# Patient Record
Sex: MALE | Race: ASIAN | NOT HISPANIC OR LATINO | URBAN - METROPOLITAN AREA
[De-identification: names, ages, dates, MRNs, and addresses within clinical notes are randomized per-mention and may not be internally consistent; named-entity substitution may affect disease eponyms.]

---

## 2018-02-20 ENCOUNTER — INPATIENT (INPATIENT)
Facility: HOSPITAL | Age: 48
LOS: 0 days | Discharge: HOME | DRG: 251 | End: 2018-02-21
Attending: INTERNAL MEDICINE | Admitting: INTERNAL MEDICINE
Payer: MEDICAID

## 2018-02-20 VITALS
TEMPERATURE: 98 F | HEART RATE: 70 BPM | DIASTOLIC BLOOD PRESSURE: 94 MMHG | SYSTOLIC BLOOD PRESSURE: 161 MMHG | WEIGHT: 179.9 LBS | OXYGEN SATURATION: 99 % | RESPIRATION RATE: 18 BRPM

## 2018-02-20 DIAGNOSIS — Z98.61 CORONARY ANGIOPLASTY STATUS: Chronic | ICD-10-CM

## 2018-02-20 PROBLEM — E78.5 HYPERLIPIDEMIA, UNSPECIFIED: Chronic | Status: ACTIVE | Noted: 2018-02-16

## 2018-02-20 LAB
ALBUMIN SERPL ELPH-MCNC: 4.8 G/DL — SIGNIFICANT CHANGE UP (ref 3.3–5)
ALP SERPL-CCNC: 99 U/L — SIGNIFICANT CHANGE UP (ref 40–120)
ALT FLD-CCNC: 15 U/L — SIGNIFICANT CHANGE UP (ref 10–45)
ANION GAP SERPL CALC-SCNC: 14 MMOL/L — SIGNIFICANT CHANGE UP (ref 5–17)
APTT BLD: 37.2 SEC — SIGNIFICANT CHANGE UP (ref 27.5–37.4)
AST SERPL-CCNC: 19 U/L — SIGNIFICANT CHANGE UP (ref 10–40)
BASOPHILS NFR BLD AUTO: 0.8 % — SIGNIFICANT CHANGE UP (ref 0–2)
BILIRUB SERPL-MCNC: 0.7 MG/DL — SIGNIFICANT CHANGE UP (ref 0.2–1.2)
BUN SERPL-MCNC: 11 MG/DL — SIGNIFICANT CHANGE UP (ref 7–23)
CALCIUM SERPL-MCNC: 9.8 MG/DL — SIGNIFICANT CHANGE UP (ref 8.4–10.5)
CHLORIDE SERPL-SCNC: 97 MMOL/L — SIGNIFICANT CHANGE UP (ref 96–108)
CK MB CFR SERPL CALC: 1.7 NG/ML — SIGNIFICANT CHANGE UP (ref 0–6.7)
CK SERPL-CCNC: 302 U/L — HIGH (ref 30–200)
CO2 SERPL-SCNC: 26 MMOL/L — SIGNIFICANT CHANGE UP (ref 22–31)
CREAT SERPL-MCNC: 0.89 MG/DL — SIGNIFICANT CHANGE UP (ref 0.5–1.3)
EOSINOPHIL NFR BLD AUTO: 10 % — HIGH (ref 0–6)
GLUCOSE SERPL-MCNC: 154 MG/DL — HIGH (ref 70–99)
HCT VFR BLD CALC: 35.1 % — LOW (ref 39–50)
HGB BLD-MCNC: 11.1 G/DL — LOW (ref 13–17)
INR BLD: 1.05 — SIGNIFICANT CHANGE UP (ref 0.88–1.16)
LYMPHOCYTES # BLD AUTO: 41.8 % — SIGNIFICANT CHANGE UP (ref 13–44)
MCHC RBC-ENTMCNC: 22.7 PG — LOW (ref 27–34)
MCHC RBC-ENTMCNC: 31.6 G/DL — LOW (ref 32–36)
MCV RBC AUTO: 71.8 FL — LOW (ref 80–100)
MONOCYTES NFR BLD AUTO: 7.3 % — SIGNIFICANT CHANGE UP (ref 2–14)
NEUTROPHILS NFR BLD AUTO: 40.1 % — LOW (ref 43–77)
PLATELET # BLD AUTO: 184 K/UL — SIGNIFICANT CHANGE UP (ref 150–400)
POTASSIUM SERPL-MCNC: 3.9 MMOL/L — SIGNIFICANT CHANGE UP (ref 3.5–5.3)
POTASSIUM SERPL-SCNC: 3.9 MMOL/L — SIGNIFICANT CHANGE UP (ref 3.5–5.3)
PROT SERPL-MCNC: 8.4 G/DL — HIGH (ref 6–8.3)
PROTHROM AB SERPL-ACNC: 11.7 SEC — SIGNIFICANT CHANGE UP (ref 9.8–12.7)
RBC # BLD: 4.89 M/UL — SIGNIFICANT CHANGE UP (ref 4.2–5.8)
RBC # FLD: 15.7 % — SIGNIFICANT CHANGE UP (ref 10.3–16.9)
SODIUM SERPL-SCNC: 137 MMOL/L — SIGNIFICANT CHANGE UP (ref 135–145)
TROPONIN T SERPL-MCNC: <0.01 NG/ML — SIGNIFICANT CHANGE UP (ref 0–0.01)
WBC # BLD: 6.4 K/UL — SIGNIFICANT CHANGE UP (ref 3.8–10.5)
WBC # FLD AUTO: 6.4 K/UL — SIGNIFICANT CHANGE UP (ref 3.8–10.5)

## 2018-02-20 PROCEDURE — 93571 IV DOP VEL&/PRESS C FLO 1ST: CPT | Mod: 26,LC

## 2018-02-20 PROCEDURE — 93458 L HRT ARTERY/VENTRICLE ANGIO: CPT | Mod: 26,XU

## 2018-02-20 PROCEDURE — 71045 X-RAY EXAM CHEST 1 VIEW: CPT | Mod: 26

## 2018-02-20 PROCEDURE — 99285 EMERGENCY DEPT VISIT HI MDM: CPT | Mod: 25

## 2018-02-20 PROCEDURE — 93010 ELECTROCARDIOGRAM REPORT: CPT

## 2018-02-20 PROCEDURE — 92920 PRQ TRLUML C ANGIOP 1ART&/BR: CPT | Mod: LC

## 2018-02-20 RX ORDER — ATORVASTATIN CALCIUM 80 MG/1
40 TABLET, FILM COATED ORAL AT BEDTIME
Qty: 0 | Refills: 0 | Status: DISCONTINUED | OUTPATIENT
Start: 2018-02-20 | End: 2018-02-21

## 2018-02-20 RX ORDER — CLOPIDOGREL BISULFATE 75 MG/1
75 TABLET, FILM COATED ORAL ONCE
Qty: 0 | Refills: 0 | Status: COMPLETED | OUTPATIENT
Start: 2018-02-20 | End: 2018-02-20

## 2018-02-20 RX ORDER — CLOPIDOGREL BISULFATE 75 MG/1
0 TABLET, FILM COATED ORAL
Qty: 0 | Refills: 0 | COMMUNITY

## 2018-02-20 RX ORDER — ISOSORBIDE MONONITRATE 60 MG/1
30 TABLET, EXTENDED RELEASE ORAL ONCE
Qty: 0 | Refills: 0 | Status: COMPLETED | OUTPATIENT
Start: 2018-02-20 | End: 2018-02-20

## 2018-02-20 RX ORDER — TICAGRELOR 90 MG/1
180 TABLET ORAL ONCE
Qty: 0 | Refills: 0 | Status: COMPLETED | OUTPATIENT
Start: 2018-02-20 | End: 2018-02-20

## 2018-02-20 RX ORDER — ATORVASTATIN CALCIUM 80 MG/1
0 TABLET, FILM COATED ORAL
Qty: 0 | Refills: 0 | COMMUNITY

## 2018-02-20 RX ORDER — HEPARIN SODIUM 5000 [USP'U]/ML
5000 INJECTION INTRAVENOUS; SUBCUTANEOUS EVERY 6 HOURS
Qty: 0 | Refills: 0 | Status: DISCONTINUED | OUTPATIENT
Start: 2018-02-20 | End: 2018-02-20

## 2018-02-20 RX ORDER — SODIUM CHLORIDE 9 MG/ML
500 INJECTION INTRAMUSCULAR; INTRAVENOUS; SUBCUTANEOUS
Qty: 0 | Refills: 0 | Status: DISCONTINUED | OUTPATIENT
Start: 2018-02-20 | End: 2018-02-21

## 2018-02-20 RX ORDER — RANOLAZINE 500 MG/1
0 TABLET, FILM COATED, EXTENDED RELEASE ORAL
Qty: 0 | Refills: 0 | COMMUNITY

## 2018-02-20 RX ORDER — RANOLAZINE 500 MG/1
500 TABLET, FILM COATED, EXTENDED RELEASE ORAL
Qty: 0 | Refills: 0 | Status: DISCONTINUED | OUTPATIENT
Start: 2018-02-20 | End: 2018-02-21

## 2018-02-20 RX ORDER — ASPIRIN/CALCIUM CARB/MAGNESIUM 324 MG
0 TABLET ORAL
Qty: 0 | Refills: 0 | COMMUNITY

## 2018-02-20 RX ORDER — ISOSORBIDE MONONITRATE 60 MG/1
30 TABLET, EXTENDED RELEASE ORAL DAILY
Qty: 0 | Refills: 0 | Status: DISCONTINUED | OUTPATIENT
Start: 2018-02-20 | End: 2018-02-21

## 2018-02-20 RX ORDER — HEPARIN SODIUM 5000 [USP'U]/ML
INJECTION INTRAVENOUS; SUBCUTANEOUS
Qty: 25000 | Refills: 0 | Status: DISCONTINUED | OUTPATIENT
Start: 2018-02-20 | End: 2018-02-20

## 2018-02-20 RX ORDER — NITROGLYCERIN 6.5 MG
0 CAPSULE, EXTENDED RELEASE ORAL
Qty: 0 | Refills: 0 | COMMUNITY

## 2018-02-20 RX ORDER — INFLUENZA VIRUS VACCINE 15; 15; 15; 15 UG/.5ML; UG/.5ML; UG/.5ML; UG/.5ML
0.5 SUSPENSION INTRAMUSCULAR ONCE
Qty: 0 | Refills: 0 | Status: COMPLETED | OUTPATIENT
Start: 2018-02-20 | End: 2018-02-20

## 2018-02-20 RX ORDER — METOPROLOL TARTRATE 50 MG
25 TABLET ORAL
Qty: 0 | Refills: 0 | Status: DISCONTINUED | OUTPATIENT
Start: 2018-02-20 | End: 2018-02-21

## 2018-02-20 RX ORDER — METOPROLOL TARTRATE 50 MG
25 TABLET ORAL ONCE
Qty: 0 | Refills: 0 | Status: COMPLETED | OUTPATIENT
Start: 2018-02-20 | End: 2018-02-20

## 2018-02-20 RX ORDER — ISOSORBIDE MONONITRATE 60 MG/1
0 TABLET, EXTENDED RELEASE ORAL
Qty: 0 | Refills: 0 | COMMUNITY

## 2018-02-20 RX ORDER — HEPARIN SODIUM 5000 [USP'U]/ML
5000 INJECTION INTRAVENOUS; SUBCUTANEOUS ONCE
Qty: 0 | Refills: 0 | Status: COMPLETED | OUTPATIENT
Start: 2018-02-20 | End: 2018-02-20

## 2018-02-20 RX ORDER — CLOPIDOGREL BISULFATE 75 MG/1
75 TABLET, FILM COATED ORAL DAILY
Qty: 0 | Refills: 0 | Status: DISCONTINUED | OUTPATIENT
Start: 2018-02-21 | End: 2018-02-21

## 2018-02-20 RX ORDER — NITROGLYCERIN 6.5 MG
0.4 CAPSULE, EXTENDED RELEASE ORAL
Qty: 0 | Refills: 0 | Status: DISCONTINUED | OUTPATIENT
Start: 2018-02-20 | End: 2018-02-21

## 2018-02-20 RX ORDER — ASPIRIN/CALCIUM CARB/MAGNESIUM 324 MG
325 TABLET ORAL DAILY
Qty: 0 | Refills: 0 | Status: DISCONTINUED | OUTPATIENT
Start: 2018-02-20 | End: 2018-02-20

## 2018-02-20 RX ORDER — METOPROLOL TARTRATE 50 MG
0 TABLET ORAL
Qty: 0 | Refills: 0 | COMMUNITY

## 2018-02-20 RX ORDER — ASPIRIN/CALCIUM CARB/MAGNESIUM 324 MG
81 TABLET ORAL DAILY
Qty: 0 | Refills: 0 | Status: DISCONTINUED | OUTPATIENT
Start: 2018-02-20 | End: 2018-02-21

## 2018-02-20 RX ADMIN — HEPARIN SODIUM 1000 UNIT(S)/HR: 5000 INJECTION INTRAVENOUS; SUBCUTANEOUS at 10:53

## 2018-02-20 RX ADMIN — CLOPIDOGREL BISULFATE 75 MILLIGRAM(S): 75 TABLET, FILM COATED ORAL at 17:36

## 2018-02-20 RX ADMIN — RANOLAZINE 500 MILLIGRAM(S): 500 TABLET, FILM COATED, EXTENDED RELEASE ORAL at 17:35

## 2018-02-20 RX ADMIN — TICAGRELOR 180 MILLIGRAM(S): 90 TABLET ORAL at 10:45

## 2018-02-20 RX ADMIN — Medication 5 MILLIGRAM(S): at 10:46

## 2018-02-20 RX ADMIN — Medication 325 MILLIGRAM(S): at 10:45

## 2018-02-20 RX ADMIN — Medication 0.4 MILLIGRAM(S): at 10:46

## 2018-02-20 RX ADMIN — ISOSORBIDE MONONITRATE 30 MILLIGRAM(S): 60 TABLET, EXTENDED RELEASE ORAL at 10:45

## 2018-02-20 RX ADMIN — HEPARIN SODIUM 5000 UNIT(S): 5000 INJECTION INTRAVENOUS; SUBCUTANEOUS at 10:53

## 2018-02-20 RX ADMIN — SODIUM CHLORIDE 100 MILLILITER(S): 9 INJECTION INTRAMUSCULAR; INTRAVENOUS; SUBCUTANEOUS at 14:37

## 2018-02-20 RX ADMIN — Medication 25 MILLIGRAM(S): at 17:36

## 2018-02-20 RX ADMIN — Medication 25 MILLIGRAM(S): at 10:46

## 2018-02-20 NOTE — H&P ADULT - NSHPLABSRESULTS_GEN_ALL_CORE
11.1   6.4   )-----------( 184      ( 20 Feb 2018 10:37 )             35.1       02-20    137  |  97  |  11  ----------------------------<  154<H>  3.9   |  26  |  0.89    Ca    9.8      20 Feb 2018 10:37    TPro  8.4<H>  /  Alb  4.8  /  TBili  0.7  /  DBili  x   /  AST  19  /  ALT  15  /  AlkPhos  99  02-20      PT/INR - ( 20 Feb 2018 10:37 )   PT: 11.7 sec;   INR: 1.05          PTT - ( 20 Feb 2018 10:37 )  PTT:37.2 sec    CARDIAC MARKERS ( 20 Feb 2018 10:37 )  x     / <0.01 ng/mL / 302 U/L / x     / 1.7 ng/mL            EKG: NSR @ 72 with no ischemic changes

## 2018-02-20 NOTE — ED PROVIDER NOTE - PROGRESS NOTE DETAILS
Pt discussed w Dr Rivera - he is going to do cath today and pt tba to Dr Yu.  Pt discussed w Dr Yu who agrees w plan.

## 2018-02-20 NOTE — H&P ADULT - HISTORY OF PRESENT ILLNESS
47 year old male with PMH CAD with MI (2010) and multiple PCIs (most recently JUAN midLAD and OM1 9/2016), HTN, HLD, GERD and hemorrhoids   presented to [facility] ED on 2/20 complaining of intermittent, SSCP radiating to the left chest/shoulder with associated SOB, nausea, diaphoresis x 2 weeks. Patient reports pain comes both on exertion and at rest and is only relieved with NTG SL. Additionally, patient reports orthopnea and has been waking up at night with SOB and CP. Of note, patient reports has not taken any of his meds including ASA and plavix for the past 2 days. Denies palpitations, dizziness, syncope, LE edema, and melena. VS on arrival T 97.6  /94 HR 70 RR 18 SpO2 99% RA. Labs significant for H/H 11.1/35.1, , CKMB 1.7 and trop (-). In ED patient given  mg, Brilinta 180 mg, Enalapril 5 mg, Imdur 30mg, Metoprolol tartrate 25mg and started on IV Heparin gtt. Patient not admitted for recommended cardiac catheterization in the setting of unstable angina. 47 year old male with PMH CAD with MI (2010) and multiple PCIs (most recently JUAN midLAD and OM1 9/2016), HTN, HLD, GERD and hemorrhoids presented to [facility] ED on 2/20 complaining of intermittent, SSCP radiating to the left chest/shoulder with associated SOB, nausea, diaphoresis x 2 weeks. Patient reports pain comes both on exertion and at rest and is only relieved with NTG SL. Additionally, patient reports orthopnea and has been waking up at night with SOB and CP. Of note, patient reports has not taken any of his meds including ASA and plavix for the past 2 days. Denies palpitations, dizziness, syncope, LE edema, and melena. VS on arrival T 97.6  /94 HR 70 RR 18 SpO2 99% RA. Labs significant for H/H 11.1/35.1, , CKMB 1.7 and trop (-). EKG revealed NSR @ 72bpm without ischemic changes. In ED patient given  mg, Brilinta 180 mg, Enalapril 5 mg, Imdur 30mg, Metoprolol tartrate 25mg and started on IV Heparin gtt. Patient not admitted for recommended cardiac catheterization in the setting of unstable angina. 47 year old male with PMH CAD with MI (2010) and multiple PCIs (most recently JUAN midLAD and OM1 9/2016), HTN, HLD, GERD and hemorrhoids presented to [facility] ED on 2/20 complaining of intermittent, SSCP radiating to the left chest/shoulder with associated SOB, nausea, diaphoresis x 2 weeks. Patient reports pain comes both on exertion and at rest and is only relieved with NTG SL. Additionally, patient reports orthopnea and has been waking up at night with SOB and CP. Of note, patient reports has not taken any of his meds including ASA and plavix for the past 2 days. Denies palpitations, dizziness, syncope, LE edema, and melena. VS on arrival T 97.6  /94 HR 70 RR 18 SpO2 99% RA. Labs significant for H/H 11.1/35.1, , CKMB 1.7 and trop (-). EKG revealed NSR @ 72bpm without ischemic changes. In ED patient given  mg, Brilinta 180 mg, Enalapril 5 mg, Imdur 30mg, Metoprolol tartrate 25mg and started on IV Heparin gtt. Patient now admitted for recommended cardiac catheterization with possible intervention in the setting of unstable angina.

## 2018-02-20 NOTE — ED PROVIDER NOTE - PMH
Atherosclerosis of coronary artery  CAD (coronary artery disease)  Essential hypertension  HTN (hypertension)  Gastroesophageal reflux disease  GERD (gastroesophageal reflux disease)  Hyperlipidemia

## 2018-02-20 NOTE — ED PROVIDER NOTE - MEDICAL DECISION MAKING DETAILS
Pt c/o progressive cp worse w exertion concerning for acs.  EKG w/o stemi.  Pt reports he's supposed to have cath today.  Pt given ntg, bp meds (did not take his meds x 2 d), heparin, brilinta.  Dr Rivera paged to discuss.  Plan for admit for cath.

## 2018-02-20 NOTE — H&P ADULT - ASSESSMENT
47 year old male with PMH CAD with MI (2010) and multiple PCIs (most recently JUAN midLAD and OM1 9/2016), HTN, HLD, GERD and hemorrhoids presented to [facility] ED on 2/20 complaining of unstable anginal symptoms and is now admitted for cardiac catheterization with possible intervention.    ASA 3, Mallampati 2    OF NOTE: Patient was loaded with ASA 325mg and Brilinta 180mg in the ED, denies taking his ASA/plavix last two days. Difficulty lying flat. Case d/w Dr. Rivera.    Risks & benefits of procedure and alternative therapy have been explained to the patient including but not limited to: allergic reaction, bleeding w/possible need for blood transfusion, infection, renal and vascular compromise, limb damage, arrhythmia, stroke, vessel dissection/perforation, Myocardial infarction, emergent CABG. Informed consent obtained and in chart.

## 2018-02-20 NOTE — ED PROVIDER NOTE - DIAGNOSTIC INTERPRETATION
ER Physician:  Vivian Director  CHEST XRAY INTERPRETATION: lungs clear, heart shadow normal, bony structures intact

## 2018-02-20 NOTE — ED ADULT NURSE NOTE - OBJECTIVE STATEMENT
pt is a 47 year old male who was sent in by his cardiologist for 2 weeks of chest pain which has been worsening. pt states "I have a history of 8 stents, I have been having pain for 2 weeks which is getting worse and my doctor told me to come in so they could look at my heart again, nitroglycerin has not been working and I have not been able to take my medicatiosn for the past two days because my pharmacy didn't have the refills". pt reports intermittent chest pain associated with nausea, dizziness and shortness of breath. pt denies any cough, fevers, chills, vomiting or falls. pt is speaking in full sentences, in NAD, EKG was done, iv placed and labs sent. pt is a 47 year old male who was sent in by his cardiologist for 2 weeks of chest pain which has been worsening. pt states "I have a history of 8 stents, I have been having pain for 2 weeks which is getting worse and my doctor told me to come in so they could look at my heart again, nitroglycerin has not been working and I have not been able to take my medications for the past two days because my pharmacy didn't have the refills". pt reports intermittent chest pain associated with nausea, dizziness and shortness of breath. pt denies any cough, fevers, chills, vomiting or falls. pt is speaking in full sentences, in NAD, EKG was done, iv placed and labs sent.

## 2018-02-20 NOTE — ED PROVIDER NOTE - OBJECTIVE STATEMENT
48 yo male h/o HTN, hyperlipidemia, CAD s/p MI JUAN LAD/OM 2015 c/o progressively worsening midsternal chest pain that is described as --------that radiates to left shoulder and occurs with --------, relieved with SL NTG (stating he need to take more NTG) over the past 6 weeks.  Associated symptoms includes shortness of breath, 46 yo male h/o tob abuse, HTN, hyperlipidemia, CAD s/p MI JUAN LAD/OM 2015 c/o progressively worsening midsternal chest pain sharp w radiation to LUE similar to pain w prev cad related issues, worse w minimal exertion but also present at rest.  Some relief w ntg - last use this am, 7-->3/10 after ntg but returned to a 7.  + sob, no palpitations.  Pt saw his md, Dr Rivera, on Fri.  No meds x 2 d except ntg 2/2 prescription problem.  No fever, cough, uri sx, abd pain.  Pt states his md is planning a cath today.

## 2018-02-20 NOTE — ED ADULT NURSE REASSESSMENT NOTE - NS ED NURSE REASSESS COMMENT FT1
heparin infusion started at 10cc/hr. nsr on cm. reports given to cath lab rnyahir. awaiting transport.

## 2018-02-21 ENCOUNTER — TRANSCRIPTION ENCOUNTER (OUTPATIENT)
Age: 48
End: 2018-02-21

## 2018-02-21 VITALS — TEMPERATURE: 96 F

## 2018-02-21 LAB
ANION GAP SERPL CALC-SCNC: 13 MMOL/L — SIGNIFICANT CHANGE UP (ref 5–17)
BASOPHILS NFR BLD AUTO: 0.3 % — SIGNIFICANT CHANGE UP (ref 0–2)
BUN SERPL-MCNC: 14 MG/DL — SIGNIFICANT CHANGE UP (ref 7–23)
CALCIUM SERPL-MCNC: 9.2 MG/DL — SIGNIFICANT CHANGE UP (ref 8.4–10.5)
CHLORIDE SERPL-SCNC: 102 MMOL/L — SIGNIFICANT CHANGE UP (ref 96–108)
CHOLEST SERPL-MCNC: 151 MG/DL — SIGNIFICANT CHANGE UP (ref 10–199)
CO2 SERPL-SCNC: 22 MMOL/L — SIGNIFICANT CHANGE UP (ref 22–31)
CREAT SERPL-MCNC: 0.94 MG/DL — SIGNIFICANT CHANGE UP (ref 0.5–1.3)
EOSINOPHIL NFR BLD AUTO: 10.9 % — HIGH (ref 0–6)
GLUCOSE SERPL-MCNC: 125 MG/DL — HIGH (ref 70–99)
HCT VFR BLD CALC: 33.1 % — LOW (ref 39–50)
HDLC SERPL-MCNC: 32 MG/DL — LOW (ref 40–125)
HGB BLD-MCNC: 10.1 G/DL — LOW (ref 13–17)
LIPID PNL WITH DIRECT LDL SERPL: 90 MG/DL — SIGNIFICANT CHANGE UP
LYMPHOCYTES # BLD AUTO: 35 % — SIGNIFICANT CHANGE UP (ref 13–44)
MAGNESIUM SERPL-MCNC: 1.9 MG/DL — SIGNIFICANT CHANGE UP (ref 1.6–2.6)
MCHC RBC-ENTMCNC: 22 PG — LOW (ref 27–34)
MCHC RBC-ENTMCNC: 30.5 G/DL — LOW (ref 32–36)
MCV RBC AUTO: 72 FL — LOW (ref 80–100)
MONOCYTES NFR BLD AUTO: 7.6 % — SIGNIFICANT CHANGE UP (ref 2–14)
NEUTROPHILS NFR BLD AUTO: 46.2 % — SIGNIFICANT CHANGE UP (ref 43–77)
PLATELET # BLD AUTO: 173 K/UL — SIGNIFICANT CHANGE UP (ref 150–400)
POTASSIUM SERPL-MCNC: 4 MMOL/L — SIGNIFICANT CHANGE UP (ref 3.5–5.3)
POTASSIUM SERPL-SCNC: 4 MMOL/L — SIGNIFICANT CHANGE UP (ref 3.5–5.3)
RBC # BLD: 4.6 M/UL — SIGNIFICANT CHANGE UP (ref 4.2–5.8)
RBC # FLD: 16 % — SIGNIFICANT CHANGE UP (ref 10.3–16.9)
SODIUM SERPL-SCNC: 137 MMOL/L — SIGNIFICANT CHANGE UP (ref 135–145)
TOTAL CHOLESTEROL/HDL RATIO MEASUREMENT: 4.7 RATIO — SIGNIFICANT CHANGE UP (ref 3.4–9.6)
TRIGL SERPL-MCNC: 147 MG/DL — SIGNIFICANT CHANGE UP (ref 10–149)
WBC # BLD: 7 K/UL — SIGNIFICANT CHANGE UP (ref 3.8–10.5)
WBC # FLD AUTO: 7 K/UL — SIGNIFICANT CHANGE UP (ref 3.8–10.5)

## 2018-02-21 PROCEDURE — 99238 HOSP IP/OBS DSCHRG MGMT 30/<: CPT

## 2018-02-21 RX ORDER — RANOLAZINE 500 MG/1
1 TABLET, FILM COATED, EXTENDED RELEASE ORAL
Qty: 60 | Refills: 2
Start: 2018-02-21 | End: 2018-05-21

## 2018-02-21 RX ORDER — METOPROLOL TARTRATE 50 MG
1 TABLET ORAL
Qty: 60 | Refills: 2 | OUTPATIENT
Start: 2018-02-21 | End: 2018-05-21

## 2018-02-21 RX ORDER — CLOPIDOGREL BISULFATE 75 MG/1
1 TABLET, FILM COATED ORAL
Qty: 30 | Refills: 11
Start: 2018-02-21 | End: 2019-02-15

## 2018-02-21 RX ORDER — ASPIRIN/CALCIUM CARB/MAGNESIUM 324 MG
1 TABLET ORAL
Qty: 0 | Refills: 0 | COMMUNITY

## 2018-02-21 RX ORDER — ISOSORBIDE MONONITRATE 60 MG/1
1 TABLET, EXTENDED RELEASE ORAL
Qty: 0 | Refills: 0 | COMMUNITY

## 2018-02-21 RX ORDER — ASPIRIN/CALCIUM CARB/MAGNESIUM 324 MG
1 TABLET ORAL
Qty: 30 | Refills: 11
Start: 2018-02-21 | End: 2019-02-15

## 2018-02-21 RX ORDER — ATORVASTATIN CALCIUM 80 MG/1
1 TABLET, FILM COATED ORAL
Qty: 0 | Refills: 0 | COMMUNITY

## 2018-02-21 RX ORDER — CLOPIDOGREL BISULFATE 75 MG/1
1 TABLET, FILM COATED ORAL
Qty: 0 | Refills: 0 | COMMUNITY

## 2018-02-21 RX ORDER — RANOLAZINE 500 MG/1
1 TABLET, FILM COATED, EXTENDED RELEASE ORAL
Qty: 0 | Refills: 0 | COMMUNITY

## 2018-02-21 RX ORDER — ISOSORBIDE MONONITRATE 60 MG/1
1 TABLET, EXTENDED RELEASE ORAL
Qty: 30 | Refills: 2
Start: 2018-02-21 | End: 2018-05-21

## 2018-02-21 RX ORDER — ATORVASTATIN CALCIUM 80 MG/1
1 TABLET, FILM COATED ORAL
Qty: 30 | Refills: 2
Start: 2018-02-21 | End: 2018-05-21

## 2018-02-21 RX ORDER — METOPROLOL TARTRATE 50 MG
1 TABLET ORAL
Qty: 0 | Refills: 0 | COMMUNITY

## 2018-02-21 RX ORDER — MAGNESIUM OXIDE 400 MG ORAL TABLET 241.3 MG
400 TABLET ORAL ONCE
Qty: 0 | Refills: 0 | Status: COMPLETED | OUTPATIENT
Start: 2018-02-21 | End: 2018-02-21

## 2018-02-21 RX ADMIN — MAGNESIUM OXIDE 400 MG ORAL TABLET 400 MILLIGRAM(S): 241.3 TABLET ORAL at 10:00

## 2018-02-21 RX ADMIN — Medication 5 MILLIGRAM(S): at 06:40

## 2018-02-21 RX ADMIN — CLOPIDOGREL BISULFATE 75 MILLIGRAM(S): 75 TABLET, FILM COATED ORAL at 10:00

## 2018-02-21 RX ADMIN — Medication 81 MILLIGRAM(S): at 10:00

## 2018-02-21 RX ADMIN — RANOLAZINE 500 MILLIGRAM(S): 500 TABLET, FILM COATED, EXTENDED RELEASE ORAL at 06:40

## 2018-02-21 RX ADMIN — ISOSORBIDE MONONITRATE 30 MILLIGRAM(S): 60 TABLET, EXTENDED RELEASE ORAL at 10:00

## 2018-02-21 RX ADMIN — ATORVASTATIN CALCIUM 40 MILLIGRAM(S): 80 TABLET, FILM COATED ORAL at 00:09

## 2018-02-21 NOTE — DISCHARGE NOTE ADULT - HOSPITAL COURSE
47 year old male with PMH CAD with MI (2010) and multiple PCIs (most recently JUAN midLAD and OM1 9/2016), HTN, HLD, GERD and hemorrhoids presented to [facility] ED on 2/20 with c/o unstable angina.  Patient reports he has not taken any of his meds including ASA and plavix for the past 2 days due to running out of the prescriptions.  On arrival VSS.  Cardiac enzymes negative.  EKG revealed NSR @ 72bpm without ischemic changes. In ED patient given  mg, Brilinta 180 mg, Enalapril 5 mg, Imdur 30mg, Metoprolol tartrate 25mg and started on IV Heparin gtt.  Patient admitted for cardiac catheterization.  He now s/p cardiac catheterization 2/20/18 revealing 2VCAD, LM: normal, LAD: luminal irregularities, D1: patent stent, LCx: luminal irregularities, large dominant, OM1: medium vessel, 70% ISR, RCA: 70% mid vessel stenosis, non-dominant, small vessel, LVEF: 50%, LVEDP 4, No AS, Inferior hypokinesis. Successful PTCA of the mid OM1 70% ISR with < 5% residual stenosis.  Right Radial D-Stat Band 3:30pm.  Continue ASA & Plavix per Dr Rivera.  (received Brilinta load precath) 47 year old male with PMH CAD with MI (2010) and multiple PCIs (most recently JUAN midLAD and OM1 9/2016), HTN, HLD, GERD and hemorrhoids presented to [facility] ED on 2/20 with c/o unstable angina.  Patient reports he has not taken any of his meds including ASA and plavix for the past 2 days due to running out of the prescriptions.  On arrival VSS.  Cardiac enzymes negative.  EKG revealed NSR @ 72bpm without ischemic changes. In ED patient given  mg, Brilinta 180 mg, Enalapril 5 mg, Imdur 30mg, Metoprolol tartrate 25mg and started on IV Heparin gtt.  Patient admitted for cardiac catheterization.  He now s/p cardiac catheterization 2/20/18 revealing 2VCAD, LM: normal, LAD: luminal irregularities, D1: patent stent, LCx: luminal irregularities, large dominant, OM1: medium vessel, 70% ISR, RCA: 70% mid vessel stenosis, non-dominant, small vessel, LVEF: 50%, LVEDP 4, No AS, Inferior hypokinesis. s/p Successful PTCA of the mid OM1 70% ISR with < 5% residual stenosis.  Right Radial access site stable with no hematoma, bleeding, and pulses intact.  Patient recommended to continue home ASA/Plavix/Statin/BB and blood pressure medications as usual.  He is feeling well post procedure.  Labs, Vitals, Meds reviewed with dr. Pacheco and patient deemed stable for discharge home.  He will follow up with Dr. Rivera.  All medications E prescribed to pharmacy of choice as patient was without any new refills at home.

## 2018-02-21 NOTE — DISCHARGE NOTE ADULT - CARE PROVIDER_API CALL
Mathew Rivera), Cardiovascular Disease; Internal Medicine; Interventional Cardiology  [address] [place] [address]  Phone: [phone]  Fax: [phone]

## 2018-02-21 NOTE — DISCHARGE NOTE ADULT - CARE PLAN
Principal Discharge DX:	Coronary artery disease  Goal:	Please follow up with Dr. Rivera in 1-2 weeks.  Assessment and plan of treatment:	You underwent a cardiac catheterization receiving balooning and opening of a prior stent that was blocked (obtuse marginal stent)  - Please continue taking aspirin 81mg daily and Plavix 75mg daily.  DO NOT STOP THESE MEDICATIONS AS THEY PREVENT YOUR STENT FROM CLOSING.  A new prescritpion was sent to your pharmacy    You underwent a coronary angiogram and should wait 3 days before returning to ordinary activities. Do not drive for 2 days. Consult your doctor before returning to vigorous activity. You may return to work in 3-5 days. The catheter from your wrist was removed and you should remove the dressing in 24 hours. You may shower once the dressing is removed, but avoid baths, hot tubs, or swimming for 5 days to prevent infection. If you notice bleeding from the site, hardening and pain at the site, drainage or redness from the site, coolness/paleness of the extremity, swelling, or fever, please call [phone].  Secondary Diagnosis:	Essential hypertension  Goal:	Your blood pressure is stable.  Please continue taking Enalapril, Metoprolol, and Imdur as usual.  new prescriptions were sent for all your medications.  Secondary Diagnosis:	Hyperlipidemia  Goal:	Please continue taking Lipitor 40mg daily at bedtime. A new prescription was sent to your pharmacy. Principal Discharge DX:	Coronary artery disease  Goal:	Please follow up with Dr. Rivera in 1-2 weeks.  Assessment and plan of treatment:	You underwent a cardiac catheterization receiving ballooning and opening of a prior stent that was blocked (obtuse marginal stent)  - Please continue taking aspirin 81mg daily and Plavix 75mg daily.  DO NOT STOP THESE MEDICATIONS AS THEY PREVENT YOUR STENT FROM CLOSING.  A new prescritpion was sent to your pharmacy    You underwent a coronary angiogram and should wait 3 days before returning to ordinary activities. Do not drive for 2 days. Consult your doctor before returning to vigorous activity. You may return to work in 3-5 days. The catheter from your wrist was removed and you should remove the dressing in 24 hours. You may shower once the dressing is removed, but avoid baths, hot tubs, or swimming for 5 days to prevent infection. If you notice bleeding from the site, hardening and pain at the site, drainage or redness from the site, coolness/paleness of the extremity, swelling, or fever, please call [phone].  Secondary Diagnosis:	Essential hypertension  Goal:	Your blood pressure is stable.  Please continue taking Enalapril, Metoprolol, and Imdur as usual.  new prescriptions were sent for all your medications.  Secondary Diagnosis:	Hyperlipidemia  Goal:	Your cholesterol is stable.  Please continue taking Lipitor 40mg daily at bedtime. A new prescription was sent to your pharmacy.

## 2018-02-21 NOTE — DISCHARGE NOTE ADULT - PLAN OF CARE
Please follow up with Dr. Rivera in 1-2 weeks. You underwent a cardiac catheterization receiving balooning and opening of a prior stent that was blocked (obtuse marginal stent)  - Please continue taking aspirin 81mg daily and Plavix 75mg daily.  DO NOT STOP THESE MEDICATIONS AS THEY PREVENT YOUR STENT FROM CLOSING.  A new prescritpion was sent to your pharmacy    You underwent a coronary angiogram and should wait 3 days before returning to ordinary activities. Do not drive for 2 days. Consult your doctor before returning to vigorous activity. You may return to work in 3-5 days. The catheter from your wrist was removed and you should remove the dressing in 24 hours. You may shower once the dressing is removed, but avoid baths, hot tubs, or swimming for 5 days to prevent infection. If you notice bleeding from the site, hardening and pain at the site, drainage or redness from the site, coolness/paleness of the extremity, swelling, or fever, please call [phone]. Your blood pressure is stable.  Please continue taking Enalapril, Metoprolol, and Imdur as usual.  new prescriptions were sent for all your medications. Please continue taking Lipitor 40mg daily at bedtime. A new prescription was sent to your pharmacy. You underwent a cardiac catheterization receiving ballooning and opening of a prior stent that was blocked (obtuse marginal stent)  - Please continue taking aspirin 81mg daily and Plavix 75mg daily.  DO NOT STOP THESE MEDICATIONS AS THEY PREVENT YOUR STENT FROM CLOSING.  A new prescritpion was sent to your pharmacy    You underwent a coronary angiogram and should wait 3 days before returning to ordinary activities. Do not drive for 2 days. Consult your doctor before returning to vigorous activity. You may return to work in 3-5 days. The catheter from your wrist was removed and you should remove the dressing in 24 hours. You may shower once the dressing is removed, but avoid baths, hot tubs, or swimming for 5 days to prevent infection. If you notice bleeding from the site, hardening and pain at the site, drainage or redness from the site, coolness/paleness of the extremity, swelling, or fever, please call [phone]. Your cholesterol is stable.  Please continue taking Lipitor 40mg daily at bedtime. A new prescription was sent to your pharmacy.

## 2018-02-21 NOTE — DISCHARGE NOTE ADULT - MEDICATION SUMMARY - MEDICATIONS TO TAKE
I will START or STAY ON the medications listed below when I get home from the hospital:    aspirin 81 mg oral tablet  -- 1 tab(s) by mouth once a day  -- Indication: For Coronary artery disease    enalapril 5 mg oral tablet  -- 1 tab(s) by mouth once a day  -- Indication: For High blood pressure    isosorbide mononitrate 30 mg oral tablet, extended release  -- 1 tab(s) by mouth once a day (in the morning)  -- Indication: For High blood pressure    ranolazine 500 mg oral tablet, extended release  -- 1 tab(s) by mouth 2 times a day  -- Indication: For as needed for chest pain    nitroglycerin 0.4 mg sublingual tablet  -- 1 tab(s) by mouth every 5 minutes, As Needed CP  -- Indication: For as needed for chest pain    atorvastatin 40 mg oral tablet  -- 1 tab(s) by mouth once a day  -- Indication: For High cholesterol    clopidogrel 75 mg oral tablet  -- 1 tab(s) by mouth once a day  -- Indication: For Coronary artery disease    Metoprolol Tartrate 25 mg oral tablet  -- 1 tab(s) by mouth 2 times a day  -- Indication: For High blood pressure and heart rate

## 2018-02-21 NOTE — CONSULT NOTE ADULT - SUBJECTIVE AND OBJECTIVE BOX
CHIEF COMPLAINT: currently denies     HISTORY OF PRESENT ILLNESS:  47 year old male with PMH CAD with MI (2010) and multiple PCIs (most recently JUAN midLAD and OM1 9/2016), HTN, HLD, GERD and hemorrhoids presented to [facility] ED on 2/20 with c/o unstable angina. Now s/p cardiac catheterization 2/20/18 revealing 2VCAD, LM: normal, LAD: luminal irregularities, D1: patent stent, LCx: luminal irregularities, large dominant, OM1: medium vessel, 70% ISR, RCA: 70% mid vessel stenosis, non-dominant, small vessel, LVEF: 50%, LVEDP 4, No AS, Inferior hypokinesis. s/p successful PTCA of the mid OM1 70% ISR with < 5% residual stenosis.  Consulted for prevention.     PAST MEDICAL & SURGICAL HISTORY:  Hyperlipidemia  Essential hypertension: HTN (hypertension)  Gastroesophageal reflux disease: GERD (gastroesophageal reflux disease)  Atherosclerosis of coronary artery: CAD (coronary artery disease)  Coronary angioplasty status    FAMILY HISTORY:   No pertinent family history in first degree relatives    ALLERGIES: NKDA    HOME MEDICATIONS:  aspirin  chewable 81 milliGRAM(s) Oral daily  atorvastatin 40 milliGRAM(s) Oral at bedtime  clopidogrel Tablet 75 milliGRAM(s) Oral daily  enalapril 5 milliGRAM(s) Oral daily  isosorbide   mononitrate ER Tablet (IMDUR) 30 milliGRAM(s) Oral daily  metoprolol     tartrate 25 milliGRAM(s) Oral two times a day  nitroglycerin     SubLingual 0.4 milliGRAM(s) SubLingual every 5 minutes PRN Chest Pain  ranolazine 500 milliGRAM(s) Oral two times a day  sodium chloride 0.9%. 500 milliLiter(s) IV Continuous <Continuous>  	    PHYSICAL EXAM:  T(C): 35.8 (02-21-18 @ 09:51), Max: 36.6 (02-20-18 @ 22:05)  T(F): 96.5 (02-21-18 @ 09:51), Max: 97.9 (02-20-18 @ 22:05)  HR: 70 (02-21-18 @ 08:30) (55 - 78)  BP: 108/65 (02-21-18 @ 08:30) (101/57 - 139/77)  RR: 16 (02-21-18 @ 08:30) (16 - 20)  SpO2: 98% (02-21-18 @ 08:30) (97% - 100%)  Wt(kg): --  Height (cm): 167.6 (02-20 @ 15:23)  Weight (kg): 81.6 (02-20 @ 10:12)  BMI (kg/m2): 29 (02-20 @ 15:23)  	  LABS:	                        10.1   7.0   )-----------( 173      ( 21 Feb 2018 07:36 )             33.1     02-21    137  |  102  |  14  ----------------------------<  125<H>  4.0   |  22  |  0.94    Ca    9.2      21 Feb 2018 07:36  Mg     1.9     02-21    TPro  8.4<H>  /  Alb  4.8  /  TBili  0.7  /  DBili  x   /  AST  19  /  ALT  15  /  AlkPhos  99  02-20    Cholesterol, Serum: 151 mg/dL (02-21 @ 07:36)  HDL Cholesterol, Serum: 32 mg/dL (02-21 @ 07:36)  Triglycerides, Serum: 147 mg/dL (02-21 @ 07:36)  Direct LDL: 90 mg/dL (02-21 @ 07:36)      10 "S" ASSESSMENT PLAN: SMOKING, SITTING, SUGAR, SALT, SOME FATS, SOCIAL, SLEEP, SIGNS, AND MEDS:  Tobacco usage: non smoker  Stress: mild to moderate  ETOH: non dirnker  Caffeine: no coffee, tea, soft drinks  Sleep Disorder: 6-8 hours, trouble falling to sleep at times  Inflammatory Condition: denies cancer/autoimmune/connective tissue   Activity Level: very active during day, but does not designate 30 minutes to moderate exercise daily  Heart Failure: denies sx's  Myopathy with Statins: denies  GI/ Issues: denies n/v/c/d  Current Diet:  Br: porridge with sugar, oatmeal with sugar, fruit  Lunch: fruit, potatoes, white toast with butter  Dinner: seafood (fish, shrimp), avocado, vegetable, fried foods occasionally -- uses butter, corn oil  S/D: fruit    ASSESSMENT/RECOMMENDATIONS: 	  Summary:   47 year old male with PMH CAD with MI (2010) and multiple PCIs (most recently JUAN midLAD and OM1 9/2016), HTN, HLD, GERD and hemorrhoids presented to [facility] ED on 2/20 with c/o unstable angina. Now s/p cardiac catheterization 2/20/18 revealing 2VCAD, LM: normal, LAD: luminal irregularities, D1: patent stent, LCx: luminal irregularities, large dominant, OM1: medium vessel, 70% ISR, RCA: 70% mid vessel stenosis, non-dominant, small vessel, LVEF: 50%, LVEDP 4, No AS, Inferior hypokinesis. s/p successful PTCA of the mid OM1 70% ISR with < 5% residual stenosis.  Consulted for prevention.     RECOMMENDATIONS:   Anti-platelet Therapy: DAPT per interventionalist recommendation.   Lipid Therapy: High dose statin, LDL not at goal for CAD, ?DM hx.   Beta Blocker Therapy: Continue current therapy per your discretion.   ACE/ARB Therapy: Continue current therapy per your discretion.   Diet: Low-sodium, low-carbohydrate, Mediterranean diet. Written instruction on the Mediterranean diet was provided. Discussed hidden sugars in his current diet and ways to modify, also discussed olive or canola over corn oil, and to use butter sparingly.   Exercise: 30-45 minutes most days of the week once cleared to do so by their referring cardiologist.  Recommend cardiac rehab -- set up through Dr. Rivera  Smoking: This patient is a non-smoker.   Stress Management: Instruction on mindfulness meditation was provided.   Sleep: Continue goal of 7-8 hours, may benefit from a sleep study.   Other: FSBS have been high and pt consumes moderate refined sugars -- concerned for diabetes risk. A1c pending --- will f/u with hid cardiologist/pmd when lab results. We discussed the benefits of diet modification and exercise and weight loss to help lower DM risk.     Thank you for the opportunity to see this patient. Please feel free to contact [facility] if there are any questions, or if you feel that your patient would benefit from continued follow-up visits with the Program.

## 2018-02-21 NOTE — DISCHARGE NOTE ADULT - PATIENT PORTAL LINK FT
You can access the [facility], offered by [facility], by registering with the following website: [email or web address]

## 2018-03-08 DIAGNOSIS — I25.10 ATHEROSCLEROTIC HEART DISEASE OF NATIVE CORONARY ARTERY WITHOUT ANGINA PECTORIS: ICD-10-CM

## 2018-03-08 DIAGNOSIS — I10 ESSENTIAL (PRIMARY) HYPERTENSION: ICD-10-CM

## 2018-03-08 DIAGNOSIS — Z79.82 LONG TERM (CURRENT) USE OF ASPIRIN: ICD-10-CM

## 2018-03-08 DIAGNOSIS — Z95.5 PRESENCE OF CORONARY ANGIOPLASTY IMPLANT AND GRAFT: ICD-10-CM

## 2018-03-08 DIAGNOSIS — Z79.02 LONG TERM (CURRENT) USE OF ANTITHROMBOTICS/ANTIPLATELETS: ICD-10-CM

## 2018-03-08 DIAGNOSIS — K21.9 GASTRO-ESOPHAGEAL REFLUX DISEASE WITHOUT ESOPHAGITIS: ICD-10-CM

## 2018-03-08 DIAGNOSIS — F17.200 NICOTINE DEPENDENCE, UNSPECIFIED, UNCOMPLICATED: ICD-10-CM

## 2018-03-08 DIAGNOSIS — E78.5 HYPERLIPIDEMIA, UNSPECIFIED: ICD-10-CM

## 2018-05-23 PROCEDURE — 96374 THER/PROPH/DIAG INJ IV PUSH: CPT

## 2018-05-23 PROCEDURE — 85025 COMPLETE CBC W/AUTO DIFF WBC: CPT

## 2018-05-23 PROCEDURE — 80061 LIPID PANEL: CPT

## 2018-05-23 PROCEDURE — 80053 COMPREHEN METABOLIC PANEL: CPT

## 2018-05-23 PROCEDURE — C1769: CPT

## 2018-05-23 PROCEDURE — 85610 PROTHROMBIN TIME: CPT

## 2018-05-23 PROCEDURE — C1725: CPT

## 2018-05-23 PROCEDURE — 93005 ELECTROCARDIOGRAM TRACING: CPT

## 2018-05-23 PROCEDURE — C1887: CPT

## 2018-05-23 PROCEDURE — 85730 THROMBOPLASTIN TIME PARTIAL: CPT

## 2018-05-23 PROCEDURE — 71045 X-RAY EXAM CHEST 1 VIEW: CPT

## 2018-05-23 PROCEDURE — 36415 COLL VENOUS BLD VENIPUNCTURE: CPT

## 2018-05-23 PROCEDURE — 82550 ASSAY OF CK (CPK): CPT

## 2018-05-23 PROCEDURE — 99285 EMERGENCY DEPT VISIT HI MDM: CPT | Mod: 25

## 2018-05-23 PROCEDURE — 84484 ASSAY OF TROPONIN QUANT: CPT

## 2018-05-23 PROCEDURE — 83735 ASSAY OF MAGNESIUM: CPT

## 2018-05-23 PROCEDURE — 80048 BASIC METABOLIC PNL TOTAL CA: CPT

## 2018-05-23 PROCEDURE — 82553 CREATINE MB FRACTION: CPT

## 2019-01-14 ENCOUNTER — INPATIENT (INPATIENT)
Facility: HOSPITAL | Age: 49
LOS: 0 days | Discharge: HOME | DRG: 251 | End: 2019-01-15
Attending: INTERNAL MEDICINE | Admitting: INTERNAL MEDICINE
Payer: MEDICAID

## 2019-01-14 VITALS
TEMPERATURE: 97 F | DIASTOLIC BLOOD PRESSURE: 82 MMHG | WEIGHT: 164.91 LBS | RESPIRATION RATE: 18 BRPM | OXYGEN SATURATION: 98 % | HEART RATE: 60 BPM | SYSTOLIC BLOOD PRESSURE: 120 MMHG | HEIGHT: 63 IN

## 2019-01-14 DIAGNOSIS — Z98.61 CORONARY ANGIOPLASTY STATUS: Chronic | ICD-10-CM

## 2019-01-14 DIAGNOSIS — K64.9 UNSPECIFIED HEMORRHOIDS: ICD-10-CM

## 2019-01-14 DIAGNOSIS — I10 ESSENTIAL (PRIMARY) HYPERTENSION: ICD-10-CM

## 2019-01-14 DIAGNOSIS — R07.9 CHEST PAIN, UNSPECIFIED: ICD-10-CM

## 2019-01-14 DIAGNOSIS — E78.5 HYPERLIPIDEMIA, UNSPECIFIED: ICD-10-CM

## 2019-01-14 DIAGNOSIS — K21.9 GASTRO-ESOPHAGEAL REFLUX DISEASE WITHOUT ESOPHAGITIS: ICD-10-CM

## 2019-01-14 LAB
ALBUMIN SERPL ELPH-MCNC: 4.6 G/DL — SIGNIFICANT CHANGE UP (ref 3.3–5)
ALP SERPL-CCNC: 100 U/L — SIGNIFICANT CHANGE UP (ref 40–120)
ALT FLD-CCNC: 12 U/L — SIGNIFICANT CHANGE UP (ref 10–45)
ANION GAP SERPL CALC-SCNC: 14 MMOL/L — SIGNIFICANT CHANGE UP (ref 5–17)
APTT BLD: 32.9 SEC — SIGNIFICANT CHANGE UP (ref 27.5–36.3)
AST SERPL-CCNC: 14 U/L — SIGNIFICANT CHANGE UP (ref 10–40)
BASOPHILS NFR BLD AUTO: 0.4 % — SIGNIFICANT CHANGE UP (ref 0–2)
BILIRUB SERPL-MCNC: 0.6 MG/DL — SIGNIFICANT CHANGE UP (ref 0.2–1.2)
BUN SERPL-MCNC: 15 MG/DL — SIGNIFICANT CHANGE UP (ref 7–23)
CALCIUM SERPL-MCNC: 9.3 MG/DL — SIGNIFICANT CHANGE UP (ref 8.4–10.5)
CHLORIDE SERPL-SCNC: 103 MMOL/L — SIGNIFICANT CHANGE UP (ref 96–108)
CK MB CFR SERPL CALC: 1.2 NG/ML — SIGNIFICANT CHANGE UP (ref 0–6.7)
CK SERPL-CCNC: 155 U/L — SIGNIFICANT CHANGE UP (ref 30–200)
CO2 SERPL-SCNC: 24 MMOL/L — SIGNIFICANT CHANGE UP (ref 22–31)
CREAT SERPL-MCNC: 0.89 MG/DL — SIGNIFICANT CHANGE UP (ref 0.5–1.3)
EOSINOPHIL NFR BLD AUTO: 11.2 % — HIGH (ref 0–6)
GLUCOSE SERPL-MCNC: 147 MG/DL — HIGH (ref 70–99)
HCT VFR BLD CALC: 34 % — LOW (ref 39–50)
HGB BLD-MCNC: 10.3 G/DL — LOW (ref 13–17)
INR BLD: 1.01 — SIGNIFICANT CHANGE UP (ref 0.88–1.16)
LYMPHOCYTES # BLD AUTO: 25.6 % — SIGNIFICANT CHANGE UP (ref 13–44)
MCHC RBC-ENTMCNC: 22.3 PG — LOW (ref 27–34)
MCHC RBC-ENTMCNC: 30.3 G/DL — LOW (ref 32–36)
MCV RBC AUTO: 73.6 FL — LOW (ref 80–100)
MONOCYTES NFR BLD AUTO: 7.7 % — SIGNIFICANT CHANGE UP (ref 2–14)
NEUTROPHILS NFR BLD AUTO: 55.1 % — SIGNIFICANT CHANGE UP (ref 43–77)
PLATELET # BLD AUTO: 228 K/UL — SIGNIFICANT CHANGE UP (ref 150–400)
POTASSIUM SERPL-MCNC: 3.9 MMOL/L — SIGNIFICANT CHANGE UP (ref 3.5–5.3)
POTASSIUM SERPL-SCNC: 3.9 MMOL/L — SIGNIFICANT CHANGE UP (ref 3.5–5.3)
PROT SERPL-MCNC: 7.9 G/DL — SIGNIFICANT CHANGE UP (ref 6–8.3)
PROTHROM AB SERPL-ACNC: 11.4 SEC — SIGNIFICANT CHANGE UP (ref 10–12.9)
RBC # BLD: 4.62 M/UL — SIGNIFICANT CHANGE UP (ref 4.2–5.8)
RBC # FLD: 15.1 % — SIGNIFICANT CHANGE UP (ref 10.3–16.9)
SODIUM SERPL-SCNC: 141 MMOL/L — SIGNIFICANT CHANGE UP (ref 135–145)
TROPONIN T SERPL-MCNC: <0.01 NG/ML — SIGNIFICANT CHANGE UP (ref 0–0.01)
WBC # BLD: 9.3 K/UL — SIGNIFICANT CHANGE UP (ref 3.8–10.5)
WBC # FLD AUTO: 9.3 K/UL — SIGNIFICANT CHANGE UP (ref 3.8–10.5)

## 2019-01-14 PROCEDURE — 99223 1ST HOSP IP/OBS HIGH 75: CPT | Mod: AI

## 2019-01-14 PROCEDURE — 71045 X-RAY EXAM CHEST 1 VIEW: CPT | Mod: 26

## 2019-01-14 PROCEDURE — 99285 EMERGENCY DEPT VISIT HI MDM: CPT | Mod: 25

## 2019-01-14 PROCEDURE — 93010 ELECTROCARDIOGRAM REPORT: CPT

## 2019-01-14 PROCEDURE — 93458 L HRT ARTERY/VENTRICLE ANGIO: CPT | Mod: 26,XU

## 2019-01-14 PROCEDURE — 92920 PRQ TRLUML C ANGIOP 1ART&/BR: CPT | Mod: LC

## 2019-01-14 RX ORDER — ASPIRIN/CALCIUM CARB/MAGNESIUM 324 MG
81 TABLET ORAL DAILY
Qty: 0 | Refills: 0 | Status: DISCONTINUED | OUTPATIENT
Start: 2019-01-15 | End: 2019-01-15

## 2019-01-14 RX ORDER — POTASSIUM CHLORIDE 20 MEQ
20 PACKET (EA) ORAL ONCE
Qty: 0 | Refills: 0 | Status: COMPLETED | OUTPATIENT
Start: 2019-01-14 | End: 2019-01-14

## 2019-01-14 RX ORDER — CARVEDILOL PHOSPHATE 80 MG/1
6.25 CAPSULE, EXTENDED RELEASE ORAL EVERY 12 HOURS
Qty: 0 | Refills: 0 | Status: DISCONTINUED | OUTPATIENT
Start: 2019-01-14 | End: 2019-01-15

## 2019-01-14 RX ORDER — RANOLAZINE 500 MG/1
500 TABLET, FILM COATED, EXTENDED RELEASE ORAL
Qty: 0 | Refills: 0 | Status: DISCONTINUED | OUTPATIENT
Start: 2019-01-14 | End: 2019-01-15

## 2019-01-14 RX ORDER — SODIUM CHLORIDE 9 MG/ML
500 INJECTION INTRAMUSCULAR; INTRAVENOUS; SUBCUTANEOUS
Qty: 0 | Refills: 0 | Status: DISCONTINUED | OUTPATIENT
Start: 2019-01-14 | End: 2019-01-15

## 2019-01-14 RX ORDER — ASPIRIN/CALCIUM CARB/MAGNESIUM 324 MG
325 TABLET ORAL ONCE
Qty: 0 | Refills: 0 | Status: COMPLETED | OUTPATIENT
Start: 2019-01-14 | End: 2019-01-14

## 2019-01-14 RX ORDER — CHLORHEXIDINE GLUCONATE 213 G/1000ML
1 SOLUTION TOPICAL ONCE
Qty: 0 | Refills: 0 | Status: DISCONTINUED | OUTPATIENT
Start: 2019-01-14 | End: 2019-01-14

## 2019-01-14 RX ORDER — ISOSORBIDE MONONITRATE 60 MG/1
30 TABLET, EXTENDED RELEASE ORAL DAILY
Qty: 0 | Refills: 0 | Status: DISCONTINUED | OUTPATIENT
Start: 2019-01-14 | End: 2019-01-15

## 2019-01-14 RX ORDER — INFLUENZA VIRUS VACCINE 15; 15; 15; 15 UG/.5ML; UG/.5ML; UG/.5ML; UG/.5ML
0.5 SUSPENSION INTRAMUSCULAR ONCE
Qty: 0 | Refills: 0 | Status: COMPLETED | OUTPATIENT
Start: 2019-01-14 | End: 2019-01-14

## 2019-01-14 RX ORDER — PANTOPRAZOLE SODIUM 20 MG/1
40 TABLET, DELAYED RELEASE ORAL
Qty: 0 | Refills: 0 | Status: DISCONTINUED | OUTPATIENT
Start: 2019-01-14 | End: 2019-01-15

## 2019-01-14 RX ORDER — CLOPIDOGREL BISULFATE 75 MG/1
75 TABLET, FILM COATED ORAL ONCE
Qty: 0 | Refills: 0 | Status: COMPLETED | OUTPATIENT
Start: 2019-01-14 | End: 2019-01-14

## 2019-01-14 RX ORDER — SODIUM CHLORIDE 9 MG/ML
500 INJECTION INTRAMUSCULAR; INTRAVENOUS; SUBCUTANEOUS
Qty: 0 | Refills: 0 | Status: DISCONTINUED | OUTPATIENT
Start: 2019-01-14 | End: 2019-01-14

## 2019-01-14 RX ORDER — HYDROCORTISONE 1 %
1 OINTMENT (GRAM) TOPICAL
Qty: 0 | Refills: 0 | Status: DISCONTINUED | OUTPATIENT
Start: 2019-01-14 | End: 2019-01-15

## 2019-01-14 RX ORDER — DOCUSATE SODIUM 100 MG
100 CAPSULE ORAL
Qty: 0 | Refills: 0 | Status: DISCONTINUED | OUTPATIENT
Start: 2019-01-14 | End: 2019-01-15

## 2019-01-14 RX ORDER — ATORVASTATIN CALCIUM 80 MG/1
80 TABLET, FILM COATED ORAL AT BEDTIME
Qty: 0 | Refills: 0 | Status: DISCONTINUED | OUTPATIENT
Start: 2019-01-14 | End: 2019-01-15

## 2019-01-14 RX ORDER — CLOPIDOGREL BISULFATE 75 MG/1
75 TABLET, FILM COATED ORAL DAILY
Qty: 0 | Refills: 0 | Status: DISCONTINUED | OUTPATIENT
Start: 2019-01-15 | End: 2019-01-15

## 2019-01-14 RX ORDER — ATORVASTATIN CALCIUM 80 MG/1
40 TABLET, FILM COATED ORAL AT BEDTIME
Qty: 0 | Refills: 0 | Status: DISCONTINUED | OUTPATIENT
Start: 2019-01-14 | End: 2019-01-14

## 2019-01-14 RX ADMIN — CLOPIDOGREL BISULFATE 75 MILLIGRAM(S): 75 TABLET, FILM COATED ORAL at 07:51

## 2019-01-14 RX ADMIN — CARVEDILOL PHOSPHATE 6.25 MILLIGRAM(S): 80 CAPSULE, EXTENDED RELEASE ORAL at 18:08

## 2019-01-14 RX ADMIN — RANOLAZINE 500 MILLIGRAM(S): 500 TABLET, FILM COATED, EXTENDED RELEASE ORAL at 18:08

## 2019-01-14 RX ADMIN — Medication 100 MILLIGRAM(S): at 18:08

## 2019-01-14 RX ADMIN — PANTOPRAZOLE SODIUM 40 MILLIGRAM(S): 20 TABLET, DELAYED RELEASE ORAL at 18:08

## 2019-01-14 RX ADMIN — Medication 325 MILLIGRAM(S): at 07:51

## 2019-01-14 RX ADMIN — ATORVASTATIN CALCIUM 80 MILLIGRAM(S): 80 TABLET, FILM COATED ORAL at 22:13

## 2019-01-14 RX ADMIN — Medication 5 MILLIGRAM(S): at 19:27

## 2019-01-14 RX ADMIN — Medication 20 MILLIEQUIVALENT(S): at 14:20

## 2019-01-14 RX ADMIN — SODIUM CHLORIDE 75 MILLILITER(S): 9 INJECTION INTRAMUSCULAR; INTRAVENOUS; SUBCUTANEOUS at 14:10

## 2019-01-14 RX ADMIN — ISOSORBIDE MONONITRATE 30 MILLIGRAM(S): 60 TABLET, EXTENDED RELEASE ORAL at 14:20

## 2019-01-14 NOTE — H&P ADULT - PROBLEM SELECTOR PLAN 1
Currently CP free and hemodynamically stable  - h/o CAD with MI in 2010 and multiple PCIs  - Last PCI @ [facility] on 8/13/18: mLAD stent widely patent, D1 stent widely patent, OM1 99% ISR s/p BRENNEN --> 20%, mRCA stent widely patent but with discrete 30% stenosis abd with second lesion with 65% stenosis. , EF 45%  - ECHO (8/14/18) revealing EF 55%, mild concentric hypertrophy, trace MR.  - Plan for cardiac cath on 1/14/19 with Dr. Rivera. Loaded with  mg x1 in ED and given home dose Plavix 75 mg PO x1. Pt endorses daily compliance with ASA/Plavix. Pt consented (in 11U PA room)  - c/w coreg 5.25 mg BID, enalapril 5 mg PO QD, ASA 81 mg PO QD, Plavix 75 mg PO QD Currently CP free and hemodynamically stable  - h/o CAD with MI in 2010 and multiple PCIs  - Last PCI @ [facility] on 8/13/18: mLAD stent widely patent, D1 stent widely patent, OM1 99% ISR s/p BRENNEN --> 20%, mRCA stent widely patent but with discrete 30% stenosis abd with second lesion with 65% stenosis. , EF 45%  - ECHO (8/14/18) revealing EF 55%, mild concentric hypertrophy, trace MR.  - Plan for cardiac cath on 1/14/19 with Dr. Rivera. Loaded with  mg x1 in ED and given home dose Plavix 75 mg PO x1. Pt endorses daily compliance with ASA/Plavix. Pt consented (in 11U PA room)  - c/w coreg 6.25 mg BID, enalapril 5 mg PO QD, ASA 81 mg PO QD, Plavix 75 mg PO QD

## 2019-01-14 NOTE — H&P ADULT - GASTROINTESTINAL DETAILS
normal/soft/no distention/no masses palpable/nontender normal/no distention/no masses palpable/bowel sounds normal/nontender/soft

## 2019-01-14 NOTE — H&P ADULT - NSHPLABSRESULTS_GEN_ALL_CORE
10.3   9.3   )-----------( 228      ( 14 Jan 2019 06:32 )             34.0       01-14    141  |  103  |  15  ----------------------------<  147<H>  3.9   |  24  |  0.89    Ca    9.3      14 Jan 2019 06:32    TPro  7.9  /  Alb  4.6  /  TBili  0.6  /  DBili  x   /  AST  14  /  ALT  12  /  AlkPhos  100  01-14      PT/INR - ( 14 Jan 2019 06:32 )   PT: 11.4 sec;   INR: 1.01          PTT - ( 14 Jan 2019 06:32 )  PTT:32.9 sec    CARDIAC MARKERS ( 14 Jan 2019 06:32 )  x     / <0.01 ng/mL / 155 U/L / x     / 1.2 ng/mL      EKG: NSR @ 68 BPM with flattened T waves in III, V5-V6.

## 2019-01-14 NOTE — ED PROVIDER NOTE - ATTENDING CONTRIBUTION TO CARE
47 y/o male with PMH of HTN, HLD, CAD with muktiple cardiac stents, GERD c/o intermittent substernal CP x 3 days which worsens with exertion. Pt also states that he gets out of breath after walking half a block. Last stent placed about one year ago. Currently pain-free. Sent to Ed by his cardiologist. Pt AAO, NAD, RRR, CTA b/l, abd: soft/NT. Labs/ studies noted. Case d/w pt Dr Rivera and will take to cath lab today recommends admission to Dr Yu. case d/w Dr Yu and will given aspirin, plavix and plan for admission.

## 2019-01-14 NOTE — H&P ADULT - NSHPSOCIALHISTORY_GEN_ALL_CORE
Former smoker (quit in 2011, smoked 1-1.5 PPD since he was 15)  Quit drinking in 2002  Denies elicit drug use

## 2019-01-14 NOTE — H&P ADULT - ATTENDING COMMENTS
Assessment: Patient personally seen and examined myself during rounds with the Physician Assistant/House Staff/Nurse Practitioner   ON DATE 1/14/19  Physician Assistant/House Staff/Nurse Practitioner note read, including vitals, physical findings, laboratory data, and radiological reports.   Revisions included below.   Direct personal management at bed side and extensive interpretation of the data.    Plan was outlined and discussed in details with the Physician Assistant/House Staff/Nurse Practitioner.    Decision making of high complexity   Risk high of complications, morbidity, and/or mortality  Assessment and Action taken for acute disease activity to reflect the level of care provided:  -Hemodynamic evaluation and support  -ACS assessment and treatment as applicable  -Heart failure assessment and treatment as applicable  -Cardiac Telemetry reviewed  -Medication reconciliation  -Review laboratory data  -EKG reviewed - no stemi  -Echo ordered  -Interdisciplinary discussion with IC / EP / HF / CTS teams as needed  TIME SPENT in evaluation and management, reassessments, review and interpretation of labs and x-rays, and hemodynamic management, formulating a plan and coordinating care: ___70____ MIN.  Time does not include procedural time.    Mi Yu MD  Cardiology    Mobile: [phone]  Office: [phone]  [address] [place], [place] [address]

## 2019-01-14 NOTE — H&P ADULT - PROBLEM SELECTOR PLAN 5
History of intermittent BRBPR since 2010 which has progressively worsened over past 3 years since he was started on AC. Pt reports blood not mixed in with stool but drips from the rectum and blood noted when he wipes on toilet paper.  - H/H stable at 10.3/34 today (10.1/33.1 2/2018)  - Dr. Rivera notified and OK to proceed  - c/w hydrocortisone 2.5% BID, colace 100 mg BID    Dispo:  - Pending cardiac cath    Case d/w with Miguel

## 2019-01-14 NOTE — H&P ADULT - HISTORY OF PRESENT ILLNESS
47 yo male with PMHx of HTN, HLD, CAD (s/p PCI 1 year ago @ ), GERD c/o presented to [facility] on 1/14/19 with c/o intermittent, non-radiating SSCP described as ___ and of _/10 intensity with associated SOB and mild nausea over past 3 days upon ambulation of <1/2 city blocks. Denies dizziness, diaphoresis, palpitations, fatigue, LE edema, orthopnea, PND, N/V, abdominal pain.   Upon admit to [facility]: Temp 97.4F, HR 60 BPM, /82, RR 18, SpO2 98% on RA. Labs significant for H/H 10.3/34 (was 10.1/33.1 on 2/2018), K+ 3.9, trop neg x1.  ECG revealed NSR @ 68 BPM with flattened T waves in III, V5-V6. While in ED, pt loaded with  mg x1 and given Plavix 75 mg x1.  Pt admitted to 5U for left heart cath with possible intervention with Dr. Rivera. 47 yo male with PMHx of HTN, HLD, CAD with MI in 2010 and multiple PCIs (most recently PCTA to OM1 with residual mRCA 70% edge stent restenosis 2/2018)), GERD who presented to [facility] ED on 1/14/19 with c/o intermittent,  SSCP radiating to left shoulder described as ___ and of _/10 intensity with associated SOB and mild nausea, diaphoresis over past 3 days upon ambulation of <1/2 city blocks. Denies dizziness, diaphoresis, palpitations, fatigue, LE edema, orthopnea, PND, N/V, abdominal pain.   Upon admit to [facility]: Temp 97.4F, HR 60 BPM, /82, RR 18, SpO2 98% on RA. Labs significant for H/H 10.3/34 (was 10.1/33.1 on 2/2018), K+ 3.9, trop neg x1.  ECG revealed NSR @ 68 BPM with flattened T waves in III, V5-V6. While in ED, pt loaded with  mg x1 and given Plavix 75 mg x1.  ECHO (8/14/18) revealing EF 55%, mild concentric hypertrophy, trace MR. In light of pt's risk factors, CCS class III anginal Sx, pt admitted to 5U for left heart cath with possible intervention with Dr. Rivera.    CATH @[facility] (2/20/18): 2VCAD, LAD luminal irregularities, D1 patent stent, LCx luminal irregularities large dominant, midOM1 49 yo male, FORMER heavy smoker with PMHx of HTN, HLD, CAD with MI in 2010 and multiple PCIs (most recently PCTA to OM1 with residual mRCA 70% edge stent restenosis 2/2018), GERD, hemorrhoids (s/p colonoscopy 3 years ago revealing internal hemorrhoid) who presented to [facility] ED on 1/14/19 with c/o intermittent,  SSCP radiating to left shoulder described as sharp/stabbing and of 8/10 intensity with associated mild nausea, decreased appetite, diaphoresis, dizziness  occurring independent of eexertion over past 5 months but progressively worsening over past 1 week relieved with SL NTG (last episode CP last night). Pt also endorses MANZO/fatigue/palpitations upon ambulation of <1/2 city blocks over past 5 months. Denies  orthopnea, PND, abdominal pain.   Upon admit to [facility]: Temp 97.4F, HR 60 BPM, /82, RR 18, SpO2 98% on RA. Labs significant for H/H 10.3/34 (was 10.1/33.1 on 2/2018), K+ 3.9, trop neg x1.  ECG revealed NSR @ 68 BPM with flattened T waves in III, V5-V6. While in ED, pt loaded with  mg x1 and given Plavix 75 mg x1.  In light of pt's risk factors, CCS class III anginal Sx, pt admitted to 5U for left heart cath with possible intervention with Dr. Rivera.  Of note: Pt also with c/o intermittent BRBPR since 2010 which has progressively worsened over past 3 years since he was started on AC. Pt reports blood not mixed in with stool but drips from the rectum and blood noted when he wipes on toilet paper.     ECHO (8/14/18) revealing EF 55%, mild concentric hypertrophy, trace MR.     CATH @[facility] (2/20/18): 2VCAD, LAD luminal irregularities, D1 patent stent, LCx luminal irregularities large dominant, midOM1

## 2019-01-14 NOTE — ED ADULT NURSE NOTE - OBJECTIVE STATEMENT
pt received into spot 3 A&Ox3 ambulatory appears comfortable, arrives via walk in triage with complaints of intermittent chest pain SOB on ambulation and for about a week now worse over 3 days states he called his cardiologist this morning who sent him to Ed and pt took 1 SL nitro 1hr PTA with relief of CP. 12 lead EKG done NSR noted to continuous cardiac monitor respirations appear even and unlabored sating at 100% on room air. 18G PIV placed to LAC labs drawn and sent pt in NAD informed and agreeable to plan will montior and reassess pt awaiting md leo

## 2019-01-14 NOTE — H&P ADULT - ASSESSMENT
49 yo male FORMER heavy smoker, with PMHx of HTN, HLD, CAD with MI in 2010 and multiple PCIs (most recently PCTA to OM1 with residual mRCA 70% edge stent restenosis 2/2018), GERD, hemorrhoids (s/p colonoscopy 3 years ago revealing internal hemorrhoid) who presented to [facility] ED on 1/14/19 with c/o intermittent,  SSCP radiating to left shoulder described as sharp/stabbing and of 8/10 intensity with associated mild nausea, decreased appetite, diaphoresis, dizziness  occurring independent of eexertion over past 5 months but progressively worsening over past 1 week relieved with SL NTG (last episode CP last night).   In light of pt's risk factors, CCS class III anginal Sx, pt admitted to 5U for left heart cath with possible intervention with Dr. Rivera.

## 2019-01-14 NOTE — ED PROVIDER NOTE - OBJECTIVE STATEMENT
49 y/o male with hx of HTN, HLD, CAD, GERD c/o cp x 3 days. Pt states intermittent pain to center of chest for past 3 days. Pt reports pain worse with exertion and gets out of breath after half a block. Last stent placed about one year ago. no leg pain or swelling. no ha or dizziness. + mild nausea. Pt currently without pain. Pt states spoke with his cardiologist and told to come to ED. no further complaints.

## 2019-01-14 NOTE — ED ADULT NURSE REASSESSMENT NOTE - NS ED NURSE REASSESS COMMENT FT1
Pt received from night PALLAVI Batres. Pt resting comfortably in NAD. VSS. Will continue to monitor.

## 2019-01-14 NOTE — ED ADULT NURSE NOTE - NSIMPLEMENTINTERV_GEN_ALL_ED
Implemented All Universal Safety Interventions:  [place] to call system. Call bell, personal items and telephone within reach. Instruct patient to call for assistance. Room bathroom lighting operational. Non-slip footwear when patient is off stretcher. Physically safe environment: no spills, clutter or unnecessary equipment. Stretcher in lowest position, wheels locked, appropriate side rails in place.

## 2019-01-14 NOTE — ED PROVIDER NOTE - MEDICAL DECISION MAKING DETAILS
chest pain. pt well appearing. no pain currently. long hx of CAD with multiple stents. d/w pt Dr Rivera and will take to cath lab today recommends admission to Dr Yu. case d/w Dr Yu and will given aspirin, plavix and plan for admission.

## 2019-01-14 NOTE — ED ADULT TRIAGE NOTE - CHIEF COMPLAINT QUOTE
chest pain for 3 days .pt is schedule for Cat today . h/o 6 Stents ,last done 6/18. chest pain for 3 days .pt is schedule for Cath today . h/o 6 Stents ,last done 6/18.

## 2019-01-14 NOTE — ED ADULT NURSE NOTE - PMH
Atherosclerosis of coronary artery  CAD (coronary artery disease)  Essential hypertension  HTN (hypertension)  Gastroesophageal reflux disease  GERD (gastroesophageal reflux disease)  Hemorrhoid    Hyperlipidemia    Myocardial ischemia

## 2019-01-15 ENCOUNTER — TRANSCRIPTION ENCOUNTER (OUTPATIENT)
Age: 49
End: 2019-01-15

## 2019-01-15 VITALS
DIASTOLIC BLOOD PRESSURE: 76 MMHG | HEART RATE: 77 BPM | OXYGEN SATURATION: 98 % | RESPIRATION RATE: 16 BRPM | SYSTOLIC BLOOD PRESSURE: 135 MMHG

## 2019-01-15 LAB
ANION GAP SERPL CALC-SCNC: 13 MMOL/L — SIGNIFICANT CHANGE UP (ref 5–17)
BUN SERPL-MCNC: 16 MG/DL — SIGNIFICANT CHANGE UP (ref 7–23)
CALCIUM SERPL-MCNC: 9 MG/DL — SIGNIFICANT CHANGE UP (ref 8.4–10.5)
CHLORIDE SERPL-SCNC: 104 MMOL/L — SIGNIFICANT CHANGE UP (ref 96–108)
CHOLEST SERPL-MCNC: 115 MG/DL — SIGNIFICANT CHANGE UP (ref 10–199)
CO2 SERPL-SCNC: 22 MMOL/L — SIGNIFICANT CHANGE UP (ref 22–31)
CREAT SERPL-MCNC: 0.84 MG/DL — SIGNIFICANT CHANGE UP (ref 0.5–1.3)
GLUCOSE SERPL-MCNC: 128 MG/DL — HIGH (ref 70–99)
HBA1C BLD-MCNC: 7.3 % — HIGH (ref 4–5.6)
HCT VFR BLD CALC: 33.9 % — LOW (ref 39–50)
HDLC SERPL-MCNC: 34 MG/DL — LOW
HGB BLD-MCNC: 10.1 G/DL — LOW (ref 13–17)
LIPID PNL WITH DIRECT LDL SERPL: 49 MG/DL — SIGNIFICANT CHANGE UP
MAGNESIUM SERPL-MCNC: 1.9 MG/DL — SIGNIFICANT CHANGE UP (ref 1.6–2.6)
MCHC RBC-ENTMCNC: 21.7 PG — LOW (ref 27–34)
MCHC RBC-ENTMCNC: 29.8 G/DL — LOW (ref 32–36)
MCV RBC AUTO: 72.9 FL — LOW (ref 80–100)
PLATELET # BLD AUTO: 173 K/UL — SIGNIFICANT CHANGE UP (ref 150–400)
POTASSIUM SERPL-MCNC: 4 MMOL/L — SIGNIFICANT CHANGE UP (ref 3.5–5.3)
POTASSIUM SERPL-SCNC: 4 MMOL/L — SIGNIFICANT CHANGE UP (ref 3.5–5.3)
RBC # BLD: 4.65 M/UL — SIGNIFICANT CHANGE UP (ref 4.2–5.8)
RBC # FLD: 15.3 % — SIGNIFICANT CHANGE UP (ref 10.3–16.9)
SODIUM SERPL-SCNC: 139 MMOL/L — SIGNIFICANT CHANGE UP (ref 135–145)
TOTAL CHOLESTEROL/HDL RATIO MEASUREMENT: 3.4 RATIO — SIGNIFICANT CHANGE UP (ref 3.4–9.6)
TRIGL SERPL-MCNC: 158 MG/DL — HIGH (ref 10–149)
WBC # BLD: 5.6 K/UL — SIGNIFICANT CHANGE UP (ref 3.8–10.5)
WBC # FLD AUTO: 5.6 K/UL — SIGNIFICANT CHANGE UP (ref 3.8–10.5)

## 2019-01-15 PROCEDURE — 82553 CREATINE MB FRACTION: CPT

## 2019-01-15 PROCEDURE — C1894: CPT

## 2019-01-15 PROCEDURE — 83735 ASSAY OF MAGNESIUM: CPT

## 2019-01-15 PROCEDURE — 82550 ASSAY OF CK (CPK): CPT

## 2019-01-15 PROCEDURE — 85610 PROTHROMBIN TIME: CPT

## 2019-01-15 PROCEDURE — 80061 LIPID PANEL: CPT

## 2019-01-15 PROCEDURE — 84484 ASSAY OF TROPONIN QUANT: CPT

## 2019-01-15 PROCEDURE — 80048 BASIC METABOLIC PNL TOTAL CA: CPT

## 2019-01-15 PROCEDURE — 85027 COMPLETE CBC AUTOMATED: CPT

## 2019-01-15 PROCEDURE — 71045 X-RAY EXAM CHEST 1 VIEW: CPT

## 2019-01-15 PROCEDURE — 83036 HEMOGLOBIN GLYCOSYLATED A1C: CPT

## 2019-01-15 PROCEDURE — C1887: CPT

## 2019-01-15 PROCEDURE — 85025 COMPLETE CBC W/AUTO DIFF WBC: CPT

## 2019-01-15 PROCEDURE — 99238 HOSP IP/OBS DSCHRG MGMT 30/<: CPT

## 2019-01-15 PROCEDURE — 99285 EMERGENCY DEPT VISIT HI MDM: CPT | Mod: 25

## 2019-01-15 PROCEDURE — 36415 COLL VENOUS BLD VENIPUNCTURE: CPT

## 2019-01-15 PROCEDURE — 80053 COMPREHEN METABOLIC PANEL: CPT

## 2019-01-15 PROCEDURE — 99231 SBSQ HOSP IP/OBS SF/LOW 25: CPT | Mod: 25

## 2019-01-15 PROCEDURE — 85730 THROMBOPLASTIN TIME PARTIAL: CPT

## 2019-01-15 PROCEDURE — C1769: CPT

## 2019-01-15 PROCEDURE — 93005 ELECTROCARDIOGRAM TRACING: CPT

## 2019-01-15 PROCEDURE — C1725: CPT

## 2019-01-15 RX ORDER — MAGNESIUM OXIDE 400 MG ORAL TABLET 241.3 MG
400 TABLET ORAL ONCE
Qty: 0 | Refills: 0 | Status: COMPLETED | OUTPATIENT
Start: 2019-01-15 | End: 2019-01-15

## 2019-01-15 RX ADMIN — MAGNESIUM OXIDE 400 MG ORAL TABLET 400 MILLIGRAM(S): 241.3 TABLET ORAL at 07:27

## 2019-01-15 RX ADMIN — CARVEDILOL PHOSPHATE 6.25 MILLIGRAM(S): 80 CAPSULE, EXTENDED RELEASE ORAL at 05:32

## 2019-01-15 RX ADMIN — Medication 81 MILLIGRAM(S): at 12:25

## 2019-01-15 RX ADMIN — RANOLAZINE 500 MILLIGRAM(S): 500 TABLET, FILM COATED, EXTENDED RELEASE ORAL at 05:32

## 2019-01-15 RX ADMIN — CLOPIDOGREL BISULFATE 75 MILLIGRAM(S): 75 TABLET, FILM COATED ORAL at 12:25

## 2019-01-15 RX ADMIN — PANTOPRAZOLE SODIUM 40 MILLIGRAM(S): 20 TABLET, DELAYED RELEASE ORAL at 07:27

## 2019-01-15 RX ADMIN — Medication 100 MILLIGRAM(S): at 05:32

## 2019-01-15 RX ADMIN — Medication 5 MILLIGRAM(S): at 05:32

## 2019-01-15 RX ADMIN — ISOSORBIDE MONONITRATE 30 MILLIGRAM(S): 60 TABLET, EXTENDED RELEASE ORAL at 12:25

## 2019-01-15 NOTE — DISCHARGE NOTE ADULT - MEDICATION SUMMARY - MEDICATIONS TO TAKE
I will START or STAY ON the medications listed below when I get home from the hospital:    hydrocortisone  -- 1 application rectally 2 times a day (2.5% 2-4x/d)  -- Indication: For as prescribed    aspirin 81 mg oral tablet  -- 1 tab(s) by mouth once a day  -- Indication: For Coronary artery disease    enalapril 5 mg oral tablet  -- 1 tab(s) by mouth once a day  -- Indication: For blood pressure    isosorbide mononitrate 30 mg oral tablet, extended release  -- 1 tab(s) by mouth once a day (in the morning)  -- Indication: For blood pressure    ranolazine 500 mg oral tablet, extended release  -- 1 tab(s) by mouth 2 times a day  -- Indication: For Chest pain    nitroglycerin 0.4 mg sublingual tablet  -- 1 tab(s) by mouth every 5 minutes, As Needed CP  -- Indication: For Chest pain as needed    atorvastatin 40 mg oral tablet  -- 1 tab(s) by mouth once a day  -- Indication: For Cholesterol    clopidogrel 75 mg oral tablet  -- 1 tab(s) by mouth once a day  -- Indication: For Coronary artery disease    carvedilol 6.25 mg oral tablet  -- 1 tab(s) by mouth 2 times a day  -- Indication: For blood pressure    raNITIdine 300 mg oral capsule  -- 1 cap(s) by mouth once a day (at bedtime)  -- Indication: For GERD    Colace 100 mg oral capsule  -- 1 cap(s) by mouth 2 times a day  -- Indication: For Constipation    omeprazole 40 mg oral delayed release capsule  -- 1 cap(s) by mouth once a day  -- Indication: For acid reflux

## 2019-01-15 NOTE — DISCHARGE NOTE ADULT - CARE PLAN
Principal Discharge DX:	Coronary artery disease  Goal:	Please follow up with Dr. Rivera in 1-2 weeks.  Assessment and plan of treatment:	You underwent a cardiac catheterization receiving a baloon and opening of the obtuse marginal artery in stent restenosis.    - Please continue taking aspirin 81mg daily and Plavix 75mg daily.  DO NOT STOP THESE MEDICATIONS AS THEY PREVENT YOUR STENT FROM CLOSING.   - Please follow up with Dr. Rivera to discuss further brachytherapy for continued restenosis of the obtuse marginal artery.     You underwent a coronary angiogram and should wait 3 days before returning to ordinary activities. Do not drive for 2 days. Consult your doctor before returning to vigorous activity. You may return to work in 3-5 days. The catheter from your wrist was removed and you should remove the dressing in 24 hours. You may shower once the dressing is removed, but avoid baths, hot tubs, or swimming for 5 days to prevent infection. If you notice bleeding from the site, hardening and pain at the site, drainage or redness from the site, coolness/paleness of the extremity, swelling, or fever, please call [phone].  Secondary Diagnosis:	Essential hypertension  Goal:	Your blood pressure is stable.  Continue home medications.  maintain a low salt diet.  Secondary Diagnosis:	Hyperlipidemia  Goal:	Your cholesterol is controlled.  Continue your home medications.

## 2019-01-15 NOTE — DISCHARGE NOTE ADULT - PRINCIPAL DIAGNOSIS
Coronary artery disease

## 2019-01-15 NOTE — DISCHARGE NOTE ADULT - PATIENT PORTAL LINK FT
You can access the [facility], offered by [facility], by registering with the following website: [email or web address]

## 2019-01-15 NOTE — DISCHARGE NOTE ADULT - CARE PROVIDER_API CALL
Mathew Rivera), Cardiovascular Disease; Internal Medicine; Interventional Cardiology  [address] [place] [address]  Phone: [phone]  Fax: [phone]

## 2019-01-15 NOTE — DISCHARGE NOTE ADULT - PLAN OF CARE
Your cholesterol is controlled.  Continue your home medications. Please follow up with Dr. Rivera in 1-2 weeks. You underwent a cardiac catheterization receiving a baloon and opening of the obtuse marginal artery in stent restenosis.    - Please continue taking aspirin 81mg daily and Plavix 75mg daily.  DO NOT STOP THESE MEDICATIONS AS THEY PREVENT YOUR STENT FROM CLOSING.   - Please follow up with Dr. Rivera to discuss further brachytherapy for continued restenosis of the obtuse marginal artery.     You underwent a coronary angiogram and should wait 3 days before returning to ordinary activities. Do not drive for 2 days. Consult your doctor before returning to vigorous activity. You may return to work in 3-5 days. The catheter from your wrist was removed and you should remove the dressing in 24 hours. You may shower once the dressing is removed, but avoid baths, hot tubs, or swimming for 5 days to prevent infection. If you notice bleeding from the site, hardening and pain at the site, drainage or redness from the site, coolness/paleness of the extremity, swelling, or fever, please call [phone]. Your blood pressure is stable.  Continue home medications.  maintain a low salt diet.

## 2019-01-15 NOTE — PROGRESS NOTE ADULT - SUBJECTIVE AND OBJECTIVE BOX
INTERVENTIONAL CARDIOLOGY FOLLOW UP NOTE    -Patient seen and examined this am    -No events overnight    -No complaints this am    VASCULAR ACCESS EXAM:    RADIAL:    2+ right/left radial pulse    Normal capillary refill. No evidence of motor or sensory deficits of digits, hand, wrist or forearm.    -Right radial Access site clean, non-tender, without ecchymosis or hematoma.    A/P: 49 yo male, FORMER heavy smoker with PMHx of HTN, HLD, CAD with MI in 2010 and multiple PCIs (most recently PCTA to OM1 with residual mRCA 70% edge stent restenosis 2/2018), GERD, hemorrhoids (s/p colonoscopy 3 years ago revealing internal hemorrhoid) now s/p PTCA of OM1 via right radial approach with no evidence of vascular complications post procedure.    -continue with current medications including dual antiplatelet therapy    -discharge instructions as per protocol    -outpatient follow up with primary cardiologist    - will need to consider brachytherapy of OM1 as outpatient

## 2019-01-18 DIAGNOSIS — R07.9 CHEST PAIN, UNSPECIFIED: ICD-10-CM

## 2019-01-18 DIAGNOSIS — E78.5 HYPERLIPIDEMIA, UNSPECIFIED: ICD-10-CM

## 2019-01-18 DIAGNOSIS — I10 ESSENTIAL (PRIMARY) HYPERTENSION: ICD-10-CM

## 2019-01-18 DIAGNOSIS — I25.110 ATHEROSCLEROTIC HEART DISEASE OF NATIVE CORONARY ARTERY WITH UNSTABLE ANGINA PECTORIS: ICD-10-CM

## 2019-01-18 DIAGNOSIS — K21.9 GASTRO-ESOPHAGEAL REFLUX DISEASE WITHOUT ESOPHAGITIS: ICD-10-CM

## 2019-01-18 DIAGNOSIS — K64.9 UNSPECIFIED HEMORRHOIDS: ICD-10-CM

## 2019-09-24 ENCOUNTER — INPATIENT (INPATIENT)
Facility: HOSPITAL | Age: 49
LOS: 0 days | Discharge: HOME | DRG: 247 | End: 2019-09-25
Attending: INTERNAL MEDICINE | Admitting: INTERNAL MEDICINE
Payer: MEDICAID

## 2019-09-24 VITALS
OXYGEN SATURATION: 98 % | TEMPERATURE: 98 F | RESPIRATION RATE: 17 BRPM | HEART RATE: 56 BPM | SYSTOLIC BLOOD PRESSURE: 176 MMHG | DIASTOLIC BLOOD PRESSURE: 111 MMHG

## 2019-09-24 DIAGNOSIS — I20.0 UNSTABLE ANGINA: ICD-10-CM

## 2019-09-24 DIAGNOSIS — I10 ESSENTIAL (PRIMARY) HYPERTENSION: ICD-10-CM

## 2019-09-24 DIAGNOSIS — E78.5 HYPERLIPIDEMIA, UNSPECIFIED: ICD-10-CM

## 2019-09-24 DIAGNOSIS — Z98.61 CORONARY ANGIOPLASTY STATUS: Chronic | ICD-10-CM

## 2019-09-24 LAB
ALBUMIN SERPL ELPH-MCNC: 4.6 G/DL — SIGNIFICANT CHANGE UP (ref 3.3–5)
ALP SERPL-CCNC: 78 U/L — SIGNIFICANT CHANGE UP (ref 40–120)
ALT FLD-CCNC: 16 U/L — SIGNIFICANT CHANGE UP (ref 10–45)
ANION GAP SERPL CALC-SCNC: 11 MMOL/L — SIGNIFICANT CHANGE UP (ref 5–17)
APTT BLD: 36 SEC — SIGNIFICANT CHANGE UP (ref 27.5–36.3)
AST SERPL-CCNC: 28 U/L — SIGNIFICANT CHANGE UP (ref 10–40)
BASOPHILS # BLD AUTO: 0.03 K/UL — SIGNIFICANT CHANGE UP (ref 0–0.2)
BASOPHILS NFR BLD AUTO: 0.4 % — SIGNIFICANT CHANGE UP (ref 0–2)
BILIRUB SERPL-MCNC: 0.8 MG/DL — SIGNIFICANT CHANGE UP (ref 0.2–1.2)
BUN SERPL-MCNC: 12 MG/DL — SIGNIFICANT CHANGE UP (ref 7–23)
CALCIUM SERPL-MCNC: 9.4 MG/DL — SIGNIFICANT CHANGE UP (ref 8.4–10.5)
CHLORIDE SERPL-SCNC: 106 MMOL/L — SIGNIFICANT CHANGE UP (ref 96–108)
CHOLEST SERPL-MCNC: 198 MG/DL — SIGNIFICANT CHANGE UP (ref 10–199)
CK MB CFR SERPL CALC: 1 NG/ML — SIGNIFICANT CHANGE UP (ref 0–6.7)
CK MB CFR SERPL CALC: 1.3 NG/ML — SIGNIFICANT CHANGE UP (ref 0–6.7)
CK SERPL-CCNC: 100 U/L — SIGNIFICANT CHANGE UP (ref 30–200)
CK SERPL-CCNC: 121 U/L — SIGNIFICANT CHANGE UP (ref 30–200)
CO2 SERPL-SCNC: 24 MMOL/L — SIGNIFICANT CHANGE UP (ref 22–31)
CREAT SERPL-MCNC: 0.76 MG/DL — SIGNIFICANT CHANGE UP (ref 0.5–1.3)
EOSINOPHIL # BLD AUTO: 0.62 K/UL — HIGH (ref 0–0.5)
EOSINOPHIL NFR BLD AUTO: 9.1 % — HIGH (ref 0–6)
GLUCOSE SERPL-MCNC: 124 MG/DL — HIGH (ref 70–99)
HBA1C BLD-MCNC: 6.2 % — HIGH (ref 4–5.6)
HCT VFR BLD CALC: 40.2 % — SIGNIFICANT CHANGE UP (ref 39–50)
HDLC SERPL-MCNC: 34 MG/DL — LOW
HGB BLD-MCNC: 12.6 G/DL — LOW (ref 13–17)
IMM GRANULOCYTES NFR BLD AUTO: 0.3 % — SIGNIFICANT CHANGE UP (ref 0–1.5)
INR BLD: 1.04 — SIGNIFICANT CHANGE UP (ref 0.88–1.16)
LIPID PNL WITH DIRECT LDL SERPL: 126 MG/DL — HIGH
LYMPHOCYTES # BLD AUTO: 2.22 K/UL — SIGNIFICANT CHANGE UP (ref 1–3.3)
LYMPHOCYTES # BLD AUTO: 32.6 % — SIGNIFICANT CHANGE UP (ref 13–44)
MCHC RBC-ENTMCNC: 27 PG — SIGNIFICANT CHANGE UP (ref 27–34)
MCHC RBC-ENTMCNC: 31.3 GM/DL — LOW (ref 32–36)
MCV RBC AUTO: 86.3 FL — SIGNIFICANT CHANGE UP (ref 80–100)
MONOCYTES # BLD AUTO: 0.77 K/UL — SIGNIFICANT CHANGE UP (ref 0–0.9)
MONOCYTES NFR BLD AUTO: 11.3 % — SIGNIFICANT CHANGE UP (ref 2–14)
NEUTROPHILS # BLD AUTO: 3.16 K/UL — SIGNIFICANT CHANGE UP (ref 1.8–7.4)
NEUTROPHILS NFR BLD AUTO: 46.3 % — SIGNIFICANT CHANGE UP (ref 43–77)
NRBC # BLD: 0 /100 WBCS — SIGNIFICANT CHANGE UP (ref 0–0)
PLATELET # BLD AUTO: 182 K/UL — SIGNIFICANT CHANGE UP (ref 150–400)
POTASSIUM SERPL-MCNC: 4.5 MMOL/L — SIGNIFICANT CHANGE UP (ref 3.5–5.3)
POTASSIUM SERPL-SCNC: 4.5 MMOL/L — SIGNIFICANT CHANGE UP (ref 3.5–5.3)
PROT SERPL-MCNC: 7.9 G/DL — SIGNIFICANT CHANGE UP (ref 6–8.3)
PROTHROM AB SERPL-ACNC: 11.8 SEC — SIGNIFICANT CHANGE UP (ref 10–12.9)
RBC # BLD: 4.66 M/UL — SIGNIFICANT CHANGE UP (ref 4.2–5.8)
RBC # FLD: 14 % — SIGNIFICANT CHANGE UP (ref 10.3–14.5)
SODIUM SERPL-SCNC: 141 MMOL/L — SIGNIFICANT CHANGE UP (ref 135–145)
TOTAL CHOLESTEROL/HDL RATIO MEASUREMENT: 5.8 RATIO — SIGNIFICANT CHANGE UP (ref 3.4–9.6)
TRIGL SERPL-MCNC: 189 MG/DL — HIGH (ref 10–149)
TROPONIN T SERPL-MCNC: <0.01 NG/ML — SIGNIFICANT CHANGE UP (ref 0–0.01)
TROPONIN T SERPL-MCNC: <0.01 NG/ML — SIGNIFICANT CHANGE UP (ref 0–0.01)
TSH SERPL-MCNC: 1.72 UIU/ML — SIGNIFICANT CHANGE UP (ref 0.35–4.94)
WBC # BLD: 6.82 K/UL — SIGNIFICANT CHANGE UP (ref 3.8–10.5)
WBC # FLD AUTO: 6.82 K/UL — SIGNIFICANT CHANGE UP (ref 3.8–10.5)

## 2019-09-24 PROCEDURE — 71045 X-RAY EXAM CHEST 1 VIEW: CPT | Mod: 26

## 2019-09-24 PROCEDURE — 99285 EMERGENCY DEPT VISIT HI MDM: CPT | Mod: 25

## 2019-09-24 PROCEDURE — 93306 TTE W/DOPPLER COMPLETE: CPT | Mod: 26

## 2019-09-24 PROCEDURE — 99223 1ST HOSP IP/OBS HIGH 75: CPT

## 2019-09-24 PROCEDURE — 92928 PRQ TCAT PLMT NTRAC ST 1 LES: CPT | Mod: LC

## 2019-09-24 PROCEDURE — 93010 ELECTROCARDIOGRAM REPORT: CPT

## 2019-09-24 PROCEDURE — 93458 L HRT ARTERY/VENTRICLE ANGIO: CPT | Mod: 26,59

## 2019-09-24 PROCEDURE — 92978 ENDOLUMINL IVUS OCT C 1ST: CPT | Mod: 26,LC

## 2019-09-24 RX ORDER — ASPIRIN/CALCIUM CARB/MAGNESIUM 324 MG
81 TABLET ORAL DAILY
Refills: 0 | Status: DISCONTINUED | OUTPATIENT
Start: 2019-09-25 | End: 2019-09-25

## 2019-09-24 RX ORDER — CARVEDILOL PHOSPHATE 80 MG/1
6.25 CAPSULE, EXTENDED RELEASE ORAL EVERY 12 HOURS
Refills: 0 | Status: DISCONTINUED | OUTPATIENT
Start: 2019-09-24 | End: 2019-09-25

## 2019-09-24 RX ORDER — DOCUSATE SODIUM 100 MG
100 CAPSULE ORAL
Refills: 0 | Status: DISCONTINUED | OUTPATIENT
Start: 2019-09-24 | End: 2019-09-25

## 2019-09-24 RX ORDER — CLOPIDOGREL BISULFATE 75 MG/1
75 TABLET, FILM COATED ORAL DAILY
Refills: 0 | Status: DISCONTINUED | OUTPATIENT
Start: 2019-09-24 | End: 2019-09-25

## 2019-09-24 RX ORDER — ATORVASTATIN CALCIUM 80 MG/1
80 TABLET, FILM COATED ORAL AT BEDTIME
Refills: 0 | Status: DISCONTINUED | OUTPATIENT
Start: 2019-09-24 | End: 2019-09-25

## 2019-09-24 RX ORDER — ASPIRIN/CALCIUM CARB/MAGNESIUM 324 MG
325 TABLET ORAL ONCE
Refills: 0 | Status: DISCONTINUED | OUTPATIENT
Start: 2019-09-24 | End: 2019-09-24

## 2019-09-24 RX ORDER — RANOLAZINE 500 MG/1
500 TABLET, FILM COATED, EXTENDED RELEASE ORAL
Refills: 0 | Status: DISCONTINUED | OUTPATIENT
Start: 2019-09-24 | End: 2019-09-25

## 2019-09-24 RX ORDER — INFLUENZA VIRUS VACCINE 15; 15; 15; 15 UG/.5ML; UG/.5ML; UG/.5ML; UG/.5ML
0.5 SUSPENSION INTRAMUSCULAR ONCE
Refills: 0 | Status: COMPLETED | OUTPATIENT
Start: 2019-09-24 | End: 2019-09-24

## 2019-09-24 RX ORDER — ASPIRIN/CALCIUM CARB/MAGNESIUM 324 MG
325 TABLET ORAL ONCE
Refills: 0 | Status: COMPLETED | OUTPATIENT
Start: 2019-09-24 | End: 2019-09-24

## 2019-09-24 RX ORDER — ISOSORBIDE MONONITRATE 60 MG/1
30 TABLET, EXTENDED RELEASE ORAL DAILY
Refills: 0 | Status: DISCONTINUED | OUTPATIENT
Start: 2019-09-24 | End: 2019-09-25

## 2019-09-24 RX ORDER — ATORVASTATIN CALCIUM 80 MG/1
40 TABLET, FILM COATED ORAL AT BEDTIME
Refills: 0 | Status: DISCONTINUED | OUTPATIENT
Start: 2019-09-24 | End: 2019-09-24

## 2019-09-24 RX ORDER — PANTOPRAZOLE SODIUM 20 MG/1
40 TABLET, DELAYED RELEASE ORAL
Refills: 0 | Status: DISCONTINUED | OUTPATIENT
Start: 2019-09-24 | End: 2019-09-25

## 2019-09-24 RX ADMIN — CLOPIDOGREL BISULFATE 75 MILLIGRAM(S): 75 TABLET, FILM COATED ORAL at 08:18

## 2019-09-24 RX ADMIN — RANOLAZINE 500 MILLIGRAM(S): 500 TABLET, FILM COATED, EXTENDED RELEASE ORAL at 08:18

## 2019-09-24 RX ADMIN — CARVEDILOL PHOSPHATE 6.25 MILLIGRAM(S): 80 CAPSULE, EXTENDED RELEASE ORAL at 21:28

## 2019-09-24 RX ADMIN — ATORVASTATIN CALCIUM 80 MILLIGRAM(S): 80 TABLET, FILM COATED ORAL at 21:29

## 2019-09-24 RX ADMIN — CARVEDILOL PHOSPHATE 6.25 MILLIGRAM(S): 80 CAPSULE, EXTENDED RELEASE ORAL at 08:19

## 2019-09-24 RX ADMIN — PANTOPRAZOLE SODIUM 40 MILLIGRAM(S): 20 TABLET, DELAYED RELEASE ORAL at 08:18

## 2019-09-24 RX ADMIN — Medication 325 MILLIGRAM(S): at 06:24

## 2019-09-24 RX ADMIN — RANOLAZINE 500 MILLIGRAM(S): 500 TABLET, FILM COATED, EXTENDED RELEASE ORAL at 21:28

## 2019-09-24 RX ADMIN — ISOSORBIDE MONONITRATE 30 MILLIGRAM(S): 60 TABLET, EXTENDED RELEASE ORAL at 06:25

## 2019-09-24 NOTE — H&P ADULT - PROBLEM SELECTOR PLAN 3
Total Cholesterol 198. Triglycerides 199. HDL 38. .   - Continue Atorvastatin 40mg daily, consider increasing in light of LDL elevation and anginal symptoms    DVT PPX: pt ambulatory  Dispo: pending cardiac catheterization

## 2019-09-24 NOTE — H&P ADULT - HISTORY OF PRESENT ILLNESS
48 yo Male former heavy smoker, PMHx of HTN, Hyperlipidemia, CAD with MI in 2010 and multiple PCIs (most recently PTCA OM1 in 1/2019 at [facility]), GERD, internal hemorrhoids who was sent in by his cardiologist Dr. Rivera for intermittent sharp SSCP radiating to left shoulder of and on x 2 weeks, assoc with mild SOB, pain comes at rest and with exertion, relived by nitro, pt last took 9/23/19 PM and now he is CP free. No dizziness or syncope. No f/c, no cough. He states he has been compliant with his meds. No recent NST or Echo. In [facility] ED /111. HR RR O2 sat Temp 48 yo Male former heavy smoker, PMHx of HTN, Hyperlipidemia, CAD with MI in 2010 and multiple PCIs (most recently PTCA OM1 in 1/2019 at [facility]), GERD, internal hemorrhoids who was sent in by his cardiologist Dr. Rivera for intermittent sharp SSCP radiating to left shoulder of and on x 2 weeks, assoc with mild SOB, pain comes at rest and with exertion, relived by nitro, pt last took 9/23/19 PM and now he is CP free. No dizziness or syncope. No f/c, no cough. He states he has been compliant with his meds. No recent NST or Echo. In [facility] ED /111. HR 56 RR 17 O2 sat 99% room air. Temp 97.8F. EKG SB 53bpm with TWI III, AVL, V1, V5, V6. Trop neg x 1. In ED pt received Aspirin 325mg x 1 and Imdur 30mg x 1. Pt is being admitted to [facility] for management of unstable angina.

## 2019-09-24 NOTE — H&P ADULT - NSICDXPASTMEDICALHX_GEN_ALL_CORE_FT
PAST MEDICAL HISTORY:  Atherosclerosis of coronary artery CAD (coronary artery disease)    Essential hypertension HTN (hypertension)    Gastroesophageal reflux disease GERD (gastroesophageal reflux disease)    Hemorrhoid     Hyperlipidemia     Myocardial ischemia

## 2019-09-24 NOTE — ED PROVIDER NOTE - OBJECTIVE STATEMENT
48M former heavy smoker, PMHx of HTN, HLD, CAD with MI in 2010 and multiple PCIs, GERD, internal hemorrhoids who was sent in by his cardiologist Dr. Galaviz for intermittent sharp SSCP radiating to left shoulder of and on x 2 weeks, assoc with mild SOB, pain comes at rest and with exertion, relived by nitro, pt last took 5 hours ago and now he is CP free. No dizziness or syncope. No f/c, no cough. He states he has been compliant with his meds.

## 2019-09-24 NOTE — ED PROVIDER NOTE - PHYSICAL EXAMINATION
GEN: Well appearing, well nourished, awake, alert, oriented to person, place, time/situation and in no apparent distress.  ENT: Airway patent, Nasal mucosa clear. Mouth with normal mucosa.  EYES: Clear bilaterally.  RESPIRATORY: Breathing comfortably with normal RR. no w/c/r.  CARDIAC: Regular rate and rhythm  ABDOMEN: Soft, nontender, +bowel sounds, no rebound, rigidity, or guarding.  MSK: Range of motion is not limited, no deformities noted.  NEURO: Alert and oriented, no focal deficits.  SKIN: Skin normal color for race, warm, dry and intact. No evidence of rash.  PSYCH: Alert and oriented to person, place, time/situation. normal mood and affect. no apparent risk to self or others.

## 2019-09-24 NOTE — H&P ADULT - PROBLEM SELECTOR PLAN 2
- Continue Carvedilol 6.25mg BID. Hold Enalapril 5mg daily until 9/25/19 at 12pm pending AM Cr if pt stays overnight post cath

## 2019-09-24 NOTE — PATIENT PROFILE ADULT - HEALTH LITERACY
CHIEF COMPLAINT:   Chief Complaint   Patient presents with   • Weakness   • Fever (75 years or more)       HISTORY OF PRESENT ILLNESS: Elba Salas is a 89 year old female presenting with UTI and sepsis. CT scan reveals B hydroureteronephrosis without ureteral stone. The bladder wall was found to be abnormal, most likely being cystitis. The pt notes a long h/o urinary incontinence. She denies GH. She denies previous kidney stones.     REVIEW OF SYSTEM:  I have personally reviewed the ROS as entered by the medical assistant, and have addressed pertinent issues.    HISTORY:   PAST MEDICAL HX:    Diabetes mellitus (CMS/[facility])                                   Pseudomonas infection                           2011          Candidiasis of skin and nails                   2010            Comment: resolved    Nutritional deficiency disorder                 2010            Comment: resolved    Hypercholesterolemia                            2010          Hyperlipemia                                    2011          Hyperpotassemia                                 2010            Comment: resolved    Anemia                                          2011          Anxiety                                                       Chronic depression                                            Chronic pain                                                  Esophageal reflux disease                                     Constipation                                                  Kidney failure, acute (CMS/[facility])                 2010            Comment: resolved    Chronic kidney disease (CKD), stage III (moder*               UTI (urinary tract infection)                                 Pressure ulcer of lower back                    2012            Comment: active condition    Rheumatoid arthritis (CMS/[facility])                  2010          Osteoarthrosis                                  2011          Osteomyelitis (CMS/[facility])                                        Insomnia                                                      Edema                                                         Symbolic dysfunction                                          Shortness of breath                                           Sepsis (CMS/HCC)                                            Comment: active as of     Circulatory disease                                           Wheelchair confinement                                          Comment: able to pivot with 1 assit.    HTN (hypertension)                                            Osteoporosis                                                  Herpes zoster                                                 PAST SURGICAL HX:    COLOSTOMY                                                     TOTAL KNEE REPLACEMENT                                          Comment: bilateral    HYSTERECTOMY                                                  FEMUR SURGERY                                                   Comment: right    DEXA BONE DENSITY AXIAL SKELETON                10/01/2008    CENTRAL LINE INSERTION                          10/14/2010    LAPAROSCOPIC TAKEDOWN STOMA                     2013        Family History   Problem Relation Age of Onset   • Cancer Father      lung     Review of patient's family status indicates:    Father                                           Mother                                           Sister                         Alive                     Brother                                          Daughter                       Alive                     Son                            Alive                     Brother                                          Brother                        Alive                     Brother                        Alive                     Son                            Alive                       Social History     Social History   •  Marital status:      Spouse name: N/A   • Number of children: N/A   • Years of education: N/A     Occupational History   • Not on file.     Social History Main Topics   • Smoking status: Former Smoker     Types: Cigarettes     Quit date: 12/17/1957   • Smokeless tobacco: Never Used   • Alcohol use No   • Drug use: No   • Sexual activity: Not on file     Other Topics Concern   • Not on file     Social History Narrative   • No narrative on file       PHYSICAL EXAM:     Visit Vitals  /76 (BP Location: [facility], Patient Position: Supine)   Pulse 102   Temp 98.9 °F (37.2 °C) (Oral)   Resp 17   Ht 5' (1.524 m)   Wt 79.7 kg   SpO2 95%   BMI 34.32 kg/m²     General: Well developed, well nourished, well groomed  Psych: Normal mood and affect. Alert and orientated x3.  HEENT: Neck supple, normal thyroid  Lungs: CTA B (clear to auscultation bilaterally), normal respiratory effort  Heart: RRR (regular rate and rhythm), no murmurs  Abdominal: Soft, NT (nontender), normal liver and spleen, no palpable hernias  Skin: warm and dry without lesions or rashes  Lymphatic: No lymphadenopathy in neck or groin  Extremities: No peripheral edema    Labs: WBC 6, Hgb 11, Plt 206, Cr 1    Assessment: UTI, B hydroureteronephrosis, cystitis    Plan: Initial recommendation is for a azul catheter to decompress the bladder and allow for renal drainage. After azul placement, U/S should be done about 48 hours later to confirm resolution of upper tract dilation. The pt will also require cystoscopy in a few weeks to ensure that the finding on CT was transient.             Thank you very kindly for allowing me to participate in the care of your patient.                   CC: Nhan Aparicio MD     no

## 2019-09-24 NOTE — ED PROVIDER NOTE - CLINICAL SUMMARY MEDICAL DECISION MAKING FREE TEXT BOX
49M with PMHx including significant CAD with multiple stents who p/w worsening CP intermittent x 2 weeks, pt took nitro PTA and CP free currently. VS remarkable for HTN, EKG no STEMI, +TWI. Monitor, labs, CXR, admit for r/o acs

## 2019-09-24 NOTE — H&P ADULT - ASSESSMENT
48 yo Male former heavy smoker, PMHx of HTN, Hyperlipidemia, CAD with MI in 2010 and multiple PCIs (most recently PTCA OM1 in 1/2019 at [facility]), GERD, internal hemorrhoids who was sent in by his cardiologist Dr. Rivera for intermittent sharp SSCP radiating to left shoulder of and on x 2 weeks, assoc with mild SOB, pain comes at rest and with exertion, relived by nitro, pt last took 9/23/19 PM and now he is CP free. No dizziness or syncope. No f/c, no cough. He states he has been compliant with his meds. No recent NST or Echo. In [facility] ED /111. HR 56 RR 17 O2 sat 99% room air. Temp 97.8F. EKG SB 53bpm with TWI III, AVL, V1, V5, V6. Trop neg x 1. In ED pt received Aspirin 325mg x 1 and Imdur 30mg x 1. Pt is being admitted to [facility] for management of unstable angina.

## 2019-09-24 NOTE — H&P ADULT - PROBLEM SELECTOR PLAN 1
- Currently chest pain free  - Trop neg x 1, f/u 10AM and 2PM trop  - EKG SB at 53bpm with TWI, III, AVL, V1, V5, V6  - Echo performed, f/u results  - NPO after 1pm for cardiac catheterization with Dr Rivera. Pt signed consent  - Aspirin 325mg x 1 given in ED, pt given Plavix 75mg (pt states he's compliant with medications)  - Consider increasing Atorvastatin 40mg daily at bedtime to 80mg daily at bedtime in light of .  - f/u hemoglobin A1C and TSH

## 2019-09-24 NOTE — ED ADULT NURSE NOTE - OBJECTIVE STATEMENT
Patient to the ED with complaint of anterior chest wall pain on and off for the past 2 weeks, associated with SOB, has hx of 6 stents ad 2 angioplasty, went to cardiologist was told to come to ED on Monday patient was unable to come to ED on Monday came this morning for procedure.  Patient denies pain at the moment, reported that he took nitro SL.

## 2019-09-24 NOTE — ED ADULT NURSE NOTE - NSIMPLEMENTINTERV_GEN_ALL_ED
Implemented All Universal Safety Interventions:  [place] to call system. Call bell, personal items and telephone within reach. Instruct patient to call for assistance. Room bathroom lighting operational. Non-slip footwear when patient is off stretcher. Physically safe environment: no spills, clutter or unnecessary equipment. Stretcher in lowest position, wheels locked, appropriate side rails in place.

## 2019-09-25 ENCOUNTER — TRANSCRIPTION ENCOUNTER (OUTPATIENT)
Age: 49
End: 2019-09-25

## 2019-09-25 VITALS — TEMPERATURE: 97 F

## 2019-09-25 LAB
ALBUMIN SERPL ELPH-MCNC: 4.9 G/DL — SIGNIFICANT CHANGE UP (ref 3.3–5)
ALP SERPL-CCNC: 83 U/L — SIGNIFICANT CHANGE UP (ref 40–120)
ALT FLD-CCNC: 15 U/L — SIGNIFICANT CHANGE UP (ref 10–45)
ANION GAP SERPL CALC-SCNC: 14 MMOL/L — SIGNIFICANT CHANGE UP (ref 5–17)
AST SERPL-CCNC: 20 U/L — SIGNIFICANT CHANGE UP (ref 10–40)
BILIRUB SERPL-MCNC: 0.8 MG/DL — SIGNIFICANT CHANGE UP (ref 0.2–1.2)
BUN SERPL-MCNC: 17 MG/DL — SIGNIFICANT CHANGE UP (ref 7–23)
CALCIUM SERPL-MCNC: 9.8 MG/DL — SIGNIFICANT CHANGE UP (ref 8.4–10.5)
CHLORIDE SERPL-SCNC: 100 MMOL/L — SIGNIFICANT CHANGE UP (ref 96–108)
CO2 SERPL-SCNC: 27 MMOL/L — SIGNIFICANT CHANGE UP (ref 22–31)
CREAT SERPL-MCNC: 0.9 MG/DL — SIGNIFICANT CHANGE UP (ref 0.5–1.3)
GLUCOSE SERPL-MCNC: 122 MG/DL — HIGH (ref 70–99)
HCT VFR BLD CALC: 42.7 % — SIGNIFICANT CHANGE UP (ref 39–50)
HGB BLD-MCNC: 13.1 G/DL — SIGNIFICANT CHANGE UP (ref 13–17)
MAGNESIUM SERPL-MCNC: 2.1 MG/DL — SIGNIFICANT CHANGE UP (ref 1.6–2.6)
MCHC RBC-ENTMCNC: 27.2 PG — SIGNIFICANT CHANGE UP (ref 27–34)
MCHC RBC-ENTMCNC: 30.7 GM/DL — LOW (ref 32–36)
MCV RBC AUTO: 88.8 FL — SIGNIFICANT CHANGE UP (ref 80–100)
NRBC # BLD: 0 /100 WBCS — SIGNIFICANT CHANGE UP (ref 0–0)
PLATELET # BLD AUTO: 191 K/UL — SIGNIFICANT CHANGE UP (ref 150–400)
POTASSIUM SERPL-MCNC: 4.1 MMOL/L — SIGNIFICANT CHANGE UP (ref 3.5–5.3)
POTASSIUM SERPL-SCNC: 4.1 MMOL/L — SIGNIFICANT CHANGE UP (ref 3.5–5.3)
PROT SERPL-MCNC: 8.4 G/DL — HIGH (ref 6–8.3)
RBC # BLD: 4.81 M/UL — SIGNIFICANT CHANGE UP (ref 4.2–5.8)
RBC # FLD: 13.8 % — SIGNIFICANT CHANGE UP (ref 10.3–14.5)
SODIUM SERPL-SCNC: 141 MMOL/L — SIGNIFICANT CHANGE UP (ref 135–145)
WBC # BLD: 8.04 K/UL — SIGNIFICANT CHANGE UP (ref 3.8–10.5)
WBC # FLD AUTO: 8.04 K/UL — SIGNIFICANT CHANGE UP (ref 3.8–10.5)

## 2019-09-25 PROCEDURE — 99238 HOSP IP/OBS DSCHRG MGMT 30/<: CPT

## 2019-09-25 RX ORDER — NITROGLYCERIN 6.5 MG
1 CAPSULE, EXTENDED RELEASE ORAL
Qty: 0 | Refills: 0 | DISCHARGE

## 2019-09-25 RX ORDER — ASPIRIN/CALCIUM CARB/MAGNESIUM 324 MG
1 TABLET ORAL
Qty: 30 | Refills: 11
Start: 2019-09-25 | End: 2020-09-18

## 2019-09-25 RX ORDER — NITROGLYCERIN 6.5 MG
1 CAPSULE, EXTENDED RELEASE ORAL
Qty: 21 | Refills: 0
Start: 2019-09-25 | End: 2019-10-01

## 2019-09-25 RX ORDER — CLOPIDOGREL BISULFATE 75 MG/1
1 TABLET, FILM COATED ORAL
Qty: 30 | Refills: 11
Start: 2019-09-25 | End: 2020-09-18

## 2019-09-25 RX ORDER — ATORVASTATIN CALCIUM 80 MG/1
1 TABLET, FILM COATED ORAL
Qty: 30 | Refills: 2
Start: 2019-09-25 | End: 2019-12-23

## 2019-09-25 RX ADMIN — CARVEDILOL PHOSPHATE 6.25 MILLIGRAM(S): 80 CAPSULE, EXTENDED RELEASE ORAL at 06:23

## 2019-09-25 RX ADMIN — Medication 81 MILLIGRAM(S): at 11:18

## 2019-09-25 RX ADMIN — CLOPIDOGREL BISULFATE 75 MILLIGRAM(S): 75 TABLET, FILM COATED ORAL at 11:18

## 2019-09-25 RX ADMIN — PANTOPRAZOLE SODIUM 40 MILLIGRAM(S): 20 TABLET, DELAYED RELEASE ORAL at 06:24

## 2019-09-25 RX ADMIN — ISOSORBIDE MONONITRATE 30 MILLIGRAM(S): 60 TABLET, EXTENDED RELEASE ORAL at 11:18

## 2019-09-25 RX ADMIN — Medication 100 MILLIGRAM(S): at 06:23

## 2019-09-25 RX ADMIN — RANOLAZINE 500 MILLIGRAM(S): 500 TABLET, FILM COATED, EXTENDED RELEASE ORAL at 06:23

## 2019-09-25 NOTE — DISCHARGE NOTE PROVIDER - NSDCCPCAREPLAN_GEN_ALL_CORE_FT
PRINCIPAL DISCHARGE DIAGNOSIS  Diagnosis: CAD (coronary artery disease)  Assessment and Plan of Treatment: You presented to the hospital with concern for unstable angina and underwent a cardiac catheterization on 9/24. The plaque build-up causing a blockage in your Left Circumflex Artery was opened with stent placement. You should continue taking aspirin and plavix (clopidogrel) daily. NEVER MISS A DOSE OF ASPIRIN OR PLAVIX; IF YOU DO, YOU ARE AT RISK OF YOUR STENT CLOSING AND HAVING A HEART ATTACK. DO NOT STOP THESE TWO MEDICATIONS UNLESS INSTRUCTED TO DO SO BY YOUR CARDIOLOGIST. You should also take atorvastatin 80mg (increased from 40mg) every night in order to prevent further plaque build-up. For chest pain/angina, you take ranolazine daily, and you may take nitroglycerin as prescribed (one tab every 5 min, maximum 3 doses). If your chest pain persists, you should call 911. Your procedure was done through your right groin. You do not need to keep this area covered and you may shower. Please avoid any heavy lifting  (no more than 3 to 5 lbs) or strenuous activity for five days. If you develop any swelling, bleeding, hardening of the skin (hematoma formation), acute pain, numbness/tingling  in your leg please contact your doctor immediately or call our 24/7 line: [phone]. Please follow up with Dr. Rivera as previously scheduled. Please adhere to a heart healthy diet, which includes continue incorporating 2 or more servings per day of vegetables, fruits, and whole grains. Increase intake of fish and legumes/beans to 2 or more servings per week. Aim to increase intake of healthy fats, such as olive oil and avocados, and have a handful of nuts/seeds most days. Reduce red/processed meat consumption to 2 or fewer times per week.      SECONDARY DISCHARGE DIAGNOSES  Diagnosis: Essential hypertension  Assessment and Plan of Treatment: Your blood pressure was well-controlled during this hospitalization. Please continue taking carvedilol 6.25mg twice a day and enalapril 5mg daily    Diagnosis: Hyperlipidemia  Assessment and Plan of Treatment: Your LDL (bad cholesterol) level is 126. Your LDL goal is less than 70. We increased your atorvastatin to 80mg once a day.

## 2019-09-25 NOTE — PROGRESS NOTE ADULT - SUBJECTIVE AND OBJECTIVE BOX
INTERVENTIONAL CARDIOLOGY FOLLOW UP NOTE    -Patient seen and examined this am  -No events overnight  -No complaints this am    T(C): 36.3 (09-25-19 @ 09:25), Max: 36.9 (09-24-19 @ 22:13)  HR: 76 (09-25-19 @ 08:44) (53 - 76)  BP: 134/79 (09-25-19 @ 08:44) (110/65 - 138/82)  RR: 14 (09-25-19 @ 08:44) (14 - 18)  SpO2: 97% (09-25-19 @ 08:44) (96% - 100%)    VASCULAR ACCESS EXAM:  FEMORAL:  2+ right/left femoral pulse  2+ DP/PT pulses.  -Access site clean, non-tender, without ecchymosis or hematoma.      A/P  S/p PCI via femoral approach with no evidence of vascular complications post procedure.  -continue with current medications including dual antiplatelet therapy  -discharge instructions as per protocol  -outpatient follow up with primary cardiologist

## 2019-09-25 NOTE — DISCHARGE NOTE PROVIDER - CARE PROVIDER_API CALL
Mathew Rivera)  Cardiovascular Disease; Internal Medicine; Interventional Cardiology  [address], [facility] [address]  [place], [place] [address]  Phone: [phone]  Fax: [phone]  Follow Up Time:

## 2019-09-25 NOTE — DISCHARGE NOTE PROVIDER - HOSPITAL COURSE
48 yo Male former heavy smoker, PMHx of HTN, Hyperlipidemia, CAD with MI in 2010 and multiple PCIs (most recently PTCA OM1 in 1/2019 at [facility]), GERD, internal hemorrhoids who was sent in by his cardiologist Dr. Rivera for intermittent sharp SSCP radiating to left shoulder of and on x 2 weeks, assoc with mild SOB, pain comes at rest and with exertion, relieved by nitro, pt last took nitro 9/23/19 PM with resolution of chest pain. He states he has been compliant with his meds. In [facility] ED /111. HR 56 RR 17 O2 sat 99% room air. Temp 97.8F. EKG SB 53bpm with TWI III, AVL, V1, V5, V6. Trop neg x 1. In ED pt received Aspirin 325mg x 1 and Imdur 30mg x 1. Admitted to [facility] for further management of unstable angina and underwent echo with normal EF, no WMA. Cardiac cath on 9/24/19 s/p JUAN pLCX. Pt seen and examined at bedside this morning. Pt comfortable, denies any CP, SOB, dizziness, palpitations. VSS. R groin access site stable, no bleeding or hematoma noted, DP/PT pulse at baseline. AM labs stable. Home meds reviewed with Dr. Encarnacion and pt. to be d/c on ASA/Plavix, atorvastatin 80mg, enalapril 5mg daily, coreg 6.25mg twice daily. Pt. stable to be d/c as per Dr. Encarnacion and to f/u with Dr Rivera in 2 weeks as previously scheduled. Patient has been given appropriate discharge instructions including medication regimen, access site management and follow up. Prescriptions have been e-prescribed to patient's preferred pharmacy.

## 2019-09-25 NOTE — DISCHARGE NOTE PROVIDER - INSTRUCTIONS
We recommend a Mediterranean diet: Some suggestions include continue incorporating 2 or more servings per day of vegetables, fruits, and whole grains. Increase intake of fish and legumes/beans to 2 or more servings per week. Aim to increase intake of healthy fats, such as olive oil and avocados, and have a handful of nuts/seeds most days. Reduce red/processed meat consumption to 2 or fewer times per week.

## 2019-09-25 NOTE — DISCHARGE NOTE NURSING/CASE MANAGEMENT/SOCIAL WORK - PATIENT PORTAL LINK FT
You can access the FollowMyHealth Patient Portal offered by [facility] by registering at the following website: [email or web address]. By joining [employer]’s FollowMyHealth portal, you will also be able to view your health information using other applications (apps) compatible with our system.

## 2019-10-01 ENCOUNTER — OUTPATIENT (OUTPATIENT)
Dept: OUTPATIENT SERVICES | Facility: HOSPITAL | Age: 49
LOS: 1 days | End: 2019-10-01
Payer: MEDICAID

## 2019-10-01 DIAGNOSIS — Z98.61 CORONARY ANGIOPLASTY STATUS: Chronic | ICD-10-CM

## 2019-10-01 PROCEDURE — G9001: CPT

## 2019-10-04 DIAGNOSIS — K64.8 OTHER HEMORRHOIDS: ICD-10-CM

## 2019-10-04 DIAGNOSIS — Z79.82 LONG TERM (CURRENT) USE OF ASPIRIN: ICD-10-CM

## 2019-10-04 DIAGNOSIS — Z87.891 PERSONAL HISTORY OF NICOTINE DEPENDENCE: ICD-10-CM

## 2019-10-04 DIAGNOSIS — I10 ESSENTIAL (PRIMARY) HYPERTENSION: ICD-10-CM

## 2019-10-04 DIAGNOSIS — K21.9 GASTRO-ESOPHAGEAL REFLUX DISEASE WITHOUT ESOPHAGITIS: ICD-10-CM

## 2019-10-04 DIAGNOSIS — I25.2 OLD MYOCARDIAL INFARCTION: ICD-10-CM

## 2019-10-04 DIAGNOSIS — T82.855A STENOSIS OF CORONARY ARTERY STENT, INITIAL ENCOUNTER: ICD-10-CM

## 2019-10-04 DIAGNOSIS — E78.5 HYPERLIPIDEMIA, UNSPECIFIED: ICD-10-CM

## 2019-10-04 DIAGNOSIS — I25.110 ATHEROSCLEROTIC HEART DISEASE OF NATIVE CORONARY ARTERY WITH UNSTABLE ANGINA PECTORIS: ICD-10-CM

## 2019-10-04 DIAGNOSIS — I25.82 CHRONIC TOTAL OCCLUSION OF CORONARY ARTERY: ICD-10-CM
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
## 2019-10-04 DIAGNOSIS — Z79.02 LONG TERM (CURRENT) USE OF ANTITHROMBOTICS/ANTIPLATELETS: ICD-10-CM
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
## 2019-10-08 DIAGNOSIS — Z71.89 OTHER SPECIFIED COUNSELING: ICD-10-CM

## 2019-10-31 PROCEDURE — 93005 ELECTROCARDIOGRAM TRACING: CPT

## 2019-10-31 PROCEDURE — 85025 COMPLETE CBC W/AUTO DIFF WBC: CPT

## 2019-10-31 PROCEDURE — 84484 ASSAY OF TROPONIN QUANT: CPT

## 2019-10-31 PROCEDURE — 82553 CREATINE MB FRACTION: CPT

## 2019-10-31 PROCEDURE — 36415 COLL VENOUS BLD VENIPUNCTURE: CPT

## 2019-10-31 PROCEDURE — C1769: CPT

## 2019-10-31 PROCEDURE — C1753: CPT

## 2019-10-31 PROCEDURE — C1725: CPT

## 2019-10-31 PROCEDURE — 85610 PROTHROMBIN TIME: CPT

## 2019-10-31 PROCEDURE — C1887: CPT

## 2019-10-31 PROCEDURE — 71045 X-RAY EXAM CHEST 1 VIEW: CPT

## 2019-10-31 PROCEDURE — 85730 THROMBOPLASTIN TIME PARTIAL: CPT

## 2019-10-31 PROCEDURE — C1894: CPT

## 2019-10-31 PROCEDURE — 83036 HEMOGLOBIN GLYCOSYLATED A1C: CPT

## 2019-10-31 PROCEDURE — 85027 COMPLETE CBC AUTOMATED: CPT

## 2019-10-31 PROCEDURE — 83735 ASSAY OF MAGNESIUM: CPT

## 2019-10-31 PROCEDURE — 82550 ASSAY OF CK (CPK): CPT

## 2019-10-31 PROCEDURE — C1874: CPT

## 2019-10-31 PROCEDURE — C1760: CPT

## 2019-10-31 PROCEDURE — 99285 EMERGENCY DEPT VISIT HI MDM: CPT | Mod: 25

## 2019-10-31 PROCEDURE — 80061 LIPID PANEL: CPT

## 2019-10-31 PROCEDURE — 93306 TTE W/DOPPLER COMPLETE: CPT

## 2019-10-31 PROCEDURE — 84443 ASSAY THYROID STIM HORMONE: CPT

## 2019-10-31 PROCEDURE — 80053 COMPREHEN METABOLIC PANEL: CPT

## 2020-05-04 ENCOUNTER — INPATIENT (INPATIENT)
Facility: HOSPITAL | Age: 50
LOS: 0 days | Discharge: HOME | DRG: 247 | End: 2020-05-05
Attending: INTERNAL MEDICINE | Admitting: INTERNAL MEDICINE
Payer: MEDICAID

## 2020-05-04 VITALS
OXYGEN SATURATION: 100 % | TEMPERATURE: 99 F | DIASTOLIC BLOOD PRESSURE: 100 MMHG | WEIGHT: 176.37 LBS | SYSTOLIC BLOOD PRESSURE: 162 MMHG | RESPIRATION RATE: 20 BRPM | HEART RATE: 84 BPM | HEIGHT: 68 IN

## 2020-05-04 DIAGNOSIS — Z98.61 CORONARY ANGIOPLASTY STATUS: Chronic | ICD-10-CM

## 2020-05-04 LAB
ALBUMIN SERPL ELPH-MCNC: 4.7 G/DL — SIGNIFICANT CHANGE UP (ref 3.3–5)
ALP SERPL-CCNC: 79 U/L — SIGNIFICANT CHANGE UP (ref 40–120)
ALT FLD-CCNC: 16 U/L — SIGNIFICANT CHANGE UP (ref 10–45)
ANION GAP SERPL CALC-SCNC: 13 MMOL/L — SIGNIFICANT CHANGE UP (ref 5–17)
APPEARANCE UR: CLEAR — SIGNIFICANT CHANGE UP
APTT BLD: 38.8 SEC — HIGH (ref 27.5–36.3)
AST SERPL-CCNC: 22 U/L — SIGNIFICANT CHANGE UP (ref 10–40)
BASOPHILS # BLD AUTO: 0.05 K/UL — SIGNIFICANT CHANGE UP (ref 0–0.2)
BASOPHILS NFR BLD AUTO: 0.7 % — SIGNIFICANT CHANGE UP (ref 0–2)
BILIRUB SERPL-MCNC: 0.6 MG/DL — SIGNIFICANT CHANGE UP (ref 0.2–1.2)
BILIRUB UR-MCNC: NEGATIVE — SIGNIFICANT CHANGE UP
BLD GP AB SCN SERPL QL: NEGATIVE — SIGNIFICANT CHANGE UP
BUN SERPL-MCNC: 13 MG/DL — SIGNIFICANT CHANGE UP (ref 7–23)
CALCIUM SERPL-MCNC: 9.4 MG/DL — SIGNIFICANT CHANGE UP (ref 8.4–10.5)
CHLORIDE SERPL-SCNC: 101 MMOL/L — SIGNIFICANT CHANGE UP (ref 96–108)
CK MB CFR SERPL CALC: 1.1 NG/ML — SIGNIFICANT CHANGE UP (ref 0–6.7)
CO2 SERPL-SCNC: 27 MMOL/L — SIGNIFICANT CHANGE UP (ref 22–31)
COLOR SPEC: YELLOW — SIGNIFICANT CHANGE UP
CREAT SERPL-MCNC: 1.02 MG/DL — SIGNIFICANT CHANGE UP (ref 0.5–1.3)
DIFF PNL FLD: NEGATIVE — SIGNIFICANT CHANGE UP
EOSINOPHIL # BLD AUTO: 0.77 K/UL — HIGH (ref 0–0.5)
EOSINOPHIL NFR BLD AUTO: 10.9 % — HIGH (ref 0–6)
GLUCOSE SERPL-MCNC: 99 MG/DL — SIGNIFICANT CHANGE UP (ref 70–99)
GLUCOSE UR QL: NEGATIVE — SIGNIFICANT CHANGE UP
HCT VFR BLD CALC: 38.2 % — LOW (ref 39–50)
HGB BLD-MCNC: 11.2 G/DL — LOW (ref 13–17)
IMM GRANULOCYTES NFR BLD AUTO: 0.4 % — SIGNIFICANT CHANGE UP (ref 0–1.5)
INR BLD: 1.05 — SIGNIFICANT CHANGE UP (ref 0.88–1.16)
KETONES UR-MCNC: NEGATIVE — SIGNIFICANT CHANGE UP
LEUKOCYTE ESTERASE UR-ACNC: NEGATIVE — SIGNIFICANT CHANGE UP
LYMPHOCYTES # BLD AUTO: 2.59 K/UL — SIGNIFICANT CHANGE UP (ref 1–3.3)
LYMPHOCYTES # BLD AUTO: 36.6 % — SIGNIFICANT CHANGE UP (ref 13–44)
MAGNESIUM SERPL-MCNC: 1.7 MG/DL — SIGNIFICANT CHANGE UP (ref 1.6–2.6)
MCHC RBC-ENTMCNC: 23.3 PG — LOW (ref 27–34)
MCHC RBC-ENTMCNC: 29.3 GM/DL — LOW (ref 32–36)
MCV RBC AUTO: 79.6 FL — LOW (ref 80–100)
MONOCYTES # BLD AUTO: 0.66 K/UL — SIGNIFICANT CHANGE UP (ref 0–0.9)
MONOCYTES NFR BLD AUTO: 9.3 % — SIGNIFICANT CHANGE UP (ref 2–14)
NEUTROPHILS # BLD AUTO: 2.98 K/UL — SIGNIFICANT CHANGE UP (ref 1.8–7.4)
NEUTROPHILS NFR BLD AUTO: 42.1 % — LOW (ref 43–77)
NITRITE UR-MCNC: NEGATIVE — SIGNIFICANT CHANGE UP
NRBC # BLD: 0 /100 WBCS — SIGNIFICANT CHANGE UP (ref 0–0)
NT-PROBNP SERPL-SCNC: 28 PG/ML — SIGNIFICANT CHANGE UP (ref 0–300)
PH UR: 7 — SIGNIFICANT CHANGE UP (ref 5–8)
PLATELET # BLD AUTO: 206 K/UL — SIGNIFICANT CHANGE UP (ref 150–400)
POTASSIUM SERPL-MCNC: 4.3 MMOL/L — SIGNIFICANT CHANGE UP (ref 3.5–5.3)
POTASSIUM SERPL-SCNC: 4.3 MMOL/L — SIGNIFICANT CHANGE UP (ref 3.5–5.3)
PROT SERPL-MCNC: 7.5 G/DL — SIGNIFICANT CHANGE UP (ref 6–8.3)
PROT UR-MCNC: NEGATIVE MG/DL — SIGNIFICANT CHANGE UP
PROTHROM AB SERPL-ACNC: 12 SEC — SIGNIFICANT CHANGE UP (ref 10–12.9)
RBC # BLD: 4.8 M/UL — SIGNIFICANT CHANGE UP (ref 4.2–5.8)
RBC # FLD: 15.9 % — HIGH (ref 10.3–14.5)
RH IG SCN BLD-IMP: POSITIVE — SIGNIFICANT CHANGE UP
SARS-COV-2 RNA SPEC QL NAA+PROBE: SIGNIFICANT CHANGE UP
SODIUM SERPL-SCNC: 141 MMOL/L — SIGNIFICANT CHANGE UP (ref 135–145)
SP GR SPEC: 1.01 — SIGNIFICANT CHANGE UP (ref 1–1.03)
TROPONIN T SERPL-MCNC: <0.01 NG/ML — SIGNIFICANT CHANGE UP (ref 0–0.01)
UROBILINOGEN FLD QL: 0.2 E.U./DL — SIGNIFICANT CHANGE UP
WBC # BLD: 7.08 K/UL — SIGNIFICANT CHANGE UP (ref 3.8–10.5)
WBC # FLD AUTO: 7.08 K/UL — SIGNIFICANT CHANGE UP (ref 3.8–10.5)

## 2020-05-04 PROCEDURE — 93010 ELECTROCARDIOGRAM REPORT: CPT

## 2020-05-04 PROCEDURE — 71045 X-RAY EXAM CHEST 1 VIEW: CPT | Mod: 26

## 2020-05-04 PROCEDURE — 99285 EMERGENCY DEPT VISIT HI MDM: CPT | Mod: 25

## 2020-05-04 NOTE — ED ADULT NURSE REASSESSMENT NOTE - NS ED NURSE REASSESS COMMENT FT1
interventions as noted, elizabeth. well, PCXR was obtained, assessment on-going, utd on current poc, with understanding verbalized
report was attempted x2, without success, per Manager-Kong, will call ED back for report
pt. ambulated to restroom and back to bed with steady gait noted, CM continued, denies complaint at present, awaiting Covid Result, utd on poc, with understanding verbalized

## 2020-05-04 NOTE — ED PROVIDER NOTE - CLINICAL SUMMARY MEDICAL DECISION MAKING FREE TEXT BOX
avss. nontoxic. NAD. no systemic sx. no active cp. no acute resp distress. no leukocytosis vs significant anemia vs electrolyte abnl. no coagulopathy. trop neg x1. ekg w/o acute abnl. cxr w/o acute focal consol vs ptx vs pulm edema. low suspicion, covid neg. ua neg. cards consulted. reccs for *** avss. nontoxic. NAD. no systemic sx. no active cp. no acute resp distress. no leukocytosis vs significant anemia vs electrolyte abnl. no coagulopathy. trop neg x1. ekg w/o acute abnl. cxr w/o acute focal consol vs ptx vs pulm edema. low suspicion, covid neg. ua neg. cards consulted. will admit per reccs.

## 2020-05-04 NOTE — H&P ADULT - ATTENDING COMMENTS
See PA note written above, for details. I reviewed the PA documentation.  I have personally seen and examined this patient. I reviewed vitals, labs, medications, cardiac studies and additional imaging.  I agree with the PA's findings and plans as written above with the following additions/amendments:  Plan for:  DAPT with ASA/ Plavix   High intensity statin Atorva 80mg for LDL goal <70  Carvedilol 6.25 BID  Ranexa 500 po daily for antianginal  Reviewed HbA1c, TFTs, and FLP  Patient to be referred to cardiac rehabilitation upon discharge for cardiac conditioning  patient to follow up with cardiologist Dr. schroeder within 1-2 weeks of discharge  RENAN Keene.  Cardiology Attending

## 2020-05-04 NOTE — H&P ADULT - NSHPLABSRESULTS_GEN_ALL_CORE
11.2   7.08  )-----------( 206      ( 04 May 2020 21:50 )             38.2   05-04    141  |  101  |  13  ----------------------------<  99  4.3   |  27  |  1.02    Ca    9.4      04 May 2020 21:50  Mg     1.7     05-04    TPro  7.5  /  Alb  4.7  /  TBili  0.6  /  DBili  x   /  AST  22  /  ALT  16  /  AlkPhos  79  05-04  PT/INR - ( 04 May 2020 21:50 )   PT: 12.0 sec;   INR: 1.05          PTT - ( 04 May 2020 21:50 )  PTT:38.8 sec

## 2020-05-04 NOTE — H&P ADULT - NSICDXPASTMEDICALHX_GEN_ALL_CORE_FT
PAST MEDICAL HISTORY:  Atherosclerosis of coronary artery CAD (coronary artery disease)    Essential hypertension HTN (hypertension)    Gastroesophageal reflux disease GERD (gastroesophageal reflux disease)    Hemorrhoid Internal    Hyperlipidemia     MI (myocardial infarction)

## 2020-05-04 NOTE — ED ADULT NURSE NOTE - PMH
Atherosclerosis of coronary artery  CAD (coronary artery disease)  Essential hypertension  HTN (hypertension)  Gastroesophageal reflux disease  GERD (gastroesophageal reflux disease)  Hemorrhoid  Internal  Hyperlipidemia    MI (myocardial infarction)

## 2020-05-04 NOTE — H&P ADULT - HISTORY OF PRESENT ILLNESS
INCOMPLETE    51 yo M, former heavy smoker, PMHx of HTN, HLD, CAD with MI in 2010 and multiple PCIs (most recent cardiac cath at [facility] on 9/25/19 with LAD widely patent stent, oLCx 99% s/p JUAN, OM1 100% ISR , pRCA patent stent, mRCA 50%), GERD, depression, internal hemorrhoids (s/p colonoscopy 4 years ago revealing internal hemorrhoid) who presented to Cardiologist Dr. Rivera with c/o intermittent LSCP --> RSCP radiating to L shoulder described as sharp/stabbing and of 6/10 intensity with associated diaphoresis occurring independent of exertion, relieved with NTG, over past 1.5 months. Pt also with MANZO upon ambulation of 2 city blocks, resolving with rest, over past 1.5 months. Furthermore, pt with generalized feeling of fatigue over past 1.5 months.  Denies dizziness, palpitations, LE edema, orthopnea, PND, syncope, N/V, abdominal pain.     ECHO 9/24/19: normal LV and RV size and systolic function, LVEF 55%, no regional WMA, no significant valvular disease, no pericardial effusion.    In setting of pt's risk factors, significant h/o CAD, CCS Anginal Class IV Sx which are similar to symptoms he developed before last stent, pt referred for cardiac cath with possible intervention to r/o progressive CAD.     Cath hx:  Cardiac cath at [facility] on 9/25/19: LMCA normal, LAD widely patent stent, D1 LI, oLCx 99% s/p JUAN, OM1 severe 100% ISR , pRCA patent stent, mRCA 50%, EF 55-60% via LV gram, LVEDP 10 mmHg, R groin. 49 yo M, former heavy smoker, PMHx of HTN, HLD, CAD with MI in 2010 and multiple PCIs (most recent cardiac cath at [facility] on 9/25/19 with LAD widely patent stent, oLCx 99% s/p JUAN, OM1 100% ISR , pRCA patent stent, mRCA 50%), GERD, depression, internal hemorrhoids (s/p colonoscopy 4 years ago revealing internal hemorrhoid) who presented to Cardiologist Dr. Rivera with c/o intermittent LSCP --> RSCP radiating to L shoulder described as sharp/stabbing and of 6/10 intensity with associated diaphoresis occurring independent of exertion, relieved with NTG, over past 1.5 months. Pt. reports that he usually was taking one SL NTG a day; however the episodes of chest pain is increasing requiring him to take 2-3 SL NTG for the last month. Pt also with MANZO upon ambulation of 2 city blocks, resolving with rest, lasting for 1-2 min over past 1.5 months. Furthermore, pt with generalized feeling of fatigue over past 1.5 months.  Denies dizziness, palpitations, LE edema, orthopnea, PND, syncope, N/V/D, abdominal pain, sick contacts; recent travel and illness. ECHO 9/24/19: normal LV and RV size and systolic function, LVEF 55%, no regional WMA, no significant valvular disease, no pericardial effusion. In ER /100, HR 84, RR 20, T 98.9, O2 100 RA; Troponin negative X 1; EKG: NSR @ 75 bpm with TWI lead 3, AVF and V6; CXR: Hazy retrocardiac opacity which could represent atelectasis, infiltrate, summation of shadows. COVID negative. In setting of pt's risk factors, significant h/o CAD, CCS Anginal Class IV Sx which are similar to symptoms he developed before last stent, pt referred for cardiac cath with possible intervention to r/o progressive CAD.     Cath hx:  Cardiac cath at [facility] on 9/25/19: LMCA normal, LAD widely patent stent, D1 LI, oLCx 99% s/p JUAN, OM1 severe 100% ISR , pRCA patent stent, mRCA 50%, EF 55-60% via LV gram, LVEDP 10 mmHg, R groin.

## 2020-05-04 NOTE — H&P ADULT - RS GEN PE MLT RESP DETAILS PC
no subcutaneous emphysema/no chest wall tenderness/no rhonchi/no wheezes/no intercostal retractions/no rales/clear to auscultation bilaterally/good air movement/normal/airway patent/breath sounds equal/respirations non-labored

## 2020-05-04 NOTE — ED ADULT NURSE NOTE - CHPI ED NUR SYMPTOMS NEG
no fever/no dizziness/no syncope/no vomiting/no back pain/no chills/no nausea/no diaphoresis/no shortness of breath/no congestion

## 2020-05-04 NOTE — H&P ADULT - NSHPSOCIALHISTORY_GEN_ALL_CORE
Former smoker (quit in 2011, smoked 1-1.5 PPD since he was 15); Quit drinking in 2002; Denies elicit drug use

## 2020-05-04 NOTE — ED PROVIDER NOTE - OBJECTIVE STATEMENT
50M former heavy smoker, cad/mi (2010) s/p pci x8 (most recently 9/2019, [facility]), htn, hld, gerd, internal hemorrhoids, referred in by dr schroeder for cath tmw AM. pt c/o 8w daily sharp L upper chest pain radiating to R upper chest w/ assoc sob/lightheadedness relieved s/p ntg. given the pandemic, cath was deferred however finally referred in for urgent cath after cp became worse in intensity earlier this AM, none currently. no fever/chills, no ha, no neck pain, no uri/cough, no abd pain/n/v, no diarrhea, no hematochezia/melena, no dysuria, no rash, no pedal edema/pain/rash, no trauma.    cards: abida  cards IR: elda

## 2020-05-04 NOTE — H&P ADULT - ASSESSMENT
51 yo M, former heavy smoker, PMHx of HTN, HLD, CAD with MI in 2010 and multiple PCIs (most recent cardiac cath at [facility] on 9/25/19 with LAD widely patent stent, oLCx 99% s/p JUAN, OM1 100% ISR , pRCA patent stent, mRCA 50%), GERD, depression, internal hemorrhoids (s/p colonoscopy 4 years ago revealing internal hemorrhoid) who is referred for cardiac cath with possible intervention to r/o progressive CAD 2/2 pt's risk factors, significant h/o CAD, CCS Anginal Class IV Sx which are similar to symptoms he developed before last stent.    H/H 11.2/38.2. Pt denies bleeding, GI bleeding, hematemesis, hematuria, BRBPR or melena . pt. reports being complaint with home DAPT. Pt. ordered for ASA 81 mg daily and Plavix 75 mg daily pre-cath.  Cr. 1.02; IV NS@ 75 cc X 4 hr ordered pre-cath.  Precath-consented. consent in [facility] PA office.   ALL home meds continued except for enalapril 5 mg daily in anticipation for cath in am.   trop neg X 1; f/u am trop and EKG.     Risks & benefits of procedure and alternative therapy have been explained to the patient including but not limited to: allergic reaction, bleeding w/possible need for blood transfusion, infection, renal and vascular compromise, limb damage, arrhythmia, stroke, vessel dissection/perforation, Myocardial infarction, emergent CABG. Informed consent obtained and in chart

## 2020-05-04 NOTE — ED PROVIDER NOTE - DIAGNOSTIC INTERPRETATION
ED Physician: Gretchen Corona MD  CHEST XRAY INTERPRETATION: trachea midline, no widened mediastinum, unchanged enlarged cardiac silhouette, lungs clear bilaterally, no acute osseous abnormalities

## 2020-05-04 NOTE — ED PROVIDER NOTE - PHYSICAL EXAMINATION
CONST: overweight nontoxic NAD speaking in full sentences  HEAD: atraumatic  EYES: conjunctivae clear, PERRL, EOMI  ENT: mmm  NECK: supple/FROM, no jvd  CARD: rrr no murmurs  CHEST: ctab no r/r/w, no stridor/retractions/tripoding  ABD: soft, nd, nttp, no rebound/guarding  EXT: FROM, symmetric distal pulses intact  SKIN: warm, dry, no rash, no pedal edema/pain/rash, cap refill <2sec  NEURO: a+ox3, 5/5 strength x4, gross sensation intact x4, normal gait

## 2020-05-04 NOTE — ED ADULT NURSE NOTE - OBJECTIVE STATEMENT
50M former heavy smoker, cad/mi (2010) s/p pci x8 (most recently 9/2019, [facility]), htn, hld, gerd, internal hemorrhoids, referred in by dr schroeder for cath tmw AM. pt c/o 8w daily sharp L upper chest pain radiating to R upper chest w/ assoc sob/lightheadedness relieved s/p ntg. given the pandemic, cath was deferred however finally referred in for urgent cath after cp became worse in intensity earlier this AM, none currently. no fever/chills, no ha, no neck pain, no uri/cough, no abd pain/n/v, no diarrhea, no hematochezia/melena, no dysuria, no rash, no pedal edema/pain/rash, no trauma.  nad at present, denies complaint

## 2020-05-04 NOTE — ED PROVIDER NOTE - PROGRESS NOTE DETAILS
cards consulted. admission for cath tmw AM pending covid. cards reccs for admission to cards/tele under dr hamptno

## 2020-05-05 ENCOUNTER — TRANSCRIPTION ENCOUNTER (OUTPATIENT)
Age: 50
End: 2020-05-05

## 2020-05-05 VITALS
DIASTOLIC BLOOD PRESSURE: 77 MMHG | OXYGEN SATURATION: 96 % | RESPIRATION RATE: 20 BRPM | SYSTOLIC BLOOD PRESSURE: 127 MMHG | HEART RATE: 83 BPM

## 2020-05-05 PROBLEM — I21.9 ACUTE MYOCARDIAL INFARCTION, UNSPECIFIED: Chronic | Status: ACTIVE | Noted: 2020-04-29

## 2020-05-05 PROBLEM — K64.9 UNSPECIFIED HEMORRHOIDS: Chronic | Status: ACTIVE | Noted: 2019-01-11

## 2020-05-05 LAB
A1C WITH ESTIMATED AVERAGE GLUCOSE RESULT: 7.1 % — HIGH (ref 4–5.6)
ANION GAP SERPL CALC-SCNC: 12 MMOL/L — SIGNIFICANT CHANGE UP (ref 5–17)
ANION GAP SERPL CALC-SCNC: 14 MMOL/L — SIGNIFICANT CHANGE UP (ref 5–17)
BUN SERPL-MCNC: 13 MG/DL — SIGNIFICANT CHANGE UP (ref 7–23)
BUN SERPL-MCNC: 16 MG/DL — SIGNIFICANT CHANGE UP (ref 7–23)
CALCIUM SERPL-MCNC: 9 MG/DL — SIGNIFICANT CHANGE UP (ref 8.4–10.5)
CALCIUM SERPL-MCNC: 9.3 MG/DL — SIGNIFICANT CHANGE UP (ref 8.4–10.5)
CHLORIDE SERPL-SCNC: 102 MMOL/L — SIGNIFICANT CHANGE UP (ref 96–108)
CHLORIDE SERPL-SCNC: 99 MMOL/L — SIGNIFICANT CHANGE UP (ref 96–108)
CHOLEST SERPL-MCNC: 134 MG/DL — SIGNIFICANT CHANGE UP (ref 10–199)
CO2 SERPL-SCNC: 25 MMOL/L — SIGNIFICANT CHANGE UP (ref 22–31)
CO2 SERPL-SCNC: 26 MMOL/L — SIGNIFICANT CHANGE UP (ref 22–31)
CREAT SERPL-MCNC: 0.89 MG/DL — SIGNIFICANT CHANGE UP (ref 0.5–1.3)
CREAT SERPL-MCNC: 1.09 MG/DL — SIGNIFICANT CHANGE UP (ref 0.5–1.3)
ESTIMATED AVERAGE GLUCOSE: 157 MG/DL — HIGH (ref 68–114)
GLUCOSE SERPL-MCNC: 127 MG/DL — HIGH (ref 70–99)
GLUCOSE SERPL-MCNC: 176 MG/DL — HIGH (ref 70–99)
HCT VFR BLD CALC: 34.7 % — LOW (ref 39–50)
HCT VFR BLD CALC: 35.4 % — LOW (ref 39–50)
HDLC SERPL-MCNC: 36 MG/DL — LOW
HGB BLD-MCNC: 10.5 G/DL — LOW (ref 13–17)
HGB BLD-MCNC: 10.8 G/DL — LOW (ref 13–17)
INR BLD: 1.04 — SIGNIFICANT CHANGE UP (ref 0.88–1.16)
LIPID PNL WITH DIRECT LDL SERPL: 68 MG/DL — SIGNIFICANT CHANGE UP
MAGNESIUM SERPL-MCNC: 1.9 MG/DL — SIGNIFICANT CHANGE UP (ref 1.6–2.6)
MCHC RBC-ENTMCNC: 23.9 PG — LOW (ref 27–34)
MCHC RBC-ENTMCNC: 23.9 PG — LOW (ref 27–34)
MCHC RBC-ENTMCNC: 30.3 GM/DL — LOW (ref 32–36)
MCHC RBC-ENTMCNC: 30.5 GM/DL — LOW (ref 32–36)
MCV RBC AUTO: 78.3 FL — LOW (ref 80–100)
MCV RBC AUTO: 78.9 FL — LOW (ref 80–100)
NRBC # BLD: 0 /100 WBCS — SIGNIFICANT CHANGE UP (ref 0–0)
NRBC # BLD: 0 /100 WBCS — SIGNIFICANT CHANGE UP (ref 0–0)
PLATELET # BLD AUTO: 191 K/UL — SIGNIFICANT CHANGE UP (ref 150–400)
PLATELET # BLD AUTO: 197 K/UL — SIGNIFICANT CHANGE UP (ref 150–400)
POTASSIUM SERPL-MCNC: 3.6 MMOL/L — SIGNIFICANT CHANGE UP (ref 3.5–5.3)
POTASSIUM SERPL-MCNC: 3.9 MMOL/L — SIGNIFICANT CHANGE UP (ref 3.5–5.3)
POTASSIUM SERPL-SCNC: 3.6 MMOL/L — SIGNIFICANT CHANGE UP (ref 3.5–5.3)
POTASSIUM SERPL-SCNC: 3.9 MMOL/L — SIGNIFICANT CHANGE UP (ref 3.5–5.3)
PROTHROM AB SERPL-ACNC: 11.9 SEC — SIGNIFICANT CHANGE UP (ref 10–12.9)
RBC # BLD: 4.4 M/UL — SIGNIFICANT CHANGE UP (ref 4.2–5.8)
RBC # BLD: 4.52 M/UL — SIGNIFICANT CHANGE UP (ref 4.2–5.8)
RBC # FLD: 16 % — HIGH (ref 10.3–14.5)
RBC # FLD: 16.2 % — HIGH (ref 10.3–14.5)
SODIUM SERPL-SCNC: 137 MMOL/L — SIGNIFICANT CHANGE UP (ref 135–145)
SODIUM SERPL-SCNC: 141 MMOL/L — SIGNIFICANT CHANGE UP (ref 135–145)
TOTAL CHOLESTEROL/HDL RATIO MEASUREMENT: 3.7 RATIO — SIGNIFICANT CHANGE UP (ref 3.4–9.6)
TRIGL SERPL-MCNC: 150 MG/DL — HIGH (ref 10–149)
TROPONIN T SERPL-MCNC: <0.01 NG/ML — SIGNIFICANT CHANGE UP (ref 0–0.01)
TSH SERPL-MCNC: 0.96 UIU/ML — SIGNIFICANT CHANGE UP (ref 0.35–4.94)
WBC # BLD: 7.06 K/UL — SIGNIFICANT CHANGE UP (ref 3.8–10.5)
WBC # BLD: 7.22 K/UL — SIGNIFICANT CHANGE UP (ref 3.8–10.5)
WBC # FLD AUTO: 7.06 K/UL — SIGNIFICANT CHANGE UP (ref 3.8–10.5)
WBC # FLD AUTO: 7.22 K/UL — SIGNIFICANT CHANGE UP (ref 3.8–10.5)

## 2020-05-05 PROCEDURE — 93458 L HRT ARTERY/VENTRICLE ANGIO: CPT | Mod: 26,59

## 2020-05-05 PROCEDURE — 92928 PRQ TCAT PLMT NTRAC ST 1 LES: CPT | Mod: RC

## 2020-05-05 PROCEDURE — 92978 ENDOLUMINL IVUS OCT C 1ST: CPT | Mod: 26,RC

## 2020-05-05 PROCEDURE — 99239 HOSP IP/OBS DSCHRG MGMT >30: CPT

## 2020-05-05 RX ORDER — SODIUM CHLORIDE 9 MG/ML
1000 INJECTION INTRAMUSCULAR; INTRAVENOUS; SUBCUTANEOUS
Refills: 0 | Status: DISCONTINUED | OUTPATIENT
Start: 2020-05-05 | End: 2020-05-05

## 2020-05-05 RX ORDER — CARVEDILOL PHOSPHATE 80 MG/1
6.25 CAPSULE, EXTENDED RELEASE ORAL EVERY 12 HOURS
Refills: 0 | Status: DISCONTINUED | OUTPATIENT
Start: 2020-05-05 | End: 2020-05-05

## 2020-05-05 RX ORDER — ASPIRIN/CALCIUM CARB/MAGNESIUM 324 MG
1 TABLET ORAL
Qty: 30 | Refills: 11
Start: 2020-05-05 | End: 2021-04-29

## 2020-05-05 RX ORDER — CLOPIDOGREL BISULFATE 75 MG/1
1 TABLET, FILM COATED ORAL
Qty: 30 | Refills: 11
Start: 2020-05-05 | End: 2021-04-29

## 2020-05-05 RX ORDER — RANOLAZINE 500 MG/1
500 TABLET, FILM COATED, EXTENDED RELEASE ORAL DAILY
Refills: 0 | Status: DISCONTINUED | OUTPATIENT
Start: 2020-05-05 | End: 2020-05-05

## 2020-05-05 RX ORDER — POTASSIUM CHLORIDE 20 MEQ
40 PACKET (EA) ORAL ONCE
Refills: 0 | Status: COMPLETED | OUTPATIENT
Start: 2020-05-05 | End: 2020-05-05

## 2020-05-05 RX ORDER — ISOSORBIDE MONONITRATE 60 MG/1
30 TABLET, EXTENDED RELEASE ORAL DAILY
Refills: 0 | Status: DISCONTINUED | OUTPATIENT
Start: 2020-05-05 | End: 2020-05-05

## 2020-05-05 RX ORDER — PANTOPRAZOLE SODIUM 20 MG/1
40 TABLET, DELAYED RELEASE ORAL DAILY
Refills: 0 | Status: DISCONTINUED | OUTPATIENT
Start: 2020-05-05 | End: 2020-05-05

## 2020-05-05 RX ORDER — ASPIRIN/CALCIUM CARB/MAGNESIUM 324 MG
81 TABLET ORAL DAILY
Refills: 0 | Status: DISCONTINUED | OUTPATIENT
Start: 2020-05-05 | End: 2020-05-05

## 2020-05-05 RX ORDER — CLOPIDOGREL BISULFATE 75 MG/1
75 TABLET, FILM COATED ORAL DAILY
Refills: 0 | Status: DISCONTINUED | OUTPATIENT
Start: 2020-05-05 | End: 2020-05-05

## 2020-05-05 RX ORDER — ASPIRIN/CALCIUM CARB/MAGNESIUM 324 MG
81 TABLET ORAL ONCE
Refills: 0 | Status: COMPLETED | OUTPATIENT
Start: 2020-05-05 | End: 2020-05-05

## 2020-05-05 RX ORDER — RANITIDINE HYDROCHLORIDE 150 MG/1
1 TABLET, FILM COATED ORAL
Qty: 0 | Refills: 0 | DISCHARGE

## 2020-05-05 RX ORDER — DOCUSATE SODIUM 100 MG
1 CAPSULE ORAL
Qty: 0 | Refills: 0 | DISCHARGE

## 2020-05-05 RX ORDER — ATORVASTATIN CALCIUM 80 MG/1
80 TABLET, FILM COATED ORAL AT BEDTIME
Refills: 0 | Status: DISCONTINUED | OUTPATIENT
Start: 2020-05-05 | End: 2020-05-05

## 2020-05-05 RX ORDER — CLOPIDOGREL BISULFATE 75 MG/1
75 TABLET, FILM COATED ORAL ONCE
Refills: 0 | Status: COMPLETED | OUTPATIENT
Start: 2020-05-05 | End: 2020-05-05

## 2020-05-05 RX ORDER — MAGNESIUM SULFATE 500 MG/ML
2 VIAL (ML) INJECTION ONCE
Refills: 0 | Status: COMPLETED | OUTPATIENT
Start: 2020-05-05 | End: 2020-05-05

## 2020-05-05 RX ORDER — HEPARIN SODIUM 5000 [USP'U]/ML
5000 INJECTION INTRAVENOUS; SUBCUTANEOUS EVERY 8 HOURS
Refills: 0 | Status: DISCONTINUED | OUTPATIENT
Start: 2020-05-05 | End: 2020-05-05

## 2020-05-05 RX ADMIN — RANOLAZINE 500 MILLIGRAM(S): 500 TABLET, FILM COATED, EXTENDED RELEASE ORAL at 13:51

## 2020-05-05 RX ADMIN — HEPARIN SODIUM 5000 UNIT(S): 5000 INJECTION INTRAVENOUS; SUBCUTANEOUS at 06:22

## 2020-05-05 RX ADMIN — CARVEDILOL PHOSPHATE 6.25 MILLIGRAM(S): 80 CAPSULE, EXTENDED RELEASE ORAL at 17:24

## 2020-05-05 RX ADMIN — CARVEDILOL PHOSPHATE 6.25 MILLIGRAM(S): 80 CAPSULE, EXTENDED RELEASE ORAL at 06:22

## 2020-05-05 RX ADMIN — ATORVASTATIN CALCIUM 80 MILLIGRAM(S): 80 TABLET, FILM COATED ORAL at 17:24

## 2020-05-05 RX ADMIN — Medication 81 MILLIGRAM(S): at 02:59

## 2020-05-05 RX ADMIN — Medication 40 MILLIEQUIVALENT(S): at 13:51

## 2020-05-05 RX ADMIN — Medication 50 GRAM(S): at 11:13

## 2020-05-05 RX ADMIN — SODIUM CHLORIDE 75 MILLILITER(S): 9 INJECTION INTRAMUSCULAR; INTRAVENOUS; SUBCUTANEOUS at 11:14

## 2020-05-05 RX ADMIN — CLOPIDOGREL BISULFATE 75 MILLIGRAM(S): 75 TABLET, FILM COATED ORAL at 02:59

## 2020-05-05 RX ADMIN — PANTOPRAZOLE SODIUM 40 MILLIGRAM(S): 20 TABLET, DELAYED RELEASE ORAL at 03:00

## 2020-05-05 RX ADMIN — ISOSORBIDE MONONITRATE 30 MILLIGRAM(S): 60 TABLET, EXTENDED RELEASE ORAL at 03:00

## 2020-05-05 NOTE — DISCHARGE NOTE PROVIDER - NSDCMRMEDTOKEN_GEN_ALL_CORE_FT
aspirin 81 mg oral tablet: 1 tab(s) orally once a day  atorvastatin 80 mg oral tablet: 1 tab(s) orally once a day (at bedtime)  carvedilol 6.25 mg oral tablet: 1 tab(s) orally 2 times a day  clopidogrel 75 mg oral tablet: 1 tab(s) orally once a day  enalapril 5 mg oral tablet: 1 tab(s) orally once a day  hydrocortisone: 1 application rectal 2 times a day (2.5% 2-4x/d)  isosorbide mononitrate 30 mg oral tablet, extended release: 1 tab(s) orally once a day (in the morning)  nitroglycerin 0.4 mg sublingual tablet: 1 tab(s) orally every 5 minutes, As Needed  for chest pain, maximum 3 doses MDD:3 tablets  omeprazole 40 mg oral delayed release capsule: 1 cap(s) orally once a day  ranolazine 500 mg oral tablet, extended release: 1 tab(s) orally once a day

## 2020-05-05 NOTE — DISCHARGE NOTE PROVIDER - HOSPITAL COURSE
49 yo M, former heavy smoker, PMHx of HTN, HLD, CAD with MI in 2010 and multiple PCIs (most recent cardiac cath at [facility] on 9/25/19 with LAD widely patent stent, oLCx 99% s/p JUAN, OM1 100% ISR , pRCA patent stent, mRCA 50%), GERD, depression, internal hemorrhoids (s/p colonoscopy 4 years ago revealing internal hemorrhoid) who presented to Cardiologist Dr. Rivera with c/o intermittent LSCP --> RSCP radiating to L shoulder described as sharp/stabbing and of 6/10 intensity with associated diaphoresis occurring independent of exertion, relieved with NTG, over past 1.5 months. Pt. reports that he usually was taking one SL NTG a day; however the episodes of chest pain is increasing requiring him to take 2-3 SL NTG for the last month. Pt also with MANZO upon ambulation of 2 city blocks, resolving with rest, lasting for 1-2 min over past 1.5 months. Furthermore, pt with generalized feeling of fatigue over past 1.5 months.  Denies dizziness, palpitations, LE edema, orthopnea, PND, syncope, N/V/D, abdominal pain, sick contacts; recent travel and illness. ECHO 9/24/19: normal LV and RV size and systolic function, LVEF 55%, no regional WMA, no significant valvular disease, no pericardial effusion. In ER /100, HR 84, RR 20, T 98.9, O2 100 RA; Troponin negative X 1; EKG: NSR @ 75 bpm with TWI lead 3, AVF and V6; CXR: Hazy retrocardiac opacity which could represent atelectasis, infiltrate, summation of shadows. COVID negative. In setting of pt's risk factors, significant h/o CAD, CCS Anginal Class IV Sx which are similar to symptoms he developed before last stent, pt referred for cardiac cath with possible intervention to r/o progressive CAD. Pt now s/p cardiac catheterization on 5/5/2020 with JUAN mRCA. IVUS of oLAD nonobstuctive. OM1 100% ISR. EF normal. EDP 15mmHg. Right radial access. If pt has chest pain in the future he should return for staged PCI of OM1 lesion. 51 yo M, former heavy smoker, PMHx of HTN, HLD, CAD with MI in 2010 and multiple PCIs (most recent cardiac cath at [facility] on 9/25/19 with LAD widely patent stent, oLCx 99% s/p JUAN, OM1 100% ISR , pRCA patent stent, mRCA 50%), GERD, depression, internal hemorrhoids (s/p colonoscopy 4 years ago revealing internal hemorrhoid) who presented to Cardiologist Dr. Rivera with c/o intermittent LSCP --> RSCP radiating to L shoulder described as sharp/stabbing and of 6/10 intensity with associated diaphoresis occurring independent of exertion, relieved with NTG, over past 1.5 months. Pt. reports that he usually was taking one SL NTG a day; however the episodes of chest pain is increasing requiring him to take 2-3 SL NTG for the last month. Pt also with MANZO upon ambulation of 2 city blocks, resolving with rest, lasting for 1-2 min over past 1.5 months. Furthermore, pt with generalized feeling of fatigue over past 1.5 months.  Denies dizziness, palpitations, LE edema, orthopnea, PND, syncope, N/V/D, abdominal pain, sick contacts; recent travel and illness. ECHO 9/24/19: normal LV and RV size and systolic function, LVEF 55%, no regional WMA, no significant valvular disease, no pericardial effusion. In ER /100, HR 84, RR 20, T 98.9, O2 100 RA; Troponin negative X 1; EKG: NSR @ 75 bpm with TWI lead 3, AVF and V6; CXR: Hazy retrocardiac opacity which could represent atelectasis, infiltrate, summation of shadows. COVID negative. In setting of pt's risk factors, significant h/o CAD, CCS Anginal Class IV Sx which are similar to symptoms he developed before last stent, pt referred for cardiac cath with possible intervention to r/o progressive CAD. Pt now s/p cardiac catheterization on 5/5/2020 with JUAN mRCA. IVUS of oLAD nonobstuctive. OM1 100% ISR. EF normal. EDP 15mmHg. Right radial access. If pt has chest pain in the future he should return for staged PCI of OM1 lesion. Labs and EKG 4 hours post cath stable. Right radial band removed without complications. Pt stable for discharge as per Dr Encarnacion. Pt will follow up with Dr Rivera in 1-2 weeks.

## 2020-05-05 NOTE — DISCHARGE NOTE PROVIDER - NSDCCPCAREPLAN_GEN_ALL_CORE_FT
PRINCIPAL DISCHARGE DIAGNOSIS  Diagnosis: Unstable angina  Assessment and Plan of Treatment: You came to the hospital endorsing chest pain. You had a procedure called a CARDIAC CATHETERIZATION and received a drug eluting STENT to your mid RIGHT CORONARY ARTERY. You have another stent from the past that is 100% blocked (OM1 artery) and if you have chest pain in the future this artery should be fixed. CONTINUE taking ASPIRIN 81mg daily and PLAVIX (CLOPIDOGREL) 75mg daily EVERY SINGLE DAY to avoid blood clots forming in your stent that could give you a heart attack. RIGHT WRIST WOUND CARE: do not lift objects heavier than 5 pounds for 5 days. Observe for signs of bleeding including bleeding/sudden swelling to your right wrist site or numbness/tingling/pain/coolness to your right wrist/hand/arm. If you experience these symptoms call your doctor and go to the nearest ED immediately. Follow up with Dr Rivera in 1-2 weeks.      SECONDARY DISCHARGE DIAGNOSES  Diagnosis: Hyperlipidemia  Assessment and Plan of Treatment: Continue taking ATORVASTATIN 80mg daily at bedtime for cholesterol control.    Diagnosis: Hypertension  Assessment and Plan of Treatment: Continue taking medications for blood pressure at home doses.

## 2020-05-05 NOTE — DISCHARGE NOTE NURSING/CASE MANAGEMENT/SOCIAL WORK - PATIENT PORTAL LINK FT
You can access the FollowMyHealth Patient Portal offered by [facility] by registering at the following website: [email or web address]. By joining [employer]’s FollowMyHealth portal, you will also be able to view your health information using other applications (apps) compatible with our system.

## 2020-05-05 NOTE — DISCHARGE NOTE PROVIDER - CARE PROVIDER_API CALL
Mathew Rivera)  Cardiovascular Disease; Internal Medicine; Interventional Cardiology  [address], [facility] [address]  [place], [place] [address]  Phone: [phone]  Fax: [phone]  Follow Up Time:

## 2020-05-07 DIAGNOSIS — K21.9 GASTRO-ESOPHAGEAL REFLUX DISEASE WITHOUT ESOPHAGITIS: ICD-10-CM

## 2020-05-07 DIAGNOSIS — I25.110 ATHEROSCLEROTIC HEART DISEASE OF NATIVE CORONARY ARTERY WITH UNSTABLE ANGINA PECTORIS: ICD-10-CM
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
## 2020-05-07 DIAGNOSIS — R07.9 CHEST PAIN, UNSPECIFIED: ICD-10-CM

## 2020-05-07 DIAGNOSIS — E78.5 HYPERLIPIDEMIA, UNSPECIFIED: ICD-10-CM

## 2020-05-07 DIAGNOSIS — I25.2 OLD MYOCARDIAL INFARCTION: ICD-10-CM

## 2020-05-07 DIAGNOSIS — T82.855A STENOSIS OF CORONARY ARTERY STENT, INITIAL ENCOUNTER: ICD-10-CM
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
## 2020-05-07 DIAGNOSIS — I10 ESSENTIAL (PRIMARY) HYPERTENSION: ICD-10-CM

## 2020-05-07 DIAGNOSIS — F32.9 MAJOR DEPRESSIVE DISORDER, SINGLE EPISODE, UNSPECIFIED: ICD-10-CM

## 2020-05-07 DIAGNOSIS — Z95.5 PRESENCE OF CORONARY ANGIOPLASTY IMPLANT AND GRAFT: ICD-10-CM

## 2020-05-08 PROCEDURE — 86901 BLOOD TYPING SEROLOGIC RH(D): CPT

## 2020-05-08 PROCEDURE — 87635 SARS-COV-2 COVID-19 AMP PRB: CPT

## 2020-05-08 PROCEDURE — 86850 RBC ANTIBODY SCREEN: CPT

## 2020-05-08 PROCEDURE — 83735 ASSAY OF MAGNESIUM: CPT

## 2020-05-08 PROCEDURE — 80053 COMPREHEN METABOLIC PANEL: CPT

## 2020-05-08 PROCEDURE — 83880 ASSAY OF NATRIURETIC PEPTIDE: CPT

## 2020-05-08 PROCEDURE — 80061 LIPID PANEL: CPT

## 2020-05-08 PROCEDURE — 36415 COLL VENOUS BLD VENIPUNCTURE: CPT

## 2020-05-08 PROCEDURE — 85025 COMPLETE CBC W/AUTO DIFF WBC: CPT

## 2020-05-08 PROCEDURE — 82550 ASSAY OF CK (CPK): CPT

## 2020-05-08 PROCEDURE — C1769: CPT

## 2020-05-08 PROCEDURE — C1887: CPT

## 2020-05-08 PROCEDURE — 81003 URINALYSIS AUTO W/O SCOPE: CPT

## 2020-05-08 PROCEDURE — 85027 COMPLETE CBC AUTOMATED: CPT

## 2020-05-08 PROCEDURE — C1874: CPT

## 2020-05-08 PROCEDURE — 83036 HEMOGLOBIN GLYCOSYLATED A1C: CPT

## 2020-05-08 PROCEDURE — C1753: CPT

## 2020-05-08 PROCEDURE — 99285 EMERGENCY DEPT VISIT HI MDM: CPT | Mod: 25

## 2020-05-08 PROCEDURE — 84443 ASSAY THYROID STIM HORMONE: CPT

## 2020-05-08 PROCEDURE — C1894: CPT

## 2020-05-08 PROCEDURE — 84484 ASSAY OF TROPONIN QUANT: CPT

## 2020-05-08 PROCEDURE — 82553 CREATINE MB FRACTION: CPT

## 2020-05-08 PROCEDURE — 71045 X-RAY EXAM CHEST 1 VIEW: CPT

## 2020-05-08 PROCEDURE — 85730 THROMBOPLASTIN TIME PARTIAL: CPT

## 2020-05-08 PROCEDURE — 85610 PROTHROMBIN TIME: CPT

## 2020-05-08 PROCEDURE — 80048 BASIC METABOLIC PNL TOTAL CA: CPT

## 2020-05-08 PROCEDURE — C1725: CPT

## 2020-07-22 ENCOUNTER — INPATIENT (INPATIENT)
Facility: HOSPITAL | Age: 50
LOS: 1 days | Discharge: HOME | DRG: 247 | End: 2020-07-24
Attending: INTERNAL MEDICINE | Admitting: INTERNAL MEDICINE
Payer: MEDICAID

## 2020-07-22 VITALS
HEART RATE: 108 BPM | SYSTOLIC BLOOD PRESSURE: 134 MMHG | TEMPERATURE: 98 F | RESPIRATION RATE: 22 BRPM | OXYGEN SATURATION: 98 % | DIASTOLIC BLOOD PRESSURE: 79 MMHG | WEIGHT: 179.9 LBS

## 2020-07-22 DIAGNOSIS — I21.4 NON-ST ELEVATION (NSTEMI) MYOCARDIAL INFARCTION: ICD-10-CM

## 2020-07-22 DIAGNOSIS — D64.9 ANEMIA, UNSPECIFIED: ICD-10-CM

## 2020-07-22 DIAGNOSIS — Z98.61 CORONARY ANGIOPLASTY STATUS: Chronic | ICD-10-CM

## 2020-07-22 DIAGNOSIS — K21.9 GASTRO-ESOPHAGEAL REFLUX DISEASE WITHOUT ESOPHAGITIS: ICD-10-CM

## 2020-07-22 DIAGNOSIS — I10 ESSENTIAL (PRIMARY) HYPERTENSION: ICD-10-CM

## 2020-07-22 LAB
ALBUMIN SERPL ELPH-MCNC: 4.4 G/DL — SIGNIFICANT CHANGE UP (ref 3.3–5)
ALP SERPL-CCNC: 75 U/L — SIGNIFICANT CHANGE UP (ref 40–120)
ALT FLD-CCNC: 12 U/L — SIGNIFICANT CHANGE UP (ref 10–45)
ANION GAP SERPL CALC-SCNC: 12 MMOL/L — SIGNIFICANT CHANGE UP (ref 5–17)
ANISOCYTOSIS BLD QL: SLIGHT — SIGNIFICANT CHANGE UP
APTT BLD: 33.4 SEC — SIGNIFICANT CHANGE UP (ref 27.5–35.5)
AST SERPL-CCNC: 20 U/L — SIGNIFICANT CHANGE UP (ref 10–40)
BASOPHILS # BLD AUTO: 0.4 K/UL — HIGH (ref 0–0.2)
BASOPHILS NFR BLD AUTO: 6.1 % — HIGH (ref 0–2)
BILIRUB SERPL-MCNC: 0.5 MG/DL — SIGNIFICANT CHANGE UP (ref 0.2–1.2)
BUN SERPL-MCNC: 11 MG/DL — SIGNIFICANT CHANGE UP (ref 7–23)
BURR CELLS BLD QL SMEAR: PRESENT — SIGNIFICANT CHANGE UP
CALCIUM SERPL-MCNC: 8.7 MG/DL — SIGNIFICANT CHANGE UP (ref 8.4–10.5)
CHLORIDE SERPL-SCNC: 101 MMOL/L — SIGNIFICANT CHANGE UP (ref 96–108)
CO2 SERPL-SCNC: 24 MMOL/L — SIGNIFICANT CHANGE UP (ref 22–31)
CREAT SERPL-MCNC: 0.98 MG/DL — SIGNIFICANT CHANGE UP (ref 0.5–1.3)
EOSINOPHIL # BLD AUTO: 0.87 K/UL — HIGH (ref 0–0.5)
EOSINOPHIL NFR BLD AUTO: 13.3 % — HIGH (ref 0–6)
GIANT PLATELETS BLD QL SMEAR: PRESENT — SIGNIFICANT CHANGE UP
GLUCOSE SERPL-MCNC: 186 MG/DL — HIGH (ref 70–99)
HCT VFR BLD CALC: 29.9 % — LOW (ref 39–50)
HGB BLD-MCNC: 9.2 G/DL — LOW (ref 13–17)
INR BLD: 0.99 — SIGNIFICANT CHANGE UP (ref 0.88–1.16)
LIDOCAIN IGE QN: 47 U/L — SIGNIFICANT CHANGE UP (ref 7–60)
LYMPHOCYTES # BLD AUTO: 1.27 K/UL — SIGNIFICANT CHANGE UP (ref 1–3.3)
LYMPHOCYTES # BLD AUTO: 19.4 % — SIGNIFICANT CHANGE UP (ref 13–44)
MAGNESIUM SERPL-MCNC: 1.6 MG/DL — SIGNIFICANT CHANGE UP (ref 1.6–2.6)
MANUAL SMEAR VERIFICATION: SIGNIFICANT CHANGE UP
MCHC RBC-ENTMCNC: 22.8 PG — LOW (ref 27–34)
MCHC RBC-ENTMCNC: 30.8 GM/DL — LOW (ref 32–36)
MCV RBC AUTO: 74.2 FL — LOW (ref 80–100)
MICROCYTES BLD QL: SLIGHT — SIGNIFICANT CHANGE UP
MONOCYTES # BLD AUTO: 0.33 K/UL — SIGNIFICANT CHANGE UP (ref 0–0.9)
MONOCYTES NFR BLD AUTO: 5.1 % — SIGNIFICANT CHANGE UP (ref 2–14)
NEUTROPHILS # BLD AUTO: 3.67 K/UL — SIGNIFICANT CHANGE UP (ref 1.8–7.4)
NEUTROPHILS NFR BLD AUTO: 55.1 % — SIGNIFICANT CHANGE UP (ref 43–77)
NEUTS BAND # BLD: 1 % — SIGNIFICANT CHANGE UP (ref 0–8)
NT-PROBNP SERPL-SCNC: 258 PG/ML — SIGNIFICANT CHANGE UP (ref 0–300)
PLAT MORPH BLD: ABNORMAL
PLATELET # BLD AUTO: 216 K/UL — SIGNIFICANT CHANGE UP (ref 150–400)
POIKILOCYTOSIS BLD QL AUTO: SLIGHT — SIGNIFICANT CHANGE UP
POLYCHROMASIA BLD QL SMEAR: SLIGHT — SIGNIFICANT CHANGE UP
POTASSIUM SERPL-MCNC: 3.8 MMOL/L — SIGNIFICANT CHANGE UP (ref 3.5–5.3)
POTASSIUM SERPL-SCNC: 3.8 MMOL/L — SIGNIFICANT CHANGE UP (ref 3.5–5.3)
PROT SERPL-MCNC: 6.8 G/DL — SIGNIFICANT CHANGE UP (ref 6–8.3)
PROTHROM AB SERPL-ACNC: 11.9 SEC — SIGNIFICANT CHANGE UP (ref 10.6–13.6)
RBC # BLD: 4.03 M/UL — LOW (ref 4.2–5.8)
RBC # FLD: 15.9 % — HIGH (ref 10.3–14.5)
RBC BLD AUTO: ABNORMAL
SMUDGE CELLS # BLD: PRESENT — SIGNIFICANT CHANGE UP
SODIUM SERPL-SCNC: 137 MMOL/L — SIGNIFICANT CHANGE UP (ref 135–145)
TROPONIN T SERPL-MCNC: 0.12 NG/ML — CRITICAL HIGH (ref 0–0.01)
WBC # BLD: 6.54 K/UL — SIGNIFICANT CHANGE UP (ref 3.8–10.5)
WBC # FLD AUTO: 6.54 K/UL — SIGNIFICANT CHANGE UP (ref 3.8–10.5)

## 2020-07-22 PROCEDURE — 99291 CRITICAL CARE FIRST HOUR: CPT

## 2020-07-22 PROCEDURE — 71045 X-RAY EXAM CHEST 1 VIEW: CPT | Mod: 26

## 2020-07-22 PROCEDURE — 93010 ELECTROCARDIOGRAM REPORT: CPT

## 2020-07-22 PROCEDURE — 99222 1ST HOSP IP/OBS MODERATE 55: CPT | Mod: AI

## 2020-07-22 RX ORDER — HEPARIN SODIUM 5000 [USP'U]/ML
4000 INJECTION INTRAVENOUS; SUBCUTANEOUS EVERY 6 HOURS
Refills: 0 | Status: DISCONTINUED | OUTPATIENT
Start: 2020-07-22 | End: 2020-07-23

## 2020-07-22 RX ORDER — RANOLAZINE 500 MG/1
500 TABLET, FILM COATED, EXTENDED RELEASE ORAL
Refills: 0 | Status: DISCONTINUED | OUTPATIENT
Start: 2020-07-22 | End: 2020-07-24

## 2020-07-22 RX ORDER — PANTOPRAZOLE SODIUM 20 MG/1
40 TABLET, DELAYED RELEASE ORAL
Refills: 0 | Status: DISCONTINUED | OUTPATIENT
Start: 2020-07-22 | End: 2020-07-24

## 2020-07-22 RX ORDER — CLOPIDOGREL BISULFATE 75 MG/1
75 TABLET, FILM COATED ORAL DAILY
Refills: 0 | Status: DISCONTINUED | OUTPATIENT
Start: 2020-07-23 | End: 2020-07-24

## 2020-07-22 RX ORDER — ISOSORBIDE MONONITRATE 60 MG/1
30 TABLET, EXTENDED RELEASE ORAL DAILY
Refills: 0 | Status: DISCONTINUED | OUTPATIENT
Start: 2020-07-22 | End: 2020-07-24

## 2020-07-22 RX ORDER — ASPIRIN/CALCIUM CARB/MAGNESIUM 324 MG
325 TABLET ORAL ONCE
Refills: 0 | Status: COMPLETED | OUTPATIENT
Start: 2020-07-22 | End: 2020-07-22

## 2020-07-22 RX ORDER — HYDROCORTISONE 20 MG
1 TABLET ORAL
Qty: 0 | Refills: 0 | DISCHARGE

## 2020-07-22 RX ORDER — CARVEDILOL PHOSPHATE 80 MG/1
6.25 CAPSULE, EXTENDED RELEASE ORAL EVERY 12 HOURS
Refills: 0 | Status: DISCONTINUED | OUTPATIENT
Start: 2020-07-22 | End: 2020-07-24

## 2020-07-22 RX ORDER — HEPARIN SODIUM 5000 [USP'U]/ML
INJECTION INTRAVENOUS; SUBCUTANEOUS
Qty: 25000 | Refills: 0 | Status: DISCONTINUED | OUTPATIENT
Start: 2020-07-22 | End: 2020-07-23

## 2020-07-22 RX ORDER — ASPIRIN/CALCIUM CARB/MAGNESIUM 324 MG
81 TABLET ORAL DAILY
Refills: 0 | Status: DISCONTINUED | OUTPATIENT
Start: 2020-07-23 | End: 2020-07-24

## 2020-07-22 RX ORDER — HEPARIN SODIUM 5000 [USP'U]/ML
4000 INJECTION INTRAVENOUS; SUBCUTANEOUS ONCE
Refills: 0 | Status: COMPLETED | OUTPATIENT
Start: 2020-07-22 | End: 2020-07-22

## 2020-07-22 RX ORDER — OMEPRAZOLE 10 MG/1
1 CAPSULE, DELAYED RELEASE ORAL
Qty: 0 | Refills: 0 | DISCHARGE

## 2020-07-22 RX ORDER — ATORVASTATIN CALCIUM 80 MG/1
80 TABLET, FILM COATED ORAL AT BEDTIME
Refills: 0 | Status: DISCONTINUED | OUTPATIENT
Start: 2020-07-22 | End: 2020-07-24

## 2020-07-22 RX ADMIN — Medication 325 MILLIGRAM(S): at 22:06

## 2020-07-22 RX ADMIN — HEPARIN SODIUM 4000 UNIT(S): 5000 INJECTION INTRAVENOUS; SUBCUTANEOUS at 22:31

## 2020-07-22 RX ADMIN — HEPARIN SODIUM 1000 UNIT(S)/HR: 5000 INJECTION INTRAVENOUS; SUBCUTANEOUS at 22:31

## 2020-07-22 NOTE — H&P ADULT - HISTORY OF PRESENT ILLNESS
50 yr old M, former heavy smoker, PMHx of HTN, hyperlipidemia, diet controlled DM, GERD,  CAD with MI in 2010 and multiple PCIs, most recent cardiac catheterization @[facility] on 5/5/2020 with JUAN mRCA, IVUS of oLAD nonobstuctive, OM1 100% ISR. EF normal. Patient at which time was recommended to return for staged PCI of OM1 if clinically indicated.      Patient now returns to [facility] ED 7/22/20 c/o intermittent chest pain x 4-5 days.    Patient describes it as being exertional left sided sharp pain 7/10 in severity, with radiation to LUE. Patient reports symptoms worse with ambulating >20 feet, associated with SOB and lightheadedness.     Patient reports he took 3 SL NTG tabs with no relief, states symptoms similar to prior MI. Patient denies any N/V, diaphoresis, fever/chills, palpitations, dizziness, recent PND, orthopnea, recent travel or sick contacts.    In ED, BP: 134/79, , RR:22, Temp: 98.4F, O2 sat: 98% RA. EKG revealed NSR@87BPM with no acute ST/T wave changes. CXR unremarkable.   Labs notable for: H/H 9.2/29.9, Troponin T 0.12, , COVID PCR pending.   Patient R/I NSTEMI, given ASA 325mg PO x 1 dose and started on Heparin gtt as per ACS protocol.    Patient currently stable, admitted to [facility] for cardiac telemetry and plan for cardiac catheterization with possible intervention.                  CATH Hx:  @[facility] on 5/5/20: as above  @ [facility] on 9/25/19: LMCA normal, LAD widely patent stent, D1 LI, oLCx 99% s/p JUAN, OM1 severe 100% ISR , pRCA patent stent, mRCA 50%, EF 55-60% via LV gram, LVEDP 10 mmHg, R groin.       Noninvasive imaging:  ECHO 9/24/19: normal LV and RV size and systolic function, LVEF 55%, no regional WMA, no significant valvular disease, no pericardial effusion. 50 yr old M, former heavy smoker, PMHx of HTN, hyperlipidemia, diet controlled DM, GERD,  CAD with MI in 2010 and multiple PCIs, most recent cardiac catheterization @[facility] on 5/5/2020 with JUAN mRCA, IVUS of oLAD nonobstuctive, OM1 100% ISR. EF normal. Patient at which time was recommended to return for staged PCI of OM1 if clinically indicated.      Patient now returns to [facility] ED 7/22/20 c/o intermittent chest pain x 4-5 days. Patient describes it as being exertional left sided sharp pain 7/10 in severity, with radiation to LUE. Patient reports symptoms worse with ambulating >20 feet, associated with SOB and lightheadedness. Patient reports he took 3 SL NTG tabs with no relief, states symptoms similar to prior MI. Patient denies any N/V, diaphoresis, fever/chills, palpitations, dizziness, recent PND, orthopnea, recent travel or sick contacts.    In ED, BP: 134/79, , RR:22, Temp: 98.4F, O2 sat: 98% RA. EKG revealed NSR@87BPM with no acute ST/T wave changes. CXR unremarkable. Labs notable for: H/H 9.2/29.9, Troponin T 0.12, , COVID PCR pending. Patient R/I NSTEMI, given ASA 325mg PO x 1 dose and started on Heparin gtt as per ACS protocol.    Patient currently stable, admitted to [facility] for cardiac telemetry and plan for cardiac catheterization with possible intervention.                  CATH Hx:  @[facility] on 5/5/20: as above  @ [facility] on 9/25/19: LMCA normal, LAD widely patent stent, D1 LI, oLCx 99% s/p JUAN, OM1 severe 100% ISR , pRCA patent stent, mRCA 50%, EF 55-60% via LV gram, LVEDP 10 mmHg, R groin.       Noninvasive imaging:  ECHO 9/24/19: normal LV and RV size and systolic function, LVEF 55%, no regional WMA, no significant valvular disease, no pericardial effusion. 50 yr old M, former heavy smoker, PMHx of HTN, hyperlipidemia, diet controlled DM, GERD,  CAD with MI in 2010 and multiple PCIs, most recent cardiac catheterization @[facility] on 5/5/2020 with JUAN mRCA, IVUS of oLAD nonobstuctive, OM1 100% ISR. EF normal. Patient at which time was recommended to return for staged PCI of OM1 if clinically indicated.      Patient now returns to [facility] ED 7/22/20 c/o intermittent chest pain x 4-5 days. Patient describes it as being exertional left sided sharp pain 7/10 in severity, with radiation to left of neck/LUE. Patient reports symptoms worse with ambulating >20 feet, associated with SOB and lightheadedness. Patient reports he took 2 SL NTG tabs with minimal relief, states symptoms similar to prior MI. Patient denies any N/V, diaphoresis, fever/chills, palpitations, dizziness, recent PND, orthopnea, recent travel or sick contacts.    In ED, BP: 134/79, , RR:22, Temp: 98.4F, O2 sat: 98% RA. EKG revealed NSR@87BPM with no acute ST/T wave changes. CXR unremarkable. Labs notable for: H/H 9.2/29.9, Troponin T 0.12, , COVID PCR pending. Patient R/I NSTEMI, given ASA 325mg PO x 1 dose and started on Heparin gtt as per ACS protocol.    Patient currently stable, admitted to [facility] for cardiac telemetry and plan for cardiac catheterization with possible intervention.                  CATH Hx:  @[facility] on 5/5/20: as above  @ [facility] on 9/25/19: LMCA normal, LAD widely patent stent, D1 LI, oLCx 99% s/p JUAN, OM1 severe 100% ISR , pRCA patent stent, mRCA 50%, EF 55-60% via LV gram, LVEDP 10 mmHg, R groin.       Noninvasive imaging:  ECHO 9/24/19: normal LV and RV size and systolic function, LVEF 55%, no regional WMA, no significant valvular disease, no pericardial effusion. 50 yr old M, former heavy smoker, PMHx of HTN, hyperlipidemia, diet controlled DM, GERD,  CAD with MI in 2010 and multiple PCIs, most recent cardiac catheterization @[facility] on 5/5/2020 with JUAN mRCA, IVUS of oLAD nonobstuctive, OM1 100% ISR. EF normal. Patient at which time was recommended to return for staged PCI of OM1 if clinically indicated.      Patient now returns to [facility] ED 7/22/20 c/o intermittent chest pain x 4-5 days. Patient describes it as being exertional left sided sharp pain 7/10 in severity, with radiation to left of neck/LUE. Patient reports symptoms worse with ambulating >20 feet, associated with SOB and lightheadedness. Patient reports he took 2 SL NTG tabs with minimal relief, states symptoms similar to prior MI. Patient denies any N/V, diaphoresis, fever/chills, palpitations, dizziness, recent PND, orthopnea, recent travel or sick contacts.    In ED, BP: 134/79, , RR:22, Temp: 98.4F, O2 sat: 98% RA. EKG revealed NSR@87BPM with TWI/T wave flattening lateral leads. CXR unremarkable. Labs notable for: H/H 9.2/29.9, Troponin T 0.12, , COVID PCR pending. Patient R/I NSTEMI, given ASA 325mg PO x 1 dose and started on Heparin gtt as per ACS protocol.    Patient currently stable, admitted to [facility] for cardiac telemetry and plan for cardiac catheterization with possible intervention.                  CATH Hx:  @[facility] on 5/5/20: as above  @ [facility] on 9/25/19: LMCA normal, LAD widely patent stent, D1 LI, oLCx 99% s/p JUAN, OM1 severe 100% ISR , pRCA patent stent, mRCA 50%, EF 55-60% via LV gram, LVEDP 10 mmHg, R groin.       Noninvasive imaging:  ECHO 9/24/19: normal LV and RV size and systolic function, LVEF 55%, no regional WMA, no significant valvular disease, no pericardial effusion.

## 2020-07-22 NOTE — ED ADULT NURSE NOTE - CHPI ED NUR SYMPTOMS NEG
no back pain/no diaphoresis/no nausea/no syncope/no chills/no vomiting/no congestion/no fever/no dizziness

## 2020-07-22 NOTE — ED ADULT NURSE REASSESSMENT NOTE - NS ED NURSE REASSESS COMMENT FT1
pt resting comfortably in stretcher.  no complaints at this time. reports relief in pain with aspirin administration. heparin started. next aptt and cbc draw 0402.

## 2020-07-22 NOTE — H&P ADULT - PROBLEM SELECTOR PLAN 4
H/H 9.2/29.9, baseline Hgb 10-12 from prior admissions. Patient denies any melena, BRBPR, no signs of active bleed. Hx of internal hemorrhoids diagnosed ~4yrs ago.  --will F/U CBC@1AM and obtain FOBT.    VTE PPx: on Heparin gtt H/H 9.2/29.9 with low MCV, baseline Hgb 10-12 from prior admissions. Patient admits to occasional BRBPR after BMs, and states he has a history of internal hemorrhoids diagnosed ~4yrs ago during colonsocopy. Patient states he noticed a few episodes when straining this past week.  --will F/U CBC and obtain Type and screen@1AM and F/U FOBT.    VTE PPx: on Heparin gtt

## 2020-07-22 NOTE — H&P ADULT - CARDIOVASCULAR DETAILS
Discussed the signs and symptoms of gastroenteritis with patient including: abdominal pain; nausea, vomiting, and diarrhea; chills; clammy skin; excessive sweating; fever; joint stiffness; fecal incontinence; muscle pain; and weight loss. Advised patient to drink water or sports beverages such as Gatorade for electrolyte replacement; do NOT use fruit juice (including apple juice), sodas or cola (flat or bubbly), Jell-O, or broth due to high sugar content; drink small amounts of fluid (2-4 oz.) every 30-60 minutes; and eat small amounts of food, when tolerable such as cereals, bread, potatoes, apples, bananas, and vegetables.  I also instructed on disease transmission and need for frequent hand washing.    Patient's headache is consistent with previous headaches and lacks features concerning for emergent or life threatening condition.  I do not suspect SAH, meningitis, increased IC pressure, infectious, toxic, vascular, CNS, or other EMC.  I have discussed this at length with patient.  Regarding treatment, advised patient to take nonsteroidal antiinflammatory medications, acetaminophen, or any medications prescribed as instructed.  To prevent headaches, patient advised to avoid muscle tension (do not stay in one position for long periods of time), avoid eye strain (make sure there is adequate lighting for reading and routine tasks), eat healthy foods, exercise, and do not smoke or drink excessive alcohol.  Patient also advised to avoid overuse of over-the-counter or prescription medications. Patient was instructed to contact primary healthcare provider if: headaches continue to get worse; occur often enough that they affect daily work or normal activities; frequent medication use is needed to manage headaches; headaches that worsen and cause vomiting; or there are any questions or concerns about the condition or care. Advised patient to return to the emergency department or call 911 if they develop a sudden headache  that presents suddenly and much worse than usual headaches; have difficulty seeing, speaking, or moving; become confused or have seizure activity; or develop a fever and stiff neck with the headache.      positive S2/positive S1

## 2020-07-22 NOTE — ED ADULT NURSE NOTE - NS ED NURSE TRANSPORT WITH
ACLS Rescue Kit/Cardiac Monitor/Defib/ACLS/Rescue Kit/O2/BVM ACLS Rescue Kit/Cardiac Monitor/Defib/ACLS/Rescue Kit/O2/BVM/IV pump

## 2020-07-22 NOTE — ED PROVIDER NOTE - CLINICAL SUMMARY MEDICAL DECISION MAKING FREE TEXT BOX
49 y/o M with above complex PMHx p/w L sided CP that feels similar to prior MI w/ CP ongoing despite Nitro. Plan is for cardiac ACS workup, CXR, COVID swab. Pt to be likely admitted based on complex PMHx. 49 yo M, former heavy smoker (quit 2011), PMHx of DM type 2, HTN, HLD, CAD with MI in 2010 and multiple PCIs (most recent cardiac cath at [facility] on 5/2020 by Dr. Rivera with LAD widely patent stent, oLCx 99% s/p JUAN, OM1 100% ISR , pRCA patent stent, mRCA 50%), hx of 6 cardiac stents, GERD, depression, hemorrhoids presents to the ED with severe intermittent L sided CP radiating to L arm that started 4 D ago. At first he thought the CP was from GERD, but it worsened and started to feel similar to CP from a prior MI. Pt used 3 doses of Nitro w/ most recent round 1 H PTA with no relief. The CP worsens when he is walking w/ associated SOB and lightheadedness. Pain is improved when lying down. Denies alcohol use (remote Hx of quitting), N/V/D, abdominal pain, cough, fevers, chills, runny nose, diaphoresis, leg swelling, leg pain. Pt is currently on Ranexa, Aspirin, Plavix, atorvastatin, pantoprazole, metoprolol, carvedilol. Plan is for cardiac ACS workup, CXR, COVID swab.  ED course: 51 yo M, former heavy smoker (quit 2011), PMHx of DM type 2, HTN, HLD, CAD with MI in 2010 and multiple PCIs (most recent cardiac cath at [facility] on 5/2020 by Dr. Rivera with LAD widely patent stent, oLCx 99% s/p JUAN, OM1 100% ISR , pRCA patent stent, mRCA 50%), hx of 6 cardiac stents, GERD, depression, hemorrhoids presents to the ED with severe intermittent L sided CP radiating to L arm that started 4 days ago. At first he thought the CP was from GERD, but it worsened and started to feel similar to CP from a prior MI. Pt used 3 doses of Nitro w/ most recent round 1 H PTA with no relief. The CP worsens when he is walking w/ associated SOB and lightheadedness. Pain is improved when lying down. Denies alcohol use (remote Hx of quitting), N/V/D, abdominal pain, cough, fevers, chills, runny nose, diaphoresis, leg swelling, leg pain. Pt is currently on Ranexa, Aspirin, Plavix, atorvastatin, pantoprazole, metoprolol, carvedilol. Plan is for cardiac ACS workup, CXR, COVID swab.   ED course: Labs/ studies noted. ECG with non-specific ST-T change. CXR with NAD. Pt with positive troponin of 0.12. Started on Heparin gtt after bolus for NSTEMI. Attempt to contact Dr. Yu unsuccessful. Pt admitted to Cardiologist on call (Dr. Encarnacion) after consulting cardiology fellow. Stable in ED.

## 2020-07-22 NOTE — ED PROVIDER NOTE - PMH
Atherosclerosis of coronary artery  CAD (coronary artery disease)  Essential hypertension  HTN (hypertension)  Gastroesophageal reflux disease  GERD (gastroesophageal reflux disease)  Hemorrhoid  Internal  Hyperlipidemia    MI (myocardial infarction) Atherosclerosis of coronary artery  CAD (coronary artery disease)  DM (diabetes mellitus), type 2    Essential hypertension  HTN (hypertension)  Gastroesophageal reflux disease  GERD (gastroesophageal reflux disease)  Hemorrhoid  Internal  Hyperlipidemia    MI (myocardial infarction)

## 2020-07-22 NOTE — H&P ADULT - ATTENDING COMMENTS
Initial attending contact date 7/23/20. See PA note written above, for details. I reviewed the PA documentation.  I have personally seen and examined this patient. I reviewed vitals, labs, medications, cardiac studies and additional imaging.  I agree with the PA's findings and plans as written above with the following additions/amendments:  50M, former heavy smoker with Essential HTN, HLD, diet controlled DM, GERD, CAD s/p multiple PCIs presents to [facility] ED 7/22/20 with unstable angina. EKG with non specific TW changes. Troponin T 0.12, R/I NSTEMI. Patient admitted to cardiac telemetry with plan for cardiac catheterization with possible intervention.    Physical Exam notable for: middle aged male laying flat in bed in NAD, flat neck veins, RRR, no MGR detected, clear lungs, overly nourished abdomen, no pretibial pitting edema, no ankle edema, skin WWP, A&Ox3  TTE 7/23/20:   1. Mildly reduced left ventricular systolic function. LVEF 45%   2. Normal right ventricular size and systolic function.   3. No significant valvular disease.   4. No evidence of pulmonary hypertension.   5. No pericardial effusion.   6. The aortic root is borderline dilated measuring 3.8 cm.    Plan for:  Heparin gtt pending [facility]  DAPT with ASA/ Plavix   High intensity statin Atorva 80mg   Carvedilol 6.25 BID home  Imdur 30mg po daily initiated for antianginal  IV iron while inpatient for iron deficiency anemia  Obtain HbA1c, TFTs, and FLP  NPO for C and possible PCI with Dr. Latoya Ferguson pending [facility]  RENAN Keene.  Cardiology Attending

## 2020-07-22 NOTE — ED PROVIDER NOTE - DIAGNOSTIC INTERPRETATION
CXR wet read: The heart appears to be within normal limits. No acute infiltrates. The chest wall and surrounding bony structures appear normal.

## 2020-07-22 NOTE — ED ADULT NURSE NOTE - OBJECTIVE STATEMENT
pt a&ox4 resting comfortably in stretcher. nsr on monitor. pt c/o sharp left sided cp beginning ~3 days ago, radiating down right arm. pt reports 8 stents, with most recent stent placed in march. pt also reports some sob with cp. respirations even unlabored.  vss. pt reports taking 3 nitro tabs 1 hour pta without relief. pt denies n, v,d, fever, chills, cough, weakness, dizziness, ha, leg swelling.

## 2020-07-22 NOTE — ED PROVIDER NOTE - PROGRESS NOTE DETAILS
Case discussed with Dr. Rivera (pt's cardiologist) - if pt's Trop is negative he can f/up with him in his office tomorrow morning. If positive pt to be admitted to Dr. Yu.

## 2020-07-22 NOTE — ED PROVIDER NOTE - OBJECTIVE STATEMENT
51 yo M, former heavy smoker ( quit 2011), PMHx of DM type 2, HTN, HLD, CAD with MI in 2010 and multiple PCIs (most recent cardiac cath at [facility] on 5/2020 by Dr. Rivera with LAD widely patent stent, oLCx 99% s/p JUAN, OM1 100% ISR , pRCA patent stent, mRCA 50%), 6 stents, GERD, depression, internal hemorrhoids (s/p colonoscopy 4 years ago revealing internal hemorrhoid) presents to the ED with c/o severe intermittent L sided CP radiating to L arm that started today. At first he thought the CP was from GERD, but it worsened and started to feel similar to CP from an MI. Pt used 3 doses of Nitro w/ most recent round 1 H PTA. The CP worsens when he is walking w/ associated SOB and lightheadedness. Pain is improved when lying down. Denies alcohol use ( remote Hx of quitting), N/V/D, abdominal pain, cough, fevers, chills, runny nose, diaphoresis, leg swelling, leg pain. Pt is currently on Ranexa, aspirin, Plavix, atorvastatin, pantoprazole, metoprolol, carvedilol. Pt was recently here for [facility] and was admitted. 51 yo M, former heavy smoker ( quit 2011), PMHx of DM type 2, HTN, HLD, CAD with MI in 2010 and multiple PCIs (most recent cardiac cath at [facility] on 5/2020 by Dr. Rivera with LAD widely patent stent, oLCx 99% s/p JUAN, OM1 100% ISR , pRCA patent stent, mRCA 50%), 6 stents, GERD, depression, internal hemorrhoids (s/p colonoscopy 4 years ago revealing internal hemorrhoid) presents to the ED with c/o severe intermittent L sided CP radiating to L arm that started today x 4 D. At first he thought the CP was from GERD, but it worsened and started to feel similar to CP from an MI. Pt used 3 doses of Nitro w/ most recent round 1 H PTA. The CP worsens when he is walking w/ associated SOB and lightheadedness. Pain is improved when lying down. Denies alcohol use ( remote Hx of quitting), N/V/D, abdominal pain, cough, fevers, chills, runny nose, diaphoresis, leg swelling, leg pain. Pt is currently on Ranexa, aspirin, Plavix, atorvastatin, pantoprazole, metoprolol, carvedilol. Pt was recently here for [facility] and was admitted. 49 yo M, former heavy smoker ( quit 2011), PMHx of DM type 2, HTN, HLD, CAD with MI in 2010 and multiple PCIs (most recent cardiac cath at [facility] on 5/2020 by Dr. Rivera with LAD widely patent stent, oLCx 99% s/p JUAN, OM1 100% ISR , pRCA patent stent, mRCA 50%), 6 stents, GERD, depression, internal hemorrhoids (s/p colonoscopy 4 years ago revealing internal hemorrhoid) presents to the ED with c/o severe intermittent L sided CP radiating to L arm that started 4 D ago. At first he thought the CP was from GERD, but it worsened and started to feel similar to CP from an MI. Pt used 3 doses of Nitro w/ most recent round 1 H PTA. The CP worsens when he is walking w/ associated SOB and lightheadedness. Pain is improved when lying down. Denies alcohol use ( remote Hx of quitting), N/V/D, abdominal pain, cough, fevers, chills, runny nose, diaphoresis, leg swelling, leg pain. Pt is currently on Ranexa, aspirin, Plavix, atorvastatin, pantoprazole, metoprolol, carvedilol. Pt was recently here for [facility] and was admitted. 49 yo M, former heavy smoker (quit 2011), PMHx of DM type 2, HTN, HLD, CAD with MI in 2010 and multiple PCIs (most recent cardiac cath at [facility] on 5/2020 by Dr. Rivera with LAD widely patent stent, oLCx 99% s/p JUAN, OM1 100% ISR , pRCA patent stent, mRCA 50%), hx of 6 cardiac stents, GERD, depression, hemorrhoids presents to the ED with severe intermittent L sided CP radiating to L arm that started 4 D ago. At first he thought the CP was from GERD, but it worsened and started to feel similar to CP from a prior MI. Pt used 3 doses of Nitro w/ most recent round 1 H PTA with no relief. The CP worsens when he is walking w/ associated SOB and lightheadedness. Pain is improved when lying down. Denies alcohol use (remote Hx of quitting), N/V/D, abdominal pain, cough, fevers, chills, runny nose, diaphoresis, leg swelling, leg pain. Pt is currently on Ranexa, Aspirin, Plavix, atorvastatin, pantoprazole, metoprolol, carvedilol.

## 2020-07-22 NOTE — ED PROVIDER NOTE - CHPI ED SYMPTOMS NEG
Denies alcohol use ( remote Hx of quitting), N/V/D, abdominal pain, cough, fevers, chills, runny nose, diaphoresis, leg swelling, leg pain

## 2020-07-22 NOTE — ED ADULT TRIAGE NOTE - ARRIVAL INFO ADDITIONAL COMMENTS
Patient reports chest pain onset four to five days prior. Patient with secondary complaint of dyspnea with exertion and shortness of breath with his chest pain episodes. Patient with reported cardiac stent placement history. Patient reports chest pain onset four to five days prior. Patient with secondary complaint of dyspnea with exertion and shortness of breath with his chest pain episodes. Patient with reported cardiac stent placement history. Patient reports taking one dose of SL NTG prior to arrival and reported not relief.

## 2020-07-22 NOTE — ED PROVIDER NOTE - PHYSICAL EXAMINATION
VITAL SIGNS: I have reviewed nursing notes and confirm.  CONSTITUTIONAL: Well-developed; well-nourished; in no acute distress.   SKIN:  warm and dry, no acute rash.   HEAD:  normocephalic, atraumatic.  EYES: EOM intact; conjunctiva and sclera clear.  ENT: No nasal discharge; airway clear.   NECK: Supple; non tender.  CARD: Regular rate and rhythm.   RESP:  Clear to auscultation b/l, no wheezes, rales or rhonchi.  ABD: Normal bowel sounds; soft; non-distended; non-tender; no guarding/ rebound.  EXT: Normal ROM. No clubbing, cyanosis or edema. 2+ pulses to b/l ue/le.  NEURO: Alert, oriented, grossly unremarkable VITAL SIGNS: I have reviewed nursing notes and confirm.  CONSTITUTIONAL: Well-developed; well-nourished; in no acute distress.   SKIN:  warm and dry, no acute rash.   HEAD:  normocephalic, atraumatic.  EYES: EOM intact; conjunctiva and sclera clear.  ENT: No nasal discharge; airway clear.   NECK: Supple; non tender.  CARD: Regular rate and rhythm.   RESP:  Clear to auscultation b/l, no wheezes, rales or rhonchi.  ABD: Normal bowel sounds; soft; non-distended; non-tender; no guarding/ rebound.  EXT: Normal ROM. No clubbing, cyanosis or edema.    NEURO: Alert, oriented, grossly unremarkable

## 2020-07-22 NOTE — H&P ADULT - PROBLEM SELECTOR PLAN 2
stable, continue Coreg 6.25mg PO q 12hrs and Imdur ER 30mg PO daily. Holding ACE in anticipation of cardiac catheterization.

## 2020-07-22 NOTE — H&P ADULT - PROBLEM SELECTOR PLAN 1
currently chest pain free, EKG NSR@87BPM without acute ST/T wave changes, CXR unremarkable, Troponin T 0.12, COVID PCR pending.  --R/I NSTEMI on Heparin gtt, given ASA 325mg PO x 1 dose in ED, took Plavix 75mg PO maintenance dose prior to arrival.  --will F/U cardiac enzymes@1AM and 5AM.  --Continue ASA/Plavix/BB/Statin/Ranexa/Imdur.  --NPO after midnight for cardiac catheterization in AM, precath/consented. currently chest pain free, EKG revealed NSR@87BPM with TWI/T wave flattening lateral leads, CXR unremarkable, Troponin T 0.12, COVID PCR pending.  --R/I NSTEMI on Heparin gtt, given ASA 325mg PO x 1 dose in ED, took Plavix 75mg PO maintenance dose prior to arrival.  --will F/U cardiac enzymes@1AM and 5AM.  --Continue ASA/Plavix/BB/Statin/Ranexa/Imdur.  --NPO after midnight for cardiac catheterization in AM, precath/consented.

## 2020-07-22 NOTE — H&P ADULT - ASSESSMENT
50 yr old M, former heavy smoker, PMHx of HTN, hyperlipidemia, diet controlled DM, GERD,  CAD s/p multiple PCIs presents to [facility] ED 7/22/20 with progressively worsening exertional angina, EKG nonischemic, Troponin T 0.12, R/I NSTEMI [facility] and plan for cardiac catheterization with possible intervention. 50 yr old M, former heavy smoker, PMHx of HTN, hyperlipidemia, diet controlled DM, GERD,  CAD s/p multiple PCIs presents to [facility] ED 7/22/20 with progressively worsening exertional angina, EKG NSR with TWI/T wave flattening lateral leads. Troponin T 0.12, R/I NSTEMI admitted to [facility] for cardiac telemetry and plan for cardiac catheterization with possible intervention.

## 2020-07-22 NOTE — H&P ADULT - NSHPLABSRESULTS_GEN_ALL_CORE
EKG revealed NSR@87BPM with no acute ST/T wave changes. EKG revealed NSR@87BPM with TWI/T wave flattening lateral leads.

## 2020-07-23 DIAGNOSIS — E11.9 TYPE 2 DIABETES MELLITUS WITHOUT COMPLICATIONS: ICD-10-CM

## 2020-07-23 DIAGNOSIS — I10 ESSENTIAL (PRIMARY) HYPERTENSION: ICD-10-CM

## 2020-07-23 LAB
A1C WITH ESTIMATED AVERAGE GLUCOSE RESULT: 8 % — HIGH (ref 4–5.6)
ANION GAP SERPL CALC-SCNC: 15 MMOL/L — SIGNIFICANT CHANGE UP (ref 5–17)
APTT BLD: 69.8 SEC — HIGH (ref 27.5–35.5)
APTT BLD: 74 SEC — HIGH (ref 27.5–35.5)
BLD GP AB SCN SERPL QL: NEGATIVE — SIGNIFICANT CHANGE UP
BUN SERPL-MCNC: 11 MG/DL — SIGNIFICANT CHANGE UP (ref 7–23)
CALCIUM SERPL-MCNC: 9.1 MG/DL — SIGNIFICANT CHANGE UP (ref 8.4–10.5)
CHLORIDE SERPL-SCNC: 101 MMOL/L — SIGNIFICANT CHANGE UP (ref 96–108)
CHOLEST SERPL-MCNC: 166 MG/DL — SIGNIFICANT CHANGE UP (ref 10–199)
CK MB CFR SERPL CALC: 3.2 NG/ML — SIGNIFICANT CHANGE UP (ref 0–6.7)
CK MB CFR SERPL CALC: 3.9 NG/ML — SIGNIFICANT CHANGE UP (ref 0–6.7)
CK MB CFR SERPL CALC: 4.8 NG/ML — SIGNIFICANT CHANGE UP (ref 0–6.7)
CK SERPL-CCNC: 138 U/L — SIGNIFICANT CHANGE UP (ref 30–200)
CK SERPL-CCNC: 152 U/L — SIGNIFICANT CHANGE UP (ref 30–200)
CK SERPL-CCNC: 162 U/L — SIGNIFICANT CHANGE UP (ref 30–200)
CO2 SERPL-SCNC: 22 MMOL/L — SIGNIFICANT CHANGE UP (ref 22–31)
CREAT SERPL-MCNC: 0.91 MG/DL — SIGNIFICANT CHANGE UP (ref 0.5–1.3)
ESTIMATED AVERAGE GLUCOSE: 183 MG/DL — HIGH (ref 68–114)
FERRITIN SERPL-MCNC: 11 NG/ML — LOW (ref 30–400)
GLUCOSE BLDC GLUCOMTR-MCNC: 115 MG/DL — HIGH (ref 70–99)
GLUCOSE BLDC GLUCOMTR-MCNC: 190 MG/DL — HIGH (ref 70–99)
GLUCOSE BLDC GLUCOMTR-MCNC: 235 MG/DL — HIGH (ref 70–99)
GLUCOSE SERPL-MCNC: 256 MG/DL — HIGH (ref 70–99)
HCT VFR BLD CALC: 30.2 % — LOW (ref 39–50)
HCT VFR BLD CALC: 30.9 % — LOW (ref 39–50)
HCT VFR BLD CALC: 31.1 % — LOW (ref 39–50)
HDLC SERPL-MCNC: 32 MG/DL — LOW
HGB BLD-MCNC: 9.1 G/DL — LOW (ref 13–17)
HGB BLD-MCNC: 9.2 G/DL — LOW (ref 13–17)
HGB BLD-MCNC: 9.3 G/DL — LOW (ref 13–17)
IRON SATN MFR SERPL: 27 UG/DL — LOW (ref 45–165)
IRON SATN MFR SERPL: 7 % — LOW (ref 16–55)
LDLC SERPL DIRECT ASSAY-MCNC: 49 MG/DL — SIGNIFICANT CHANGE UP
LIPID PNL WITH DIRECT LDL SERPL: 49 MG/DL — SIGNIFICANT CHANGE UP
MAGNESIUM SERPL-MCNC: 1.8 MG/DL — SIGNIFICANT CHANGE UP (ref 1.6–2.6)
MCHC RBC-ENTMCNC: 22.4 PG — LOW (ref 27–34)
MCHC RBC-ENTMCNC: 22.5 PG — LOW (ref 27–34)
MCHC RBC-ENTMCNC: 22.6 PG — LOW (ref 27–34)
MCHC RBC-ENTMCNC: 29.8 GM/DL — LOW (ref 32–36)
MCHC RBC-ENTMCNC: 29.9 GM/DL — LOW (ref 32–36)
MCHC RBC-ENTMCNC: 30.1 GM/DL — LOW (ref 32–36)
MCV RBC AUTO: 74.2 FL — LOW (ref 80–100)
MCV RBC AUTO: 75.5 FL — LOW (ref 80–100)
MCV RBC AUTO: 75.7 FL — LOW (ref 80–100)
NRBC # BLD: 0 /100 WBCS — SIGNIFICANT CHANGE UP (ref 0–0)
PLATELET # BLD AUTO: 203 K/UL — SIGNIFICANT CHANGE UP (ref 150–400)
PLATELET # BLD AUTO: 204 K/UL — SIGNIFICANT CHANGE UP (ref 150–400)
PLATELET # BLD AUTO: 221 K/UL — SIGNIFICANT CHANGE UP (ref 150–400)
POTASSIUM SERPL-MCNC: 3.7 MMOL/L — SIGNIFICANT CHANGE UP (ref 3.5–5.3)
POTASSIUM SERPL-SCNC: 3.7 MMOL/L — SIGNIFICANT CHANGE UP (ref 3.5–5.3)
RBC # BLD: 4.07 M/UL — LOW (ref 4.2–5.8)
RBC # BLD: 4.08 M/UL — LOW (ref 4.2–5.8)
RBC # BLD: 4.12 M/UL — LOW (ref 4.2–5.8)
RBC # FLD: 16 % — HIGH (ref 10.3–14.5)
RBC # FLD: 16 % — HIGH (ref 10.3–14.5)
RBC # FLD: 16.1 % — HIGH (ref 10.3–14.5)
RH IG SCN BLD-IMP: POSITIVE — SIGNIFICANT CHANGE UP
SARS-COV-2 IGG SERPL QL IA: NEGATIVE — SIGNIFICANT CHANGE UP
SARS-COV-2 IGM SERPL IA-ACNC: <0.1 INDEX — SIGNIFICANT CHANGE UP
SARS-COV-2 RNA SPEC QL NAA+PROBE: SIGNIFICANT CHANGE UP
SODIUM SERPL-SCNC: 138 MMOL/L — SIGNIFICANT CHANGE UP (ref 135–145)
TIBC SERPL-MCNC: 400 UG/DL — SIGNIFICANT CHANGE UP (ref 220–430)
TOTAL CHOLESTEROL/HDL RATIO MEASUREMENT: 5.2 RATIO — SIGNIFICANT CHANGE UP (ref 3.4–9.6)
TRIGL SERPL-MCNC: 424 MG/DL — HIGH (ref 10–149)
TROPONIN T SERPL-MCNC: 0.1 NG/ML — CRITICAL HIGH (ref 0–0.01)
TROPONIN T SERPL-MCNC: 0.12 NG/ML — CRITICAL HIGH (ref 0–0.01)
TROPONIN T SERPL-MCNC: 0.14 NG/ML — CRITICAL HIGH (ref 0–0.01)
UIBC SERPL-MCNC: 373 UG/DL — HIGH (ref 110–370)
WBC # BLD: 6.37 K/UL — SIGNIFICANT CHANGE UP (ref 3.8–10.5)
WBC # BLD: 6.44 K/UL — SIGNIFICANT CHANGE UP (ref 3.8–10.5)
WBC # BLD: 6.99 K/UL — SIGNIFICANT CHANGE UP (ref 3.8–10.5)
WBC # FLD AUTO: 6.37 K/UL — SIGNIFICANT CHANGE UP (ref 3.8–10.5)
WBC # FLD AUTO: 6.44 K/UL — SIGNIFICANT CHANGE UP (ref 3.8–10.5)
WBC # FLD AUTO: 6.99 K/UL — SIGNIFICANT CHANGE UP (ref 3.8–10.5)

## 2020-07-23 PROCEDURE — 92920 PRQ TRLUML C ANGIOP 1ART&/BR: CPT | Mod: LC

## 2020-07-23 PROCEDURE — 93010 ELECTROCARDIOGRAM REPORT: CPT

## 2020-07-23 PROCEDURE — 93306 TTE W/DOPPLER COMPLETE: CPT | Mod: 26

## 2020-07-23 PROCEDURE — 92928 PRQ TCAT PLMT NTRAC ST 1 LES: CPT | Mod: LD

## 2020-07-23 PROCEDURE — 99222 1ST HOSP IP/OBS MODERATE 55: CPT

## 2020-07-23 PROCEDURE — 93458 L HRT ARTERY/VENTRICLE ANGIO: CPT | Mod: 26,59

## 2020-07-23 RX ORDER — HEPARIN SODIUM 5000 [USP'U]/ML
900 INJECTION INTRAVENOUS; SUBCUTANEOUS
Qty: 25000 | Refills: 0 | Status: DISCONTINUED | OUTPATIENT
Start: 2020-07-23 | End: 2020-07-23

## 2020-07-23 RX ORDER — DEXTROSE 50 % IN WATER 50 %
25 SYRINGE (ML) INTRAVENOUS ONCE
Refills: 0 | Status: DISCONTINUED | OUTPATIENT
Start: 2020-07-23 | End: 2020-07-24

## 2020-07-23 RX ORDER — HEPARIN SODIUM 5000 [USP'U]/ML
5000 INJECTION INTRAVENOUS; SUBCUTANEOUS EVERY 6 HOURS
Refills: 0 | Status: DISCONTINUED | OUTPATIENT
Start: 2020-07-23 | End: 2020-07-23

## 2020-07-23 RX ORDER — POTASSIUM CHLORIDE 20 MEQ
40 PACKET (EA) ORAL ONCE
Refills: 0 | Status: COMPLETED | OUTPATIENT
Start: 2020-07-23 | End: 2020-07-23

## 2020-07-23 RX ORDER — SODIUM CHLORIDE 9 MG/ML
500 INJECTION INTRAMUSCULAR; INTRAVENOUS; SUBCUTANEOUS
Refills: 0 | Status: DISCONTINUED | OUTPATIENT
Start: 2020-07-23 | End: 2020-07-24

## 2020-07-23 RX ORDER — MAGNESIUM OXIDE 400 MG ORAL TABLET 241.3 MG
800 TABLET ORAL ONCE
Refills: 0 | Status: COMPLETED | OUTPATIENT
Start: 2020-07-23 | End: 2020-07-23

## 2020-07-23 RX ORDER — SODIUM CHLORIDE 9 MG/ML
1000 INJECTION, SOLUTION INTRAVENOUS
Refills: 0 | Status: DISCONTINUED | OUTPATIENT
Start: 2020-07-23 | End: 2020-07-24

## 2020-07-23 RX ORDER — INSULIN LISPRO 100/ML
VIAL (ML) SUBCUTANEOUS
Refills: 0 | Status: DISCONTINUED | OUTPATIENT
Start: 2020-07-23 | End: 2020-07-24

## 2020-07-23 RX ORDER — MORPHINE SULFATE 50 MG/1
2 CAPSULE, EXTENDED RELEASE ORAL ONCE
Refills: 0 | Status: DISCONTINUED | OUTPATIENT
Start: 2020-07-23 | End: 2020-07-23

## 2020-07-23 RX ORDER — DEXTROSE 50 % IN WATER 50 %
15 SYRINGE (ML) INTRAVENOUS ONCE
Refills: 0 | Status: DISCONTINUED | OUTPATIENT
Start: 2020-07-23 | End: 2020-07-24

## 2020-07-23 RX ORDER — GLUCAGON INJECTION, SOLUTION 0.5 MG/.1ML
1 INJECTION, SOLUTION SUBCUTANEOUS ONCE
Refills: 0 | Status: DISCONTINUED | OUTPATIENT
Start: 2020-07-23 | End: 2020-07-24

## 2020-07-23 RX ORDER — SODIUM CHLORIDE 9 MG/ML
500 INJECTION INTRAMUSCULAR; INTRAVENOUS; SUBCUTANEOUS
Refills: 0 | Status: DISCONTINUED | OUTPATIENT
Start: 2020-07-23 | End: 2020-07-23

## 2020-07-23 RX ORDER — DEXTROSE 50 % IN WATER 50 %
12.5 SYRINGE (ML) INTRAVENOUS ONCE
Refills: 0 | Status: DISCONTINUED | OUTPATIENT
Start: 2020-07-23 | End: 2020-07-24

## 2020-07-23 RX ORDER — SODIUM CHLORIDE 9 MG/ML
1000 INJECTION INTRAMUSCULAR; INTRAVENOUS; SUBCUTANEOUS
Refills: 0 | Status: DISCONTINUED | OUTPATIENT
Start: 2020-07-23 | End: 2020-07-23

## 2020-07-23 RX ORDER — IRON SUCROSE 20 MG/ML
200 INJECTION, SOLUTION INTRAVENOUS EVERY 24 HOURS
Refills: 0 | Status: DISCONTINUED | OUTPATIENT
Start: 2020-07-23 | End: 2020-07-24

## 2020-07-23 RX ADMIN — CLOPIDOGREL BISULFATE 75 MILLIGRAM(S): 75 TABLET, FILM COATED ORAL at 08:57

## 2020-07-23 RX ADMIN — Medication 2: at 07:10

## 2020-07-23 RX ADMIN — SODIUM CHLORIDE 50 MILLILITER(S): 9 INJECTION INTRAMUSCULAR; INTRAVENOUS; SUBCUTANEOUS at 20:30

## 2020-07-23 RX ADMIN — SODIUM CHLORIDE 75 MILLILITER(S): 9 INJECTION INTRAMUSCULAR; INTRAVENOUS; SUBCUTANEOUS at 08:03

## 2020-07-23 RX ADMIN — SODIUM CHLORIDE 50 MILLILITER(S): 9 INJECTION INTRAMUSCULAR; INTRAVENOUS; SUBCUTANEOUS at 21:55

## 2020-07-23 RX ADMIN — RANOLAZINE 500 MILLIGRAM(S): 500 TABLET, FILM COATED, EXTENDED RELEASE ORAL at 05:34

## 2020-07-23 RX ADMIN — ISOSORBIDE MONONITRATE 30 MILLIGRAM(S): 60 TABLET, EXTENDED RELEASE ORAL at 08:57

## 2020-07-23 RX ADMIN — CARVEDILOL PHOSPHATE 6.25 MILLIGRAM(S): 80 CAPSULE, EXTENDED RELEASE ORAL at 05:34

## 2020-07-23 RX ADMIN — HEPARIN SODIUM 900 UNIT(S)/HR: 5000 INJECTION INTRAVENOUS; SUBCUTANEOUS at 04:53

## 2020-07-23 RX ADMIN — Medication 1: at 13:12

## 2020-07-23 RX ADMIN — MORPHINE SULFATE 2 MILLIGRAM(S): 50 CAPSULE, EXTENDED RELEASE ORAL at 04:47

## 2020-07-23 RX ADMIN — Medication 81 MILLIGRAM(S): at 08:57

## 2020-07-23 RX ADMIN — MAGNESIUM OXIDE 400 MG ORAL TABLET 800 MILLIGRAM(S): 241.3 TABLET ORAL at 08:03

## 2020-07-23 RX ADMIN — MORPHINE SULFATE 2 MILLIGRAM(S): 50 CAPSULE, EXTENDED RELEASE ORAL at 04:54

## 2020-07-23 RX ADMIN — IRON SUCROSE 110 MILLIGRAM(S): 20 INJECTION, SOLUTION INTRAVENOUS at 08:57

## 2020-07-23 RX ADMIN — HEPARIN SODIUM 900 UNIT(S)/HR: 5000 INJECTION INTRAVENOUS; SUBCUTANEOUS at 12:45

## 2020-07-23 RX ADMIN — PANTOPRAZOLE SODIUM 40 MILLIGRAM(S): 20 TABLET, DELAYED RELEASE ORAL at 05:34

## 2020-07-23 RX ADMIN — HEPARIN SODIUM 900 UNIT(S)/HR: 5000 INJECTION INTRAVENOUS; SUBCUTANEOUS at 06:37

## 2020-07-23 RX ADMIN — Medication 40 MILLIEQUIVALENT(S): at 08:03

## 2020-07-23 NOTE — PROGRESS NOTE ADULT - PROBLEM SELECTOR PLAN 1
Currently chest pain free, EKG on 7/23 @4am revealed NSR@84BPM with TWI in leads I/AVL and ST depressions in V4-V6, CXR unremarkable, Troponin T 0.12, COVID PCR pending.  --R/I NSTEMI on Heparin gtt, given ASA 325mg PO x 1 dose in ED, took Plavix 75mg PO maintenance dose prior to arrival. Currently chest pain free, EKG on 7/23 @4am revealed NSR@84BPM with TWI in leads I/AVL and ST depressions in V4-V6, CXR unremarkable, Troponin T 0.12 --> 0.14 --> 0.10 @05:48am on 7/23. R/I NSTEMI.   - Heparin gtt  - ASA 81mg PO QD  - Clopidogrel 75mg PO QD  - Atorvastatin 80mg PO HS  - Carvedilol 6.25mg PO BID  - Ranolazine 500mg PO BID  - Imdur ER 30mg PO QD Currently chest pain free, EKG on 7/23 @4am revealed NSR@84BPM with TWI in leads I/AVL and ST depressions in V4-V6, Troponin T 0.12-> 0.14->0.10->0.12  - C/w Heparin gtt, loaded with ASA 325mg PO x 1 on arrival to ED  - C/w home Plavix 75mg QD, ASA 81mg QD, Atorvastatin 80mg QHS, Carvedilol 6.25mg BID, Ranolazine 500mg BID, Imdur 30mg QD  - Echo on 7/23: EF 45%, global hypokinesis, trace AR/MR/TR, aortic root is borderline dilated, measuring 3.80 cm at level of the sinuses of Valsalva (normal 3.1-3.7 cm for men)

## 2020-07-23 NOTE — PROGRESS NOTE ADULT - PROBLEM SELECTOR PLAN 4
- H/H 9.2/29.9 with low iron, low MCV, low ferritin. Baseline Hgb 10-12 from prior admissions.   - Patient admits to occasional BRBPR after BMs, and states he has a history of internal hemorrhoids diagnosed ~4yrs ago during colonoscopy. Patient states he noticed a few episodes when straining this past week. HgbA1c 8.0, pt "diet-contolled" at home  - Endo paged, f/u recs  - FS/ISS

## 2020-07-23 NOTE — PROVIDER CONTACT NOTE (OTHER) - ACTION/TREATMENT ORDERED:
EKG done-increased ST depression, morphine 2mg administered. Morphine administered with improvement. Troponin trending. No chest pain at this time.

## 2020-07-23 NOTE — PROGRESS NOTE ADULT - PROBLEM SELECTOR PLAN 2
Stable  -C/w Carvedilol 6.25mg PO q 12hrs and Imdur ER 30mg PO daily. Holding ACE in anticipation of cardiac catheterization. Stable  -C/w Carvedilol 6.25mg PO q 12hrs and Imdur ER 30mg PO daily. Holding home Enalapril 5mg QD in anticipation of cardiac catheterization. Will order for 11AM on 7/24 pending AM Cr

## 2020-07-23 NOTE — PROGRESS NOTE ADULT - SUBJECTIVE AND OBJECTIVE BOX
Interventional Cardiology PA Adult Progress Note      Subjective Assessment: overnight @4am pt c/o sharp L sided CP w/ radiation to LUE, 3/10 severity. Pt denies current CP, SOB, palpitations, dizziness/lightheadedness.      12 Point ROS Negative except as per Subjective and HPI  	  MEDICATIONS:  carvedilol 6.25 milliGRAM(s) Oral every 12 hours  isosorbide   mononitrate ER Tablet (IMDUR) 30 milliGRAM(s) Oral daily  ranolazine 500 milliGRAM(s) Oral two times a day    pantoprazole    Tablet 40 milliGRAM(s) Oral before breakfast    atorvastatin 80 milliGRAM(s) Oral at bedtime  dextrose 40% Gel 15 Gram(s) Oral once PRN  dextrose 50% Injectable 12.5 Gram(s) IV Push once  dextrose 50% Injectable 25 Gram(s) IV Push once  dextrose 50% Injectable 25 Gram(s) IV Push once  glucagon  Injectable 1 milliGRAM(s) IntraMuscular once PRN  insulin lispro (HumaLOG) corrective regimen sliding scale   SubCutaneous Before meals and at bedtime    aspirin enteric coated 81 milliGRAM(s) Oral daily  clopidogrel Tablet 75 milliGRAM(s) Oral daily  dextrose 5%. 1000 milliLiter(s) IV Continuous <Continuous>  heparin   Injectable 5000 Unit(s) IV Push every 6 hours PRN  heparin  Infusion. 900 Unit(s)/Hr IV Continuous <Continuous>  iron sucrose IVPB 200 milliGRAM(s) IV Intermittent every 24 hours  sodium chloride 0.9%. 1000 milliLiter(s) IV Continuous <Continuous>      	    [PHYSICAL EXAM:  TELEMETRY:  T(C): 36.4 (07-23-20 @ 06:12), Max: 37.2 (07-23-20 @ 01:04)  HR: 81 (07-23-20 @ 05:31) (81 - 108)  BP: 135/72 (07-23-20 @ 05:31) (117/73 - 137/71)  RR: 17 (07-23-20 @ 05:31) (17 - 22)  SpO2: 97% (07-23-20 @ 05:31) (95% - 98%)  Wt(kg): --  I&O's Summary    22 Jul 2020 07:01  -  23 Jul 2020 07:00  --------------------------------------------------------  IN: 127 mL / OUT: 0 mL / NET: 127 mL      Height (cm): 167.6 (07-22 @ 22:55)  Weight (kg): 81.2 (07-22 @ 22:55)  BMI (kg/m2): 28.9 (07-22 @ 22:55)  BSA (m2): 1.91 (07-22 @ 22:55)                                           Appearance: Normal	  HEENT:   Normal oral mucosa, PERRL, EOMI	  Neck: Supple, + JVD/ - JVD; Carotid Bruit   Cardiovascular: Normal S1 S2, No JVD, No murmurs,   Respiratory: Lungs clear to auscultation, no Rales, Rhonchi, Wheezing	  Gastrointestinal:  Soft, Non-tender, + BS	  Skin: No rashes, No ecchymoses, No cyanosis  Extremities: Normal range of motion, No clubbing, cyanosis or edema  Vascular: Peripheral pulses palpable 2+ bilaterally  Neurologic: Non-focal  Psychiatry: A & O x 3, Mood & affect appropriate    	    ECG: @4AM NSR 84bpm w/ TWI in leads I/AVL, ST depressions in V4-V6 (more prominent compared to EKG on admission)	  RADIOLOGY:   DIAGNOSTIC TESTING:  [ ] Echocardiogram:  [ ]  Catheterization:  [ ] Stress Test:    [ ] VENKAT  OTHER: 	    LABS:	 	  CARDIAC MARKERS: Troponin T 0.12 --> 0.14                                  9.2    6.37  )-----------( 203      ( 23 Jul 2020 07:09 )             30.9     07-23    138  |  101  |  11  ----------------------------<  256<H>  3.7   |  22  |  0.91    Ca    9.1      23 Jul 2020 05:48  Mg     1.8     07-23    TPro  6.8  /  Alb  4.4  /  TBili  0.5  /  DBili  x   /  AST  20  /  ALT  12  /  AlkPhos  75  07-22    proBNP: Serum Pro-Brain Natriuretic Peptide: 258 pg/mL (07-22 @ 21:20)    Lipid Profile:   HgA1c:   TSH:   PT/INR - ( 22 Jul 2020 21:20 )   PT: 11.9 sec;   INR: 0.99          PTT - ( 23 Jul 2020 04:26 )  PTT:74.0 sec    ASSESSMENT/PLAN: 	        DVT ppx:  Dispo: Interventional Cardiology PA Adult Progress Note      Subjective Assessment: overnight @4am pt c/o sharp L sided CP w/ radiation to LUE, 3/10 severity. Pt denies current CP, SOB, palpitations, dizziness/lightheadedness.      12 Point ROS Negative except as per Subjective and HPI  	  MEDICATIONS:  carvedilol 6.25 milliGRAM(s) Oral every 12 hours  isosorbide   mononitrate ER Tablet (IMDUR) 30 milliGRAM(s) Oral daily  ranolazine 500 milliGRAM(s) Oral two times a day    pantoprazole    Tablet 40 milliGRAM(s) Oral before breakfast    atorvastatin 80 milliGRAM(s) Oral at bedtime  dextrose 40% Gel 15 Gram(s) Oral once PRN  dextrose 50% Injectable 12.5 Gram(s) IV Push once  dextrose 50% Injectable 25 Gram(s) IV Push once  dextrose 50% Injectable 25 Gram(s) IV Push once  glucagon  Injectable 1 milliGRAM(s) IntraMuscular once PRN  insulin lispro (HumaLOG) corrective regimen sliding scale   SubCutaneous Before meals and at bedtime    aspirin enteric coated 81 milliGRAM(s) Oral daily  clopidogrel Tablet 75 milliGRAM(s) Oral daily  dextrose 5%. 1000 milliLiter(s) IV Continuous <Continuous>  heparin   Injectable 5000 Unit(s) IV Push every 6 hours PRN  heparin  Infusion. 900 Unit(s)/Hr IV Continuous <Continuous>  iron sucrose IVPB 200 milliGRAM(s) IV Intermittent every 24 hours  sodium chloride 0.9%. 1000 milliLiter(s) IV Continuous <Continuous>      	    [PHYSICAL EXAM:  TELEMETRY:  T(C): 36.4 (20 @ 06:12), Max: 37.2 (20 @ 01:04)  HR: 81 (20 @ 05:31) (81 - 108)  BP: 135/72 (20 @ 05:31) (117/73 - 137/71)  RR: 17 (20 @ 05:31) (17 - 22)  SpO2: 97% (20 @ 05:31) (95% - 98%)  Wt(kg): --  I&O's Summary    2020 07:01  -  2020 07:00  --------------------------------------------------------  IN: 127 mL / OUT: 0 mL / NET: 127 mL      Height (cm): 167.6 ( @ 22:55)  Weight (kg): 81.2 ( @ 22:55)  BMI (kg/m2): 28.9 ( @ 22:55)  BSA (m2): 1.91 ( @ 22:55)                                           Appearance: Normal	  HEENT:   Normal oral mucosa, PERRL, EOMI	  Neck: Supple, + JVD/ - JVD; Carotid Bruit   Cardiovascular: Normal S1 S2, No JVD, No murmurs,   Respiratory: Lungs clear to auscultation, no Rales, Rhonchi, Wheezing	  Gastrointestinal:  Soft, Non-tender, + BS	  Skin: No rashes, No ecchymoses, No cyanosis  Extremities: Normal range of motion, No clubbing, cyanosis or edema  Vascular: Peripheral pulses palpable 2+ bilaterally  Neurologic: Non-focal  Psychiatry: A & O x 3, Mood & affect appropriate    	    ECG: @4AM NSR 84bpm w/ TWI in leads I/AVL, ST depressions in V4-V6 (more prominent compared to EKG on admission)	  RADIOLOGY:   DIAGNOSTIC TESTING:  [ ] Echocardiogram:  [ ]  Catheterization:  [ ] Stress Test:    [ ] VENKAT  OTHER: 	    LABS:	 	  CARDIAC MARKERS: Troponin T 0.12 --> 0.14 --> 0.10                                  9.2    6.37  )-----------( 203      ( 2020 07:09 )             30.9         138  |  101  |  11  ----------------------------<  256<H>  3.7   |  22  |  0.91    Ca    9.1      2020 05:48  Mg     1.8         TPro  6.8  /  Alb  4.4  /  TBili  0.5  /  DBili  x   /  AST  20  /  ALT  12  /  AlkPhos  75      proBNP: Serum Pro-Brain Natriuretic Peptide: 258 pg/mL ( @ 21:20)    Lipid Profile: total cholesterol: 166; T; HDL: 32; LDL: 99  HgA1c: 8.0  TSH:   PT/INR - ( 2020 21:20 )   PT: 11.9 sec;   INR: 0.99          PTT - ( 2020 04:26 )  PTT:74.0 sec    ASSESSMENT/PLAN: 	        DVT ppx:  Dispo: Interventional Cardiology PA Adult Progress Note      Subjective Assessment: Pt seen and examined at bedside this AM without any complaints. Pt states overnight ~4am, he experienced some sharp L sided CP w/ radiation to LUE, 3/10 severity. Pt denies current CP, SOB, palpitations, dizziness/lightheadedness.    12 Point ROS Negative except as per Subjective and HPI  	  MEDICATIONS:  carvedilol 6.25 milliGRAM(s) Oral every 12 hours  isosorbide   mononitrate ER Tablet (IMDUR) 30 milliGRAM(s) Oral daily  ranolazine 500 milliGRAM(s) Oral two times a day  pantoprazole    Tablet 40 milliGRAM(s) Oral before breakfast  atorvastatin 80 milliGRAM(s) Oral at bedtime  dextrose 40% Gel 15 Gram(s) Oral once PRN  dextrose 50% Injectable 12.5 Gram(s) IV Push once  dextrose 50% Injectable 25 Gram(s) IV Push once  dextrose 50% Injectable 25 Gram(s) IV Push once  glucagon  Injectable 1 milliGRAM(s) IntraMuscular once PRN  insulin lispro (HumaLOG) corrective regimen sliding scale   SubCutaneous Before meals and at bedtime  aspirin enteric coated 81 milliGRAM(s) Oral daily  clopidogrel Tablet 75 milliGRAM(s) Oral daily  dextrose 5%. 1000 milliLiter(s) IV Continuous <Continuous>  heparin   Injectable 5000 Unit(s) IV Push every 6 hours PRN  heparin  Infusion. 900 Unit(s)/Hr IV Continuous <Continuous>  iron sucrose IVPB 200 milliGRAM(s) IV Intermittent every 24 hours  sodium chloride 0.9%. 1000 milliLiter(s) IV Continuous <Continuous>      PHYSICAL EXAM:  TELEMETRY:  T(C): 36.4 (20 @ 06:12), Max: 37.2 (20 @ 01:04)  HR: 81 (20 @ 05:31) (81 - 108)  BP: 135/72 (20 @ 05:31) (117/73 - 137/71)  RR: 17 (20 @ 05:31) (17 - 22)  SpO2: 97% (20 @ 05:31) (95% - 98%)  Wt(kg): --  I&O's Summary    2020 07:01  -  2020 07:00  --------------------------------------------------------  IN: 127 mL / OUT: 0 mL / NET: 127 mL      Height (cm): 167.6 ( @ 22:55)  Weight (kg): 81.2 ( @ 22:55)  BMI (kg/m2): 28.9 ( @ 22:55)  BSA (m2): 1.91 ( @ 22:55)                                           Appearance: NAD, lying comfortably in bed	  HEENT: Normal oral mucosa, PERRL, EOMI	  Neck: Supple, - JVD; No Carotid Bruit   Cardiovascular: Normal S1 S2, no murmurs  Respiratory: Lungs clear to auscultation, no Rales, Rhonchi, Wheezing	  Gastrointestinal:  Soft, Non-tender, + BS	  Skin: No rashes, No ecchymoses, No cyanosis  Extremities: Normal range of motion, No clubbing, cyanosis or edema  Vascular: Peripheral pulses palpable 2+ bilaterally  Neurologic: Non-focal  Psychiatry: A & O x 3, Mood & affect appropriate    	    ECG: @4AM NSR 84bpm w/ TWI in leads I/AVL, ST depressions in V4-V6 (more prominent compared to EKG on admission)	  	  CARDIAC MARKERS: Troponin T 0.12 --> 0.14 --> 0.10                          9.2    6.37  )-----------( 203      ( 2020 07:09 )             30.9         138  |  101  |  11  ----------------------------<  256<H>  3.7   |  22  |  0.91    Ca    9.1      2020 05:48  Mg     1.8         TPro  6.8  /  Alb  4.4  /  TBili  0.5  /  DBili  x   /  AST  20  /  ALT  12  /  AlkPhos  75      proBNP: Serum Pro-Brain Natriuretic Peptide: 258 pg/mL ( @ 21:20)    Lipid Profile: total cholesterol: 166; T; HDL: 32; LDL: 99  HgA1c: 8.0  TSH:   PT/INR - ( 2020 21:20 )   PT: 11.9 sec;   INR: 0.99          PTT - ( 2020 04:26 )  PTT:74.0 sec

## 2020-07-23 NOTE — PROGRESS NOTE ADULT - PROBLEM SELECTOR PLAN 3
- C/w pantoprazole 40mg PO QD - H/H 9.2/29.9 with low iron, low MCV, low ferritin. Baseline Hgb 10-12 from prior admissions.   - Started IV Iron on 7/23/20 for EDUARDO. Will need PO Iron on d/c  - Patient admits to occasional BRBPR after BMs, and states he has a history of internal hemorrhoids diagnosed ~4yrs ago during colonoscopy. Patient states he noticed a few episodes when straining this past week. F/u FOBT

## 2020-07-23 NOTE — PROGRESS NOTE ADULT - ASSESSMENT
50 yr old M, former heavy smoker, PMHx of HTN, hyperlipidemia, diet controlled DM, GERD,  CAD s/p multiple PCIs presents to [facility] ED 7/22/20 with progressively worsening exertional angina, EKG on admission NSR with TWI/T wave flattening lateral leads, EKG @4am on 7/23 shows NSR 84bpm w/ TWI in Lead I/AVL and ST depressions in V4-V6. On admission, Troponin T of 0.12 --> 0.14 @4am on 7/23. R/I NSTEMI admitted to [facility] for cardiac telemetry and plan for cardiac catheterization with possible intervention. 50 yr old M, former heavy smoker, PMHx of HTN, hyperlipidemia, diet controlled DM, GERD,  CAD s/p multiple PCIs presents to [facility] ED 7/22/20 with progressively worsening exertional angina, EKG on admission NSR with TWI/T wave flattening lateral leads, EKG @4am on 7/23 shows NSR 84bpm w/ TWI in Lead I/AVL and ST depressions in V4-V6. On admission, Troponin T of 0.12 --> 0.14 @1am on 7/23 --> 0.10 @ 5:48am. R/I NSTEMI admitted to [facility] for cardiac telemetry and plan for cardiac catheterization w/ Dr. Klein with possible intervention. 50 yr old M, former heavy smoker, PMHx of HTN, hyperlipidemia, diet controlled DM, GERD,  CAD s/p multiple PCIs presents to [facility] ED 7/22/20 with progressively worsening exertional angina, EKG on admission NSR with TWI/T wave flattening lateral leads, EKG @4am on 7/23 shows NSR 84bpm w/ TWI in Lead I/AVL and ST depressions in V4-V6. On admission, Troponin T of 0.12 --> 0.14 @1am on 7/23 --> 0.10 @ 5:48am. R/I NSTEMI admitted to [facility] for cardiac telemetry and plan for cardiac catheterization w/ Dr. Klein on 7/23 with possible intervention.

## 2020-07-23 NOTE — PROGRESS NOTE ADULT - PROBLEM SELECTOR PLAN 5
- insulin lispro corrective regimen sliding scale subQ before meals and bedtime - C/w pantoprazole 40mg PO QD    VTE: Heparin gtt  Dispo: NPO for cath on 7/23. Likely d/c on 7/24

## 2020-07-23 NOTE — CHART NOTE - NSCHARTNOTEFT_GEN_A_CORE
Called to patient’s bedside ~4:30AM as patient c/o left sided sharp chest pain with radiation to LUE, 3/10 in severity.     BP: 131/76, HR: 70s, RR:18, O2 sat: 97% RA. EKG revealed NSR@84BPM with TWI Lead I/AVL and ST depressions V4-V6. (more prominent compared to EKG on admission). Troponin T 0.12-->0.14, CK normal.     Patient given Morphine 2mg IV x 1 dose with complete resolution of symptoms. CCU fellow Dr. Man informed, will continue Heparin gtt and monitor closely.

## 2020-07-24 ENCOUNTER — TRANSCRIPTION ENCOUNTER (OUTPATIENT)
Age: 50
End: 2020-07-24

## 2020-07-24 VITALS — TEMPERATURE: 98 F

## 2020-07-24 LAB
ALBUMIN SERPL ELPH-MCNC: 3.8 G/DL — SIGNIFICANT CHANGE UP (ref 3.3–5)
ALP SERPL-CCNC: 75 U/L — SIGNIFICANT CHANGE UP (ref 40–120)
ALT FLD-CCNC: 14 U/L — SIGNIFICANT CHANGE UP (ref 10–45)
ANION GAP SERPL CALC-SCNC: 10 MMOL/L — SIGNIFICANT CHANGE UP (ref 5–17)
APTT BLD: 37.2 SEC — HIGH (ref 27.5–35.5)
AST SERPL-CCNC: 16 U/L — SIGNIFICANT CHANGE UP (ref 10–40)
BASOPHILS # BLD AUTO: 0.04 K/UL — SIGNIFICANT CHANGE UP (ref 0–0.2)
BASOPHILS NFR BLD AUTO: 0.6 % — SIGNIFICANT CHANGE UP (ref 0–2)
BILIRUB SERPL-MCNC: 0.4 MG/DL — SIGNIFICANT CHANGE UP (ref 0.2–1.2)
BUN SERPL-MCNC: 11 MG/DL — SIGNIFICANT CHANGE UP (ref 7–23)
CALCIUM SERPL-MCNC: 8.8 MG/DL — SIGNIFICANT CHANGE UP (ref 8.4–10.5)
CHLORIDE SERPL-SCNC: 103 MMOL/L — SIGNIFICANT CHANGE UP (ref 96–108)
CO2 SERPL-SCNC: 26 MMOL/L — SIGNIFICANT CHANGE UP (ref 22–31)
CREAT SERPL-MCNC: 1.05 MG/DL — SIGNIFICANT CHANGE UP (ref 0.5–1.3)
EOSINOPHIL # BLD AUTO: 0.67 K/UL — HIGH (ref 0–0.5)
EOSINOPHIL NFR BLD AUTO: 9.5 % — HIGH (ref 0–6)
GLUCOSE BLDC GLUCOMTR-MCNC: 162 MG/DL — HIGH (ref 70–99)
GLUCOSE BLDC GLUCOMTR-MCNC: 228 MG/DL — HIGH (ref 70–99)
GLUCOSE SERPL-MCNC: 159 MG/DL — HIGH (ref 70–99)
HCT VFR BLD CALC: 29.4 % — LOW (ref 39–50)
HGB BLD-MCNC: 8.7 G/DL — LOW (ref 13–17)
IMM GRANULOCYTES NFR BLD AUTO: 0.7 % — SIGNIFICANT CHANGE UP (ref 0–1.5)
LYMPHOCYTES # BLD AUTO: 1.78 K/UL — SIGNIFICANT CHANGE UP (ref 1–3.3)
LYMPHOCYTES # BLD AUTO: 25.2 % — SIGNIFICANT CHANGE UP (ref 13–44)
MAGNESIUM SERPL-MCNC: 2.1 MG/DL — SIGNIFICANT CHANGE UP (ref 1.6–2.6)
MCHC RBC-ENTMCNC: 22.3 PG — LOW (ref 27–34)
MCHC RBC-ENTMCNC: 29.6 GM/DL — LOW (ref 32–36)
MCV RBC AUTO: 75.2 FL — LOW (ref 80–100)
MONOCYTES # BLD AUTO: 0.6 K/UL — SIGNIFICANT CHANGE UP (ref 0–0.9)
MONOCYTES NFR BLD AUTO: 8.5 % — SIGNIFICANT CHANGE UP (ref 2–14)
NEUTROPHILS # BLD AUTO: 3.92 K/UL — SIGNIFICANT CHANGE UP (ref 1.8–7.4)
NEUTROPHILS NFR BLD AUTO: 55.5 % — SIGNIFICANT CHANGE UP (ref 43–77)
NRBC # BLD: 0 /100 WBCS — SIGNIFICANT CHANGE UP (ref 0–0)
PLATELET # BLD AUTO: 187 K/UL — SIGNIFICANT CHANGE UP (ref 150–400)
POTASSIUM SERPL-MCNC: 4 MMOL/L — SIGNIFICANT CHANGE UP (ref 3.5–5.3)
POTASSIUM SERPL-SCNC: 4 MMOL/L — SIGNIFICANT CHANGE UP (ref 3.5–5.3)
PROT SERPL-MCNC: 6.7 G/DL — SIGNIFICANT CHANGE UP (ref 6–8.3)
RBC # BLD: 3.91 M/UL — LOW (ref 4.2–5.8)
RBC # FLD: 16 % — HIGH (ref 10.3–14.5)
SODIUM SERPL-SCNC: 139 MMOL/L — SIGNIFICANT CHANGE UP (ref 135–145)
WBC # BLD: 7.06 K/UL — SIGNIFICANT CHANGE UP (ref 3.8–10.5)
WBC # FLD AUTO: 7.06 K/UL — SIGNIFICANT CHANGE UP (ref 3.8–10.5)

## 2020-07-24 PROCEDURE — 99239 HOSP IP/OBS DSCHRG MGMT >30: CPT

## 2020-07-24 RX ORDER — ASPIRIN/CALCIUM CARB/MAGNESIUM 324 MG
1 TABLET ORAL
Qty: 30 | Refills: 11
Start: 2020-07-24 | End: 2021-07-18

## 2020-07-24 RX ORDER — CLOPIDOGREL BISULFATE 75 MG/1
1 TABLET, FILM COATED ORAL
Qty: 30 | Refills: 11
Start: 2020-07-24 | End: 2021-07-18

## 2020-07-24 RX ORDER — FERROUS SULFATE 325(65) MG
1 TABLET ORAL
Qty: 60 | Refills: 0
Start: 2020-07-24 | End: 2020-08-22

## 2020-07-24 RX ADMIN — CLOPIDOGREL BISULFATE 75 MILLIGRAM(S): 75 TABLET, FILM COATED ORAL at 10:57

## 2020-07-24 RX ADMIN — PANTOPRAZOLE SODIUM 40 MILLIGRAM(S): 20 TABLET, DELAYED RELEASE ORAL at 05:25

## 2020-07-24 RX ADMIN — Medication 1: at 07:28

## 2020-07-24 RX ADMIN — RANOLAZINE 500 MILLIGRAM(S): 500 TABLET, FILM COATED, EXTENDED RELEASE ORAL at 05:25

## 2020-07-24 RX ADMIN — IRON SUCROSE 110 MILLIGRAM(S): 20 INJECTION, SOLUTION INTRAVENOUS at 05:26

## 2020-07-24 RX ADMIN — CARVEDILOL PHOSPHATE 6.25 MILLIGRAM(S): 80 CAPSULE, EXTENDED RELEASE ORAL at 05:25

## 2020-07-24 RX ADMIN — ISOSORBIDE MONONITRATE 30 MILLIGRAM(S): 60 TABLET, EXTENDED RELEASE ORAL at 10:58

## 2020-07-24 RX ADMIN — Medication 81 MILLIGRAM(S): at 10:57

## 2020-07-24 NOTE — DISCHARGE NOTE PROVIDER - CARE PROVIDERS DIRECT ADDRESSES
,DirectAddress_Unknown ,DirectAddress_Unknown,[email or web address]

## 2020-07-24 NOTE — DISCHARGE NOTE PROVIDER - HOSPITAL COURSE
50 yr old M, former heavy smoker, PMHx of HTN, hyperlipidemia, diet controlled DM, GERD,  CAD with MI in 2010 and multiple PCIs, recent cardiac catheterization @[facility] on 5/5/2020 with JUAN mRCA, IVUS of oLAD nonobstuctive, OM1 100% ISR. EF normal. Patient at which time was recommended to return for staged PCI of OM1 if clinically indicated. Patient now returns to [facility] ED 7/22/20 c/o intermittent chest pain x 4-5 days. Patient describes it as being exertional left sided sharp pain 7/10 in severity, with radiation to left of neck/LUE. Patient reports symptoms worse with ambulating >20 feet, associated with SOB and lightheadedness. Pt was ruled in NSTEMI on 7/22 and is now s/p cardiac cath  7/23/2020: mRCA patent stent, pLCx patent stent, pLCx patent stent, mLAD/D1 patent stent, OM1 100% ISR s/p PTCA/JUAN, pLAD 95% s/p JUAN .EF 60%, EDP 16. Pt is planned to return for brachytherapy of OM1 stent on 8/6/20. Access: R Groin PC. Pt was admitted overnight to [facility] for monitoring and has been seen and examined at bedside this morning. Pt is out of bed and ambulating with no complaints. R groin access stable with no hematoma, no bleed, 2+ radial pulse. Lab values, telemetry, and vital signs reviewed and remained stable. Discharge medication regimen reviewed with patient and Dr. Encarnacion. Pt will continue aspirin 81mg and Plavix 75 daily. Pt will continue with home meds Coreg 6.25mg BID, Lipitor 80mg QD, Imdur 30mg, Ranexa 500mg BID, enalapril 5mg daily. Of note, pt has had slowly downtrending H/H: hgb 8.7 today. Pt has hx of internal hemorrhoids and states he notices intermittent hematochezia, mostly when he strains during a BM. Denies fatigue, lightheadedness, dizziness. He will be given a prescription of ferrous sulfate 325mg BID. He should follow up with his PCP for further workup. All discharge instructions reviewed with patient and medications e-prescribed to pharmacy. Pt is cleared for discharge per Dr. Encarnacion, and will follow up with  within 1-2 weeks of discharge. 50 yr old M, former heavy smoker, PMHx of HTN, hyperlipidemia, diet controlled DM, GERD,  CAD with MI in 2010 and multiple PCIs, recent cardiac catheterization @[facility] on 5/5/2020 with JUAN mRCA, IVUS of oLAD nonobstuctive, OM1 100% ISR. EF normal. Patient at which time was recommended to return for staged PCI of OM1 if clinically indicated. Patient now returns to [facility] ED 7/22/20 c/o intermittent chest pain x 4-5 days. Patient describes it as being exertional left sided sharp pain 7/10 in severity, with radiation to left of neck/LUE. Patient reports symptoms worse with ambulating >20 feet, associated with SOB and lightheadedness. Pt was ruled in NSTEMI on 7/22 and is now s/p cardiac cath  7/23/2020: mRCA patent stent, pLCx patent stent, pLCx patent stent, mLAD/D1 patent stent, OM1 100% ISR s/p PTCA/JUAN, pLAD 95% s/p JUAN .EF 60%, EDP 16. Pt is planned to return for brachytherapy of OM1 stent on 8/6/20. Access: R Groin PC. Pt was admitted overnight to [facility] for monitoring and has been seen and examined at bedside this morning. Pt is out of bed and ambulating with no complaints. R groin access stable with no hematoma, no bleed, 2+ radial pulse. Lab values, telemetry, and vital signs reviewed and remained stable. Discharge medication regimen reviewed with patient and Dr. Encarnacion. Pt will continue aspirin 81mg and Plavix 75 daily. Pt will continue with home meds Coreg 6.25mg BID, Lipitor 80mg QD, Imdur 30mg, Ranexa 500mg BID, enalapril 5mg daily. Of note, pt has had slowly downtrending H/H: hgb 8.7 today. Pt has hx of internal hemorrhoids and states he notices intermittent hematochezia, mostly when he strains during a BM. Denies fatigue, lightheadedness, dizziness. He will be given a prescription of ferrous sulfate 325mg BID. He should follow up with his PCP, Dr. Hawa Saleem, for further workup. All discharge instructions reviewed with patient and medications e-prescribed to pharmacy. Pt is cleared for discharge per Dr. Encarnacion, and will follow up with Dr. Hawa Saleem within 1-2 weeks of discharge.

## 2020-07-24 NOTE — DISCHARGE NOTE PROVIDER - NSDCMRMEDTOKEN_GEN_ALL_CORE_FT
aspirin 81 mg oral tablet: 1 tab(s) orally once a day  atorvastatin 80 mg oral tablet: 1 tab(s) orally once a day (at bedtime)  carvedilol 6.25 mg oral tablet: 1 tab(s) orally 2 times a day  clopidogrel 75 mg oral tablet: 1 tab(s) orally once a day  docusate potassium 100 mg oral capsule: 1 tab(s) orally 2 times a day  enalapril 5 mg oral tablet: 1 tab(s) orally once a day  isosorbide mononitrate 30 mg oral tablet, extended release: 1 tab(s) orally once a day (in the morning)  nitroglycerin 0.4 mg sublingual tablet: 1 tab(s) orally every 5 minutes, As Needed  for chest pain, maximum 3 doses MDD:3 tablets  pantoprazole 40 mg oral delayed release tablet: 1 tab(s) orally once a day  ranolazine 500 mg oral tablet, extended release: 1 tab(s) orally once a day aspirin 81 mg oral tablet: 1 tab(s) orally once a day  atorvastatin 80 mg oral tablet: 1 tab(s) orally once a day (at bedtime)  carvedilol 6.25 mg oral tablet: 1 tab(s) orally 2 times a day  clopidogrel 75 mg oral tablet: 1 tab(s) orally once a day  docusate potassium 100 mg oral capsule: 1 tab(s) orally 2 times a day  enalapril 5 mg oral tablet: 1 tab(s) orally once a day  ferrous sulfate 325 mg (65 mg elemental iron) oral tablet: 1 tab(s) orally 2 times a day   isosorbide mononitrate 30 mg oral tablet, extended release: 1 tab(s) orally once a day (in the morning)  nitroglycerin 0.4 mg sublingual tablet: 1 tab(s) orally every 5 minutes, As Needed  for chest pain, maximum 3 doses MDD:3 tablets  pantoprazole 40 mg oral delayed release tablet: 1 tab(s) orally once a day  ranolazine 500 mg oral tablet, extended release: 1 tab(s) orally once a day

## 2020-07-24 NOTE — DISCHARGE NOTE NURSING/CASE MANAGEMENT/SOCIAL WORK - PATIENT PORTAL LINK FT
You can access the FollowMyHealth Patient Portal offered by [facility] by registering at the following website: [email or web address]. By joining [employer]’s FollowMyHealth portal, you will also be able to view your health information using other applications (apps) compatible with our system.

## 2020-07-24 NOTE — DISCHARGE NOTE PROVIDER - NSDCCPCAREPLAN_GEN_ALL_CORE_FT
PRINCIPAL DISCHARGE DIAGNOSIS  Diagnosis: Chest pain  Assessment and Plan of Treatment: You were found to have blockages in the arteries of your heart, also known as Coronary Artery Diseease. You underwent a cardiac angiogram and received a stent to the obtuse marginal branch artery. PLEASE CONTINUE ASPIRIN 81MG DAILY AND PLAVIX 75MG DAILY. DO NOT STOP THESE MEDICATIONS FOR ANY REASON AS THEY ARE KEEPING YOUR STENT OPEN AND PREVENTING A HEART ATTACK. Avoid strenuous activity or heavy lifting for the next five days. Do not take a bath or swim for the next five days; you may shower. For any bleeding or hematoma formation (hardened blood collection under the skin) at the access site of your R groin please hold pressure and go to the emergency room. Please follow up with  ___ in 1-2 weeks. For recurrent chest pain, please call your doctor or go to the emergency room. You should also RETURN FOR PLANNED BRACHYTHERAPY OF THIS ARTERY ON AUGUST 6TH, 2020.      SECONDARY DISCHARGE DIAGNOSES  Diagnosis: Anemia  Assessment and Plan of Treatment: You were found to have a low blood count on admission. This is most likely related to your internal hemorrhoids. PLEASE START ferrous sulfate 325mg by mouth twice daily, and follow up with your PCP for further workup.    Diagnosis: Hypertension  Assessment and Plan of Treatment: Please continue with enalapril 5mg dailly. PRINCIPAL DISCHARGE DIAGNOSIS  Diagnosis: Chest pain  Assessment and Plan of Treatment: You were found to have blockages in the arteries of your heart, also known as Coronary Artery Diseease. You underwent a cardiac angiogram and received a stent to the obtuse marginal branch artery. PLEASE CONTINUE ASPIRIN 81MG DAILY AND PLAVIX 75MG DAILY. DO NOT STOP THESE MEDICATIONS FOR ANY REASON AS THEY ARE KEEPING YOUR STENT OPEN AND PREVENTING A HEART ATTACK. Avoid strenuous activity or heavy lifting for the next five days. Do not take a bath or swim for the next five days; you may shower. For any bleeding or hematoma formation (hardened blood collection under the skin) at the access site of your R groin please hold pressure and go to the emergency room. Please follow up with Dr. Hawa Saleem in 1-2 weeks. For recurrent chest pain, please call your doctor or go to the emergency room. You should also RETURN FOR PLANNED BRACHYTHERAPY OF THIS ARTERY ON AUGUST 6TH, 2020.      SECONDARY DISCHARGE DIAGNOSES  Diagnosis: Anemia  Assessment and Plan of Treatment: You were found to have a low blood count on admission. This is most likely related to your internal hemorrhoids. PLEASE START ferrous sulfate 325mg by mouth twice daily, and follow up with your PCP for further workup.    Diagnosis: Hypertension  Assessment and Plan of Treatment: Please continue with enalapril 5mg dailly.

## 2020-07-24 NOTE — DISCHARGE NOTE PROVIDER - PROVIDER TOKENS
FREE:[LAST:[Stiven],FIRST:[Hawa],PHONE:[(   )    -],FAX:[(   )    -],ADDRESS:[[phone] [place] [address]]] FREE:[LAST:[Saleem],FIRST:[Hawa],PHONE:[(   )    -],FAX:[(   )    -],ADDRESS:[[address] [address]]],PROVIDER:[TOKEN:[[id number]],FOLLOWUP:[1 week],ESTABLISHEDPATIENT:[T]]

## 2020-07-24 NOTE — CONSULT NOTE ADULT - SUBJECTIVE AND OBJECTIVE BOX
HPI: 50 yr old M, former heavy smoker, PMHx of HTN, hyperlipidemia, diet controlled DM, GERD,  CAD s/p multiple PCIs presents to [facility] ED 7/22/20 with progressively worsening exertional angina, EKG on admission NSR with TWI/T wave flattening lateral leads, EKG @4am on 7/23 shows NSR 84bpm w/ TWI in Lead I/AVL and ST depressions in V4-V6. On admission, Troponin T of 0.12 --> 0.14 @1am on 7/23 --> 0.10 @ 5:48am. R/I NSTEMI admitted to [facility] for cardiac telemetry and plan for cardiac catheterization w/ Dr. Klein on 7/23 with possible intervention.  - Echo on 7/23: EF 45%, global hypokinesis, trace AR/MR/TR, aortic root is borderline dilated, measuring 3.80 cm at level of the sinuses of Valsalva (normal 3.1-3.7 cm for men).    FSG & insulin:    Dinner FSG   Lispro   +    Ate  Bedtime FSG     Lantus      and Lispro         Breakfast FSG   Lispro    +    Ate  Lunch FSG      Lispro    +    Ate      Age at Dx:  How dx:  Hx and duration of insulin:  Current Therapy:  Hx of other regimens:    Home FSG:  Fasting  Lunch  Dinner  Bed    Diet:  Exercise:    Hx of hypoglycemia:  Hx of DKA/HHS:  Complications:  Outpatient Endo:    FH:  DM:  Thyroid:  Autoimmune:  Other:    SH:  Smoking  Etoh:  Recreational Drugs:  Social Life:    PMH & Surgical Hx:CHEST PAIN  No pertinent family history in first degree relatives  Handoff  MEWS Score  MI (myocardial infarction)  Myocardial ischemia  Hemorrhoid  Hyperlipidemia  Essential hypertension  Gastroesophageal reflux disease  Atherosclerosis of coronary artery  DM (diabetes mellitus), type 2  Chest pain  Diabetes mellitus type 2, diet-controlled  Hypertension  Anemia  Gastroesophageal reflux disease  Essential hypertension  NSTEMI (non-ST elevated myocardial infarction)  Coronary angioplasty status  CHEST PAIN  78  SysAdmin_VisitLink          Current Meds:  aspirin enteric coated 81 milliGRAM(s) Oral daily  atorvastatin 80 milliGRAM(s) Oral at bedtime  carvedilol 6.25 milliGRAM(s) Oral every 12 hours  clopidogrel Tablet 75 milliGRAM(s) Oral daily  dextrose 40% Gel 15 Gram(s) Oral once PRN  dextrose 5%. 1000 milliLiter(s) IV Continuous <Continuous>  dextrose 50% Injectable 12.5 Gram(s) IV Push once  dextrose 50% Injectable 25 Gram(s) IV Push once  dextrose 50% Injectable 25 Gram(s) IV Push once  enalapril 5 milliGRAM(s) Oral daily  glucagon  Injectable 1 milliGRAM(s) IntraMuscular once PRN  insulin lispro (HumaLOG) corrective regimen sliding scale   SubCutaneous Before meals and at bedtime  iron sucrose IVPB 200 milliGRAM(s) IV Intermittent every 24 hours  isosorbide   mononitrate ER Tablet (IMDUR) 30 milliGRAM(s) Oral daily  pantoprazole    Tablet 40 milliGRAM(s) Oral before breakfast  ranolazine 500 milliGRAM(s) Oral two times a day  sodium chloride 0.9%. 500 milliLiter(s) IV Continuous <Continuous>      Allergies:  No Known Allergies      ROS:  Denies the following except as indicated.    General: weight loss/weight gain, decreased appetite, fatigue  Eyes: Blurry vision, double vision, visual changes  ENT: Throat pain, changes in voice,   CV: palpitations, SOB, CP, cough  GI: NVD, difficulty swallowing, abdominal pain  : polyuria, dysuria  Endo: abnormal menses, temperature intolerance, decreased libido  MSK: weakness, joint pain  Skin: rash, dryness, diaphoresis  Heme: Easy bruising, bleeding  Neuro: HA, dizziness, lightheadedness, numbness/ tingling  Psych: Anxiety, Depression    Vital Signs Last 24 Hrs  T(C): 36.4 (24 Jul 2020 09:59), Max: 36.8 (24 Jul 2020 06:18)  T(F): 97.5 (24 Jul 2020 09:59), Max: 98.2 (24 Jul 2020 06:18)  HR: 75 (24 Jul 2020 08:28) (73 - 86)  BP: 133/82 (24 Jul 2020 08:28) (119/73 - 151/96)  BP(mean): 102 (24 Jul 2020 08:28) (96 - 119)  RR: 18 (24 Jul 2020 08:28) (16 - 18)  SpO2: 98% (24 Jul 2020 08:28) (95% - 98%)  Height (cm): 167.6 (07-22 @ 22:55)  Weight (kg): 81.2 (07-22 @ 22:55)  BMI (kg/m2): 28.9 (07-22 @ 22:55)      Constitutional: wn/wd in NAD.   HEENT: NCAT, MMM, OP clear, EOMI, , no proptosis or lid retraction  Neck: no thyromegaly or palpable thyroid nodules   Respiratory: lungs CTAB.  Cardiovascular: regular rhythm, normal S1 and S2, no audible murmurs, no peripheral edema  GI: soft, NT/ND, no masses/HSM appreciated.  Neurology: no tremors, DTR 2+  Skin: no visible rashes/lesions. no acanthosis nigricans. no hyperlipotrophy. no cushing's stigmata.  Psychiatric: AAO x 3, normal affect/mood.  Ext: radial pulses intact, DP pulses intact, extremities warm, no cyanosis, clubbing or edema.       LABS:                        8.7    7.06  )-----------( 187      ( 24 Jul 2020 06:41 )             29.4     07-24    139  |  103  |  11  ----------------------------<  159<H>  4.0   |  26  |  1.05    Ca    8.8      24 Jul 2020 06:41  Mg     2.1     07-24    TPro  6.7  /  Alb  3.8  /  TBili  0.4  /  DBili  x   /  AST  16  /  ALT  14  /  AlkPhos  75  07-24    PT/INR - ( 22 Jul 2020 21:20 )   PT: 11.9 sec;   INR: 0.99          PTT - ( 24 Jul 2020 06:41 )  PTT:37.2 sec      Thyroid Stimulating Hormone, Serum: 0.957 (05-05 @ 06:46)  Thyroid Stimulating Hormone, Serum: 1.724 (09-24 @ 04:53)      RADIOLOGY & ADDITIONAL STUDIES:  CAPILLARY BLOOD GLUCOSE      POCT Blood Glucose.: 162 mg/dL (24 Jul 2020 06:39)  POCT Blood Glucose.: 115 mg/dL (23 Jul 2020 21:54)  POCT Blood Glucose.: 190 mg/dL (23 Jul 2020 12:55)        A/P:50y Male    1.  DM -   A1C:  Weight:  Cr/GRF:  ER:    Please continue lantus       units at night / morning.  Please continue lispro      units before each meal.  Please continue lispro moderate / low dose sliding scale four times daily with meals and at bedtime    Pt's fingerstick glucose goal is     Will continue to monitor     For discharge, pt can continue    Pt can follow up at discharge with [facility] by calling  to make an appointment.   Will discuss case with     and update primary team    To Make an appointment at [address] [place] [address] for the patient, either:  1. Send a STAT task via [employer] to Valentina Chua or Adelia Portillo (office managers)   OR  2. Call supervisor's line. P: [phone] (do not give this number to patient). VM is checked periodically.  In the message, specify that this is a hospital discharge follow-up, and that the appt can be made with a NP if there is no timely MD availability.    REMINDERS FOR INSULIN/DIABETES SUPPLIES at DISCHARGE:  INSULIN:   Long actin/Basal Insulin: Examples: Toujeo, Basaglar, Tresiba, Lantus   Short acting/Bolus Insulin: Humalog, Admelog, Novolog  Please ensure that BOTH short acting and long acting insulin are prescribed in the same preparation (Ex: PEN vs VIAL/SOLUTION)     TESTING SUPPLIES:   All glucometer supplies should be written as generic to avoid issues with insurance. Use the free text option in sunrise prescription writer, and type in glucometer test strips, lancets, etc to order.    If sending patient home on insulin PEN, please send:   •	BD sammie insulin pen needles for use up to 4 times daily (total quantity 100)  •	Lancets for use up to 4 times daily (total quantity 100)  •	Glucometer Test strips for use up to 4 times daily (total quantity 100)  •	Alcohol swabs for use up to 4 times daily (total quantity 100)  •	Glucometer (If provided by hospital, still provide scripts for lancets, test strips, and swabs)  If sending patient home on insulin VIAL, please send:   •	Insulin syringes (6mm) - for use up to 4 times daily (total quantity 100)  •	Lancets for use up to 4 times daily (total quantity 100)  •	Generic Glucometer Test strips for use up to 4 times daily (total quantity 100)  •	Alcohol swabs for use up to 4 times daily (total quantity 100)  •	Generic Glucometer (If provided by hospital, still provide scripts for lancets, test strips, and swabs)  •	Do not specify brand for testing supplies (such as contour, freestyle, one touch etc) that way the pharmacy has the freedom to pick and change according to what the insurance dictates.  For patients without insurance:   •	Provide social work with appropriate scripts so they may obtain 1 week of samples  •	Provide with glucometer. Glucometers are located at various nursing stations, the nursing office, education, and endocrine fellows office.  •	Please make appointment with Tresa Castillo NP or Tessa Cleveland RN and LEILA Loera at the [address] [place] [address] endocrinology clinic. They can see patients without insurance, provide appropriate samples, and assist in getting insurance coverage.     PREFERRED PHARMACY:  [facility] (located on 1st floor next to admitting)  P: [phone]  Hours: M – F 8AM – 8PM, Sat 8AM – 4PM, Sun—closed  If not using [employer], please follow up with chosen pharmacy to ensure insulin prescribed is covered. HPI: 50 yr old M, former heavy smoker, PMHx of HTN, hyperlipidemia, diet controlled DM, GERD,  CAD s/p multiple PCIs presents to [facility] ED 7/22/20 with progressively worsening exertional angina, EKG on admission NSR with TWI/T wave flattening lateral leads, EKG @4am on 7/23 shows NSR 84bpm w/ TWI in Lead I/AVL and ST depressions in V4-V6. On admission, Troponin T of 0.12 --> 0.14 @1am on 7/23 --> 0.10 @ 5:48am. R/I NSTEMI admitted to [facility] for cardiac telemetry, now s/p cardiac catheterization w/ Dr. Klein on 7/23. no note.  - Echo on 7/23: EF 45%, global hypokinesis, trace AR/MR/TR, aortic root is borderline dilated, measuring 3.80 cm at level of the sinuses of Valsalva (normal 3.1-3.7 cm for men).    Endocrine consulted for " diet controlled" DM with A1C 8.0%.    FSG & insulin:    Dinner FSG   Lispro   +    Ate  Bedtime FSG     Lantus      and Lispro         Breakfast FSG   Lispro    +    Ate  Lunch FSG      Lispro    +    Ate      Age at Dx:  How dx:  Hx and duration of insulin:  Current Therapy:  Hx of other regimens:    Home FSG:  Fasting  Lunch  Dinner  Bed    Diet:  Exercise:    Hx of hypoglycemia:  Hx of DKA/HHS:  Complications:  Outpatient Endo:    FH:  DM:  Thyroid:  Autoimmune:  Other:    SH:  Smoking  Etoh:  Recreational Drugs:  Social Life:    PMH & Surgical Hx:CHEST PAIN  No pertinent family history in first degree relatives  Handoff  MEWS Score  MI (myocardial infarction)  Myocardial ischemia  Hemorrhoid  Hyperlipidemia  Essential hypertension  Gastroesophageal reflux disease  Atherosclerosis of coronary artery  DM (diabetes mellitus), type 2  Chest pain  Diabetes mellitus type 2, diet-controlled  Hypertension  Anemia  Gastroesophageal reflux disease  Essential hypertension  NSTEMI (non-ST elevated myocardial infarction)  Coronary angioplasty status  CHEST PAIN  78  SysAdmin_VisitLink          Current Meds:  aspirin enteric coated 81 milliGRAM(s) Oral daily  atorvastatin 80 milliGRAM(s) Oral at bedtime  carvedilol 6.25 milliGRAM(s) Oral every 12 hours  clopidogrel Tablet 75 milliGRAM(s) Oral daily  dextrose 40% Gel 15 Gram(s) Oral once PRN  dextrose 5%. 1000 milliLiter(s) IV Continuous <Continuous>  dextrose 50% Injectable 12.5 Gram(s) IV Push once  dextrose 50% Injectable 25 Gram(s) IV Push once  dextrose 50% Injectable 25 Gram(s) IV Push once  enalapril 5 milliGRAM(s) Oral daily  glucagon  Injectable 1 milliGRAM(s) IntraMuscular once PRN  insulin lispro (HumaLOG) corrective regimen sliding scale   SubCutaneous Before meals and at bedtime  iron sucrose IVPB 200 milliGRAM(s) IV Intermittent every 24 hours  isosorbide   mononitrate ER Tablet (IMDUR) 30 milliGRAM(s) Oral daily  pantoprazole    Tablet 40 milliGRAM(s) Oral before breakfast  ranolazine 500 milliGRAM(s) Oral two times a day  sodium chloride 0.9%. 500 milliLiter(s) IV Continuous <Continuous>      Allergies:  No Known Allergies      ROS:  Denies the following except as indicated.    General: weight loss/weight gain, decreased appetite, fatigue  Eyes: Blurry vision, double vision, visual changes  ENT: Throat pain, changes in voice,   CV: palpitations, SOB, CP, cough  GI: NVD, difficulty swallowing, abdominal pain  : polyuria, dysuria  Endo: abnormal menses, temperature intolerance, decreased libido  MSK: weakness, joint pain  Skin: rash, dryness, diaphoresis  Heme: Easy bruising, bleeding  Neuro: HA, dizziness, lightheadedness, numbness/ tingling  Psych: Anxiety, Depression    Vital Signs Last 24 Hrs  T(C): 36.4 (24 Jul 2020 09:59), Max: 36.8 (24 Jul 2020 06:18)  T(F): 97.5 (24 Jul 2020 09:59), Max: 98.2 (24 Jul 2020 06:18)  HR: 75 (24 Jul 2020 08:28) (73 - 86)  BP: 133/82 (24 Jul 2020 08:28) (119/73 - 151/96)  BP(mean): 102 (24 Jul 2020 08:28) (96 - 119)  RR: 18 (24 Jul 2020 08:28) (16 - 18)  SpO2: 98% (24 Jul 2020 08:28) (95% - 98%)  Height (cm): 167.6 (07-22 @ 22:55)  Weight (kg): 81.2 (07-22 @ 22:55)  BMI (kg/m2): 28.9 (07-22 @ 22:55)      Constitutional: wn/wd in NAD.   HEENT: NCAT, MMM, OP clear, EOMI, , no proptosis or lid retraction  Neck: no thyromegaly or palpable thyroid nodules   Respiratory: lungs CTAB.  Cardiovascular: regular rhythm, normal S1 and S2, no audible murmurs, no peripheral edema  GI: soft, NT/ND, no masses/HSM appreciated.  Neurology: no tremors, DTR 2+  Skin: no visible rashes/lesions. no acanthosis nigricans. no hyperlipotrophy. no cushing's stigmata.  Psychiatric: AAO x 3, normal affect/mood.  Ext: radial pulses intact, DP pulses intact, extremities warm, no cyanosis, clubbing or edema.       LABS:                        8.7    7.06  )-----------( 187      ( 24 Jul 2020 06:41 )             29.4     07-24    139  |  103  |  11  ----------------------------<  159<H>  4.0   |  26  |  1.05    Ca    8.8      24 Jul 2020 06:41  Mg     2.1     07-24    TPro  6.7  /  Alb  3.8  /  TBili  0.4  /  DBili  x   /  AST  16  /  ALT  14  /  AlkPhos  75  07-24    PT/INR - ( 22 Jul 2020 21:20 )   PT: 11.9 sec;   INR: 0.99          PTT - ( 24 Jul 2020 06:41 )  PTT:37.2 sec      Thyroid Stimulating Hormone, Serum: 0.957 (05-05 @ 06:46)  Thyroid Stimulating Hormone, Serum: 1.724 (09-24 @ 04:53)      RADIOLOGY & ADDITIONAL STUDIES:  CAPILLARY BLOOD GLUCOSE      POCT Blood Glucose.: 162 mg/dL (24 Jul 2020 06:39)  POCT Blood Glucose.: 115 mg/dL (23 Jul 2020 21:54)  POCT Blood Glucose.: 190 mg/dL (23 Jul 2020 12:55)      Will discuss in rounds  A/P: 50 yr old M, former heavy smoker, PMHx of HTN, hyperlipidemia, diet controlled DM, GERD,  CAD s/p multiple PCIs presents to [facility] ED 7/22/20 with progressively worsening exertional angina, EKG on admission NSR with TWI/T wave flattening lateral leads, EKG @4am on 7/23 shows NSR 84bpm w/ TWI in Lead I/AVL and ST depressions in V4-V6. On admission, Troponin T of 0.12 --> 0.14 @1am on 7/23 --> 0.10 @ 5:48am. R/I NSTEMI admitted to [facility] for cardiac telemetry, now s/p cardiac catheterization w/ Dr. Klein on 7/23, with uncontrolled diabetes    1.  DM - type 2, uncontrolled, complicated  A1C: 8.0  Weight: 81kg BMI 29  Cr/GRF: 1.05/83  EF: 45%    Please continue lantus       units at night / morning.  Please continue lispro      units before each meal.  Please continue lispro moderate / low dose sliding scale four times daily with meals and at bedtime    Pt's fingerstick glucose goal is 100-180.    Will continue to monitor     For discharge, pt can continue  Glucometer: ?  Nutrition: Consulted  Education:   Follow up appt:     Pt can follow up at discharge with [facility] by calling  to make an appointment.   Will discuss case with Dr. Winkler   and update primary team    To Make an appointment at [address] [place] [address] for the patient, either:  1. Send a STAT task via [employer] to Valentina Chua or Adelia Portillo (office managers)   OR  2. Call supervisor's line. P: [phone] (do not give this number to patient). VM is checked periodically.  In the message, specify that this is a hospital discharge follow-up, and that the appt can be made with a NP if there is no timely MD availability.    REMINDERS FOR INSULIN/DIABETES SUPPLIES at DISCHARGE:  INSULIN:   Long actin/Basal Insulin: Examples: Toujeo, Basaglar, Tresiba, Lantus   Short acting/Bolus Insulin: Humalog, Admelog, Novolog  Please ensure that BOTH short acting and long acting insulin are prescribed in the same preparation (Ex: PEN vs VIAL/SOLUTION)     TESTING SUPPLIES:   All glucometer supplies should be written as generic to avoid issues with insurance. Use the free text option in sunrise prescription writer, and type in glucometer test strips, lancets, etc to order.    If sending patient home on insulin PEN, please send:   •	BD sammie insulin pen needles for use up to 4 times daily (total quantity 100)  •	Lancets for use up to 4 times daily (total quantity 100)  •	Glucometer Test strips for use up to 4 times daily (total quantity 100)  •	Alcohol swabs for use up to 4 times daily (total quantity 100)  •	Glucometer (If provided by hospital, still provide scripts for lancets, test strips, and swabs)  If sending patient home on insulin VIAL, please send:   •	Insulin syringes (6mm) - for use up to 4 times daily (total quantity 100)  •	Lancets for use up to 4 times daily (total quantity 100)  •	Generic Glucometer Test strips for use up to 4 times daily (total quantity 100)  •	Alcohol swabs for use up to 4 times daily (total quantity 100)  •	Generic Glucometer (If provided by hospital, still provide scripts for lancets, test strips, and swabs)  •	Do not specify brand for testing supplies (such as contour, freestyle, one touch etc) that way the pharmacy has the freedom to pick and change according to what the insurance dictates.  For patients without insurance:   •	Provide social work with appropriate scripts so they may obtain 1 week of samples  •	Provide with glucometer. Glucometers are located at various nursing stations, the nursing office, education, and endocrine fellows office.  •	Please make appointment with Tresa Castillo NP or Tessa Cleveland RN and LEILA Loera at the [address] [place] [address] endocrinology clinic. They can see patients without insurance, provide appropriate samples, and assist in getting insurance coverage.     PREFERRED PHARMACY:  [facility] (located on 1st floor next to admitting)  P: [phone]  Hours: M – F 8AM – 8PM, Sat 8AM – 4PM, Sun—closed  If not using [employer], please follow up with chosen pharmacy to ensure insulin prescribed is covered.

## 2020-07-24 NOTE — DISCHARGE NOTE PROVIDER - CARE PROVIDER_API CALL
Hawa Saleem  [address] [place] [address]  Phone: (   )    -  Fax: (   )    -  Follow Up Time: Hawa Saleem  [address] [place] [address]  Phone: (   )    -  Fax: (   )    -  Follow Up Time:     Mathew Rivera  CARDIOVASCULAR DISEASE  [address] [place] [address]  Phone: [phone]  Fax: [phone]  Established Patient  Follow Up Time: 1 week

## 2020-07-28 PROCEDURE — 82728 ASSAY OF FERRITIN: CPT

## 2020-07-28 PROCEDURE — 82553 CREATINE MB FRACTION: CPT

## 2020-07-28 PROCEDURE — 93005 ELECTROCARDIOGRAM TRACING: CPT

## 2020-07-28 PROCEDURE — 86769 SARS-COV-2 COVID-19 ANTIBODY: CPT

## 2020-07-28 PROCEDURE — C1753: CPT

## 2020-07-28 PROCEDURE — 85730 THROMBOPLASTIN TIME PARTIAL: CPT

## 2020-07-28 PROCEDURE — 83880 ASSAY OF NATRIURETIC PEPTIDE: CPT

## 2020-07-28 PROCEDURE — C1760: CPT

## 2020-07-28 PROCEDURE — 83540 ASSAY OF IRON: CPT

## 2020-07-28 PROCEDURE — 36415 COLL VENOUS BLD VENIPUNCTURE: CPT

## 2020-07-28 PROCEDURE — U0003: CPT

## 2020-07-28 PROCEDURE — 80061 LIPID PANEL: CPT

## 2020-07-28 PROCEDURE — C1725: CPT

## 2020-07-28 PROCEDURE — C1885: CPT

## 2020-07-28 PROCEDURE — 83721 ASSAY OF BLOOD LIPOPROTEIN: CPT

## 2020-07-28 PROCEDURE — C1887: CPT

## 2020-07-28 PROCEDURE — 93306 TTE W/DOPPLER COMPLETE: CPT

## 2020-07-28 PROCEDURE — 71045 X-RAY EXAM CHEST 1 VIEW: CPT

## 2020-07-28 PROCEDURE — 80053 COMPREHEN METABOLIC PANEL: CPT

## 2020-07-28 PROCEDURE — C1894: CPT

## 2020-07-28 PROCEDURE — 99285 EMERGENCY DEPT VISIT HI MDM: CPT | Mod: 25

## 2020-07-28 PROCEDURE — 96374 THER/PROPH/DIAG INJ IV PUSH: CPT

## 2020-07-28 PROCEDURE — 82550 ASSAY OF CK (CPK): CPT

## 2020-07-28 PROCEDURE — 86901 BLOOD TYPING SEROLOGIC RH(D): CPT

## 2020-07-28 PROCEDURE — 80048 BASIC METABOLIC PNL TOTAL CA: CPT

## 2020-07-28 PROCEDURE — 83735 ASSAY OF MAGNESIUM: CPT

## 2020-07-28 PROCEDURE — 83550 IRON BINDING TEST: CPT

## 2020-07-28 PROCEDURE — 86850 RBC ANTIBODY SCREEN: CPT

## 2020-07-28 PROCEDURE — 85025 COMPLETE CBC W/AUTO DIFF WBC: CPT

## 2020-07-28 PROCEDURE — 82962 GLUCOSE BLOOD TEST: CPT

## 2020-07-28 PROCEDURE — 83690 ASSAY OF LIPASE: CPT

## 2020-07-28 PROCEDURE — 84484 ASSAY OF TROPONIN QUANT: CPT

## 2020-07-28 PROCEDURE — 83036 HEMOGLOBIN GLYCOSYLATED A1C: CPT

## 2020-07-28 PROCEDURE — C1874: CPT

## 2020-07-28 PROCEDURE — 85027 COMPLETE CBC AUTOMATED: CPT

## 2020-07-28 PROCEDURE — 85610 PROTHROMBIN TIME: CPT

## 2020-07-28 PROCEDURE — C1769: CPT

## 2020-07-30 DIAGNOSIS — E11.9 TYPE 2 DIABETES MELLITUS WITHOUT COMPLICATIONS: ICD-10-CM
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
## 2020-07-30 DIAGNOSIS — Z79.82 LONG TERM (CURRENT) USE OF ASPIRIN: ICD-10-CM

## 2020-07-30 DIAGNOSIS — K64.8 OTHER HEMORRHOIDS: ICD-10-CM

## 2020-07-30 DIAGNOSIS — I25.82 CHRONIC TOTAL OCCLUSION OF CORONARY ARTERY: ICD-10-CM

## 2020-07-30 DIAGNOSIS — K21.9 GASTRO-ESOPHAGEAL REFLUX DISEASE WITHOUT ESOPHAGITIS: ICD-10-CM

## 2020-07-30 DIAGNOSIS — E78.5 HYPERLIPIDEMIA, UNSPECIFIED: ICD-10-CM

## 2020-07-30 DIAGNOSIS — Z95.5 PRESENCE OF CORONARY ANGIOPLASTY IMPLANT AND GRAFT: ICD-10-CM

## 2020-07-30 DIAGNOSIS — T82.855A STENOSIS OF CORONARY ARTERY STENT, INITIAL ENCOUNTER: ICD-10-CM

## 2020-07-30 DIAGNOSIS — F32.9 MAJOR DEPRESSIVE DISORDER, SINGLE EPISODE, UNSPECIFIED: ICD-10-CM

## 2020-07-30 DIAGNOSIS — I21.4 NON-ST ELEVATION (NSTEMI) MYOCARDIAL INFARCTION: ICD-10-CM

## 2020-07-30 DIAGNOSIS — Z87.891 PERSONAL HISTORY OF NICOTINE DEPENDENCE: ICD-10-CM
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
## 2020-07-30 DIAGNOSIS — I25.2 OLD MYOCARDIAL INFARCTION: ICD-10-CM

## 2020-07-30 DIAGNOSIS — I25.110 ATHEROSCLEROTIC HEART DISEASE OF NATIVE CORONARY ARTERY WITH UNSTABLE ANGINA PECTORIS: ICD-10-CM

## 2020-07-30 DIAGNOSIS — Z79.02 LONG TERM (CURRENT) USE OF ANTITHROMBOTICS/ANTIPLATELETS: ICD-10-CM

## 2020-07-30 DIAGNOSIS — D50.9 IRON DEFICIENCY ANEMIA, UNSPECIFIED: ICD-10-CM

## 2020-07-30 DIAGNOSIS — I10 ESSENTIAL (PRIMARY) HYPERTENSION: ICD-10-CM

## 2020-08-01 PROBLEM — E11.9 TYPE 2 DIABETES MELLITUS WITHOUT COMPLICATIONS: Chronic | Status: ACTIVE | Noted: 2020-07-27

## 2020-08-02 DIAGNOSIS — Z01.818 ENCOUNTER FOR OTHER PREPROCEDURAL EXAMINATION: ICD-10-CM

## 2020-08-02 PROBLEM — Z00.00 ENCOUNTER FOR PREVENTIVE HEALTH EXAMINATION: Status: ACTIVE | Noted: 2020-08-02

## 2020-08-03 ENCOUNTER — APPOINTMENT (OUTPATIENT)
Dept: DISASTER EMERGENCY | Facility: CLINIC | Age: 50
End: 2020-08-03

## 2020-08-04 LAB — SARS-COV-2 N GENE NPH QL NAA+PROBE: NOT DETECTED

## 2020-08-05 VITALS
WEIGHT: 186.07 LBS | HEART RATE: 67 BPM | RESPIRATION RATE: 18 BRPM | SYSTOLIC BLOOD PRESSURE: 132 MMHG | TEMPERATURE: 97 F | HEIGHT: 66 IN | DIASTOLIC BLOOD PRESSURE: 82 MMHG | OXYGEN SATURATION: 99 %

## 2020-08-05 NOTE — H&P ADULT - ASSESSMENT
50 yr old Male, former heavy smoker, PMHx of HTN, hyperlipidemia, diet controlled DM, GERD, EDUARDO (hemoglobin in 8s during recent admission and was discharged on PO Iron), CAD with MI in 2010 and multiple PCIs, recent NSTEMI s/p cardiac cath on 7/23/2020 JUAN OM1 presents for brachytherapy of OM1 stent presents today 08/06/2020 for cardiac catheterization with brachytherapy.     - EKG: NSR @ 67 BPM, no acute ST-T wave changes  - ASA: III    Mallampati: II  - H/H stable: 10.3/34.5.   Platelets/Coags stable. Cr: 0.89   - Patient denies hematochezia, hematuria, easy bruising, or signs of bleeding.   - Patient took Aspirin 81 mg and Plavix 75 mg this am on 08/06/2020, reports compliance for the past month.   - Patient started on IV NS @ 75 cc/hr x 4 hours.  - Patient last took Metformin 500 mg 08/05/2020. MISS started. Patient instructed to hold his Metformin until 08/08/2020.   - Patient is a suitable candidate for moderate sedation.     Risks & benefits of procedure and alternative therapy have been explained to the patient including but not limited to: allergic reaction, bleeding w/possible need for blood transfusion, infection, renal and vascular compromise, limb damage, arrhythmia, stroke, vessel dissection/perforation, Myocardial infarction, emergent CABG. Informed consent obtained and in chart.

## 2020-08-05 NOTE — H&P ADULT - HISTORY OF PRESENT ILLNESS
BINDU    Cardiologist: Dr Hawa Saleem  Pharmacy:        50 yr old M, former heavy smoker, PMHx of HTN, hyperlipidemia, diet controlled DM, GERD,  CAD with MI in 2010 and multiple PCIs, recent cardiac catheterization @[facility] on 5/5/2020 with JUAN mRCA, IVUS of oLAD nonobstuctive, OM1 100% ISR. EF normal. Patient at which time was recommended to return for staged PCI of OM1 if clinically indicated. Patient now returns to [facility] ED 7/22/20 c/o intermittent chest pain x 4-5 days. Patient describes it as being exertional left sided sharp pain 7/10 in severity, with radiation to left of neck/LUE. Patient reports symptoms worse with ambulating >20 feet, associated with SOB and lightheadedness. Pt was ruled in NSTEMI on 7/22 and is now s/p cardiac cath  7/23/2020: mRCA patent stent, pLCx patent stent, pLCx patent stent, mLAD/D1 patent stent, OM1 100% ISR s/p PTCA/JUAN, pLAD 95% s/p JUAN .EF 60%, EDP 16. Pt is planned to return for brachytherapy of OM1 stent on 8/6/20. Access: R Groin PC. Pt was admitted overnight to [facility] for monitoring and has been seen and examined at bedside this morning. Pt is out of bed and ambulating with no complaints. R groin access stable with no hematoma, no bleed, 2+ radial pulse. Lab values, telemetry, and vital signs reviewed and remained stable. Discharge medication regimen reviewed with patient and Dr. Encarnacion. Pt will continue aspirin 81mg and Plavix 75 daily. Pt will continue with home meds Coreg 6.25mg BID, Lipitor 80mg QD, Imdur 30mg, Ranexa 500mg BID, enalapril 5mg daily. Of note, pt has had slowly downtrending H/H: hgb 8.7 today. Pt has hx of internal hemorrhoids and states he notices intermittent hematochezia, mostly when he strains during a BM. Denies fatigue, lightheadedness, dizziness. He will be given a prescription of ferrous sulfate 325mg BID. He should follow up with his PCP, Dr. Hawa Saleem, for further workup. All discharge instructions reviewed with patient and medications e-prescribed to pharmacy. Pt is cleared for discharge per Dr. Encarnacion, and will follow up with Dr. Hawa Saleem within 1-2 weeks of discharge Cardiologist: Dr Hawa Saleem  Pharmacy: [facility]  COVID negative on 08/03/2020 as per HIE  Verify medications on arrival    50 yr old Male, former heavy smoker, PMHx of HTN, hyperlipidemia, diet controlled DM, GERD, EDUARDO (hemoglobin in 8s during recent admission and was discharged on PO Iron), CAD with MI in 2010 and multiple PCIs, recent NSTEMI s/p cardiac cath on 7/23/2020 JUAN OM1 presents for brachytherapy of OM1 stent. Prior to procedure in 7/23/2020 intermittent chest pain x 4-5 days. Patient describes it as being exertional left sided sharp pain 7/10 in severity, with radiation to left of neck/LUE. Patient reports symptoms worse with ambulating >20 feet, associated with SOB and lightheadedness. Since last procedure pt still endorsing intermittent chest pain with moderate exertion.     In light of pt's risk factors and persistent CCS Class 3 anginal symptoms pt presents for recommended cardiac catheterization with brachytherapy.     Cardiac Cath at [facility] on  7/23/2020: mRCA patent stent, pLCx patent stent, pLCx patent stent, mLAD/D1 patent stent, OM1 100% ISR s/p PTCA/JUAN, pLAD 95% s/p JUAN .EF 60%, EDP 16. Cardiologist: Dr Hawa Saleem  Pharmacy: [facility]  COVID negative on 08/03/2020 as per HIE  Verify medications on arrival    50 yr old Male, former heavy smoker, PMHx of HTN, hyperlipidemia, diet controlled DM, GERD, EDUARDO (hemoglobin in 8s during recent admission and was discharged on PO Iron), CAD with MI in 2010 and multiple PCIs, recent NSTEMI s/p cardiac cath on 7/23/2020 JUAN OM1 presents for brachytherapy of OM1 stent. Prior to procedure in 7/23/2020 intermittent chest pain x 4-5 days. Patient describes it as being exertional left sided sharp pain 7/10 in severity, with radiation to left of neck/LUE. Patient reports symptoms worse with ambulating >20 feet, associated with SOB and lightheadedness. Since last procedure pt still endorsing intermittent chest pain with moderate exertion.     In light of pt's risk factors and persistent CCS Class 3 anginal symptoms pt presents for recommended cardiac catheterization with brachytherapy.     Cardiac Cath at [facility] on  7/23/2020: mRCA patent stent, pLCx patent stent, pLCx patent stent, mLAD/D1 patent stent, OM1 100% ISR s/p PTCA/JUAN, pLAD 95% s/p JUAN .EF 60%, EDP 16.

## 2020-08-05 NOTE — H&P ADULT - NSICDXPASTMEDICALHX_GEN_ALL_CORE_FT
PAST MEDICAL HISTORY:  Atherosclerosis of coronary artery CAD (coronary artery disease)    DM (diabetes mellitus), type 2     Essential hypertension HTN (hypertension)    Gastroesophageal reflux disease GERD (gastroesophageal reflux disease)    Hemorrhoid Internal    Hyperlipidemia     MI (myocardial infarction)

## 2020-08-06 ENCOUNTER — INPATIENT (INPATIENT)
Facility: HOSPITAL | Age: 50
LOS: 0 days | Discharge: HOME | DRG: 250 | End: 2020-08-07
Attending: INTERNAL MEDICINE | Admitting: INTERNAL MEDICINE
Payer: MEDICAID

## 2020-08-06 DIAGNOSIS — Z98.61 CORONARY ANGIOPLASTY STATUS: Chronic | ICD-10-CM

## 2020-08-06 LAB
ALBUMIN SERPL ELPH-MCNC: 5 G/DL — SIGNIFICANT CHANGE UP (ref 3.3–5)
ALP SERPL-CCNC: 73 U/L — SIGNIFICANT CHANGE UP (ref 40–120)
ALT FLD-CCNC: 13 U/L — SIGNIFICANT CHANGE UP (ref 10–45)
ANION GAP SERPL CALC-SCNC: 12 MMOL/L — SIGNIFICANT CHANGE UP (ref 5–17)
APTT BLD: 36.9 SEC — HIGH (ref 27.5–35.5)
AST SERPL-CCNC: 16 U/L — SIGNIFICANT CHANGE UP (ref 10–40)
BILIRUB SERPL-MCNC: 0.6 MG/DL — SIGNIFICANT CHANGE UP (ref 0.2–1.2)
BUN SERPL-MCNC: 12 MG/DL — SIGNIFICANT CHANGE UP (ref 7–23)
CALCIUM SERPL-MCNC: 9.6 MG/DL — SIGNIFICANT CHANGE UP (ref 8.4–10.5)
CHLORIDE SERPL-SCNC: 103 MMOL/L — SIGNIFICANT CHANGE UP (ref 96–108)
CO2 SERPL-SCNC: 25 MMOL/L — SIGNIFICANT CHANGE UP (ref 22–31)
CREAT SERPL-MCNC: 0.89 MG/DL — SIGNIFICANT CHANGE UP (ref 0.5–1.3)
GLUCOSE BLDC GLUCOMTR-MCNC: 106 MG/DL — HIGH (ref 70–99)
GLUCOSE BLDC GLUCOMTR-MCNC: 108 MG/DL — HIGH (ref 70–99)
GLUCOSE BLDC GLUCOMTR-MCNC: 172 MG/DL — HIGH (ref 70–99)
GLUCOSE SERPL-MCNC: 151 MG/DL — HIGH (ref 70–99)
HCT VFR BLD CALC: 34.5 % — LOW (ref 39–50)
HGB BLD-MCNC: 10.3 G/DL — LOW (ref 13–17)
INR BLD: 1 — SIGNIFICANT CHANGE UP (ref 0.88–1.16)
MAGNESIUM SERPL-MCNC: 1.9 MG/DL — SIGNIFICANT CHANGE UP (ref 1.6–2.6)
MCHC RBC-ENTMCNC: 22.9 PG — LOW (ref 27–34)
MCHC RBC-ENTMCNC: 29.9 GM/DL — LOW (ref 32–36)
MCV RBC AUTO: 76.7 FL — LOW (ref 80–100)
NRBC # BLD: 0 /100 WBCS — SIGNIFICANT CHANGE UP (ref 0–0)
PLATELET # BLD AUTO: 216 K/UL — SIGNIFICANT CHANGE UP (ref 150–400)
POTASSIUM SERPL-MCNC: 3.9 MMOL/L — SIGNIFICANT CHANGE UP (ref 3.5–5.3)
POTASSIUM SERPL-SCNC: 3.9 MMOL/L — SIGNIFICANT CHANGE UP (ref 3.5–5.3)
PROT SERPL-MCNC: 7.5 G/DL — SIGNIFICANT CHANGE UP (ref 6–8.3)
PROTHROM AB SERPL-ACNC: 12 SEC — SIGNIFICANT CHANGE UP (ref 10.6–13.6)
RBC # BLD: 4.5 M/UL — SIGNIFICANT CHANGE UP (ref 4.2–5.8)
RBC # FLD: 19.2 % — HIGH (ref 10.3–14.5)
SODIUM SERPL-SCNC: 140 MMOL/L — SIGNIFICANT CHANGE UP (ref 135–145)
WBC # BLD: 4.31 K/UL — SIGNIFICANT CHANGE UP (ref 3.8–10.5)
WBC # FLD AUTO: 4.31 K/UL — SIGNIFICANT CHANGE UP (ref 3.8–10.5)

## 2020-08-06 PROCEDURE — 77290 THER RAD SIMULAJ FIELD CPLX: CPT | Mod: 26

## 2020-08-06 PROCEDURE — 77770 HDR RDNCL NTRSTL/ICAV BRCHTX: CPT | Mod: 26

## 2020-08-06 PROCEDURE — 77316 BRACHYTX ISODOSE PLAN SIMPLE: CPT | Mod: 26

## 2020-08-06 PROCEDURE — 99221 1ST HOSP IP/OBS SF/LOW 40: CPT

## 2020-08-06 PROCEDURE — 77263 THER RADIOLOGY TX PLNG CPLX: CPT

## 2020-08-06 PROCEDURE — 77470 SPECIAL RADIATION TREATMENT: CPT | Mod: 26

## 2020-08-06 PROCEDURE — 93454 CORONARY ARTERY ANGIO S&I: CPT | Mod: 26,59

## 2020-08-06 PROCEDURE — 92978 ENDOLUMINL IVUS OCT C 1ST: CPT | Mod: 26,LC

## 2020-08-06 PROCEDURE — 92933 PRQ TRLML C ATHRC ST ANGIOP1: CPT | Mod: LC

## 2020-08-06 RX ORDER — CARVEDILOL PHOSPHATE 80 MG/1
6.25 CAPSULE, EXTENDED RELEASE ORAL DAILY
Refills: 0 | Status: DISCONTINUED | OUTPATIENT
Start: 2020-08-06 | End: 2020-08-07

## 2020-08-06 RX ORDER — ASPIRIN/CALCIUM CARB/MAGNESIUM 324 MG
81 TABLET ORAL DAILY
Refills: 0 | Status: DISCONTINUED | OUTPATIENT
Start: 2020-08-07 | End: 2020-08-07

## 2020-08-06 RX ORDER — ISOSORBIDE MONONITRATE 60 MG/1
30 TABLET, EXTENDED RELEASE ORAL DAILY
Refills: 0 | Status: DISCONTINUED | OUTPATIENT
Start: 2020-08-07 | End: 2020-08-07

## 2020-08-06 RX ORDER — INSULIN LISPRO 100/ML
VIAL (ML) SUBCUTANEOUS ONCE
Refills: 0 | Status: COMPLETED | OUTPATIENT
Start: 2020-08-06 | End: 2020-08-06

## 2020-08-06 RX ORDER — DEXTROSE 50 % IN WATER 50 %
25 SYRINGE (ML) INTRAVENOUS ONCE
Refills: 0 | Status: DISCONTINUED | OUTPATIENT
Start: 2020-08-06 | End: 2020-08-07

## 2020-08-06 RX ORDER — GLUCAGON INJECTION, SOLUTION 0.5 MG/.1ML
1 INJECTION, SOLUTION SUBCUTANEOUS ONCE
Refills: 0 | Status: DISCONTINUED | OUTPATIENT
Start: 2020-08-06 | End: 2020-08-07

## 2020-08-06 RX ORDER — SODIUM CHLORIDE 9 MG/ML
500 INJECTION INTRAMUSCULAR; INTRAVENOUS; SUBCUTANEOUS
Refills: 0 | Status: DISCONTINUED | OUTPATIENT
Start: 2020-08-06 | End: 2020-08-07

## 2020-08-06 RX ORDER — SODIUM CHLORIDE 9 MG/ML
1000 INJECTION, SOLUTION INTRAVENOUS
Refills: 0 | Status: DISCONTINUED | OUTPATIENT
Start: 2020-08-06 | End: 2020-08-07

## 2020-08-06 RX ORDER — RANOLAZINE 500 MG/1
500 TABLET, FILM COATED, EXTENDED RELEASE ORAL DAILY
Refills: 0 | Status: DISCONTINUED | OUTPATIENT
Start: 2020-08-07 | End: 2020-08-07

## 2020-08-06 RX ORDER — DEXTROSE 50 % IN WATER 50 %
15 SYRINGE (ML) INTRAVENOUS ONCE
Refills: 0 | Status: DISCONTINUED | OUTPATIENT
Start: 2020-08-06 | End: 2020-08-07

## 2020-08-06 RX ORDER — ACETAMINOPHEN 500 MG
650 TABLET ORAL ONCE
Refills: 0 | Status: COMPLETED | OUTPATIENT
Start: 2020-08-06 | End: 2020-08-06

## 2020-08-06 RX ORDER — DEXTROSE 50 % IN WATER 50 %
12.5 SYRINGE (ML) INTRAVENOUS ONCE
Refills: 0 | Status: DISCONTINUED | OUTPATIENT
Start: 2020-08-06 | End: 2020-08-07

## 2020-08-06 RX ORDER — ATORVASTATIN CALCIUM 80 MG/1
80 TABLET, FILM COATED ORAL AT BEDTIME
Refills: 0 | Status: DISCONTINUED | OUTPATIENT
Start: 2020-08-06 | End: 2020-08-07

## 2020-08-06 RX ORDER — FERROUS SULFATE 325(65) MG
325 TABLET ORAL DAILY
Refills: 0 | Status: DISCONTINUED | OUTPATIENT
Start: 2020-08-06 | End: 2020-08-07

## 2020-08-06 RX ORDER — PANTOPRAZOLE SODIUM 20 MG/1
1 TABLET, DELAYED RELEASE ORAL
Qty: 0 | Refills: 0 | DISCHARGE

## 2020-08-06 RX ORDER — CLOPIDOGREL BISULFATE 75 MG/1
75 TABLET, FILM COATED ORAL DAILY
Refills: 0 | Status: DISCONTINUED | OUTPATIENT
Start: 2020-08-07 | End: 2020-08-07

## 2020-08-06 RX ADMIN — Medication 650 MILLIGRAM(S): at 22:45

## 2020-08-06 RX ADMIN — Medication 325 MILLIGRAM(S): at 22:09

## 2020-08-06 RX ADMIN — Medication 650 MILLIGRAM(S): at 23:45

## 2020-08-06 RX ADMIN — CARVEDILOL PHOSPHATE 6.25 MILLIGRAM(S): 80 CAPSULE, EXTENDED RELEASE ORAL at 22:09

## 2020-08-06 RX ADMIN — SODIUM CHLORIDE 75 MILLILITER(S): 9 INJECTION INTRAMUSCULAR; INTRAVENOUS; SUBCUTANEOUS at 16:05

## 2020-08-06 RX ADMIN — Medication 2: at 22:09

## 2020-08-06 RX ADMIN — ATORVASTATIN CALCIUM 80 MILLIGRAM(S): 80 TABLET, FILM COATED ORAL at 22:09

## 2020-08-07 ENCOUNTER — TRANSCRIPTION ENCOUNTER (OUTPATIENT)
Age: 50
End: 2020-08-07

## 2020-08-07 VITALS — TEMPERATURE: 98 F

## 2020-08-07 LAB
ALBUMIN SERPL ELPH-MCNC: 4.4 G/DL — SIGNIFICANT CHANGE UP (ref 3.3–5)
ALP SERPL-CCNC: 68 U/L — SIGNIFICANT CHANGE UP (ref 40–120)
ALT FLD-CCNC: 12 U/L — SIGNIFICANT CHANGE UP (ref 10–45)
ANION GAP SERPL CALC-SCNC: 11 MMOL/L — SIGNIFICANT CHANGE UP (ref 5–17)
AST SERPL-CCNC: 16 U/L — SIGNIFICANT CHANGE UP (ref 10–40)
BILIRUB SERPL-MCNC: 0.6 MG/DL — SIGNIFICANT CHANGE UP (ref 0.2–1.2)
BUN SERPL-MCNC: 11 MG/DL — SIGNIFICANT CHANGE UP (ref 7–23)
CALCIUM SERPL-MCNC: 8.7 MG/DL — SIGNIFICANT CHANGE UP (ref 8.4–10.5)
CHLORIDE SERPL-SCNC: 106 MMOL/L — SIGNIFICANT CHANGE UP (ref 96–108)
CO2 SERPL-SCNC: 25 MMOL/L — SIGNIFICANT CHANGE UP (ref 22–31)
CREAT SERPL-MCNC: 0.99 MG/DL — SIGNIFICANT CHANGE UP (ref 0.5–1.3)
GLUCOSE BLDC GLUCOMTR-MCNC: 130 MG/DL — HIGH (ref 70–99)
GLUCOSE BLDC GLUCOMTR-MCNC: 172 MG/DL — HIGH (ref 70–99)
GLUCOSE SERPL-MCNC: 118 MG/DL — HIGH (ref 70–99)
HCT VFR BLD CALC: 33.2 % — LOW (ref 39–50)
HGB BLD-MCNC: 9.9 G/DL — LOW (ref 13–17)
MAGNESIUM SERPL-MCNC: 1.9 MG/DL — SIGNIFICANT CHANGE UP (ref 1.6–2.6)
MCHC RBC-ENTMCNC: 23.3 PG — LOW (ref 27–34)
MCHC RBC-ENTMCNC: 29.8 GM/DL — LOW (ref 32–36)
MCV RBC AUTO: 78.1 FL — LOW (ref 80–100)
NRBC # BLD: 0 /100 WBCS — SIGNIFICANT CHANGE UP (ref 0–0)
PLATELET # BLD AUTO: 209 K/UL — SIGNIFICANT CHANGE UP (ref 150–400)
POTASSIUM SERPL-MCNC: 4 MMOL/L — SIGNIFICANT CHANGE UP (ref 3.5–5.3)
POTASSIUM SERPL-SCNC: 4 MMOL/L — SIGNIFICANT CHANGE UP (ref 3.5–5.3)
PROT SERPL-MCNC: 7 G/DL — SIGNIFICANT CHANGE UP (ref 6–8.3)
RBC # BLD: 4.25 M/UL — SIGNIFICANT CHANGE UP (ref 4.2–5.8)
RBC # FLD: 18.9 % — HIGH (ref 10.3–14.5)
SODIUM SERPL-SCNC: 142 MMOL/L — SIGNIFICANT CHANGE UP (ref 135–145)
WBC # BLD: 6.11 K/UL — SIGNIFICANT CHANGE UP (ref 3.8–10.5)
WBC # FLD AUTO: 6.11 K/UL — SIGNIFICANT CHANGE UP (ref 3.8–10.5)

## 2020-08-07 PROCEDURE — 99238 HOSP IP/OBS DSCHRG MGMT 30/<: CPT

## 2020-08-07 RX ORDER — METFORMIN HYDROCHLORIDE 850 MG/1
1 TABLET ORAL
Qty: 0 | Refills: 0 | DISCHARGE

## 2020-08-07 RX ORDER — MAGNESIUM OXIDE 400 MG ORAL TABLET 241.3 MG
400 TABLET ORAL ONCE
Refills: 0 | Status: COMPLETED | OUTPATIENT
Start: 2020-08-07 | End: 2020-08-07

## 2020-08-07 RX ORDER — ACETAMINOPHEN 500 MG
650 TABLET ORAL ONCE
Refills: 0 | Status: COMPLETED | OUTPATIENT
Start: 2020-08-07 | End: 2020-08-07

## 2020-08-07 RX ORDER — NITROGLYCERIN 6.5 MG
1 CAPSULE, EXTENDED RELEASE ORAL
Qty: 21 | Refills: 0
Start: 2020-08-07 | End: 2020-08-13

## 2020-08-07 RX ADMIN — Medication 650 MILLIGRAM(S): at 06:24

## 2020-08-07 RX ADMIN — CLOPIDOGREL BISULFATE 75 MILLIGRAM(S): 75 TABLET, FILM COATED ORAL at 11:47

## 2020-08-07 RX ADMIN — ISOSORBIDE MONONITRATE 30 MILLIGRAM(S): 60 TABLET, EXTENDED RELEASE ORAL at 11:46

## 2020-08-07 RX ADMIN — Medication 81 MILLIGRAM(S): at 11:47

## 2020-08-07 RX ADMIN — Medication 325 MILLIGRAM(S): at 11:46

## 2020-08-07 RX ADMIN — CARVEDILOL PHOSPHATE 6.25 MILLIGRAM(S): 80 CAPSULE, EXTENDED RELEASE ORAL at 06:07

## 2020-08-07 RX ADMIN — RANOLAZINE 500 MILLIGRAM(S): 500 TABLET, FILM COATED, EXTENDED RELEASE ORAL at 11:47

## 2020-08-07 RX ADMIN — Medication 650 MILLIGRAM(S): at 07:24

## 2020-08-07 RX ADMIN — MAGNESIUM OXIDE 400 MG ORAL TABLET 400 MILLIGRAM(S): 241.3 TABLET ORAL at 11:46

## 2020-08-07 NOTE — DISCHARGE NOTE PROVIDER - NSDCCPCAREPLAN_GEN_ALL_CORE_FT
PRINCIPAL DISCHARGE DIAGNOSIS  Diagnosis: Coronary artery disease  Assessment and Plan of Treatment: You have a diagnosis of coronary artery disease and  received brachytherapy to your OM1 coronary artery. You should continue to take Aspirin 81mg daily and Plavix (Clopidogrel) 75mg daily. You MUST continue taking the daily Aspirin and Plavix to ensure your stent does not close. DO NOT STOP THESE MEDICATIONS FOR ANY REASON UNLESS OTHERWISE INDICATED BY YOUR CARDIOLOGIST BECAUSE THIS WILL PUT YOU AT RISK FOR A HEART ATTACK. You should refrain from strenuous activity and heavy lifting for 1 week. Please make a follow up appointment with your cardiologist within 1-2 weeks of your discharge. All of your prescriptions have been sent electronically to your pharmacy.  The catheter from your groin was removed and bleeding was stopped with a closure device.  After 24hours you may take off the dressing and shower. Wash the site with soap and water.  There is no need to put on another bandage.  Avoid tub baths, hot tubs or swimming for 5 days.      Call the Interventional Cardiology and Vascular Team at [phone] or [phone] if any of following occur pertaining to your vascular access site: Bleeding or hematoma formation (collection of blood under the skin), drainage or redness at the puncture site, numbness, decrease in strength, coolness or pale coloration of skin of the leg or hand.      SECONDARY DISCHARGE DIAGNOSES  Diagnosis: Hypertension  Assessment and Plan of Treatment: You have a history of being diagnosed with high blood pressure. You should continue to take your home medications as they were previously prescribed and continue to follow up with your outpatient provider for further management.    Diagnosis: Hyperlipidemia  Assessment and Plan of Treatment: You have a history of being diagnosed with high cholesterol. You should continue to take your home medications as they were previously prescribed and continue to follow up with your outpatient provider for further management.    Diagnosis: Type II diabetes mellitus  Assessment and Plan of Treatment: You have a history of being diagnosed with type II diabetes mellitus. You may continue to take your home medication of Metformin 500 mg daily on 08/09/2020. Please DO NOT restart this medication before this date because it can interact with the contrast dye used for the procedure you had done. Please also continue to follow up with your outpatient provider for further management.    Diagnosis: Iron deficiency anemia  Assessment and Plan of Treatment: You have a history of being diagnosed with iron deficiency anemia. Your hemoglobin and blood counts remained stable throughout your admission. Please continue to take your home medications as they were previously prescribed and continue to follow up with your outpatient provider.

## 2020-08-07 NOTE — DISCHARGE NOTE PROVIDER - NSDCFUADDAPPT_GEN_ALL_CORE_FT
Please call and schedule a follow up appointment with Dr. Rivera for 1-2 weeks from your discharge date.

## 2020-08-07 NOTE — DISCHARGE NOTE PROVIDER - NSDCMRMEDTOKEN_GEN_ALL_CORE_FT
aspirin 81 mg oral tablet: 1 tab(s) orally once a day  atorvastatin 80 mg oral tablet: 1 tab(s) orally once a day (at bedtime)  carvedilol 6.25 mg oral tablet: 1 tab(s) orally 2 times a day  clopidogrel 75 mg oral tablet: 1 tab(s) orally once a day  docusate potassium 100 mg oral capsule: 1 tab(s) orally 2 times a day  enalapril 5 mg oral tablet: 1 tab(s) orally once a day  ferrous sulfate 325 mg (65 mg elemental iron) oral tablet: 1 tab(s) orally 2 times a day   isosorbide mononitrate 30 mg oral tablet, extended release: 1 tab(s) orally once a day (in the morning)  metFORMIN 500 mg oral tablet, extended release: 1 tab(s) orally once a day  nitroglycerin 0.4 mg sublingual tablet: 1 tab(s) orally every 5 minutes, As Needed  for chest pain, maximum 3 doses MDD:3 tablets  ranolazine 500 mg oral tablet, extended release: 1 tab(s) orally once a day

## 2020-08-07 NOTE — DISCHARGE NOTE NURSING/CASE MANAGEMENT/SOCIAL WORK - PATIENT PORTAL LINK FT
You can access the FollowMyHealth Patient Portal offered by [facility] by registering at the following website: [email or web address]. By joining [employer]’s FollowMyHealth portal, you will also be able to view your health information using other applications (apps) compatible with our system.

## 2020-08-07 NOTE — DISCHARGE NOTE PROVIDER - HOSPITAL COURSE
51 y/o Male, former heavy smoker, w/ PMHx of HTN, HLD, diet controlled DM, GERD, EDUARDO (hemoglobin in 8s during recent admission and was discharged on PO Iron), CAD (w/ MI in 2010 and multiple PCIs, recent NSTEMI s/p cardiac cath on 7/23/2020 JUAN OM1) who presents for brachytherapy of OM1 stent. Prior to procedure on 7/23/2020, patient experienced intermittent chest pain x 4-5 days. Patient describes it as being exertional left sided sharp pain, 7/10 in severity, with radiation to left of neck/LUE. Patient reports symptoms worse with ambulating >20 feet, associated with SOB and lightheadedness. Since last procedure patient still endorsed intermittent chest pain with moderate exertion. In light of patient's risk factors and persistent CCS Class 3 anginal symptoms, patient presented for recommended cardiac catheterization with brachytherapy. Patient is now s/p cardiac catheterization on 08/06/2020 w/ brachytherapy of OM1. Left groin access utilized.         No significant events on telemetry overnight. Repeat EKG without ischemic changes. Patient has been medically cleared for discharge as per Dr. Flynn. Patient has been given appropriate discharge instructions including medication regimen, access site management and follow up. Medications that patient needs refills on have been e-prescribed to preferred pharmacy.         VSS    Gen: NAD, A&O x3    Cards: RRR, clear S1 and S2 without murmur    Pulm: CTA B/L without w/r/r    Left groin: No hematoma or ooze, peripheral pulses 2+ B/L    Abd: soft, NT    Ext: no LE edema or ulcerations B/L

## 2020-08-07 NOTE — DISCHARGE NOTE PROVIDER - INSTRUCTIONS
Low sodium, heart healthy diet is recommended. We recommend a Mediterranean diet: Some suggestions include continue incorporating 2 or more servings per day of vegetables, fruits, and whole grains. Increase intake of fish and legumes/beans to 2 or more servings per week. Aim to increase intake of healthy fats, such as olive oil and avocados, and have a handful of nuts/seeds most days. Reduce red/processed meat consumption to 2 or fewer times per week.

## 2020-08-07 NOTE — DISCHARGE NOTE PROVIDER - CARE PROVIDER_API CALL
Mathew Rivera)  Cardiovascular Disease; Internal Medicine; Interventional Cardiology  [address] [place] [address]  Phone: [phone]  Fax: [phone]  Follow Up Time: 2 weeks

## 2020-08-09 RX ORDER — METFORMIN HYDROCHLORIDE 850 MG/1
1 TABLET ORAL
Qty: 0 | Refills: 0 | DISCHARGE
Start: 2020-08-09

## 2020-08-12 PROCEDURE — C1894: CPT

## 2020-08-12 PROCEDURE — C1769: CPT

## 2020-08-12 PROCEDURE — C1725: CPT

## 2020-08-12 PROCEDURE — 82962 GLUCOSE BLOOD TEST: CPT

## 2020-08-12 PROCEDURE — C1753: CPT

## 2020-08-12 PROCEDURE — 77290 THER RAD SIMULAJ FIELD CPLX: CPT

## 2020-08-12 PROCEDURE — C1760: CPT

## 2020-08-12 PROCEDURE — C1887: CPT

## 2020-08-12 PROCEDURE — 77770 HDR RDNCL NTRSTL/ICAV BRCHTX: CPT

## 2020-08-12 PROCEDURE — 77470 SPECIAL RADIATION TREATMENT: CPT

## 2020-08-12 PROCEDURE — 85027 COMPLETE CBC AUTOMATED: CPT

## 2020-08-12 PROCEDURE — 36415 COLL VENOUS BLD VENIPUNCTURE: CPT

## 2020-08-12 PROCEDURE — 77316 BRACHYTX ISODOSE PLAN SIMPLE: CPT

## 2020-08-12 PROCEDURE — C1885: CPT

## 2020-08-12 PROCEDURE — C1728: CPT

## 2020-08-12 PROCEDURE — 83735 ASSAY OF MAGNESIUM: CPT

## 2020-08-12 PROCEDURE — 85610 PROTHROMBIN TIME: CPT

## 2020-08-12 PROCEDURE — 77370 RADIATION PHYSICS CONSULT: CPT

## 2020-08-12 PROCEDURE — 85730 THROMBOPLASTIN TIME PARTIAL: CPT

## 2020-08-12 PROCEDURE — 80053 COMPREHEN METABOLIC PANEL: CPT

## 2020-08-12 PROCEDURE — C1717: CPT

## 2020-08-13 DIAGNOSIS — I25.10 ATHEROSCLEROTIC HEART DISEASE OF NATIVE CORONARY ARTERY WITHOUT ANGINA PECTORIS: ICD-10-CM

## 2020-08-13 DIAGNOSIS — I10 ESSENTIAL (PRIMARY) HYPERTENSION: ICD-10-CM

## 2020-08-13 DIAGNOSIS — T82.855A STENOSIS OF CORONARY ARTERY STENT, INITIAL ENCOUNTER: ICD-10-CM

## 2020-08-13 DIAGNOSIS — E78.5 HYPERLIPIDEMIA, UNSPECIFIED: ICD-10-CM

## 2020-08-13 DIAGNOSIS — E11.9 TYPE 2 DIABETES MELLITUS WITHOUT COMPLICATIONS: ICD-10-CM
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
## 2020-08-13 DIAGNOSIS — K21.9 GASTRO-ESOPHAGEAL REFLUX DISEASE WITHOUT ESOPHAGITIS: ICD-10-CM

## 2020-08-13 DIAGNOSIS — Z87.891 PERSONAL HISTORY OF NICOTINE DEPENDENCE: ICD-10-CM

## 2020-08-13 DIAGNOSIS — D50.9 IRON DEFICIENCY ANEMIA, UNSPECIFIED: ICD-10-CM

## 2020-08-13 DIAGNOSIS — Z95.5 PRESENCE OF CORONARY ANGIOPLASTY IMPLANT AND GRAFT: ICD-10-CM

## 2020-08-13 DIAGNOSIS — I25.2 OLD MYOCARDIAL INFARCTION: ICD-10-CM

## 2020-08-13 DIAGNOSIS — Z79.02 LONG TERM (CURRENT) USE OF ANTITHROMBOTICS/ANTIPLATELETS: ICD-10-CM

## 2020-08-13 DIAGNOSIS — Z79.82 LONG TERM (CURRENT) USE OF ASPIRIN: ICD-10-CM
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
## 2020-08-13 DIAGNOSIS — Z79.84 LONG TERM (CURRENT) USE OF ORAL HYPOGLYCEMIC DRUGS: ICD-10-CM

## 2020-08-13 DIAGNOSIS — I21.4 NON-ST ELEVATION (NSTEMI) MYOCARDIAL INFARCTION: ICD-10-CM

## 2021-01-06 ENCOUNTER — APPOINTMENT (OUTPATIENT)
Dept: HEART AND VASCULAR | Facility: CLINIC | Age: 51
End: 2021-01-06

## 2021-03-08 ENCOUNTER — APPOINTMENT (OUTPATIENT)
Dept: GASTROENTEROLOGY | Facility: CLINIC | Age: 51
End: 2021-03-08

## 2021-03-18 ENCOUNTER — APPOINTMENT (OUTPATIENT)
Dept: HEART AND VASCULAR | Facility: CLINIC | Age: 51
End: 2021-03-18

## 2021-03-19 ENCOUNTER — INPATIENT (INPATIENT)
Facility: HOSPITAL | Age: 51
LOS: 1 days | Discharge: HOME | DRG: 812 | End: 2021-03-21
Attending: INTERNAL MEDICINE | Admitting: INTERNAL MEDICINE
Payer: MEDICAID

## 2021-03-19 VITALS
WEIGHT: 177.91 LBS | HEART RATE: 105 BPM | SYSTOLIC BLOOD PRESSURE: 110 MMHG | HEIGHT: 66 IN | RESPIRATION RATE: 17 BRPM | OXYGEN SATURATION: 100 % | TEMPERATURE: 98 F | DIASTOLIC BLOOD PRESSURE: 67 MMHG

## 2021-03-19 DIAGNOSIS — R07.9 CHEST PAIN, UNSPECIFIED: ICD-10-CM

## 2021-03-19 DIAGNOSIS — K21.9 GASTRO-ESOPHAGEAL REFLUX DISEASE WITHOUT ESOPHAGITIS: ICD-10-CM

## 2021-03-19 DIAGNOSIS — D64.9 ANEMIA, UNSPECIFIED: ICD-10-CM

## 2021-03-19 DIAGNOSIS — Z98.61 CORONARY ANGIOPLASTY STATUS: Chronic | ICD-10-CM

## 2021-03-19 DIAGNOSIS — I25.10 ATHEROSCLEROTIC HEART DISEASE OF NATIVE CORONARY ARTERY WITHOUT ANGINA PECTORIS: ICD-10-CM

## 2021-03-19 DIAGNOSIS — E78.5 HYPERLIPIDEMIA, UNSPECIFIED: ICD-10-CM

## 2021-03-19 DIAGNOSIS — I10 ESSENTIAL (PRIMARY) HYPERTENSION: ICD-10-CM

## 2021-03-19 DIAGNOSIS — E11.9 TYPE 2 DIABETES MELLITUS WITHOUT COMPLICATIONS: ICD-10-CM

## 2021-03-19 LAB
ALBUMIN SERPL ELPH-MCNC: 3.4 G/DL — SIGNIFICANT CHANGE UP (ref 3.3–5)
ALBUMIN SERPL ELPH-MCNC: 3.9 G/DL — SIGNIFICANT CHANGE UP (ref 3.3–5)
ALP SERPL-CCNC: 52 U/L — SIGNIFICANT CHANGE UP (ref 40–120)
ALP SERPL-CCNC: 56 U/L — SIGNIFICANT CHANGE UP (ref 40–120)
ALT FLD-CCNC: 7 U/L — LOW (ref 10–45)
ALT FLD-CCNC: 7 U/L — LOW (ref 10–45)
ANION GAP SERPL CALC-SCNC: 10 MMOL/L — SIGNIFICANT CHANGE UP (ref 5–17)
ANION GAP SERPL CALC-SCNC: 11 MMOL/L — SIGNIFICANT CHANGE UP (ref 5–17)
APTT BLD: 34.9 SEC — SIGNIFICANT CHANGE UP (ref 27.5–35.5)
AST SERPL-CCNC: 13 U/L — SIGNIFICANT CHANGE UP (ref 10–40)
AST SERPL-CCNC: 18 U/L — SIGNIFICANT CHANGE UP (ref 10–40)
BASOPHILS # BLD AUTO: 0.03 K/UL — SIGNIFICANT CHANGE UP (ref 0–0.2)
BASOPHILS # BLD AUTO: 0.04 K/UL — SIGNIFICANT CHANGE UP (ref 0–0.2)
BASOPHILS NFR BLD AUTO: 0.4 % — SIGNIFICANT CHANGE UP (ref 0–2)
BASOPHILS NFR BLD AUTO: 0.5 % — SIGNIFICANT CHANGE UP (ref 0–2)
BILIRUB SERPL-MCNC: 0.2 MG/DL — SIGNIFICANT CHANGE UP (ref 0.2–1.2)
BILIRUB SERPL-MCNC: 0.5 MG/DL — SIGNIFICANT CHANGE UP (ref 0.2–1.2)
BLD GP AB SCN SERPL QL: NEGATIVE — SIGNIFICANT CHANGE UP
BLD GP AB SCN SERPL QL: NEGATIVE — SIGNIFICANT CHANGE UP
BUN SERPL-MCNC: 10 MG/DL — SIGNIFICANT CHANGE UP (ref 7–23)
BUN SERPL-MCNC: 9 MG/DL — SIGNIFICANT CHANGE UP (ref 7–23)
CALCIUM SERPL-MCNC: 8.3 MG/DL — LOW (ref 8.4–10.5)
CALCIUM SERPL-MCNC: 8.8 MG/DL — SIGNIFICANT CHANGE UP (ref 8.4–10.5)
CHLORIDE SERPL-SCNC: 98 MMOL/L — SIGNIFICANT CHANGE UP (ref 96–108)
CHLORIDE SERPL-SCNC: 99 MMOL/L — SIGNIFICANT CHANGE UP (ref 96–108)
CK MB CFR SERPL CALC: 1.3 NG/ML — SIGNIFICANT CHANGE UP (ref 0–6.7)
CK MB CFR SERPL CALC: 1.4 NG/ML — SIGNIFICANT CHANGE UP (ref 0–6.7)
CK SERPL-CCNC: 66 U/L — SIGNIFICANT CHANGE UP (ref 30–200)
CK SERPL-CCNC: 73 U/L — SIGNIFICANT CHANGE UP (ref 30–200)
CO2 SERPL-SCNC: 23 MMOL/L — SIGNIFICANT CHANGE UP (ref 22–31)
CO2 SERPL-SCNC: 25 MMOL/L — SIGNIFICANT CHANGE UP (ref 22–31)
CREAT SERPL-MCNC: 0.92 MG/DL — SIGNIFICANT CHANGE UP (ref 0.5–1.3)
CREAT SERPL-MCNC: 1.04 MG/DL — SIGNIFICANT CHANGE UP (ref 0.5–1.3)
EOSINOPHIL # BLD AUTO: 0.53 K/UL — HIGH (ref 0–0.5)
EOSINOPHIL # BLD AUTO: 0.66 K/UL — HIGH (ref 0–0.5)
EOSINOPHIL NFR BLD AUTO: 7.7 % — HIGH (ref 0–6)
EOSINOPHIL NFR BLD AUTO: 8.6 % — HIGH (ref 0–6)
GLUCOSE BLDC GLUCOMTR-MCNC: 127 MG/DL — HIGH (ref 70–99)
GLUCOSE BLDC GLUCOMTR-MCNC: 142 MG/DL — HIGH (ref 70–99)
GLUCOSE BLDC GLUCOMTR-MCNC: 171 MG/DL — HIGH (ref 70–99)
GLUCOSE SERPL-MCNC: 147 MG/DL — HIGH (ref 70–99)
GLUCOSE SERPL-MCNC: 152 MG/DL — HIGH (ref 70–99)
HCT VFR BLD CALC: 15.7 % — CRITICAL LOW (ref 39–50)
HCT VFR BLD CALC: 16.8 % — CRITICAL LOW (ref 39–50)
HCT VFR BLD CALC: 22.4 % — LOW (ref 39–50)
HCT VFR BLD CALC: 25.6 % — LOW (ref 39–50)
HGB BLD-MCNC: 5 G/DL — CRITICAL LOW (ref 13–17)
HGB BLD-MCNC: 5.2 G/DL — CRITICAL LOW (ref 13–17)
HGB BLD-MCNC: 7.3 G/DL — LOW (ref 13–17)
HGB BLD-MCNC: 8.1 G/DL — LOW (ref 13–17)
IMM GRANULOCYTES NFR BLD AUTO: 0.8 % — SIGNIFICANT CHANGE UP (ref 0–1.5)
IMM GRANULOCYTES NFR BLD AUTO: 1 % — SIGNIFICANT CHANGE UP (ref 0–1.5)
INR BLD: 0.93 — SIGNIFICANT CHANGE UP (ref 0.88–1.16)
LYMPHOCYTES # BLD AUTO: 2.34 K/UL — SIGNIFICANT CHANGE UP (ref 1–3.3)
LYMPHOCYTES # BLD AUTO: 2.5 K/UL — SIGNIFICANT CHANGE UP (ref 1–3.3)
LYMPHOCYTES # BLD AUTO: 30.4 % — SIGNIFICANT CHANGE UP (ref 13–44)
LYMPHOCYTES # BLD AUTO: 36.5 % — SIGNIFICANT CHANGE UP (ref 13–44)
MCHC RBC-ENTMCNC: 25.2 PG — LOW (ref 27–34)
MCHC RBC-ENTMCNC: 26 PG — LOW (ref 27–34)
MCHC RBC-ENTMCNC: 26 PG — LOW (ref 27–34)
MCHC RBC-ENTMCNC: 26.5 PG — LOW (ref 27–34)
MCHC RBC-ENTMCNC: 31 GM/DL — LOW (ref 32–36)
MCHC RBC-ENTMCNC: 31.6 GM/DL — LOW (ref 32–36)
MCHC RBC-ENTMCNC: 31.8 GM/DL — LOW (ref 32–36)
MCHC RBC-ENTMCNC: 32.6 GM/DL — SIGNIFICANT CHANGE UP (ref 32–36)
MCV RBC AUTO: 81.5 FL — SIGNIFICANT CHANGE UP (ref 80–100)
MCV RBC AUTO: 81.6 FL — SIGNIFICANT CHANGE UP (ref 80–100)
MCV RBC AUTO: 81.8 FL — SIGNIFICANT CHANGE UP (ref 80–100)
MCV RBC AUTO: 82.3 FL — SIGNIFICANT CHANGE UP (ref 80–100)
MONOCYTES # BLD AUTO: 0.61 K/UL — SIGNIFICANT CHANGE UP (ref 0–0.9)
MONOCYTES # BLD AUTO: 0.67 K/UL — SIGNIFICANT CHANGE UP (ref 0–0.9)
MONOCYTES NFR BLD AUTO: 8.7 % — SIGNIFICANT CHANGE UP (ref 2–14)
MONOCYTES NFR BLD AUTO: 8.9 % — SIGNIFICANT CHANGE UP (ref 2–14)
NEUTROPHILS # BLD AUTO: 3.11 K/UL — SIGNIFICANT CHANGE UP (ref 1.8–7.4)
NEUTROPHILS # BLD AUTO: 3.92 K/UL — SIGNIFICANT CHANGE UP (ref 1.8–7.4)
NEUTROPHILS NFR BLD AUTO: 45.5 % — SIGNIFICANT CHANGE UP (ref 43–77)
NEUTROPHILS NFR BLD AUTO: 51 % — SIGNIFICANT CHANGE UP (ref 43–77)
NRBC # BLD: 0 /100 WBCS — SIGNIFICANT CHANGE UP (ref 0–0)
PLATELET # BLD AUTO: 145 K/UL — LOW (ref 150–400)
PLATELET # BLD AUTO: 159 K/UL — SIGNIFICANT CHANGE UP (ref 150–400)
PLATELET # BLD AUTO: 166 K/UL — SIGNIFICANT CHANGE UP (ref 150–400)
PLATELET # BLD AUTO: 184 K/UL — SIGNIFICANT CHANGE UP (ref 150–400)
POTASSIUM SERPL-MCNC: 3.5 MMOL/L — SIGNIFICANT CHANGE UP (ref 3.5–5.3)
POTASSIUM SERPL-MCNC: 3.8 MMOL/L — SIGNIFICANT CHANGE UP (ref 3.5–5.3)
POTASSIUM SERPL-SCNC: 3.5 MMOL/L — SIGNIFICANT CHANGE UP (ref 3.5–5.3)
POTASSIUM SERPL-SCNC: 3.8 MMOL/L — SIGNIFICANT CHANGE UP (ref 3.5–5.3)
PROT SERPL-MCNC: 6 G/DL — SIGNIFICANT CHANGE UP (ref 6–8.3)
PROT SERPL-MCNC: 6.5 G/DL — SIGNIFICANT CHANGE UP (ref 6–8.3)
PROTHROM AB SERPL-ACNC: 11.2 SEC — SIGNIFICANT CHANGE UP (ref 10.6–13.6)
RBC # BLD: 1.92 M/UL — LOW (ref 4.2–5.8)
RBC # BLD: 2.06 M/UL — LOW (ref 4.2–5.8)
RBC # BLD: 2.75 M/UL — LOW (ref 4.2–5.8)
RBC # BLD: 3.11 M/UL — LOW (ref 4.2–5.8)
RBC # FLD: 15.4 % — HIGH (ref 10.3–14.5)
RBC # FLD: 15.5 % — HIGH (ref 10.3–14.5)
RBC # FLD: 15.8 % — HIGH (ref 10.3–14.5)
RBC # FLD: 16 % — HIGH (ref 10.3–14.5)
RH IG SCN BLD-IMP: POSITIVE — SIGNIFICANT CHANGE UP
RH IG SCN BLD-IMP: POSITIVE — SIGNIFICANT CHANGE UP
SARS-COV-2 RNA SPEC QL NAA+PROBE: SIGNIFICANT CHANGE UP
SODIUM SERPL-SCNC: 132 MMOL/L — LOW (ref 135–145)
SODIUM SERPL-SCNC: 134 MMOL/L — LOW (ref 135–145)
TRANSFUSION REACTION INTERP 1: NEGATIVE — SIGNIFICANT CHANGE UP
TROPONIN T SERPL-MCNC: 0.03 NG/ML — HIGH (ref 0–0.01)
TROPONIN T SERPL-MCNC: 0.04 NG/ML — CRITICAL HIGH (ref 0–0.01)
TROPONIN T SERPL-MCNC: 0.04 NG/ML — CRITICAL HIGH (ref 0–0.01)
TROPONIN T SERPL-MCNC: 0.05 NG/ML — CRITICAL HIGH (ref 0–0.01)
WBC # BLD: 5.77 K/UL — SIGNIFICANT CHANGE UP (ref 3.8–10.5)
WBC # BLD: 6.85 K/UL — SIGNIFICANT CHANGE UP (ref 3.8–10.5)
WBC # BLD: 6.9 K/UL — SIGNIFICANT CHANGE UP (ref 3.8–10.5)
WBC # BLD: 7.69 K/UL — SIGNIFICANT CHANGE UP (ref 3.8–10.5)
WBC # FLD AUTO: 5.77 K/UL — SIGNIFICANT CHANGE UP (ref 3.8–10.5)
WBC # FLD AUTO: 6.85 K/UL — SIGNIFICANT CHANGE UP (ref 3.8–10.5)
WBC # FLD AUTO: 6.9 K/UL — SIGNIFICANT CHANGE UP (ref 3.8–10.5)
WBC # FLD AUTO: 7.69 K/UL — SIGNIFICANT CHANGE UP (ref 3.8–10.5)

## 2021-03-19 PROCEDURE — 93010 ELECTROCARDIOGRAM REPORT: CPT

## 2021-03-19 PROCEDURE — 99283 EMERGENCY DEPT VISIT LOW MDM: CPT | Mod: GC

## 2021-03-19 PROCEDURE — 71045 X-RAY EXAM CHEST 1 VIEW: CPT | Mod: 26

## 2021-03-19 PROCEDURE — 99222 1ST HOSP IP/OBS MODERATE 55: CPT

## 2021-03-19 PROCEDURE — 99285 EMERGENCY DEPT VISIT HI MDM: CPT

## 2021-03-19 PROCEDURE — 71045 X-RAY EXAM CHEST 1 VIEW: CPT | Mod: 26,77

## 2021-03-19 RX ORDER — ACETAMINOPHEN 500 MG
650 TABLET ORAL ONCE
Refills: 0 | Status: COMPLETED | OUTPATIENT
Start: 2021-03-19 | End: 2021-03-19

## 2021-03-19 RX ORDER — INSULIN LISPRO 100/ML
VIAL (ML) SUBCUTANEOUS
Refills: 0 | Status: DISCONTINUED | OUTPATIENT
Start: 2021-03-19 | End: 2021-03-21

## 2021-03-19 RX ORDER — DEXTROSE 50 % IN WATER 50 %
25 SYRINGE (ML) INTRAVENOUS ONCE
Refills: 0 | Status: DISCONTINUED | OUTPATIENT
Start: 2021-03-19 | End: 2021-03-21

## 2021-03-19 RX ORDER — DEXTROSE 50 % IN WATER 50 %
15 SYRINGE (ML) INTRAVENOUS ONCE
Refills: 0 | Status: DISCONTINUED | OUTPATIENT
Start: 2021-03-19 | End: 2021-03-21

## 2021-03-19 RX ORDER — INFLUENZA VIRUS VACCINE 15; 15; 15; 15 UG/.5ML; UG/.5ML; UG/.5ML; UG/.5ML
0.5 SUSPENSION INTRAMUSCULAR ONCE
Refills: 0 | Status: COMPLETED | OUTPATIENT
Start: 2021-03-19 | End: 2021-03-19

## 2021-03-19 RX ORDER — DOCUSATE SODIUM 100 MG
1 CAPSULE ORAL
Qty: 0 | Refills: 0 | DISCHARGE

## 2021-03-19 RX ORDER — ATORVASTATIN CALCIUM 80 MG/1
80 TABLET, FILM COATED ORAL AT BEDTIME
Refills: 0 | Status: DISCONTINUED | OUTPATIENT
Start: 2021-03-19 | End: 2021-03-21

## 2021-03-19 RX ORDER — SODIUM CHLORIDE 9 MG/ML
1000 INJECTION, SOLUTION INTRAVENOUS
Refills: 0 | Status: DISCONTINUED | OUTPATIENT
Start: 2021-03-19 | End: 2021-03-21

## 2021-03-19 RX ORDER — FERROUS SULFATE 325(65) MG
325 TABLET ORAL DAILY
Refills: 0 | Status: DISCONTINUED | OUTPATIENT
Start: 2021-03-19 | End: 2021-03-21

## 2021-03-19 RX ORDER — DEXTROSE 50 % IN WATER 50 %
12.5 SYRINGE (ML) INTRAVENOUS ONCE
Refills: 0 | Status: DISCONTINUED | OUTPATIENT
Start: 2021-03-19 | End: 2021-03-21

## 2021-03-19 RX ORDER — POTASSIUM CHLORIDE 20 MEQ
40 PACKET (EA) ORAL ONCE
Refills: 0 | Status: COMPLETED | OUTPATIENT
Start: 2021-03-19 | End: 2021-03-19

## 2021-03-19 RX ORDER — PANTOPRAZOLE SODIUM 20 MG/1
40 TABLET, DELAYED RELEASE ORAL
Refills: 0 | Status: DISCONTINUED | OUTPATIENT
Start: 2021-03-19 | End: 2021-03-21

## 2021-03-19 RX ORDER — ISOSORBIDE MONONITRATE 60 MG/1
30 TABLET, EXTENDED RELEASE ORAL DAILY
Refills: 0 | Status: DISCONTINUED | OUTPATIENT
Start: 2021-03-19 | End: 2021-03-21

## 2021-03-19 RX ORDER — GLUCAGON INJECTION, SOLUTION 0.5 MG/.1ML
1 INJECTION, SOLUTION SUBCUTANEOUS ONCE
Refills: 0 | Status: DISCONTINUED | OUTPATIENT
Start: 2021-03-19 | End: 2021-03-21

## 2021-03-19 RX ADMIN — Medication 2: at 17:44

## 2021-03-19 RX ADMIN — Medication 40 MILLIEQUIVALENT(S): at 10:06

## 2021-03-19 RX ADMIN — Medication 650 MILLIGRAM(S): at 18:03

## 2021-03-19 RX ADMIN — Medication 325 MILLIGRAM(S): at 22:54

## 2021-03-19 RX ADMIN — ISOSORBIDE MONONITRATE 30 MILLIGRAM(S): 60 TABLET, EXTENDED RELEASE ORAL at 12:23

## 2021-03-19 RX ADMIN — Medication 650 MILLIGRAM(S): at 09:47

## 2021-03-19 RX ADMIN — Medication 1 TABLET(S): at 22:54

## 2021-03-19 RX ADMIN — ATORVASTATIN CALCIUM 80 MILLIGRAM(S): 80 TABLET, FILM COATED ORAL at 22:54
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
## 2021-03-19 NOTE — CHART NOTE - NSCHARTNOTEFT_GEN_A_CORE
Patient presented w/ severe anemia w/ Hgb 5.0, initially received 2 units PRBCs. Upon arrival to floor patient began 3rd unit of PRBC. Since start of initial PRBC transfusion patient began to have low grade fever . While receiving 3rd PRBC unit, patient had temperature of 101F. Stopped PRBC transfusion immediately and collected stat labs for concern for transfusion reaction, sent CBC, CMP, Type and Screen x2, Blood culture x2, and sent all PRBU bag and accessories to blood bank.     On evaluation, vitals stable and unchanged from trend, patient comfortable and asymptomatic. Denies respiratory distress signs/symptoms, abdominal discomfort, dizziness, URI symptoms, blurred vision, skin irritation/rash formation. Will follow up labs and monitor patient closely for signs of transfusion reaction.

## 2021-03-19 NOTE — CONSULT NOTE ADULT - ASSESSMENT
49 y/o Male, former heavy smoker, w/ PMHx of HTN, HLD,  DM, GERD, EDUARDO (baseline Hg 9- 10 ), CAD (w/ MI in 2010 and multiple PCIs, recent NSTEMI s/p cardiac cath on 7/23/2020 JUAN OM f/u by brachytherapy OM1 08/2020) who  is now admitted to cardiology service for further management of chest pain which is likely 2/2 demand ischemia in the setting of severe anemia.     Surgery team was consulted to r/o internal hemorrhoids as a cause of his anemia     Patient is stable, HR improved from 101 to 88, abdomen is soft, BHARTI is negative for blood or masses.  Hgb responded to transfusion from 5 to 7.2    Primary team consulted ENT pending recs    Discussed with Chief resident and attending:    - Recommend GI consult for EGD/Colonoscopy  - Trend CBC and transfuse as apporpriate  - Surgery team 4C will continue to follow     49 y/o Male, former heavy smoker, w/ PMHx of HTN, HLD,  DM, GERD, EDUARDO (baseline Hg 9- 10 ), CAD (w/ MI in 2010 and multiple PCIs, recent NSTEMI s/p cardiac cath on 7/23/2020 JUAN OM f/u by brachytherapy OM1 08/2020) who  is now admitted to cardiology service for further management of chest pain which is likely 2/2 demand ischemia in the setting of severe anemia.     Surgery team was consulted to r/o internal hemorrhoids as a cause of his anemia     Patient is stable, HR improved from 101 to 88, abdomen is soft, BHARTI is negative for blood or masses.  Hgb responded to transfusion from 5 to 7.2    Primary team consulted ENT pending recs    Discussed with Chief resident and attending:    - Recommend GI consult for EGD/Colonoscopy  - Trend CBC and transfuse as appropriate  - Surgery team 4C will continue to follow

## 2021-03-19 NOTE — H&P ADULT - PROBLEM SELECTOR PLAN 7
DVT PPX: SCD  Dispo: blood transusion for severe anemia; monitor on tele     will discuss case with Dr. Encarnacion

## 2021-03-19 NOTE — ED PROVIDER NOTE - PHYSICAL EXAMINATION
CONSTITUTIONAL: NAD   SKIN: Normal color and turgor. No rash.    HEAD: NC/AT.  EYES: Conjunctiva pale. EOMI. PERRL.    ENT: Airway patent, OP without erythema, tonsillar swelling or exudate; uvula midline without swelling. No epistaxis. No septal hematoma.   RESPIRATORY:  Breathing non-labored. No retractions or accessory muscle use.  Lungs with mild crackles at bases.  CARDIOVASCULAR:  RRR, S1S2. No M/R/G.      GI:  Abdomen soft, nontender.    MSK: Neck supple.  No lower extremity edema or calf tenderness.  No joint swelling or ROM limitation.  NEURO: Alert and oriented; CN II-XII grossly intact. Speech clear. 5/5 strength in all extremities.
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
## 2021-03-19 NOTE — ED PROVIDER NOTE - PMH
Atherosclerosis of coronary artery  CAD (coronary artery disease)  DM (diabetes mellitus), type 2    Essential hypertension  HTN (hypertension)  Gastroesophageal reflux disease  GERD (gastroesophageal reflux disease)  Hemorrhoid  Internal  Hyperlipidemia    MI (myocardial infarction)

## 2021-03-19 NOTE — ED ADULT NURSE REASSESSMENT NOTE - JUGULAR VENOUS DISTENTION
Outpatient Physical Therapy Discharge Note     Patient: Sergio Us  : 1981    Beginning/End Dates of Reporting Period:  17 to 17    Referring Provider: Dr. Xie    Therapy Diagnosis:  Bilateral knee pain    Patient did not return for follow up treatments.  The patient was instructed to continue with his HEP and to return if he did not have a complete resolution of symptoms in 1 month.  Goal status and current objective information is therefore unknown.  Discharge from PT services at this time for this episode of treatment. Please see attached documentation under this episode of care for further information including dates of service, start of care date, referring physician, Dx, treatment plan, treatments, etc.    Please contact me with any questions or concerns.    Thank you for your referral.    Minda Swanson, PT, DPT, CLT  Physical Therapist & Certified Lymphedema Therapist  [address] [address] [phone]    
Outpatient Physical Therapy Progress Note     Patient: Sergio Us  : 1981    Beginning/End Dates of Reporting Period:  17 to 3/13/2017    Referring Provider: Dr. Reno Xie    Therapy Diagnosis: Knee pain     Client Self Report: Patient reports a decrease in knee pain and ability to bike and use the leg press at the gym without pain.  Also tolerated walking 16333 steps yesterday without pain.    Objective Measurements:  Objective Measure: SLR  Details: no extensor lag bilaterally, however reports fatigue with exercise  Objective Measure: Patella Observations  Details: L patella lateral tilt    Goals:  Goal Identifier stairs   Goal Description Patient will tolerate ambulating a flight of stairs without an increase in sytmpoms   Target Date 17   Date Met  17   Progress:     Goal Identifier gait   Goal Description Patient will ambulate 1/2 mile without an increase in sytmpoms in order to shop or work out at the gym.   Target Date 17   Date Met  02/15/17   Progress:     Goal Identifier Working out   Goal Description Patient will tolerate performing the leg press machine at the gym without an increase in symptoms.   Target Date 17   Date Met  17   Progress:       Progress Toward Goals:   Progress this reporting period: Patient reports minimal knee pain, however continues to get some knee pain with single leg squatting or when his leg is fatigued.  Patient will f/u in clinic in 1 month to progress HEP and discharge.    Plan:  Continue therapy per current plan of care.    Discharge:  No  
unable to visualize

## 2021-03-19 NOTE — ED PROVIDER NOTE - ATTENDING CONTRIBUTION TO CARE
50M hx CAD, MI (stents), htn, dm, high chol, c/o chest pain. pt states he was recently in [place] when he hit his nose with fishing bari, causing nosebleed. states he drove to a hospital in [place] where had nose packed.  pt had CT that was negative. was prescribed augmentin and removed the packing yesterday.  no further bleeding. states now having left sided chest pain.  took nitro a few times today.  no SOB at rest, however MANZO. feeling weak.  gen- nad  heent- ncat, clear conj  cv -rrr  lungs -ctab  abd - soft, nt, nd  ext -wwp, no edema  neuro -aox3, steady gait, solares  no acute st/t wave changes, pt found ot have severe anemia, likely from recent epistaxis, no infiltrates on CXR.  pt consented for blood transfusion. will require admission

## 2021-03-19 NOTE — ED ADULT NURSE NOTE - OBJECTIVE STATEMENT
Pt presents with c/o "left ACW pain" started yesterday (3/18), intermittent throughout the day per pt, temporarily relieved with use of SL NTG, pt reports total of 12 tabs taken. Pain worsened at approx MN, pt took NTG PTA and has no s/s at this time. Pt reports recent "nasal injury" in which he "lost a lot of blood", on Plavix, no active bleeding at this time.

## 2021-03-19 NOTE — ED PROVIDER NOTE - NS ED ROS FT
CONSTITUTIONAL: No fever or chills  NEURO: No headache, no dizziness, no syncope; No focal weakness/tingling/numbness  EYES: No visual changes  ENT: No rhinorrhea or sore throat  PULM: HPI  CV: HPI  GI: BRBPR when wiping due to hemorrhoids  : No dysuria, hematuria, frequency  MSK: No neck pain or back pain, no joint pain  SKIN: no rash or unusual bruising

## 2021-03-19 NOTE — CONSULT NOTE ADULT - SUBJECTIVE AND OBJECTIVE BOX
**ICU CONSULT**          PAST MEDICAL/SURGICAL HISTORY  PAST MEDICAL & SURGICAL HISTORY:  DM (diabetes mellitus), type 2    MI (myocardial infarction)    Hemorrhoid  Internal    Hyperlipidemia    Essential hypertension  HTN (hypertension)    Gastroesophageal reflux disease  GERD (gastroesophageal reflux disease)    Atherosclerosis of coronary artery  CAD (coronary artery disease)    Coronary angioplasty status        REVIEW OF SYSTEMS:  CONSTITUTIONAL: No fever, weight loss, or fatigue  EYES: No eye pain, visual disturbances, or discharge  ENMT:  No difficulty hearing, tinnitus, vertigo; No sinus or throat pain  NECK: No pain or stiffness  BREASTS: No pain, masses, or nipple discharge  RESPIRATORY: No cough, wheezing, chills or hemoptysis; No shortness of breath  CARDIOVASCULAR: No chest pain, palpitations, dizziness, or leg swelling  GASTROINTESTINAL: No abdominal or epigastric pain. No nausea, vomiting, or hematemesis; No diarrhea or constipation. No melena or hematochezia.  GENITOURINARY: No dysuria, frequency, hematuria, or incontinence  NEUROLOGICAL: No headaches, memory loss, loss of strength, numbness, or tremors  SKIN: No itching, burning, rashes, or lesions   LYMPH NODES: No enlarged glands  ENDOCRINE: No heat or cold intolerance; No hair loss  MUSCULOSKELETAL: No joint pain or swelling; No muscle, back, or extremity pain  PSYCHIATRIC: No depression, anxiety, mood swings, or difficulty sleeping  HEME/LYMPH: No easy bruising, or bleeding gums  ALLERY AND IMMUNOLOGIC: No hives or eczema    T(C): 37.7 (03-19-21 @ 05:50), Max: 37.7 (03-19-21 @ 05:50)  HR: 105 (03-19-21 @ 05:50) (103 - 107)  BP: 120/67 (03-19-21 @ 05:50) (110/67 - 120/67)  RR: 18 (03-19-21 @ 04:30) (15 - 18)  SpO2: 97% (03-19-21 @ 04:30) (97% - 100%)  Wt(kg): --Vital Signs Last 24 Hrs  T(C): 37.7 (19 Mar 2021 05:50), Max: 37.7 (19 Mar 2021 05:50)  T(F): 99.9 (19 Mar 2021 05:50), Max: 99.9 (19 Mar 2021 05:50)  HR: 105 (19 Mar 2021 05:50) (103 - 107)  BP: 120/67 (19 Mar 2021 05:50) (110/67 - 120/67)  BP(mean): --  RR: 18 (19 Mar 2021 04:30) (15 - 18)  SpO2: 97% (19 Mar 2021 04:30) (97% - 100%)    PHYSICAL EXAM:  GENERAL: NAD, well-groomed, well-developed  HEAD:  Atraumatic, Normocephalic  EYES: EOMI, PERRLA, conjunctiva and sclera clear  ENMT: No tonsillar erythema, exudates, or enlargement; Moist mucous membranes, Good dentition, No lesions  NECK: Supple, No JVD, Normal thyroid  NERVOUS SYSTEM:  Alert & Oriented X3, Good concentration; Motor Strength 5/5 B/L upper and lower extremities; DTRs 2+ intact and symmetric  CHEST/LUNG: Clear to percussion bilaterally; No rales, rhonchi, wheezing, or rubs  HEART: Regular rate and rhythm; No murmurs, rubs, or gallops  ABDOMEN: Soft, Nontender, Nondistended; Bowel sounds present  EXTREMITIES:  2+ Peripheral Pulses, No clubbing, cyanosis, or edema  LYMPH: No lymphadenopathy noted  SKIN: No rashes or lesions    Consultant(s) Notes Reviewed:  [x ] YES  [ ] NO  Care Discussed with Consultants/Other Providers [ x] YES  [ ] NO    LABS:  CBC   03-19-21 @ 04:50  Hematcorit 15.7  Hemoglobin 5.0  Mean Cell Hemoglobin 26.0  Platelet Count-Automated 145  RBC Count 1.92  Red Cell Distrib Width 15.4  Wbc Count 5.77  03-19-21 @ 02:26  Hematcorit 16.8  Hemoglobin 5.2  Mean Cell Hemoglobin 25.2  Platelet Count-Automated 166  RBC Count 2.06  Red Cell Distrib Width 15.5  Wbc Count 6.85      BMP  03-19-21 @ 02:26  Anion Gap. Serum 10  Blood Urea Nitrogen,Serm 9  Calcium, Total Serum 8.3  Carbon Dioxide, Serum 25  Chloride, Serum 99  Creatinine, Serum 1.04  eGFR in  97  eGFR in Non Afican American 84  Gloucose, serum 147  Potassium, Serum 3.5  Sodium, Serum 134                  CMP  03-19-21 @ 02:26  Batsheva Aminotransferase(ALT/SGPT)7  Albumin, Serum 3.4  Alkaline Phosphatase, Serum 52  Anion Gap, Serum 10  Aspartate Aminotransferase (AST/SGOT)13  Bilirubin Total, Serum 0.2  Blood Urea Nitrogen, Serum 9  Calcium,Total Serum 8.3  Carbon Dioxide, Serum 25  Chloride, Serum 99  Creatinine, Serum 1.04  eGFR if  97  eGFR if Non African American 84  Glucose, Serum 147  Potassium, Serum 3.5  Protein Total, Serum 6.0  Sodium, Serum 134                          PT/INR  PT/INR  03-19-21 @ 02:26  INR 0.93  Prothrombin Time Comment --  Prothrobin Time, [id number]      Amylase/Lipase            RADIOLOGY & ADDITIONAL TESTS:    Imaging Personally Reviewed:  [ ] YES  [ ] NO **ICU CONSULT**    49 y/o Male, former heavy smoker, w/ PMHx of HTN, HLD, diet controlled DM, GERD, EDUARDO (hemoglobin in 8s during recent admission and was discharged on PO Iron), CAD (w/ MI in 2010 and multiple PCIs, recent NSTEMI s/p cardiac cath on 7/23/2020 [facility] OM1), and OM1 brachytherapy presents with severe anemia. Pt was fishing with his son when he caught a fish hook to the nose while on DAPT. He had continuous epistaxis     Meds:  aspirin 81 mg oral tablet: 1 tab(s) orally once a day  atorvastatin 80 mg oral tablet: 1 tab(s) orally once a day (at bedtime)  carvedilol 6.25 mg oral tablet: 1 tab(s) orally 2 times a day  clopidogrel 75 mg oral tablet: 1 tab(s) orally once a day  docusate potassium 100 mg oral capsule: 1 tab(s) orally 2 times a day  enalapril 5 mg oral tablet: 1 tab(s) orally once a day  ferrous sulfate 325 mg (65 mg elemental iron) oral tablet: 1 tab(s) orally 2 times a day   isosorbide mononitrate 30 mg oral tablet, extended release: 1 tab(s) orally once a day (in the morning)  metFORMIN 500 mg oral tablet, extended release: 1 tab(s) orally once a day  nitroglycerin 0.4 mg sublingual tablet: 1 tab(s) orally every 5 minutes, As Needed  for chest pain, maximum 3 doses MDD:3 tablets  ranolazine 500 mg oral tablet, extended release: 1 tab(s) orally once a day      PAST MEDICAL/SURGICAL HISTORY  PAST MEDICAL & SURGICAL HISTORY:  DM (diabetes mellitus), type 2    MI (myocardial infarction)    Hemorrhoid  Internal    Hyperlipidemia    Essential hypertension  HTN (hypertension)    Gastroesophageal reflux disease  GERD (gastroesophageal reflux disease)    Atherosclerosis of coronary artery  CAD (coronary artery disease)    Coronary angioplasty status        REVIEW OF SYSTEMS:  CONSTITUTIONAL: No fever, weight loss, or fatigue  EYES: No eye pain, visual disturbances, or discharge  ENMT:  No difficulty hearing, tinnitus, vertigo; No sinus or throat pain  NECK: No pain or stiffness  BREASTS: No pain, masses, or nipple discharge  RESPIRATORY: No cough, wheezing, chills or hemoptysis; No shortness of breath  CARDIOVASCULAR: No chest pain, palpitations, dizziness, or leg swelling  GASTROINTESTINAL: No abdominal or epigastric pain. No nausea, vomiting, or hematemesis; No diarrhea or constipation. No melena or hematochezia.  GENITOURINARY: No dysuria, frequency, hematuria, or incontinence  NEUROLOGICAL: No headaches, memory loss, loss of strength, numbness, or tremors  SKIN: No itching, burning, rashes, or lesions   LYMPH NODES: No enlarged glands  ENDOCRINE: No heat or cold intolerance; No hair loss  MUSCULOSKELETAL: No joint pain or swelling; No muscle, back, or extremity pain  PSYCHIATRIC: No depression, anxiety, mood swings, or difficulty sleeping  HEME/LYMPH: No easy bruising, or bleeding gums  ALLERY AND IMMUNOLOGIC: No hives or eczema    T(C): 37.7 (03-19-21 @ 05:50), Max: 37.7 (03-19-21 @ 05:50)  HR: 105 (03-19-21 @ 05:50) (103 - 107)  BP: 120/67 (03-19-21 @ 05:50) (110/67 - 120/67)  RR: 18 (03-19-21 @ 04:30) (15 - 18)  SpO2: 97% (03-19-21 @ 04:30) (97% - 100%)  Wt(kg): --Vital Signs Last 24 Hrs  T(C): 37.7 (19 Mar 2021 05:50), Max: 37.7 (19 Mar 2021 05:50)  T(F): 99.9 (19 Mar 2021 05:50), Max: 99.9 (19 Mar 2021 05:50)  HR: 105 (19 Mar 2021 05:50) (103 - 107)  BP: 120/67 (19 Mar 2021 05:50) (110/67 - 120/67)  BP(mean): --  RR: 18 (19 Mar 2021 04:30) (15 - 18)  SpO2: 97% (19 Mar 2021 04:30) (97% - 100%)    PHYSICAL EXAM:  GENERAL: NAD, well-groomed, well-developed  HEAD:  Atraumatic, Normocephalic  EYES: EOMI, PERRLA, conjunctiva and sclera clear  ENMT: No tonsillar erythema, exudates, or enlargement; Moist mucous membranes, Good dentition, No lesions  NECK: Supple, No JVD, Normal thyroid  NERVOUS SYSTEM:  Alert & Oriented X3, Good concentration; Motor Strength 5/5 B/L upper and lower extremities; DTRs 2+ intact and symmetric  CHEST/LUNG: Clear to percussion bilaterally; No rales, rhonchi, wheezing, or rubs  HEART: Regular rate and rhythm; No murmurs, rubs, or gallops  ABDOMEN: Soft, Nontender, Nondistended; Bowel sounds present  EXTREMITIES:  2+ Peripheral Pulses, No clubbing, cyanosis, or edema  LYMPH: No lymphadenopathy noted  SKIN: No rashes or lesions    Consultant(s) Notes Reviewed:  [x ] YES  [ ] NO  Care Discussed with Consultants/Other Providers [ x] YES  [ ] NO    LABS:  CBC   03-19-21 @ 04:50  Hematcorit 15.7  Hemoglobin 5.0  Mean Cell Hemoglobin 26.0  Platelet Count-Automated 145  RBC Count 1.92  Red Cell Distrib Width 15.4  Wbc Count 5.77  03-19-21 @ 02:26  Hematcorit 16.8  Hemoglobin 5.2  Mean Cell Hemoglobin 25.2  Platelet Count-Automated 166  RBC Count 2.06  Red Cell Distrib Width 15.5  Wbc Count 6.85      BMP  03-19-21 @ 02:26  Anion Gap. Serum 10  Blood Urea Nitrogen,Serm 9  Calcium, Total Serum 8.3  Carbon Dioxide, Serum 25  Chloride, Serum 99  Creatinine, Serum 1.04  eGFR in  97  eGFR in Non Afican American 84  Gloucose, serum 147  Potassium, Serum 3.5  Sodium, Serum 134                  CMP  03-19-21 @ 02:26  Batsheva Aminotransferase(ALT/SGPT)7  Albumin, Serum 3.4  Alkaline Phosphatase, Serum 52  Anion Gap, Serum 10  Aspartate Aminotransferase (AST/SGOT)13  Bilirubin Total, Serum 0.2  Blood Urea Nitrogen, Serum 9  Calcium,Total Serum 8.3  Carbon Dioxide, Serum 25  Chloride, Serum 99  Creatinine, Serum 1.04  eGFR if  97  eGFR if Non African American 84  Glucose, Serum 147  Potassium, Serum 3.5  Protein Total, Serum 6.0  Sodium, Serum 134                          PT/INR  PT/INR  03-19-21 @ 02:26  INR 0.93  Prothrombin Time Comment --  Prothrobin Time, [id number]      Amylase/Lipase            RADIOLOGY & ADDITIONAL TESTS:    Imaging Personally Reviewed:  [ ] YES  [ ] NO **ICU CONSULT**    49 y/o Male, former heavy smoker, w/ PMHx of HTN, HLD, diet controlled DM, GERD, EDUARDO (hemoglobin in 8s during recent admission and was discharged on PO Iron), CAD (w/ MI in 2010 and multiple PCIs, recent NSTEMI s/p cardiac cath on 7/23/2020 [facility]1), and OM1 brachytherapy presents with severe anemia. Pt was fishing with his son when he caught a fishing pole to the nose while on DAPT. He had continuous epistaxis until he got back to [place] where he was discovered to have a Hgb 9.8.  CT head, maxillofacial, and c-spine without acute findings.  He was prescribed Augmentin.  He removed the nasal tampon yesterday as instructed, and no longer has epistaxis.  He comes to the ED now with chest pain; he says he's been having pain for the past several days, and worse in the past 1 day.  Pain is "sharp" and goes from the left anterior shoulder to the center of chest.  Has taken apx 12 NTG tablets in past day, but says the pain is not usually relieved for about 30 minutes after taking a dose, sometimes sooner.  Pain worse with exertion, but also at rest.  He is pain-free here in the ED.  Gets associated MANZO walking to the bathroom, only 1 room away.  Feels very weak.  Has not been sick with fever or chills, cough, loss of taste or smell. No swelling of legs.  Hx of brachytherapy to OM1 [facility] last August.    Meds:  aspirin 81 mg oral tablet: 1 tab(s) orally once a day  atorvastatin 80 mg oral tablet: 1 tab(s) orally once a day (at bedtime)  carvedilol 6.25 mg oral tablet: 1 tab(s) orally 2 times a day  clopidogrel 75 mg oral tablet: 1 tab(s) orally once a day  docusate potassium 100 mg oral capsule: 1 tab(s) orally 2 times a day  enalapril 5 mg oral tablet: 1 tab(s) orally once a day  ferrous sulfate 325 mg (65 mg elemental iron) oral tablet: 1 tab(s) orally 2 times a day   isosorbide mononitrate 30 mg oral tablet, extended release: 1 tab(s) orally once a day (in the morning)  metFORMIN 500 mg oral tablet, extended release: 1 tab(s) orally once a day  nitroglycerin 0.4 mg sublingual tablet: 1 tab(s) orally every 5 minutes, As Needed  for chest pain, maximum 3 doses MDD:3 tablets  ranolazine 500 mg oral tablet, extended release: 1 tab(s) orally once a day      PAST MEDICAL/SURGICAL HISTORY  PAST MEDICAL & SURGICAL HISTORY:  DM (diabetes mellitus), type 2    MI (myocardial infarction)    Hemorrhoid  Internal    Hyperlipidemia    Essential hypertension  HTN (hypertension)    Gastroesophageal reflux disease  GERD (gastroesophageal reflux disease)    Atherosclerosis of coronary artery  CAD (coronary artery disease)    Coronary angioplasty status        REVIEW OF SYSTEMS:  CONSTITUTIONAL: No fever, weight loss, or fatigue  EYES: No eye pain, visual disturbances, or discharge  ENMT:  No difficulty hearing, tinnitus, vertigo; No sinus or throat pain  NECK: No pain or stiffness  BREASTS: No pain, masses, or nipple discharge  RESPIRATORY: No cough, wheezing, chills or hemoptysis; No shortness of breath  CARDIOVASCULAR: No chest pain, palpitations, dizziness, or leg swelling  GASTROINTESTINAL: No abdominal or epigastric pain. No nausea, vomiting, or hematemesis; No diarrhea or constipation. No melena or hematochezia.  GENITOURINARY: No dysuria, frequency, hematuria, or incontinence  NEUROLOGICAL: No headaches, memory loss, loss of strength, numbness, or tremors  SKIN: No itching, burning, rashes, or lesions   LYMPH NODES: No enlarged glands  ENDOCRINE: No heat or cold intolerance; No hair loss  MUSCULOSKELETAL: No joint pain or swelling; No muscle, back, or extremity pain  PSYCHIATRIC: No depression, anxiety, mood swings, or difficulty sleeping  HEME/LYMPH: No easy bruising, or bleeding gums  ALLERY AND IMMUNOLOGIC: No hives or eczema    T(C): 37.7 (03-19-21 @ 05:50), Max: 37.7 (03-19-21 @ 05:50)  HR: 105 (03-19-21 @ 05:50) (103 - 107)  BP: 120/67 (03-19-21 @ 05:50) (110/67 - 120/67)  RR: 18 (03-19-21 @ 04:30) (15 - 18)  SpO2: 97% (03-19-21 @ 04:30) (97% - 100%)  Wt(kg): --Vital Signs Last 24 Hrs  T(C): 37.7 (19 Mar 2021 05:50), Max: 37.7 (19 Mar 2021 05:50)  T(F): 99.9 (19 Mar 2021 05:50), Max: 99.9 (19 Mar 2021 05:50)  HR: 105 (19 Mar 2021 05:50) (103 - 107)  BP: 120/67 (19 Mar 2021 05:50) (110/67 - 120/67)  BP(mean): --  RR: 18 (19 Mar 2021 04:30) (15 - 18)  SpO2: 97% (19 Mar 2021 04:30) (97% - 100%)    PHYSICAL EXAM:  GENERAL: NAD, well-groomed, well-developed  HEAD:  Atraumatic, Normocephalic  EYES: EOMI, PERRLA, conjunctiva and sclera clear  ENMT: No tonsillar erythema, exudates, or enlargement; Moist mucous membranes, Good dentition, No lesions  NECK: Supple, No JVD, Normal thyroid  NERVOUS SYSTEM:  Alert & Oriented X3, Good concentration; Motor Strength 5/5 B/L upper and lower extremities; DTRs 2+ intact and symmetric  CHEST/LUNG: Clear to percussion bilaterally; No rales, rhonchi, wheezing, or rubs  HEART: Regular rate and rhythm; No murmurs, rubs, or gallops  ABDOMEN: Soft, Nontender, Nondistended; Bowel sounds present  EXTREMITIES:  2+ Peripheral Pulses, No clubbing, cyanosis, or edema  LYMPH: No lymphadenopathy noted  SKIN: No rashes or lesions    Consultant(s) Notes Reviewed:  [x ] YES  [ ] NO  Care Discussed with Consultants/Other Providers [ x] YES  [ ] NO    LABS:  CBC   03-19-21 @ 04:50  Hematcorit 15.7  Hemoglobin 5.0  Mean Cell Hemoglobin 26.0  Platelet Count-Automated 145  RBC Count 1.92  Red Cell Distrib Width 15.4  Wbc Count 5.77  03-19-21 @ 02:26  Hematcorit 16.8  Hemoglobin 5.2  Mean Cell Hemoglobin 25.2  Platelet Count-Automated 166  RBC Count 2.06  Red Cell Distrib Width 15.5  Wbc Count 6.85      BMP  03-19-21 @ 02:26  Anion Gap. Serum 10  Blood Urea Nitrogen,Serm 9  Calcium, Total Serum 8.3  Carbon Dioxide, Serum 25  Chloride, Serum 99  Creatinine, Serum 1.04  eGFR in  97  eGFR in Non Afican American 84  Gloucose, serum 147  Potassium, Serum 3.5  Sodium, Serum 134                  CMP  03-19-21 @ 02:26  Batsheva Aminotransferase(ALT/SGPT)7  Albumin, Serum 3.4  Alkaline Phosphatase, Serum 52  Anion Gap, Serum 10  Aspartate Aminotransferase (AST/SGOT)13  Bilirubin Total, Serum 0.2  Blood Urea Nitrogen, Serum 9  Calcium,Total Serum 8.3  Carbon Dioxide, Serum 25  Chloride, Serum 99  Creatinine, Serum 1.04  eGFR if  97  eGFR if Non African American 84  Glucose, Serum 147  Potassium, Serum 3.5  Protein Total, Serum 6.0  Sodium, Serum 134                          PT/INR  PT/INR  03-19-21 @ 02:26  INR 0.93  Prothrombin Time Comment --  Prothrobin Time, [id number]      Amylase/Lipase            RADIOLOGY & ADDITIONAL TESTS:    Imaging Personally Reviewed:  [ ] YES  [ ] NO

## 2021-03-19 NOTE — CONSULT NOTE ADULT - SUBJECTIVE AND OBJECTIVE BOX
This is a 51 y/o M with PMH of CAD (stents done at 2010 and 2020 on ASA and Plavix), HTN, DM, HLD, iron-deficiency anemia previous chain smoker, non significant PSH, was admitted under cardiology for investigating chest pain  and anemia, the patient was fishing with his son a few days ago and hit his nose with fishing bari and started to bleed out from his nose for 15 hours until he reached to hospital where his epistaxis was controlled with a Rhino Rocket that was placed Tuesday morning, then yesterday started feeling dizzy and fell in the bath room and had a chest pain, so came to the hospital early am.    On Arrival to ED he was tachy to 101, BP was Wnl, afebrile Hgb was 5 and was given 2 units of PRBCs responded to 7.2  Cardiology admitted because of chest pain, high trops, although normal EKG suspecting demand ischemia 2/2 anemia from bleeding.    Surgery ACS team was consulted to r/o hemorrhoids as a cause of his anemia, as the patient mentioned a history of internal hemorrhoids that was found on colonoscopy 5 years ago and this is causing intermitted lower GI bleeding last time was Monday "only minimal" usually blood is bright red but can become darker, and it is with BMs and sometimes he finds it on toilet paper, the quantity of bleeding varies.  He thought of treating the hemorrhoids surgically but was told by his PCP that he is high risk for bleeding because he is on ASA and Plavix.  Denied losing weight recently, denies abdominal pain, nausea vomiting, fever or chills.    No family history of colon ca          PAST MEDICAL & SURGICAL HISTORY:  DM (diabetes mellitus), type 2    MI (myocardial infarction)    Hemorrhoid  Internal    Hyperlipidemia    Essential hypertension  HTN (hypertension)    Gastroesophageal reflux disease  GERD (gastroesophageal reflux disease)    Atherosclerosis of coronary artery  CAD (coronary artery disease)    Coronary angioplasty status        MEDICATIONS  (STANDING):  dextrose 40% Gel 15 Gram(s) Oral once  dextrose 5%. 1000 milliLiter(s) (50 mL/Hr) IV Continuous <Continuous>  dextrose 5%. 1000 milliLiter(s) (100 mL/Hr) IV Continuous <Continuous>  dextrose 50% Injectable 25 Gram(s) IV Push once  dextrose 50% Injectable 12.5 Gram(s) IV Push once  dextrose 50% Injectable 25 Gram(s) IV Push once  glucagon  Injectable 1 milliGRAM(s) IntraMuscular once  insulin lispro (ADMELOG) corrective regimen sliding scale   SubCutaneous Before meals and at bedtime  isosorbide   mononitrate ER Tablet (IMDUR) 30 milliGRAM(s) Oral daily    MEDICATIONS  (PRN):      Allergies    No Known Allergies    Intolerances        SOCIAL HISTORY:    FAMILY HISTORY:  No pertinent family history in first degree relatives  cad        Vital Signs Last 24 Hrs  T(C): 36.9 (19 Mar 2021 15:18), Max: 37.9 (19 Mar 2021 07:56)  T(F): 98.4 (19 Mar 2021 15:18), Max: 100.2 (19 Mar 2021 07:56)  HR: 97 (19 Mar 2021 15:18) (92 - 114)  BP: 139/77 (19 Mar 2021 15:18) (110/67 - 139/77)  BP(mean): --  RR: 18 (19 Mar 2021 15:18) (15 - 18)  SpO2: 99% (19 Mar 2021 15:18) (94% - 100%)    I&O's Summary      Physical Exam:  General: NAD, resting comfortably  HEENT: NC/AT, EOMI, normal hearing, no oral lesions, no LAD, neck supple  Pulmonary: normal resp effort, CTA-B  Cardiovascular: NSR, no murmurs  Abdominal: soft, ND/NT, no organomegaly  BHARTI: no palpable masses or blood on gloved finger  Extremities: WWP, normal strength, no clubbing/cyanosis/edema  Neuro: A/O x 3, CNs II-XII grossly intact, normal sensation, no focal deficits  Pulses: palpable distal pulses    Lines/drains/tubes:    LABS:                        7.3    6.90  )-----------( 159      ( 19 Mar 2021 12:33 )             22.4     03-19    134<L>  |  99  |  9   ----------------------------<  147<H>  3.5   |  25  |  1.04    Ca    8.3<L>      19 Mar 2021 02:26    TPro  6.0  /  Alb  3.4  /  TBili  0.2  /  DBili  x   /  AST  13  /  ALT  7<L>  /  AlkPhos  52  03-19    PT/INR - ( 19 Mar 2021 02:26 )   PT: 11.2 sec;   INR: 0.93          PTT - ( 19 Mar 2021 02:26 )  PTT:34.9 sec    CAPILLARY BLOOD GLUCOSE      POCT Blood Glucose.: 127 mg/dL (19 Mar 2021 12:17)    LIVER FUNCTIONS - ( 19 Mar 2021 02:26 )  Alb: 3.4 g/dL / Pro: 6.0 g/dL / ALK PHOS: 52 U/L / ALT: 7 U/L / AST: 13 U/L / GGT: x             Cultures:      RADIOLOGY & ADDITIONAL STUDIES:      Assessment: 50y Male    Recommendations:  -   - discussed with Chief on call  - call /[id number] with questions

## 2021-03-19 NOTE — H&P ADULT - PROBLEM SELECTOR PLAN 2
- pt. with chest pain at home which resolved by the time he came to ED, reports at home he took 2 SLG NTG with improvement in chest pain,  -  EKG on arrival to ED  revealed EKG sinus Tachychardia 101 with improved ST depression  in V4-v6 compared from 07/2020  - Pt with chest pain again in ED 4/10 L sided radiating to L arm and back which had resolved by the time PA at [facility]; EKG done unchanged from prior EKG  - TRop T 0.03>0.04 which is likely 2/2 demand ischema in the setting of severe anemia; will continue to trend trop  - Interventionalist/op Cardiologist Dr. Rivera aware of pt admission and agrees with transfusion for now.  - Trop T peaked to 0.12 07/2020 when pt had NSTEMI - pt. with chest pain at home which resolved by the time he came to ED, reports at home he took 2 SLG NTG with improvement in chest pain,  -  EKG on arrival to ED  revealed EKG sinus Tachychardia 101 with improved ST depression  in V4-v6 compared from 07/2020  - Pt with chest pain again in ED 4/10 L sided radiating to L arm and back which had resolved by the time PA at [facility]; EKG done unchanged from prior EKG  - TRop T 0.03>0.04 which is likely 2/2 demand ischema in the setting of severe anemia; will continue to trend until trop peaks, CK/ckmb downtrending  - Interventionalist/op Cardiologist Dr. Rivera aware of pt admission and agrees with transfusion for now.  - Trop T peaked to 0.12 07/2020 when pt had NSTEMI

## 2021-03-19 NOTE — H&P ADULT - ASSESSMENT
51 y/o Male, former heavy smoker, w/ PMHx of HTN, HLD, diet controlled DM, GERD, EDUARDO (baseline Hg 9- 10 ), CAD (w/ MI in 2010 and multiple PCIs, recent NSTEMI s/p cardiac cath on 7/23/2020 [facility] OM f/u by brachytherapy OM1 08/2020) who  is now admitted to cardiology service for further management of chest pain which is likely 2/2 demand ischemia in the setting of severe anemia.    49 y/o Male, former heavy smoker, w/ PMHx of HTN, HLD,  DM, GERD, EDUARDO (baseline Hg 9- 10 ), CAD (w/ MI in 2010 and multiple PCIs, recent NSTEMI s/p cardiac cath on 7/23/2020 JUAN OM f/u by brachytherapy OM1 08/2020) who  is now admitted to cardiology service for further management of chest pain which is likely 2/2 demand ischemia in the setting of severe anemia.

## 2021-03-19 NOTE — H&P ADULT - PROBLEM SELECTOR PLAN 1
- Hg 5 in ED, currently receiving 2nd transfusion in ED which will be completed at 11 am; will recheck CBC 12 pm. Goal Hg > 8; might need more transfusion pending 12 pm Hg; might need lasix chaser  if pt receives more transfusion pending Physcial exam  - anemia likely 2/2 recent accidental epistaxis after hitting his nose on his fishing pole. pt with Hg 9 on 3/15 @ ED in [place] ; he was treated with packing successfully in his nose; removed packing yesterday and has had no further bleeding.  - also reports of having internal  hemorrhoids with intermittent BRB in stool; last seen Monday ; will consult ? GI/surgery - Hg 5 in ED, currently receiving 2nd transfusion in ED which will be completed at 11:30  am; will recheck CBC 12:30  pm. Goal Hg > 8; might need more transfusion pending 12 pm Hg; might need lasix chaser  if pt receives more transfusion pending Physcial exam  - anemia likely 2/2 recent accidental epistaxis after hitting his nose on his fishing pole. pt with Hg 9 on 3/15 @ ED in [place] ; he was treated with packing successfully in his nose; removed packing yesterday and has had no further bleeding.  - also reports of having internal  hemorrhoids with intermittent BRB in stool; last seen Monday ; surgery team consulted, f/u  - will also consult ENT team for recent epistaxis

## 2021-03-19 NOTE — H&P ADULT - PROBLEM SELECTOR PLAN 3
-CAD (w/ MI in 2010 and multiple PCIs, recent NSTEMI s/p cardiac cath on 7/23/2020 JU[facility] OM1) and then underwent brachytherapy 08/2020 with Dr. Rivera  - compliant with home DAPT  - hoding ASA/plavix for now in setting of severe anemia and getting tranfusion  -holding home Coreg 6.25mg PO q 12hrs , enalapril 5 mg daily in setting of anemia for now;   - ? imdur/ranolazine hold; will discuss with Dr. Encarnacion  -ECHO7/23/20:EF 45 %, borderline dilated aortic root 3,8 cm   - ECHO ordered. f/u

## 2021-03-19 NOTE — PROVIDER CONTACT NOTE (OTHER) - ASSESSMENT
Pt AOX3,  /71 SPO2 95% on room air RR 16. Pt denies, chills, dyspnea, dizziness, itchiness, back pain, chest pain, nausea, and SOB.

## 2021-03-19 NOTE — H&P ADULT - PROBLEM SELECTOR PLAN 4
- holding home Coreg 6.25mg PO q 12hrs , enalapril 5 mg daily in setting of anemia for now; resume as tolerated  - ? imdur/ranolazine hold, will discuss with Dr. Encarnacion

## 2021-03-19 NOTE — ED PROVIDER NOTE - OBJECTIVE STATEMENT
51 yo m with Hx CAD/MI/JUAN, DM HTN, HLD, GERD was in [place] last week when he was accidentally struck in the nose with a fishing bari, causing epistaxis.  Drove to [place] over the next day and seen at [facility] ED; took nearly 24 hours to drive from [place] to [place] and he reports having a nosebleed the entire way, which he tried to control by pinching the nostrils. In the ED there his right nostril was packed.  Hgb 9.8.  CT head, maxillofacial, and c-spine without acute findings.  He was prescribed Augmentin.  He removed the nasal tampon yesterday as instructed, and no longer has epistaxis.  He comes to the ED now with chest pain; he says he's been having pain for the past several days, and worse in the past 1 day.  Pain is "sharp" and goes from the left anterior shoulder to the center of chest.  Has taken apx 12 NTG tablets in past day, but says the pain is not usually relieved for about 30 minutes after taking a dose, sometimes sooner.  Pain worse with exertion, but also at rest.  He is pain-free here in the ED.  Gets associated MANZO walking to the bathroom, only 1 room away.  Feels very weak.  Has not been sick with fever or chills, cough, loss of taste or smell. No swelling of legs.  Hx of brachytherapy to OM1 JUAN last August.

## 2021-03-19 NOTE — ED PROVIDER NOTE - PROGRESS NOTE DETAILS
Apple:  received s/o pending cards dispo. Had been evalauted by MICU who recommended cards admission. I rediscussed w/ cards fellow, will admit for further care. Pt still w/ active cp.

## 2021-03-19 NOTE — H&P ADULT - NSHPLABSRESULTS_GEN_ALL_CORE
5.0    5.77  )-----------( 145      ( 19 Mar 2021 04:50 )             15.7   03-19    134<L>  |  99  |  9   ----------------------------<  147<H>  3.5   |  25  |  1.04    Ca    8.3<L>      19 Mar 2021 02:26    TPro  6.0  /  Alb  3.4  /  TBili  0.2  /  DBili  x   /  AST  13  /  ALT  7<L>  /  AlkPhos  52  03-19  PT/INR - ( 19 Mar 2021 02:26 )   PT: 11.2 sec;   INR: 0.93          PTT - ( 19 Mar 2021 02:26 )  PTT:34.9 sec

## 2021-03-19 NOTE — ED PROVIDER NOTE - CLINICAL SUMMARY MEDICAL DECISION MAKING FREE TEXT BOX
Chest pain likely due to demand ischemia from severe anemia.  No active bleeding.  Troponin mildly elevated but less so than previously, and ST depressions seen are improved compared to EKG from July 2020. Not having CP currently.  Will admit for PRBC transfusion.

## 2021-03-19 NOTE — H&P ADULT - HISTORY OF PRESENT ILLNESS
51 y/o Male, former heavy smoker, w/ PMHx of HTN, HLD, diet controlled DM, GERD, EDUARDO (baseline Hg 9- 10 ), CAD (w/ MI in 2010 and multiple PCIs, recent NSTEMI s/p cardiac cath on 7/23/2020 JUAN OM f/u by brachytherapy OM1 08/2020)  Who now presents to [facility] ED 3/18/21 with severe anemia and chest pain X 1 day. Pt reports that since being d/c from the hospital after his brachytherapy; he has been feeling better with no chest pain and MANZO, he reports being compliant with home DAPT. He reports last night he had severe pressure like L sided Chest pain radiating to his L arm which improved after he took 2 SLG NTG. He reports having associated SOB with Chest pain occurring when he was going to his bathroom; he came back to his bed; tried to relax with no improvement and had to take SLG NTG. At baseline; he reports feeling very tired since his epistaxis. He reports On Monday 3/15 he was fishing with his son when he caught a fishing pole to the nose by accident and he had continuous epistaxis from 4pm -7pm until he got back to [place], went to ER there; Hg at ER was 9.8; he also had CT head, maxillofacial, and c-spine without acute findings. His nose was packed with a nasal tampon, he was prescribed Augmentin X 1 week and instructed to remove the nasal tampon yesterday which he did and he hasn’t had any further epistaxis. On arrival To ED; he was chest pain free but very weak/tired appearing.   He denies any LE edema, palpitations, N/V, syncope, fever/chills, loss of taste, any hospital admission other than recent ER visit since his brachytherapy.  In ED, /67,  RR 17 T 98.3, 02 100 RA. Labs revealed Hg 5. Trop 0.03>0.04, EKG : sinus tachycardia @ 101 with improved ST depression V4-v6 compared to EKG 07/2020. CXR clear In ED, pt received 1 unit PRBC (completed) and getting the 2nd unit and pt is now admitted to cardiology service for further management of chest pain which is likely 2/2 demand ischemia in the setting of severe anemia.      49 y/o Male, former heavy smoker, w/ PMHx of HTN, HLD, DM, GERD, EDUARDO (baseline Hg 9- 10 ), CAD (w/ MI in 2010 and multiple PCIs, recent NSTEMI s/p cardiac cath on 7/23/2020 [facility] OM f/u by brachytherapy OM1 08/2020)  Who now presents to [facility] ED 3/18/21 with severe anemia and chest pain X 1 day. Pt reports that since being d/c from the hospital after his brachytherapy; he has been feeling better with no chest pain and MANZO, he reports being compliant with home DAPT. He reports last night he had severe pressure like L sided Chest pain radiating to his L arm which improved after he took 2 SLG NTG. He reports having associated SOB with Chest pain occurring when he was going to his bathroom; he came back to his bed; tried to relax with no improvement and had to take SLG NTG. At baseline; he reports feeling very tired since his epistaxis. He reports On Monday 3/15 he was fishing with his son when he caught a fishing pole to the nose by accident and he had continuous epistaxis from 4pm -7pm until he got back to [place], went to ER there; Hg at ER was 9.8; he also had CT head, maxillofacial, and c-spine without acute findings. His nose was packed with a nasal tampon, he was prescribed Augmentin X 1 week and instructed to remove the nasal tampon yesterday which he did and he hasn’t had any further epistaxis. On arrival To ED; he was chest pain free but very weak/tired appearing.   He denies any LE edema, palpitations, N/V, syncope, fever/chills, loss of taste, any hospital admission other than recent ER visit since his brachytherapy.  In ED, /67,  RR 17 T 98.3, 02 100 RA. Labs revealed Hg 5. Trop 0.03>0.04, EKG : sinus tachycardia @ 101 with improved ST depression V4-v6 compared to EKG 07/2020. CXR clear In ED, pt received 1 unit PRBC (completed) and getting the 2nd unit and pt is now admitted to cardiology service for further management of chest pain which is likely 2/2 demand ischemia in the setting of severe anemia.

## 2021-03-19 NOTE — CONSULT NOTE ADULT - ASSESSMENT
# Anemia  - anemia in setting of severe CAD. Pt initially presented 2hrs of typical chest relieved by NTD  - Hgb on arrival 5.2, chest pain resolved by time of presentation to ED  - EKG w/ nonspecific T wave flattening  - Tropon 0.3, could be demand ischemia. However considering pt had episode of severe chest pain, he should have a formal cardiology eval  Recommendations  - agree w/ transfusing. He may require lasix chaser as his EF is 45% as of July 2020  - Please obtain formal cards consult    d/w attending

## 2021-03-20 DIAGNOSIS — R50.9 FEVER, UNSPECIFIED: ICD-10-CM

## 2021-03-20 LAB
A1C WITH ESTIMATED AVERAGE GLUCOSE RESULT: 6.7 % — HIGH (ref 4–5.6)
ANION GAP SERPL CALC-SCNC: 12 MMOL/L — SIGNIFICANT CHANGE UP (ref 5–17)
BUN SERPL-MCNC: 10 MG/DL — SIGNIFICANT CHANGE UP (ref 7–23)
CALCIUM SERPL-MCNC: 8.8 MG/DL — SIGNIFICANT CHANGE UP (ref 8.4–10.5)
CHLORIDE SERPL-SCNC: 103 MMOL/L — SIGNIFICANT CHANGE UP (ref 96–108)
CHOLEST SERPL-MCNC: 135 MG/DL — SIGNIFICANT CHANGE UP
CO2 SERPL-SCNC: 24 MMOL/L — SIGNIFICANT CHANGE UP (ref 22–31)
CREAT SERPL-MCNC: 0.87 MG/DL — SIGNIFICANT CHANGE UP (ref 0.5–1.3)
ESTIMATED AVERAGE GLUCOSE: 146 MG/DL — HIGH (ref 68–114)
GLUCOSE BLDC GLUCOMTR-MCNC: 141 MG/DL — HIGH (ref 70–99)
GLUCOSE BLDC GLUCOMTR-MCNC: 158 MG/DL — HIGH (ref 70–99)
GLUCOSE BLDC GLUCOMTR-MCNC: 234 MG/DL — HIGH (ref 70–99)
GLUCOSE BLDC GLUCOMTR-MCNC: 264 MG/DL — HIGH (ref 70–99)
GLUCOSE SERPL-MCNC: 145 MG/DL — HIGH (ref 70–99)
HCT VFR BLD CALC: 27.1 % — LOW (ref 39–50)
HDLC SERPL-MCNC: 29 MG/DL — LOW
HGB BLD-MCNC: 8.6 G/DL — LOW (ref 13–17)
LIPID PNL WITH DIRECT LDL SERPL: 76 MG/DL — SIGNIFICANT CHANGE UP
MAGNESIUM SERPL-MCNC: 2.1 MG/DL — SIGNIFICANT CHANGE UP (ref 1.6–2.6)
MCHC RBC-ENTMCNC: 26 PG — LOW (ref 27–34)
MCHC RBC-ENTMCNC: 31.7 GM/DL — LOW (ref 32–36)
MCV RBC AUTO: 81.9 FL — SIGNIFICANT CHANGE UP (ref 80–100)
NON HDL CHOLESTEROL: 106 MG/DL — SIGNIFICANT CHANGE UP
NRBC # BLD: 1 /100 WBCS — HIGH (ref 0–0)
PLATELET # BLD AUTO: 182 K/UL — SIGNIFICANT CHANGE UP (ref 150–400)
POTASSIUM SERPL-MCNC: 3.8 MMOL/L — SIGNIFICANT CHANGE UP (ref 3.5–5.3)
POTASSIUM SERPL-SCNC: 3.8 MMOL/L — SIGNIFICANT CHANGE UP (ref 3.5–5.3)
RBC # BLD: 3.31 M/UL — LOW (ref 4.2–5.8)
RBC # FLD: 16.4 % — HIGH (ref 10.3–14.5)
SODIUM SERPL-SCNC: 139 MMOL/L — SIGNIFICANT CHANGE UP (ref 135–145)
TRIGL SERPL-MCNC: 152 MG/DL — HIGH
TSH SERPL-MCNC: 1.13 UIU/ML — SIGNIFICANT CHANGE UP (ref 0.35–4.94)
WBC # BLD: 4.75 K/UL — SIGNIFICANT CHANGE UP (ref 3.8–10.5)
WBC # FLD AUTO: 4.75 K/UL — SIGNIFICANT CHANGE UP (ref 3.8–10.5)

## 2021-03-20 PROCEDURE — 99232 SBSQ HOSP IP/OBS MODERATE 35: CPT

## 2021-03-20 PROCEDURE — 99222 1ST HOSP IP/OBS MODERATE 55: CPT

## 2021-03-20 RX ORDER — PETROLATUM,WHITE
1 JELLY (GRAM) TOPICAL
Refills: 0 | Status: DISCONTINUED | OUTPATIENT
Start: 2021-03-20 | End: 2021-03-21

## 2021-03-20 RX ORDER — CARVEDILOL PHOSPHATE 80 MG/1
6.25 CAPSULE, EXTENDED RELEASE ORAL EVERY 12 HOURS
Refills: 0 | Status: DISCONTINUED | OUTPATIENT
Start: 2021-03-20 | End: 2021-03-21

## 2021-03-20 RX ORDER — ASPIRIN/CALCIUM CARB/MAGNESIUM 324 MG
81 TABLET ORAL DAILY
Refills: 0 | Status: DISCONTINUED | OUTPATIENT
Start: 2021-03-20 | End: 2021-03-21

## 2021-03-20 RX ADMIN — Medication 1 APPLICATION(S): at 18:49

## 2021-03-20 RX ADMIN — ISOSORBIDE MONONITRATE 30 MILLIGRAM(S): 60 TABLET, EXTENDED RELEASE ORAL at 11:49

## 2021-03-20 RX ADMIN — Medication 6: at 22:06

## 2021-03-20 RX ADMIN — CARVEDILOL PHOSPHATE 6.25 MILLIGRAM(S): 80 CAPSULE, EXTENDED RELEASE ORAL at 22:06

## 2021-03-20 RX ADMIN — Medication 4: at 16:52

## 2021-03-20 RX ADMIN — PANTOPRAZOLE SODIUM 40 MILLIGRAM(S): 20 TABLET, DELAYED RELEASE ORAL at 07:16

## 2021-03-20 RX ADMIN — Medication 2: at 07:16

## 2021-03-20 RX ADMIN — Medication 81 MILLIGRAM(S): at 15:34

## 2021-03-20 RX ADMIN — ATORVASTATIN CALCIUM 80 MILLIGRAM(S): 80 TABLET, FILM COATED ORAL at 22:06

## 2021-03-20 RX ADMIN — Medication 1 TABLET(S): at 07:15

## 2021-03-20 RX ADMIN — Medication 325 MILLIGRAM(S): at 11:49

## 2021-03-20 RX ADMIN — Medication 1 TABLET(S): at 18:48

## 2021-03-20 NOTE — PROGRESS NOTE ADULT - PROBLEM SELECTOR PLAN 3
-CAD (w/ MI in 2010 and multiple PCIs, recent NSTEMI s/p cardiac cath on 7/23/2020 JUAN OM1) and then underwent brachytherapy 08/2020 with Dr. Rivera  - compliant with home DAPT  - hoding ASA/plavix for now in setting of severe anemia and getting tranfusion  -holding home Coreg 6.25mg PO q 12hrs , enalapril 5 mg daily in setting of anemia for now;   - ? imdur/ranolazine hold; will discuss with Dr. Encarnacion  -ECHO7/23/20:EF 45 %, borderline dilated aortic root 3,8 cm   - ECHO ordered. f/u -home regimen: coreg 6.25mg BID, enalapril 5mg daily  -continue coreg for now as SBP remains 130s-140s -Rectal temp 101 3/19 at 7pm during 3rd transfusion, possible transfusion reaction  -no localizing infectious symptoms, no leukocytosis  -BCx NGTD  -continue to monitor

## 2021-03-20 NOTE — PROGRESS NOTE ADULT - PROBLEM SELECTOR PLAN 4
- holding home Coreg 6.25mg PO q 12hrs , enalapril 5 mg daily in setting of anemia for now; resume as tolerated  - ? imdur/ranolazine hold, will discuss with Dr. Encarnacion -LDL 76  -c/w home lipitor 80 mg daily -home regimen: coreg 6.25mg BID, enalapril 5mg daily  -continue coreg for now as SBP remains 130s-140s

## 2021-03-20 NOTE — PROGRESS NOTE ADULT - SUBJECTIVE AND OBJECTIVE BOX
OVERNIGHT EVENTS:  No acute events overnight.    SUBJECTIVE / INTERVAL HPI: Patient seen and examined at bedside. Comfortable, no complaints. Denies CP, SOB, melena/hematochezia or epistaxis.     VITAL SIGNS:  Vital Signs Last 24 Hrs  T(C): 35.9 (20 Mar 2021 12:44), Max: 38.3 (19 Mar 2021 18:53)  T(F): 96.7 (20 Mar 2021 12:44), Max: 101 (19 Mar 2021 18:53)  HR: 93 (20 Mar 2021 09:05) (93 - 110)  BP: 148/93 (20 Mar 2021 09:05) (115/66 - 148/93)  BP(mean): --  RR: 16 (20 Mar 2021 09:05) (16 - 18)  SpO2: 98% (20 Mar 2021 09:05) (92% - 99%)      I&O's Summary    19 Mar 2021 07:01  -  20 Mar 2021 07:00  --------------------------------------------------------  IN: 120 mL / OUT: 0 mL / NET: 120 mL    20 Mar 2021 07:01  -  20 Mar 2021 13:57  --------------------------------------------------------  IN: 180 mL / OUT: 400 mL / NET: -220 mL      PHYSICAL EXAM:  General: sitting up in bed, NAD  HEENT: conjunctiva clear; MMM  Neck: supple, no JVD  Cardiovascular: RRR, no murmurs  Respiratory: CTA B/L  Gastrointestinal: soft, NT/ND, +BS  Extremities: WWP, no edema or cyanosis  Neurological: AAOx3, no focal deficits    MEDICATIONS:  MEDICATIONS  (STANDING):  amoxicillin  875 milliGRAM(s)/clavulanate 1 Tablet(s) Oral two times a day  aspirin enteric coated 81 milliGRAM(s) Oral daily  atorvastatin 80 milliGRAM(s) Oral at bedtime  dextrose 40% Gel 15 Gram(s) Oral once  dextrose 5%. 1000 milliLiter(s) (50 mL/Hr) IV Continuous <Continuous>  dextrose 5%. 1000 milliLiter(s) (100 mL/Hr) IV Continuous <Continuous>  dextrose 50% Injectable 25 Gram(s) IV Push once  dextrose 50% Injectable 12.5 Gram(s) IV Push once  dextrose 50% Injectable 25 Gram(s) IV Push once  ferrous    sulfate 325 milliGRAM(s) Oral daily  glucagon  Injectable 1 milliGRAM(s) IntraMuscular once  insulin lispro (ADMELOG) corrective regimen sliding scale   SubCutaneous Before meals and at bedtime  isosorbide   mononitrate ER Tablet (IMDUR) 30 milliGRAM(s) Oral daily  pantoprazole    Tablet 40 milliGRAM(s) Oral before breakfast    MEDICATIONS  (PRN):      LABS:                        8.6    4.75  )-----------( 182      ( 20 Mar 2021 07:05 )             27.1       03-20    139  |  103  |  10  ----------------------------<  145<H>  3.8   |  24  |  0.87    Ca    8.8      20 Mar 2021 07:05  Mg     2.1     03-20    TPro  6.5  /  Alb  3.9  /  TBili  0.5  /  DBili  x   /  AST  18  /  ALT  7<L>  /  AlkPhos  56  03-19      PT/INR - ( 19 Mar 2021 02:26 )   PT: 11.2 sec;   INR: 0.93          PTT - ( 19 Mar 2021 02:26 )  PTT:34.9 sec    CARDIAC MARKERS ( 19 Mar 2021 20:10 )  x     / 0.04 ng/mL / x     / x     / x      CARDIAC MARKERS ( 19 Mar 2021 12:33 )  x     / 0.05 ng/mL / 73 U/L / x     / 1.4 ng/mL  CARDIAC MARKERS ( 19 Mar 2021 04:50 )  x     / 0.04 ng/mL / 66 U/L / x     / 1.3 ng/mL  CARDIAC MARKERS ( 19 Mar 2021 02:26 )  x     / 0.03 ng/mL / 68 U/L / x     / 1.4 ng/mL      TELEMETRY: sinus rhythm/sinus tach    RADIOLOGY & ADDITIONAL TESTS: Reviewed. OVERNIGHT EVENTS:  No acute events overnight.    SUBJECTIVE / INTERVAL HPI: Patient seen and examined at bedside. Comfortable, no complaints. Denies CP, SOB, melena/hematochezia or epistaxis.     VITAL SIGNS:  Vital Signs Last 24 Hrs  T(C): 35.9 (20 Mar 2021 12:44), Max: 38.3 (19 Mar 2021 18:53)  T(F): 96.7 (20 Mar 2021 12:44), Max: 101 (19 Mar 2021 18:53)  HR: 93 (20 Mar 2021 09:05) (93 - 110)  BP: 148/93 (20 Mar 2021 09:05) (115/66 - 148/93)  RR: 16 (20 Mar 2021 09:05) (16 - 18)  SpO2: 98% (20 Mar 2021 09:05) (92% - 99%)      I&O's Summary    19 Mar 2021 07:01  -  20 Mar 2021 07:00  --------------------------------------------------------  IN: 120 mL / OUT: 0 mL / NET: 120 mL    20 Mar 2021 07:01  -  20 Mar 2021 13:57  --------------------------------------------------------  IN: 180 mL / OUT: 400 mL / NET: -220 mL      PHYSICAL EXAM:  General: sitting up in bed, NAD  HEENT: conjunctiva clear; MMM  Neck: supple, no JVD  Cardiovascular: RRR, no murmurs  Respiratory: CTA B/L  Gastrointestinal: soft, NT/ND, +BS  Extremities: WWP, no edema or cyanosis  Neurological: AAOx3, no focal deficits    MEDICATIONS:  MEDICATIONS  (STANDING):  amoxicillin  875 milliGRAM(s)/clavulanate 1 Tablet(s) Oral two times a day  aspirin enteric coated 81 milliGRAM(s) Oral daily  atorvastatin 80 milliGRAM(s) Oral at bedtime  dextrose 40% Gel 15 Gram(s) Oral once  dextrose 5%. 1000 milliLiter(s) (50 mL/Hr) IV Continuous <Continuous>  dextrose 5%. 1000 milliLiter(s) (100 mL/Hr) IV Continuous <Continuous>  dextrose 50% Injectable 25 Gram(s) IV Push once  dextrose 50% Injectable 12.5 Gram(s) IV Push once  dextrose 50% Injectable 25 Gram(s) IV Push once  ferrous    sulfate 325 milliGRAM(s) Oral daily  glucagon  Injectable 1 milliGRAM(s) IntraMuscular once  insulin lispro (ADMELOG) corrective regimen sliding scale   SubCutaneous Before meals and at bedtime  isosorbide   mononitrate ER Tablet (IMDUR) 30 milliGRAM(s) Oral daily  pantoprazole    Tablet 40 milliGRAM(s) Oral before breakfast    MEDICATIONS  (PRN):      LABS:                        8.6    4.75  )-----------( 182      ( 20 Mar 2021 07:05 )             27.1       03-20    139  |  103  |  10  ----------------------------<  145<H>  3.8   |  24  |  0.87    Ca    8.8      20 Mar 2021 07:05  Mg     2.1     03-20    TPro  6.5  /  Alb  3.9  /  TBili  0.5  /  DBili  x   /  AST  18  /  ALT  7<L>  /  AlkPhos  56  03-19      PT/INR - ( 19 Mar 2021 02:26 )   PT: 11.2 sec;   INR: 0.93          PTT - ( 19 Mar 2021 02:26 )  PTT:34.9 sec    CARDIAC MARKERS ( 19 Mar 2021 20:10 )  x     / 0.04 ng/mL / x     / x     / x      CARDIAC MARKERS ( 19 Mar 2021 12:33 )  x     / 0.05 ng/mL / 73 U/L / x     / 1.4 ng/mL  CARDIAC MARKERS ( 19 Mar 2021 04:50 )  x     / 0.04 ng/mL / 66 U/L / x     / 1.3 ng/mL  CARDIAC MARKERS ( 19 Mar 2021 02:26 )  x     / 0.03 ng/mL / 68 U/L / x     / 1.4 ng/mL      TELEMETRY: sinus rhythm/sinus tach    RADIOLOGY & ADDITIONAL TESTS: Reviewed.

## 2021-03-20 NOTE — PROGRESS NOTE ADULT - PROBLEM SELECTOR PLAN 5
- c/w home lipitor 80 mg daily  - f/u am lipid profile - A1c 6.7  - SSI  - holding home oral meds -LDL 76  -c/w home lipitor 80 mg daily

## 2021-03-20 NOTE — PROGRESS NOTE ADULT - ASSESSMENT
49 y/o Male, former heavy smoker, w/ PMHx of HTN, HLD,  DM, GERD, EDUARDO (baseline Hg 9-10), CAD (w/ MI in 2010 and multiple PCIs, recent NSTEMI s/p cardiac cath on 7/23/2020 JUAN OM then  brachytherapy OM1 08/2020) presented to [facility] ER on 3/19 c/o chest pain, SOB, and fatigue in setting of recent significant episode of epistaxis after nasal trauma, was found to have hgb 5.0 with and trop 0.05, admitted to [facility] on 3/19 for severe anemia with detectable troponin (likely demand ischemia).

## 2021-03-20 NOTE — PROGRESS NOTE ADULT - PROBLEM SELECTOR PLAN 1
-Hg 5 on admission, received 2 units PRBCs and had possible transfusion reaction during the 3rd unit; Hgb now 8.6  -anemia likely 2/2 recent accidental epistaxis (3/15-3/16) after hitting his nose on his fishing pole. At that time he went to ER in [place] where Hg 9 on 3/16 and he was treated with nasal packing, without further bleeding despite remaining on DAPT (last doses on 3/18)  -ENT recs NTD, apply vaseline to nares BID  -also reports internal hemorrhoids with intermittent BRB in stool; last seen Monday   BHARTI is negative for blood or masses  -surgery consulted- NTD, rec GI consult  -GI rec outpatient C-scope/EGD  -of note patient has GI follow up appt on 3/25 previously scheduled  -per discussion with Dr. Rivera, will continue aspirin for now and monitor. If no further bleeding and Hgb stable, will restart plavix  -Maintain active T+S (last 3/19) -Hg 5 on admission, received 2 units PRBCs and had possible transfusion reaction during the 3rd unit; Hgb now 8.6  -anemia likely 2/2 recent accidental epistaxis (3/15-3/16) after hitting his nose on his fishing pole. At that time he went to ER in [place] where Hg 9 on 3/16 and he was treated with nasal packing, without further bleeding despite remaining on DAPT (last doses on 3/18)  -ENT recs NTD, apply vaseline to nares BID  -also reports internal hemorrhoids with intermittent BRB in stool; last seen Monday   BHARTI is negative for blood or masses  -surgery consulted- NTD, rec GI consult  -GI rec outpatient C-scope/EGD  -of note patient has GI follow up appt on 3/25 previously scheduled  -per discussion with Dr. Rivera, will continue aspirin for now and monitor. If no further bleeding and Hgb stable, will restart plavix  -Maintain active T+S (last 3/19)  -continue PPI -Hg 5 on admission, received 2 units PRBCs and had possible transfusion reaction during the 3rd unit; Hgb now 8.6  -anemia likely 2/2 recent accidental epistaxis (3/15-3/16) after hitting his nose on his fishing pole. At that time he went to ER in [place] where Hg 9 on 3/16 and he was treated with nasal packing, without further bleeding despite remaining on DAPT (last doses on 3/18)  -ENT recs NTD, apply vaseline to nares BID  -also reports internal hemorrhoids with intermittent BRB in stool; last seen Monday   BHARTI is negative for blood or masses  -surgery consulted- NTD, rec GI consult  -f/u GI recs  -of note patient has GI follow up appt on 3/25 previously scheduled  -per discussion with Dr. Rivera, will continue aspirin for now and monitor. If no further bleeding and Hgb stable, will restart plavix  -Maintain active T+S (last 3/19)  -continue PPI

## 2021-03-20 NOTE — CONSULT NOTE ADULT - CONSULT REASON
Anemia/rectal bleeding
History of epistaxis, anemia
anemia in setting of CAD and mod systolic HF
r/o hemorrhoids as a cause of anemia

## 2021-03-20 NOTE — PROGRESS NOTE ADULT - PROBLEM SELECTOR PLAN 7
DVT PPX: SCD  Dispo: blood transusion for severe anemia; monitor on tele     will discuss case with Dr. Encarnacion DVT PPX: SCD    Dispo: home in 24-48 hrs if hgb stable and no further bleeding    Case d/w Dr. Cortes

## 2021-03-20 NOTE — CONSULT NOTE ADULT - SUBJECTIVE AND OBJECTIVE BOX
GASTROENTEROLOGY CONSULT NOTE  HPI:    51 y/o Male, former heavy smoker, w/ PMHx of HTN, HLD, DM, GERD, EDUARDO (baseline Hg 9- 10 ), CAD (w/ MI in 2010 and multiple PCIs, recent NSTEMI s/p cardiac cath on 7/23/2020 JUAN OM f/u by brachytherapy OM1 08/2020)  Who now presents to [facility] ED 3/18/21 with severe anemia and chest pain X 1 day. Pt reports that since being d/c from the hospital after his brachytherapy; he has been feeling better with no chest pain and MANZO, he reports being compliant with home DAPT. He reports last night he had severe pressure like L sided Chest pain radiating to his L arm which improved after he took 2 SLG NTG. He reports having associated SOB with Chest pain occurring when he was going to his bathroom; he came back to his bed; tried to relax with no improvement and had to take SLG NTG. At baseline; he reports feeling very tired since his epistaxis. He reports On Monday 3/15 he was fishing with his son when he caught a fishing pole to the nose by accident and he had continuous epistaxis from 4pm -7pm until he got back to [place], went to ER there; Hg at ER was 9.8; he also had CT head, maxillofacial, and c-spine without acute findings. His nose was packed with a nasal tampon, he was prescribed Augmentin X 1 week and instructed to remove the nasal tampon yesterday which he did and he hasn’t had any further epistaxis. On arrival To ED; he was chest pain free but very weak/tired appearing.   He denies any LE edema, palpitations, N/V, syncope, fever/chills, loss of taste, any hospital admission other than recent ER visit since his brachytherapy.  In ED, /67,  RR 17 T 98.3, 02 100 RA. Labs revealed Hg 5. Trop 0.03>0.04, EKG : sinus tachycardia @ 101 with improved ST depression V4-v6 compared to EKG 07/2020. CXR clear In ED, pt received 1 unit PRBC (completed) and getting the 2nd unit and pt is now admitted to cardiology service for further management of chest pain which is likely 2/2 demand ischemia in the setting of severe anemia.    (19 Mar 2021 09:19)    Allergies    No Known Allergies    Intolerances      Home Medications:  amoxicillin-clavulanate 875 mg-125 mg oral tablet: 1 tab(s) orally every 12 hours (19 Mar 2021 20:40)  carvedilol 6.25 mg oral tablet: 1 tab(s) orally 2 times a day (19 Mar 2021 09:21)  metFORMIN 500 mg oral tablet, extended release: 1 tab(s) orally once a day (19 Mar 2021 09:21)  pantoprazole 40 mg oral delayed release tablet: 1 tab(s) orally once a day (19 Mar 2021 09:21)  ranolazine 500 mg oral tablet, extended release: 1 tab(s) orally once a day (19 Mar 2021 09:21)    MEDICATIONS:  MEDICATIONS  (STANDING):  amoxicillin  875 milliGRAM(s)/clavulanate 1 Tablet(s) Oral two times a day  atorvastatin 80 milliGRAM(s) Oral at bedtime  dextrose 40% Gel 15 Gram(s) Oral once  dextrose 5%. 1000 milliLiter(s) (50 mL/Hr) IV Continuous <Continuous>  dextrose 5%. 1000 milliLiter(s) (100 mL/Hr) IV Continuous <Continuous>  dextrose 50% Injectable 25 Gram(s) IV Push once  dextrose 50% Injectable 12.5 Gram(s) IV Push once  dextrose 50% Injectable 25 Gram(s) IV Push once  ferrous    sulfate 325 milliGRAM(s) Oral daily  glucagon  Injectable 1 milliGRAM(s) IntraMuscular once  insulin lispro (ADMELOG) corrective regimen sliding scale   SubCutaneous Before meals and at bedtime  isosorbide   mononitrate ER Tablet (IMDUR) 30 milliGRAM(s) Oral daily  pantoprazole    Tablet 40 milliGRAM(s) Oral before breakfast    MEDICATIONS  (PRN):    PAST MEDICAL & SURGICAL HISTORY:  DM (diabetes mellitus), type 2    MI (myocardial infarction)    Hemorrhoid  Internal    Hyperlipidemia    Essential hypertension  HTN (hypertension)    Gastroesophageal reflux disease  GERD (gastroesophageal reflux disease)    Atherosclerosis of coronary artery  CAD (coronary artery disease)    Coronary angioplasty status      FAMILY HISTORY:  No pertinent family history in first degree relatives  cad      SOCIAL HISTORY:  [place]obacoo: [ ] Current, [ ] Former, [ ] Never; Pack Years:  Alcohol:  Illicit Drugs:    REVIEW OF SYSTEMS:  CONSTITUTIONAL: No weakness, fevers or chills  HEENT: No visual changes; No vertigo or throat pain   NECK: No pain or stiffness  RESPIRATORY: No cough, wheezing, hemoptysis; No shortness of breath  CARDIOVASCULAR: No chest pain or palpitations  GASTROINTESTINAL: No abdominal or epigastric pain. No nausea, vomiting, or hematemesis; No diarrhea or constipation. No melena or hematochezia.  GENITOURINARY: No dysuria, frequency or hematuria  NEUROLOGICAL: No numbness or weakness  SKIN: No itching, burning, rashes, or lesions   All other 10 review of systems is negative unless indicated above.    Vital Signs Last 24 Hrs  T(C): 35.9 (20 Mar 2021 12:44), Max: 38.3 (19 Mar 2021 18:53)  T(F): 96.7 (20 Mar 2021 12:44), Max: 101 (19 Mar 2021 18:53)  HR: 93 (20 Mar 2021 09:05) (93 - 110)  BP: 148/93 (20 Mar 2021 09:05) (115/66 - 148/93)  BP(mean): --  RR: 16 (20 Mar 2021 09:05) (16 - 18)  SpO2: 98% (20 Mar 2021 09:05) (92% - 99%)    03-19 @ 07:01  -  03-20 @ 07:00  --------------------------------------------------------  IN: 120 mL / OUT: 0 mL / NET: 120 mL    03-20 @ 07:01  -  03-20 @ 13:06  --------------------------------------------------------  IN: 180 mL / OUT: 0 mL / NET: 180 mL        PHYSICAL EXAM:    General: Well developed; well nourished; in no acute distress  Eyes: Anicteric sclerae, moist conjunctivae  HENT: Moist mucous membranes  Neck: Trachea midline, supple  Lungs: Normal respiratory effort, no intercostal retractions  Cardiovascular: RRR  Abdomen: Soft, non-tender non-distended; Normal bowel sounds; No rebound or guarding  Extremities: Normal range of motion, No clubbing, cyanosis or edema  Neurological: Alert and oriented x3  Skin: Warm and dry. No obvious rash    LABS:                        8.6    4.75  )-----------( 182      ( 20 Mar 2021 07:05 )             27.1     03-20    139  |  103  |  10  ----------------------------<  145<H>  3.8   |  24  |  0.87    Ca    8.8      20 Mar 2021 07:05  Mg     2.1     03-20    TPro  6.5  /  Alb  3.9  /  TBili  0.5  /  DBili  x   /  AST  18  /  ALT  7<L>  /  AlkPhos  56  03-19      Culture Results:   No growth at 12 hours (03-19 @ 19:56)  Culture Results:   No growth at 12 hours (03-19 @ 19:56)    PT/INR - ( 19 Mar 2021 02:26 )   PT: 11.2 sec;   INR: 0.93          PTT - ( 19 Mar 2021 02:26 )  PTT:34.9 sec    Culture - Blood (collected 19 Mar 2021 19:56)  Source: .Blood Blood  Preliminary Report (20 Mar 2021 08:01):    No growth at 12 hours    Culture - Blood (collected 19 Mar 2021 19:56)  Source: .Blood Blood  Preliminary Report (20 Mar 2021 08:01):    No growth at 12 hours      RADIOLOGY & ADDITIONAL STUDIES:     Reviewed GASTROENTEROLOGY CONSULT NOTE  HPI:    49 y/o Male, former heavy smoker, w/ PMHx of HTN, HLD, DM, GERD, EDUARDO (baseline Hg 9- 10 ), CAD (w/ MI in 2010 and multiple PCIs, recent NSTEMI s/p cardiac cath on 7/23/2020 JUAN OM f/u by brachytherapy OM1 08/2020)  Who now presents to [facility] ED 3/18/21 with severe anemia and chest pain X 1 day. Pt reports that since being d/c from the hospital after his brachytherapy; he has been feeling better with no chest pain and MANZO, he reports being compliant with home DAPT. He reports last night he had severe pressure like L sided Chest pain radiating to his L arm which improved after he took 2 SLG NTG. He reports having associated SOB with Chest pain occurring when he was going to his bathroom; he came back to his bed; tried to relax with no improvement and had to take SLG NTG. At baseline; he reports feeling very tired since his epistaxis. He reports On Monday 3/15 he was fishing with his son when he caught a fishing pole to the nose by accident and he had continuous epistaxis from 4pm -7pm until he got back to [place], went to ER there; Hg at ER was 9.8; he also had CT head, maxillofacial, and c-spine without acute findings. His nose was packed with a nasal tampon, he was prescribed Augmentin X 1 week and instructed to remove the nasal tampon yesterday which he did and he hasn’t had any further epistaxis. On arrival To ED; he was chest pain free but very weak/tired appearing.   He denies any LE edema, palpitations, N/V, syncope, fever/chills, loss of taste, any hospital admission other than recent ER visit since his brachytherapy.  In ED, /67,  RR 17 T 98.3, 02 100 RA. Labs revealed Hg 5. Trop 0.03>0.04, EKG : sinus tachycardia @ 101 with improved ST depression V4-v6 compared to EKG 07/2020. CXR clear In ED, pt received 1 unit PRBC (completed) and getting the 2nd unit and pt is now admitted to cardiology service for further management of chest pain which is likely 2/2 demand ischemia in the setting of severe anemia.    (19 Mar 2021 09:19)    Allergies    No Known Allergies    Intolerances      Home Medications:  amoxicillin-clavulanate 875 mg-125 mg oral tablet: 1 tab(s) orally every 12 hours (19 Mar 2021 20:40)  carvedilol 6.25 mg oral tablet: 1 tab(s) orally 2 times a day (19 Mar 2021 09:21)  metFORMIN 500 mg oral tablet, extended release: 1 tab(s) orally once a day (19 Mar 2021 09:21)  pantoprazole 40 mg oral delayed release tablet: 1 tab(s) orally once a day (19 Mar 2021 09:21)  ranolazine 500 mg oral tablet, extended release: 1 tab(s) orally once a day (19 Mar 2021 09:21)    MEDICATIONS:  MEDICATIONS  (STANDING):  amoxicillin  875 milliGRAM(s)/clavulanate 1 Tablet(s) Oral two times a day  atorvastatin 80 milliGRAM(s) Oral at bedtime  dextrose 40% Gel 15 Gram(s) Oral once  dextrose 5%. 1000 milliLiter(s) (50 mL/Hr) IV Continuous <Continuous>  dextrose 5%. 1000 milliLiter(s) (100 mL/Hr) IV Continuous <Continuous>  dextrose 50% Injectable 25 Gram(s) IV Push once  dextrose 50% Injectable 12.5 Gram(s) IV Push once  dextrose 50% Injectable 25 Gram(s) IV Push once  ferrous    sulfate 325 milliGRAM(s) Oral daily  glucagon  Injectable 1 milliGRAM(s) IntraMuscular once  insulin lispro (ADMELOG) corrective regimen sliding scale   SubCutaneous Before meals and at bedtime  isosorbide   mononitrate ER Tablet (IMDUR) 30 milliGRAM(s) Oral daily  pantoprazole    Tablet 40 milliGRAM(s) Oral before breakfast    MEDICATIONS  (PRN):    PAST MEDICAL & SURGICAL HISTORY:  DM (diabetes mellitus), type 2    MI (myocardial infarction)    Hemorrhoid  Internal    Hyperlipidemia    Essential hypertension  HTN (hypertension)    Gastroesophageal reflux disease  GERD (gastroesophageal reflux disease)    Atherosclerosis of coronary artery  CAD (coronary artery disease)    Coronary angioplasty status      FAMILY HISTORY:  No pertinent family history in first degree relatives  cad      SOCIAL HISTORY:  [place]ac[place]: [ ] Current, [ ] Former, [ ] Never; Pack Years:  Alcohol:  Illicit Drugs:    REVIEW OF SYSTEMS:  CONSTITUTIONAL: No fevers or chills  HEENT: No visual changes; No vertigo or throat pain   NECK: No pain or stiffness  RESPIRATORY: No cough, wheezing, hemoptysis; No shortness of breath  CARDIOVASCULAR: as above  GASTROINTESTINAL: No abdominal or epigastric pain. No nausea, vomiting, or hematemesis; No diarrhea or constipation.   GENITOURINARY: No dysuria, frequency or hematuria  NEUROLOGICAL: No numbness or weakness  SKIN: No itching, burning, rashes, or lesions   All other 10 review of systems is negative unless indicated above.    Vital Signs Last 24 Hrs  T(C): 35.9 (20 Mar 2021 12:44), Max: 38.3 (19 Mar 2021 18:53)  T(F): 96.7 (20 Mar 2021 12:44), Max: 101 (19 Mar 2021 18:53)  HR: 93 (20 Mar 2021 09:05) (93 - 110)  BP: 148/93 (20 Mar 2021 09:05) (115/66 - 148/93)  BP(mean): --  RR: 16 (20 Mar 2021 09:05) (16 - 18)  SpO2: 98% (20 Mar 2021 09:05) (92% - 99%)    03-19 @ 07:01  -  03-20 @ 07:00  --------------------------------------------------------  IN: 120 mL / OUT: 0 mL / NET: 120 mL    03-20 @ 07:01 - 03-20 @ 13:06  --------------------------------------------------------  IN: 180 mL / OUT: 0 mL / NET: 180 mL        PHYSICAL EXAM:    General: Well developed; well nourished; in no acute distress  Eyes: Anicteric sclerae, moist conjunctivae  HENT: Moist mucous membranes  Neck: Trachea midline, supple  Lungs: Normal respiratory effort, no intercostal retractions  Cardiovascular: RRR  Abdomen: Soft, non-tender non-distended; No rebound or guarding  Extremities: Normal range of motion, No clubbing, cyanosis or edema  Neurological: Alert and oriented x3  Skin: Warm and dry. No obvious rash    LABS:                        8.6    4.75  )-----------( 182      ( 20 Mar 2021 07:05 )             27.1     03-20    139  |  103  |  10  ----------------------------<  145<H>  3.8   |  24  |  0.87    Ca    8.8      20 Mar 2021 07:05  Mg     2.1     03-20    TPro  6.5  /  Alb  3.9  /  TBili  0.5  /  DBili  x   /  AST  18  /  ALT  7<L>  /  AlkPhos  56  03-19      Culture Results:   No growth at 12 hours (03-19 @ 19:56)  Culture Results:   No growth at 12 hours (03-19 @ 19:56)    PT/INR - ( 19 Mar 2021 02:26 )   PT: 11.2 sec;   INR: 0.93          PTT - ( 19 Mar 2021 02:26 )  PTT:34.9 sec    Culture - Blood (collected 19 Mar 2021 19:56)  Source: .Blood Blood  Preliminary Report (20 Mar 2021 08:01):    No growth at 12 hours    Culture - Blood (collected 19 Mar 2021 19:56)  Source: .Blood Blood  Preliminary Report (20 Mar 2021 08:01):    No growth at 12 hours      RADIOLOGY & ADDITIONAL STUDIES:     Reviewed

## 2021-03-20 NOTE — PROGRESS NOTE ADULT - PROBLEM SELECTOR PLAN 2
- pt. with chest pain at home which resolved by the time he came to ED, reports at home he took 2 SLG NTG with improvement in chest pain,  -  EKG on arrival to ED  revealed EKG sinus Tachychardia 101 with improved ST depression  in V4-v6 compared from 07/2020  - Pt with chest pain again in ED 4/10 L sided radiating to L arm and back which had resolved by the time PA at [facility]; EKG done unchanged from prior EKG  - TRop T 0.03>0.04 which is likely 2/2 demand ischema in the setting of severe anemia; will continue to trend until trop peaks, CK/ckmb downtrending  - Interventionalist/op Cardiologist Dr. Rivera aware of pt admission and agrees with transfusion for now.  - Trop T peaked to 0.12 07/2020 when pt had NSTEMI -CAD (w/ MI in 2010 and multiple PCIs, recent NSTEMI s/p cardiac cath on 7/23/2020 JUAN OM1) and then underwent brachytherapy 08/2020 with Dr. Rivera  - compliant with home DAPT  - pt. with chest pain at home, improved with 2 SL NTG, also with CP in ED that resolved spontaneously  -  EKG on arrival to ED revealed sinus Tachychardia 101 with improved ST depression  in V4-v6 compared from 07/2020  - TRop T 0.04 -->0.05 --> 0.04 which is likely 2/2 demand ischema in the setting of severe anemia  -ECHO 7/23/20: EF 45 %, borderline dilated aortic root 3,8 cm   -f/u echo  - ASA/plavix held on admission, as discussed with Dr. Rivera will restart aspirin today and monitor for bleeding. If stable, will restart plavix Sun or Mon  - continue atorvastatin 80mg, Imdur 30mg daily, Coreg 6.25mg BID

## 2021-03-20 NOTE — CONSULT NOTE ADULT - ASSESSMENT
49 y/o male, former heavy smoker, w/ PMHx of HTN, HLD, DM, GERD, EDUARDO (baseline Hg 9- 10 ), CAD (w/ MI in 2010 and multiple PCIs, recent NSTEMI s/p cardiac cath on 7/23/2020 JUAN OM f/u by brachytherapy OM1 08/2020) with chest pain X 1 day presumed to be 2/2 demand ischemia in the setting of severe anemia. On Monday 3/15 injured his nose that led to severe epistaxis needing nasal packing  Hgb responded to transfusion from 5 to 8.6. Of note patient also reports intermittent BRBPR    #     51 y/o male, former heavy smoker, w/ PMHx of HTN, HLD, DM, GERD, EDUARDO (baseline Hg 9- 10 ), CAD (w/ MI in 2010 and multiple PCIs, recent NSTEMI s/p cardiac cath on 7/23/2020 JUAN OM f/u by brachytherapy OM1 08/2020) with chest pain X 1 day presumed to be 2/2 demand ischemia in the setting of severe anemia. On Monday 3/15 injured his nose that led to severe epistaxis needing nasal packing. Of note patient also reports intermittent BRBPR related to internal hermorrhoids. Last colonoscopy 5 yrs back at [place] just showed internal hemorrhoids    # Blood loss anemia 2/2 epistaxis in the setting of DAPT  Hgb responded to transfusion from 5 to 8.6.   # BRBPR   likely related to internal hemorrhoids based on the history  No urgent need for colonoscopy   would consider endoscopic w/u for anemia however the timing of it needs to be discussed with cardiology  Is it safe to discontinue plavix x 5 days? Or does he need bridging with heparin etc?  Please provide risk eval for endoscopic interventions and opine on with-holding DAPT for the same  Monitor CBC and transfuse PRN    Recommendations discussed with primary team  Plan discussed with GI service attending    Dariusz Coronel MD  PGY-4 GI fellow  Pager: [phone]

## 2021-03-20 NOTE — PROGRESS NOTE ADULT - PROBLEM SELECTOR PLAN 6
- SSI  - holding home oral meds  - f/u am HgbA1c DVT PPX: SCD    Dispo: home in 24-48 hrs if hgb stable and no further bleeding    Case d/w Dr. Cortes - A1c 6.7  - SSI  - holding home oral meds

## 2021-03-21 ENCOUNTER — TRANSCRIPTION ENCOUNTER (OUTPATIENT)
Age: 51
End: 2021-03-21

## 2021-03-21 VITALS — TEMPERATURE: 96 F

## 2021-03-21 LAB
ANION GAP SERPL CALC-SCNC: 13 MMOL/L — SIGNIFICANT CHANGE UP (ref 5–17)
BUN SERPL-MCNC: 12 MG/DL — SIGNIFICANT CHANGE UP (ref 7–23)
CALCIUM SERPL-MCNC: 9.5 MG/DL — SIGNIFICANT CHANGE UP (ref 8.4–10.5)
CHLORIDE SERPL-SCNC: 104 MMOL/L — SIGNIFICANT CHANGE UP (ref 96–108)
CO2 SERPL-SCNC: 24 MMOL/L — SIGNIFICANT CHANGE UP (ref 22–31)
CREAT SERPL-MCNC: 0.75 MG/DL — SIGNIFICANT CHANGE UP (ref 0.5–1.3)
GLUCOSE BLDC GLUCOMTR-MCNC: 154 MG/DL — HIGH (ref 70–99)
GLUCOSE BLDC GLUCOMTR-MCNC: 158 MG/DL — HIGH (ref 70–99)
GLUCOSE SERPL-MCNC: 173 MG/DL — HIGH (ref 70–99)
HCT VFR BLD CALC: 29.1 % — LOW (ref 39–50)
HGB BLD-MCNC: 9 G/DL — LOW (ref 13–17)
MAGNESIUM SERPL-MCNC: 1.9 MG/DL — SIGNIFICANT CHANGE UP (ref 1.6–2.6)
MCHC RBC-ENTMCNC: 25.9 PG — LOW (ref 27–34)
MCHC RBC-ENTMCNC: 30.9 GM/DL — LOW (ref 32–36)
MCV RBC AUTO: 83.6 FL — SIGNIFICANT CHANGE UP (ref 80–100)
NRBC # BLD: 0 /100 WBCS — SIGNIFICANT CHANGE UP (ref 0–0)
PLATELET # BLD AUTO: 212 K/UL — SIGNIFICANT CHANGE UP (ref 150–400)
POTASSIUM SERPL-MCNC: 4.3 MMOL/L — SIGNIFICANT CHANGE UP (ref 3.5–5.3)
POTASSIUM SERPL-SCNC: 4.3 MMOL/L — SIGNIFICANT CHANGE UP (ref 3.5–5.3)
RBC # BLD: 3.48 M/UL — LOW (ref 4.2–5.8)
RBC # FLD: 16.4 % — HIGH (ref 10.3–14.5)
SODIUM SERPL-SCNC: 141 MMOL/L — SIGNIFICANT CHANGE UP (ref 135–145)
WBC # BLD: 5.46 K/UL — SIGNIFICANT CHANGE UP (ref 3.8–10.5)
WBC # FLD AUTO: 5.46 K/UL — SIGNIFICANT CHANGE UP (ref 3.8–10.5)

## 2021-03-21 PROCEDURE — P9016: CPT

## 2021-03-21 PROCEDURE — 82962 GLUCOSE BLOOD TEST: CPT

## 2021-03-21 PROCEDURE — 86880 COOMBS TEST DIRECT: CPT

## 2021-03-21 PROCEDURE — 86901 BLOOD TYPING SEROLOGIC RH(D): CPT

## 2021-03-21 PROCEDURE — 87040 BLOOD CULTURE FOR BACTERIA: CPT

## 2021-03-21 PROCEDURE — 80048 BASIC METABOLIC PNL TOTAL CA: CPT

## 2021-03-21 PROCEDURE — 85730 THROMBOPLASTIN TIME PARTIAL: CPT

## 2021-03-21 PROCEDURE — 80061 LIPID PANEL: CPT

## 2021-03-21 PROCEDURE — 87086 URINE CULTURE/COLONY COUNT: CPT

## 2021-03-21 PROCEDURE — 36415 COLL VENOUS BLD VENIPUNCTURE: CPT

## 2021-03-21 PROCEDURE — 83735 ASSAY OF MAGNESIUM: CPT

## 2021-03-21 PROCEDURE — 85025 COMPLETE CBC W/AUTO DIFF WBC: CPT

## 2021-03-21 PROCEDURE — 84443 ASSAY THYROID STIM HORMONE: CPT

## 2021-03-21 PROCEDURE — 82553 CREATINE MB FRACTION: CPT

## 2021-03-21 PROCEDURE — 99285 EMERGENCY DEPT VISIT HI MDM: CPT | Mod: 25

## 2021-03-21 PROCEDURE — 99239 HOSP IP/OBS DSCHRG MGMT >30: CPT

## 2021-03-21 PROCEDURE — 86900 BLOOD TYPING SEROLOGIC ABO: CPT

## 2021-03-21 PROCEDURE — 86923 COMPATIBILITY TEST ELECTRIC: CPT

## 2021-03-21 PROCEDURE — 84484 ASSAY OF TROPONIN QUANT: CPT

## 2021-03-21 PROCEDURE — 85027 COMPLETE CBC AUTOMATED: CPT

## 2021-03-21 PROCEDURE — 36430 TRANSFUSION BLD/BLD COMPNT: CPT

## 2021-03-21 PROCEDURE — U0003: CPT

## 2021-03-21 PROCEDURE — 82550 ASSAY OF CK (CPK): CPT

## 2021-03-21 PROCEDURE — 85610 PROTHROMBIN TIME: CPT

## 2021-03-21 PROCEDURE — 71045 X-RAY EXAM CHEST 1 VIEW: CPT

## 2021-03-21 PROCEDURE — U0005: CPT

## 2021-03-21 PROCEDURE — 83036 HEMOGLOBIN GLYCOSYLATED A1C: CPT

## 2021-03-21 PROCEDURE — 80053 COMPREHEN METABOLIC PANEL: CPT

## 2021-03-21 PROCEDURE — 86850 RBC ANTIBODY SCREEN: CPT

## 2021-03-21 RX ADMIN — Medication 325 MILLIGRAM(S): at 11:44

## 2021-03-21 RX ADMIN — CARVEDILOL PHOSPHATE 6.25 MILLIGRAM(S): 80 CAPSULE, EXTENDED RELEASE ORAL at 11:44

## 2021-03-21 RX ADMIN — Medication 1 TABLET(S): at 05:48

## 2021-03-21 RX ADMIN — Medication 2: at 07:20

## 2021-03-21 RX ADMIN — PANTOPRAZOLE SODIUM 40 MILLIGRAM(S): 20 TABLET, DELAYED RELEASE ORAL at 05:48

## 2021-03-21 RX ADMIN — Medication 81 MILLIGRAM(S): at 11:44

## 2021-03-21 RX ADMIN — Medication 2: at 11:57

## 2021-03-21 RX ADMIN — ISOSORBIDE MONONITRATE 30 MILLIGRAM(S): 60 TABLET, EXTENDED RELEASE ORAL at 11:44

## 2021-03-21 RX ADMIN — Medication 1 APPLICATION(S): at 05:48

## 2021-03-21 NOTE — DISCHARGE NOTE PROVIDER - CARE PROVIDER_API CALL
Mathew Rivera)  Cardiovascular Disease; Internal Medicine; Interventional Cardiology  [address], [facility] [address]  [place], [place] [address]  Phone: [phone]  Fax: [phone]  Follow Up Time: 1 week   Mathew Rivera)  Cardiovascular Disease; Internal Medicine; Interventional Cardiology  [address], [address]  [place] [address]  Phone: [phone]  Fax: [phone]  Follow Up Time: 1 week    Kenneth Cramer  [address]  [place] [address]  Phone: [phone]  Fax: (   )    -  Scheduled Appointment: 03/25/2021 09:30 AM   Kenneth Cramer  [address]  [place] [address]  Phone: [phone]  Fax: (   )    -  Scheduled Appointment: 03/25/2021 09:30 AM    Mathew Rivera  Phone: (   )    -  Fax: (   )    -  Established Patient  Scheduled Appointment: 03/26/2021 10:00 AM

## 2021-03-21 NOTE — DISCHARGE NOTE PROVIDER - HOSPITAL COURSE
49 y/o Male, former heavy smoker, w/ PMHx of HTN, HLD,  DM, GERD, EDUARDO (baseline Hg 9-10), CAD (w/ MI in 2010 and multiple PCIs, recent NSTEMI s/p cardiac cath on 7/23/2020 JUAN OM then  brachytherapy OM1 08/2020) presented to [facility] ER on 3/19 c/o chest pain, SOB, and fatigue in setting of recent significant episode of epistaxis after nasal trauma, was found to have hgb 5.0 with and trop 0.05, admitted to [facility] on 3/19 for severe anemia with detectable troponin (likely demand ischemia).   #Severe anemia  Hg 5 on admission, received 2 units PRBCs and had possible transfusion reaction during the 3rd unit. Anemia likely 2/2 recent significant epistaxis for several hours after hitting his nose on his fishing pole. At that time he went to ER in [place] where Hg 9 on 3/16 and he was treated with nasal packing, without further bleeding despite remaining on DAPT (last doses on 3/18). ENT recs NTD, apply vaseline to nares BID. Pt also reported internal hemorrhoids with intermittent BRB in stool. BHARTI negative for blood or masses. Surgery consulted- NTD, rec GI consult, who stated would consider endoscopic work-up of anemia but would have to remain off Plavix x5 days. Resumed aspirin on 3/20 with no further bleeding and hgb stable at 9.0. Per discussion with outpatient cardiologist/Interventional Dr. Rivera, will continue only aspirin for now (hold Plavix) and likely restart after outpatient follow up next week.   #CAD  CAD (w/ MI in 2010 and multiple PCIs, recent NSTEMI s/p cardiac cath on 7/23/2020 JUAN OM1) and then underwent brachytherapy 08/2020 with Dr. Rivera. Compliant with home DAPT. Pt. with chest pain at home, improved with 2 SL NTG, also with CP in ED that resolved spontaneously. EKG on arrival to ED revealed sinus Tachychardia 101 with improved ST depression  in V4-v6 compared from 07/2020. Trop T 0.04 -->0.05 --> 0.04 likely 2/2 demand ischema in the setting of severe anemia. Pt to continue aspirin, hold Plavix as above. Continue atorvastatin 80mg, Imdur 30mg daily, Coreg 6.25mg BID, enalapril 5mg daily. Follow up with Dr. Rivera within 1 week.    49 y/o Male, former heavy smoker, w/ PMHx of HTN, HLD,  DM, GERD, EDUARDO (baseline Hg 9-10), CAD (w/ MI in 2010 and multiple PCIs, recent NSTEMI s/p cardiac cath on 7/23/2020 JUAN OM then  brachytherapy OM1 08/2020) presented to [facility] ER on 3/19 c/o chest pain, SOB, and fatigue in setting of recent significant episode of epistaxis after nasal trauma, was found to have hgb 5.0 with and trop 0.05, admitted to [facility] on 3/19 for severe anemia with detectable troponin (likely demand ischemia).   #Severe anemia  Hg 5 on admission, received 2 units PRBCs and had possible transfusion reaction during the 3rd unit. Anemia likely 2/2 recent significant epistaxis for several hours after hitting his nose on his fishing pole. At that time he went to ER in [place] where Hg 9 on 3/16 and he was treated with nasal packing, without further bleeding despite remaining on DAPT (last doses on 3/18). ENT recs NTD, apply vaseline to nares BID. Pt also reported internal hemorrhoids with intermittent BRB in stool. BHARTI negative for blood or masses. Surgery consulted- NTD, rec GI consult, who stated would consider endoscopic work-up of anemia but would have to remain off Plavix x5 days. Resumed aspirin on 3/20 with no further bleeding and hgb stable at 9.0. Per discussion with outpatient cardiologist/Interventional Dr. Rivera, will continue only aspirin for now (hold Plavix) and consider restarting after outpatient follow up next week (3/26 at 10AM @ [place]).   #CAD  CAD (w/ MI in 2010 and multiple PCIs, recent NSTEMI s/p cardiac cath on 7/23/2020 JUAN OM1) and then underwent brachytherapy 08/2020 with Dr. Rivera. Compliant with home DAPT. Pt. with chest pain at home, improved with 2 SL NTG, also with CP in ED that resolved spontaneously. EKG on arrival to ED revealed sinus Tachychardia 101 with improved ST depression  in V4-v6 compared from 07/2020. Trop T 0.04 -->0.05 --> 0.04 likely 2/2 demand ischema in the setting of severe anemia. Pt to continue aspirin, hold Plavix as above. Continue atorvastatin 80mg, Imdur 30mg daily, Coreg 6.25mg BID, enalapril 5mg daily. Follow up with Dr. Rivera on 3/26.

## 2021-03-21 NOTE — DISCHARGE NOTE PROVIDER - NSDCMRMEDTOKEN_GEN_ALL_CORE_FT
amoxicillin-clavulanate 875 mg-125 mg oral tablet: 1 tab(s) orally every 12 hours (to complete course as previously prescribed)  aspirin 81 mg oral tablet: 1 tab(s) orally once a day  atorvastatin 80 mg oral tablet: 1 tab(s) orally once a day (at bedtime)  carvedilol 6.25 mg oral tablet: 1 tab(s) orally 2 times a day  enalapril 5 mg oral tablet: 1 tab(s) orally once a day  ferrous sulfate 325 mg (65 mg elemental iron) oral tablet: 1 tab(s) orally 2 times a day   isosorbide mononitrate 30 mg oral tablet, extended release: 1 tab(s) orally once a day (in the morning)  metFORMIN 500 mg oral tablet, extended release: 1 tab(s) orally once a day  nitroglycerin 0.4 mg sublingual tablet: 1 tab(s) orally every 5 minutes, As Needed  for chest pain, maximum 3 doses MDD:3 tablets  pantoprazole 40 mg oral delayed release tablet: 1 tab(s) orally once a day  ranolazine 500 mg oral tablet, extended release: 1 tab(s) orally once a day

## 2021-03-21 NOTE — DISCHARGE NOTE PROVIDER - CARE PROVIDERS DIRECT ADDRESSES
,[email or web address] ,[email or web address],DirectAddress_Unknown ,DirectAddress_Unknown,DirectAddress_Unknown

## 2021-03-21 NOTE — DISCHARGE NOTE PROVIDER - NSDCCPCAREPLAN_GEN_ALL_CORE_FT
PRINCIPAL DISCHARGE DIAGNOSIS  Diagnosis: Severe anemia  Assessment and Plan of Treatment:       SECONDARY DISCHARGE DIAGNOSES  Diagnosis: CAD (coronary artery disease)  Assessment and Plan of Treatment:      PRINCIPAL DISCHARGE DIAGNOSIS  Diagnosis: Severe anemia  Assessment and Plan of Treatment: You were severely anemic as a result of significant nose bleeding, and required 3 units of blood, with an appropriate increase in your blood counts. We recommend gentle application of vaseline to your nares twice a day, and try to avoid nose blowing, open mouth sneezing, or heavy lifting. For now, continue to take aspirin 81mg daily but do not take plavix until you follow up with Dr. Rivera. Please follow up with the gastrointestinal doctor (Dr. Cramer) as previously scheduled on 3/25. If you have any further significant bleeding, please return to the emergency room.      SECONDARY DISCHARGE DIAGNOSES  Diagnosis: CAD (coronary artery disease)  Assessment and Plan of Treatment: Do not take plavix until you follow up with Dr. Rivera. You may continue taking all other medications as prescribed.     PRINCIPAL DISCHARGE DIAGNOSIS  Diagnosis: Severe anemia  Assessment and Plan of Treatment: You were severely anemic as a result of significant nose bleeding, and required 3 units of blood, with an appropriate increase in your blood counts. We recommend gentle application of vaseline to your nares twice a day, and try to avoid nose blowing, open mouth sneezing, or heavy lifting. For now, continue to take aspirin 81mg daily but do not take plavix until you follow up with Dr. Rivera. Please follow up with the gastrointestinal doctor (Dr. Cramer) as previously scheduled on 3/25. If you have any further significant bleeding, please return to the emergency room.      SECONDARY DISCHARGE DIAGNOSES  Diagnosis: CAD (coronary artery disease)  Assessment and Plan of Treatment: Do not take plavix until you follow up with Dr. Rivera on Friday 3/26. You may continue taking all other medications as prescribed.

## 2021-03-21 NOTE — DISCHARGE NOTE NURSING/CASE MANAGEMENT/SOCIAL WORK - PATIENT PORTAL LINK FT
You can access the FollowMyHealth Patient Portal offered by [facility] by registering at the following website: [email or web address]. By joining [email or web address]’s FollowMyHealth portal, you will also be able to view your health information using other applications (apps) compatible with our system.

## 2021-03-21 NOTE — DISCHARGE NOTE PROVIDER - PROVIDER TOKENS
PROVIDER:[TOKEN:[[id number]],FOLLOWUP:[1 week]] PROVIDER:[TOKEN:[[id number]],FOLLOWUP:[1 week]],FREE:[LAST:[Shoaib],FIRST:[Kenneth],PHONE:[phone]:[(   )    -],ADDRESS:[[address] [place] [address], [address]  [place] [address]],SCHEDULEDAPPT:[03/25/2021],SCHEDULEDAPPTTIME:[09:30 AM]] FREE:[LAST:[Shoaib],FIRST:[Kenneth],PHONE:[phone]],FAX:[(   )    -],ADDRESS:[[address] [place] [address], [facility] [address]  [place] [address]],SCHEDULEDAPPT:[03/25/2021],SCHEDULEDAPPTTIME:[09:30 AM]],FREE:[LAST:[Lasic],FIRST:[Mathew],PHONE:[(   )    -],FAX:[(   )    -],SCHEDULEDAPPT:[03/26/2021],SCHEDULEDAPPTTIME:[10:00 AM],ESTABLISHEDPATIENT:[T]]

## 2021-03-22 LAB
CULTURE RESULTS: SIGNIFICANT CHANGE UP
SPECIMEN SOURCE: SIGNIFICANT CHANGE UP

## 2021-03-24 LAB
CULTURE RESULTS: SIGNIFICANT CHANGE UP
CULTURE RESULTS: SIGNIFICANT CHANGE UP
SPECIMEN SOURCE: SIGNIFICANT CHANGE UP
SPECIMEN SOURCE: SIGNIFICANT CHANGE UP

## 2021-03-25 ENCOUNTER — APPOINTMENT (OUTPATIENT)
Dept: GASTROENTEROLOGY | Facility: CLINIC | Age: 51
End: 2021-03-25
Payer: MEDICAID

## 2021-03-25 ENCOUNTER — LABORATORY RESULT (OUTPATIENT)
Age: 51
End: 2021-03-25

## 2021-03-25 VITALS
HEIGHT: 65 IN | HEART RATE: 96 BPM | RESPIRATION RATE: 15 BRPM | TEMPERATURE: 97.7 F | DIASTOLIC BLOOD PRESSURE: 60 MMHG | SYSTOLIC BLOOD PRESSURE: 110 MMHG | WEIGHT: 172 LBS | BODY MASS INDEX: 28.66 KG/M2 | OXYGEN SATURATION: 98 %

## 2021-03-25 DIAGNOSIS — D50.9 IRON DEFICIENCY ANEMIA, UNSPECIFIED: ICD-10-CM

## 2021-03-25 DIAGNOSIS — R12 HEARTBURN: ICD-10-CM

## 2021-03-25 DIAGNOSIS — K64.9 UNSPECIFIED HEMORRHOIDS: ICD-10-CM

## 2021-03-25 DIAGNOSIS — Z86.79 PERSONAL HISTORY OF OTHER DISEASES OF THE CIRCULATORY SYSTEM: ICD-10-CM

## 2021-03-25 DIAGNOSIS — Z87.898 PERSONAL HISTORY OF OTHER SPECIFIED CONDITIONS: ICD-10-CM

## 2021-03-25 DIAGNOSIS — E09.43 DRUG OR CHEMICAL INDUCED DIABETES MELLITUS WITH NEUROLOGICAL COMPLICATIONS WITH DIABETIC AUTONOMIC (POLY)NEUROPATHY: ICD-10-CM

## 2021-03-25 DIAGNOSIS — Z86.39 PERSONAL HISTORY OF OTHER ENDOCRINE, NUTRITIONAL AND METABOLIC DISEASE: ICD-10-CM

## 2021-03-25 DIAGNOSIS — K62.5 HEMORRHAGE OF ANUS AND RECTUM: ICD-10-CM

## 2021-03-25 DIAGNOSIS — R58 HEMORRHAGE, NOT ELSEWHERE CLASSIFIED: ICD-10-CM

## 2021-03-25 DIAGNOSIS — Z87.19 PERSONAL HISTORY OF OTHER DISEASES OF THE DIGESTIVE SYSTEM: ICD-10-CM

## 2021-03-25 DIAGNOSIS — R51.9 HEADACHE, UNSPECIFIED: ICD-10-CM

## 2021-03-25 DIAGNOSIS — Z86.59 PERSONAL HISTORY OF OTHER MENTAL AND BEHAVIORAL DISORDERS: ICD-10-CM

## 2021-03-25 LAB — TRANSFUSION REACTION INTERP 2: NEGATIVE — SIGNIFICANT CHANGE UP

## 2021-03-25 PROCEDURE — 99204 OFFICE O/P NEW MOD 45 MIN: CPT

## 2021-03-25 NOTE — PHYSICAL EXAM
[General Appearance - Alert] : alert [General Appearance - In No Acute Distress] : in no acute distress [General Appearance - Well Nourished] : well nourished [General Appearance - Well Developed] : well developed [General Appearance - Well-Appearing] : healthy appearing [PERRL With Normal Accommodation] : pupils were equal in size, round, and reactive to light [Extraocular Movements] : extraocular movements were intact [Outer Ear] : the ears and nose were normal in appearance [Hearing Threshold Finger Rub Not [place]] : hearing was normal [Neck Cervical Mass (___cm)] : no neck mass was observed [Respiration, Rhythm And Depth] : normal respiratory rhythm and effort [Exaggerated Use Of Accessory Muscles For Inspiration] : no accessory muscle use [Heart Rate And Rhythm] : heart rate was normal and rhythm regular [Edema] : there was no peripheral edema [Veins - Varicosity Changes] : there were no varicosital changes [Abdomen Soft] : soft [Abdomen Tenderness] : non-tender [Nail Clubbing] : no clubbing  or cyanosis of the fingernails [Involuntary Movements] : no involuntary movements were seen [Skin Color & Pigmentation] : normal skin color and pigmentation [] : no rash [Skin Lesions] : no skin lesions [No Focal Deficits] : no focal deficits [Oriented To Time, Place, And Person] : oriented to person, place, and time [Impaired Insight] : insight and judgment were intact [Affect] : the affect was normal [Mood] : the mood was normal

## 2021-03-25 NOTE — HISTORY OF PRESENT ILLNESS
[de-identified] : 51yM presenting after hospitalization at [facility] for chest pain, found to have symptomatic anemia likely secondary to profuse epistaxis with response to transfusion. GI service consulted while inpatient due to the complaint of intermittent rectal bleeding. Patient stated this has been ongoing for years. His last colonoscopy was in 2016 and reportedly normal. He has never had an upper endoscopy. Patient presents with photos which show fairly regular hematochezia. He describes a lesion which prolapses out of his rectum when he strains suggestive of a prolapsing internal hemorrhoid. Patient's bleeding has been confounded by recent use of DAPT, however, was recently taken off of Plavix by his cardiologist. He has a significant cardiac history inclusive of multiple PCIs with an NSTEMI in July 2020 with follow-up brachytherapy in September.

## 2021-03-25 NOTE — ASSESSMENT
[FreeTextEntry1] : 51yM presenting after hospitalization for evaluation of intermittent rectal bleeding and iron deficiency anemia.\par \par 1. Iron deficiency anemia - Likely multifactorial given history of recent profuse epistaxis and longstanding rectal bleeding. Patient endorsing prolapsing bleeding lesion with defecation despite lack of constipation suggestive of a prolapsing internal hemorrhoid, however, given EDUARDO, warrants bidirectional endoscopy.\par - Discussed r/b/a/i of endoscopy/colonoscopy and patient amenable\par - Discussed need for bowel preparation, preprocedural COVID testing, and day-of escort\par - Plan for EGD/Colon/VCE at [facility]\par - Patient will require cardiac clearance given history - follows with Dr. Mathew Rivera\par - Patient recently off Plavix\par - Will obtain labs today to evaluate Hgb\par \par RTC after endoscopic/colonoscopic evaluation\par Patient seen and discussed with attending physician

## 2021-03-26 LAB
BASOPHILS # BLD AUTO: 0.11 K/UL
BASOPHILS NFR BLD AUTO: 1.7 %
EOSINOPHIL # BLD AUTO: 1.91 K/UL
EOSINOPHIL NFR BLD AUTO: 30.2 %
FERRITIN SERPL-MCNC: 17 NG/ML
HCT VFR BLD CALC: 29.9 %
HGB BLD-MCNC: 9 G/DL
IRON SATN MFR SERPL: 5 %
IRON SERPL-MCNC: 21 UG/DL
LYMPHOCYTES # BLD AUTO: 2.18 K/UL
LYMPHOCYTES NFR BLD AUTO: 34.5 %
MAN DIFF?: NORMAL
MCHC RBC-ENTMCNC: 26.5 PG
MCHC RBC-ENTMCNC: 30.1 GM/DL
MCV RBC AUTO: 88.2 FL
MONOCYTES # BLD AUTO: 0.38 K/UL
MONOCYTES NFR BLD AUTO: 6 %
NEUTROPHILS # BLD AUTO: 1.74 K/UL
NEUTROPHILS NFR BLD AUTO: 27.6 %
PLATELET # BLD AUTO: 239 K/UL
RBC # BLD: 3.39 M/UL
RBC # FLD: 16.9 %
TIBC SERPL-MCNC: 440 UG/DL
TRANSFERRIN SERPL-MCNC: 342 MG/DL
UIBC SERPL-MCNC: 419 UG/DL
WBC # FLD AUTO: 6.32 K/UL

## 2021-03-29 DIAGNOSIS — R50.84 FEBRILE NONHEMOLYTIC TRANSFUSION REACTION: ICD-10-CM

## 2021-03-29 DIAGNOSIS — I25.10 ATHEROSCLEROTIC HEART DISEASE OF NATIVE CORONARY ARTERY WITHOUT ANGINA PECTORIS: ICD-10-CM

## 2021-03-29 DIAGNOSIS — D50.0 IRON DEFICIENCY ANEMIA SECONDARY TO BLOOD LOSS (CHRONIC): ICD-10-CM

## 2021-03-29 DIAGNOSIS — K64.8 OTHER HEMORRHOIDS: ICD-10-CM

## 2021-03-29 DIAGNOSIS — E78.5 HYPERLIPIDEMIA, UNSPECIFIED: ICD-10-CM

## 2021-03-29 DIAGNOSIS — Z79.84 LONG TERM (CURRENT) USE OF ORAL HYPOGLYCEMIC DRUGS: ICD-10-CM

## 2021-03-29 DIAGNOSIS — E11.9 TYPE 2 DIABETES MELLITUS WITHOUT COMPLICATIONS: ICD-10-CM

## 2021-03-29 DIAGNOSIS — Z95.5 PRESENCE OF CORONARY ANGIOPLASTY IMPLANT AND GRAFT: ICD-10-CM

## 2021-03-29 DIAGNOSIS — I24.8 OTHER FORMS OF ACUTE ISCHEMIC HEART DISEASE: ICD-10-CM

## 2021-03-29 DIAGNOSIS — D64.9 ANEMIA, UNSPECIFIED: ICD-10-CM

## 2021-03-29 DIAGNOSIS — I10 ESSENTIAL (PRIMARY) HYPERTENSION: ICD-10-CM

## 2021-03-29 DIAGNOSIS — Z79.82 LONG TERM (CURRENT) USE OF ASPIRIN: ICD-10-CM

## 2021-03-29 DIAGNOSIS — K21.9 GASTRO-ESOPHAGEAL REFLUX DISEASE WITHOUT ESOPHAGITIS: ICD-10-CM

## 2021-03-29 DIAGNOSIS — Z79.02 LONG TERM (CURRENT) USE OF ANTITHROMBOTICS/ANTIPLATELETS: ICD-10-CM

## 2021-03-29 DIAGNOSIS — F17.200 NICOTINE DEPENDENCE, UNSPECIFIED, UNCOMPLICATED: ICD-10-CM

## 2021-04-15 ENCOUNTER — OUTPATIENT (OUTPATIENT)
Dept: OUTPATIENT SERVICES | Facility: HOSPITAL | Age: 51
LOS: 1 days | Discharge: HOME | End: 2021-04-15
Payer: MEDICAID

## 2021-04-15 ENCOUNTER — RESULT REVIEW (OUTPATIENT)
Age: 51
End: 2021-04-15

## 2021-04-15 ENCOUNTER — APPOINTMENT (OUTPATIENT)
Dept: GASTROENTEROLOGY | Facility: HOSPITAL | Age: 51
End: 2021-04-15

## 2021-04-15 DIAGNOSIS — K29.70 GASTRITIS, UNSPECIFIED, W/OUT BLEEDING: ICD-10-CM

## 2021-04-15 DIAGNOSIS — K62.89 OTHER SPECIFIED DISEASES OF ANUS AND RECTUM: ICD-10-CM

## 2021-04-15 DIAGNOSIS — Z98.61 CORONARY ANGIOPLASTY STATUS: Chronic | ICD-10-CM

## 2021-04-15 LAB — GLUCOSE BLDC GLUCOMTR-MCNC: 101 MG/DL — HIGH (ref 70–99)

## 2021-04-15 PROCEDURE — 43235 EGD DIAGNOSTIC BRUSH WASH: CPT

## 2021-04-15 PROCEDURE — 88305 TISSUE EXAM BY PATHOLOGIST: CPT

## 2021-04-15 PROCEDURE — 45380 COLONOSCOPY AND BIOPSY: CPT

## 2021-04-15 PROCEDURE — 82962 GLUCOSE BLOOD TEST: CPT

## 2021-04-15 PROCEDURE — 45385 COLONOSCOPY W/LESION REMOVAL: CPT

## 2021-04-15 PROCEDURE — 91112 GI WIRELESS CAPSULE MEASURE: CPT

## 2021-04-15 PROCEDURE — 88305 TISSUE EXAM BY PATHOLOGIST: CPT | Mod: 26

## 2021-04-20 LAB — SURGICAL PATHOLOGY STUDY: SIGNIFICANT CHANGE UP

## 2021-10-16 NOTE — ED ADULT NURSE NOTE - NSFALLRSKHARMRISK_ED_ALL_ED
[de-identified] : 89F f/u for right cholesteatoma- used H202  - no pain, otorrhea, change in hearing or infections. Off balance - has been several years - not getting worse - no spinning vertigo no motion sensation - doesn't walk straight - no neuro eval  - hx XRT to neck and chest -  says possible spinal cord atrophy. 
no
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
## 2022-01-17 VITALS
HEIGHT: 66 IN | TEMPERATURE: 98 F | SYSTOLIC BLOOD PRESSURE: 139 MMHG | RESPIRATION RATE: 18 BRPM | OXYGEN SATURATION: 97 % | WEIGHT: 179.9 LBS | HEART RATE: 104 BPM | DIASTOLIC BLOOD PRESSURE: 75 MMHG

## 2022-01-17 PROCEDURE — 71045 X-RAY EXAM CHEST 1 VIEW: CPT | Mod: 26

## 2022-01-17 PROCEDURE — 93010 ELECTROCARDIOGRAM REPORT: CPT

## 2022-01-17 PROCEDURE — 99285 EMERGENCY DEPT VISIT HI MDM: CPT | Mod: 25

## 2022-01-17 RX ORDER — NITROGLYCERIN 6.5 MG
0.4 CAPSULE, EXTENDED RELEASE ORAL
Refills: 0 | Status: DISCONTINUED | OUTPATIENT
Start: 2022-01-17 | End: 2022-01-19

## 2022-01-17 RX ADMIN — Medication 0.4 MILLIGRAM(S): at 23:43

## 2022-01-17 NOTE — ED PROVIDER NOTE - OBJECTIVE STATEMENT
50 yo former heavy smoker, w/ PMHx of HTN, HLD,  DM, GERD, EDUARDO (baseline Hg 9-10), CAD (w/ MI in 2010 and multiple PCIs, recent NSTEMI s/p cardiac cath on 7/23/2020 JUAN OM then  brachytherapy OM1 08/2020) with complaints of chest pain, pressure sensation states similar to prior MIs. Describes chest pain worse on exertion, no assoc radiation of pain, cough, f/c, abd pain, leg swelling, sob. States due to hemorrhoid surgery in Dec had been off of asa and plavix for two weeks. No current GI bleed symptoms.

## 2022-01-17 NOTE — ED PROVIDER NOTE - CLINICAL SUMMARY MEDICAL DECISION MAKING FREE TEXT BOX
multiple risk factors as above w chest pain, relieved w nitro  Ddx: ACS, less likely PE/dissection/other GI  pathology given presentation  no infectious sx  - labs, EKG, cxr, likely admit. multiple cardiac risk factors as above w chest pain, relieved w nitro  Ddx: ACS, less likely PE/dissection/other GI  pathology given presentation  no infectious sx  - labs, EKG, cxr, likely admit.

## 2022-01-18 ENCOUNTER — TRANSCRIPTION ENCOUNTER (OUTPATIENT)
Age: 52
End: 2022-01-18

## 2022-01-18 ENCOUNTER — INPATIENT (INPATIENT)
Facility: HOSPITAL | Age: 52
LOS: 0 days | Discharge: HOME | DRG: 287 | End: 2022-01-19
Attending: INTERNAL MEDICINE | Admitting: INTERNAL MEDICINE
Payer: MEDICAID

## 2022-01-18 DIAGNOSIS — Z98.890 OTHER SPECIFIED POSTPROCEDURAL STATES: Chronic | ICD-10-CM

## 2022-01-18 DIAGNOSIS — R07.9 CHEST PAIN, UNSPECIFIED: ICD-10-CM

## 2022-01-18 DIAGNOSIS — E78.00 PURE HYPERCHOLESTEROLEMIA, UNSPECIFIED: ICD-10-CM

## 2022-01-18 DIAGNOSIS — I10 ESSENTIAL (PRIMARY) HYPERTENSION: ICD-10-CM

## 2022-01-18 DIAGNOSIS — E11.9 TYPE 2 DIABETES MELLITUS WITHOUT COMPLICATIONS: ICD-10-CM

## 2022-01-18 DIAGNOSIS — D64.9 ANEMIA, UNSPECIFIED: ICD-10-CM

## 2022-01-18 DIAGNOSIS — Z98.61 CORONARY ANGIOPLASTY STATUS: Chronic | ICD-10-CM

## 2022-01-18 PROBLEM — K29.70 GASTRITIS, UNSPECIFIED, WITHOUT BLEEDING: Chronic | Status: ACTIVE | Noted: 2022-01-13

## 2022-01-18 LAB
ALBUMIN SERPL ELPH-MCNC: 4.4 G/DL — SIGNIFICANT CHANGE UP (ref 3.3–5)
ALP SERPL-CCNC: 79 U/L — SIGNIFICANT CHANGE UP (ref 40–120)
ALT FLD-CCNC: 13 U/L — SIGNIFICANT CHANGE UP (ref 10–45)
ANION GAP SERPL CALC-SCNC: 10 MMOL/L — SIGNIFICANT CHANGE UP (ref 5–17)
ANION GAP SERPL CALC-SCNC: 13 MMOL/L — SIGNIFICANT CHANGE UP (ref 5–17)
ANISOCYTOSIS BLD QL: SIGNIFICANT CHANGE UP
APTT BLD: 34 SEC — SIGNIFICANT CHANGE UP (ref 27.5–35.5)
AST SERPL-CCNC: 14 U/L — SIGNIFICANT CHANGE UP (ref 10–40)
BASOPHILS # BLD AUTO: 0 K/UL — SIGNIFICANT CHANGE UP (ref 0–0.2)
BASOPHILS NFR BLD AUTO: 0 % — SIGNIFICANT CHANGE UP (ref 0–2)
BILIRUB SERPL-MCNC: 0.5 MG/DL — SIGNIFICANT CHANGE UP (ref 0.2–1.2)
BLD GP AB SCN SERPL QL: NEGATIVE — SIGNIFICANT CHANGE UP
BUN SERPL-MCNC: 11 MG/DL — SIGNIFICANT CHANGE UP (ref 7–23)
BUN SERPL-MCNC: 13 MG/DL — SIGNIFICANT CHANGE UP (ref 7–23)
CALCIUM SERPL-MCNC: 8.9 MG/DL — SIGNIFICANT CHANGE UP (ref 8.4–10.5)
CALCIUM SERPL-MCNC: 9 MG/DL — SIGNIFICANT CHANGE UP (ref 8.4–10.5)
CHLORIDE SERPL-SCNC: 102 MMOL/L — SIGNIFICANT CHANGE UP (ref 96–108)
CHLORIDE SERPL-SCNC: 103 MMOL/L — SIGNIFICANT CHANGE UP (ref 96–108)
CO2 SERPL-SCNC: 24 MMOL/L — SIGNIFICANT CHANGE UP (ref 22–31)
CO2 SERPL-SCNC: 25 MMOL/L — SIGNIFICANT CHANGE UP (ref 22–31)
CREAT SERPL-MCNC: 0.91 MG/DL — SIGNIFICANT CHANGE UP (ref 0.5–1.3)
CREAT SERPL-MCNC: 1 MG/DL — SIGNIFICANT CHANGE UP (ref 0.5–1.3)
EOSINOPHIL # BLD AUTO: 1.06 K/UL — HIGH (ref 0–0.5)
EOSINOPHIL NFR BLD AUTO: 17.4 % — HIGH (ref 0–6)
FLUAV AG NPH QL: SIGNIFICANT CHANGE UP
FLUBV AG NPH QL: SIGNIFICANT CHANGE UP
GIANT PLATELETS BLD QL SMEAR: PRESENT — SIGNIFICANT CHANGE UP
GLUCOSE BLDC GLUCOMTR-MCNC: 153 MG/DL — HIGH (ref 70–99)
GLUCOSE BLDC GLUCOMTR-MCNC: 167 MG/DL — HIGH (ref 70–99)
GLUCOSE BLDC GLUCOMTR-MCNC: 204 MG/DL — HIGH (ref 70–99)
GLUCOSE BLDC GLUCOMTR-MCNC: 228 MG/DL — HIGH (ref 70–99)
GLUCOSE SERPL-MCNC: 221 MG/DL — HIGH (ref 70–99)
GLUCOSE SERPL-MCNC: 264 MG/DL — HIGH (ref 70–99)
HCT VFR BLD CALC: 24.7 % — LOW (ref 39–50)
HCT VFR BLD CALC: 25.6 % — LOW (ref 39–50)
HGB BLD-MCNC: 7.2 G/DL — LOW (ref 13–17)
HGB BLD-MCNC: 7.2 G/DL — LOW (ref 13–17)
HYPOCHROMIA BLD QL: SLIGHT — SIGNIFICANT CHANGE UP
INR BLD: 1 — SIGNIFICANT CHANGE UP (ref 0.88–1.16)
LYMPHOCYTES # BLD AUTO: 0.84 K/UL — LOW (ref 1–3.3)
LYMPHOCYTES # BLD AUTO: 13.8 % — SIGNIFICANT CHANGE UP (ref 13–44)
MAGNESIUM SERPL-MCNC: 1.7 MG/DL — SIGNIFICANT CHANGE UP (ref 1.6–2.6)
MAGNESIUM SERPL-MCNC: 1.8 MG/DL — SIGNIFICANT CHANGE UP (ref 1.6–2.6)
MANUAL SMEAR VERIFICATION: SIGNIFICANT CHANGE UP
MCHC RBC-ENTMCNC: 19.8 PG — LOW (ref 27–34)
MCHC RBC-ENTMCNC: 20 PG — LOW (ref 27–34)
MCHC RBC-ENTMCNC: 28.1 GM/DL — LOW (ref 32–36)
MCHC RBC-ENTMCNC: 29.1 GM/DL — LOW (ref 32–36)
MCV RBC AUTO: 68.6 FL — LOW (ref 80–100)
MCV RBC AUTO: 70.5 FL — LOW (ref 80–100)
MICROCYTES BLD QL: SIGNIFICANT CHANGE UP
MONOCYTES # BLD AUTO: 0.39 K/UL — SIGNIFICANT CHANGE UP (ref 0–0.9)
MONOCYTES NFR BLD AUTO: 6.4 % — SIGNIFICANT CHANGE UP (ref 2–14)
NEUTROPHILS # BLD AUTO: 3.79 K/UL — SIGNIFICANT CHANGE UP (ref 1.8–7.4)
NEUTROPHILS NFR BLD AUTO: 62.4 % — SIGNIFICANT CHANGE UP (ref 43–77)
NRBC # BLD: 0 /100 WBCS — SIGNIFICANT CHANGE UP (ref 0–0)
NRBC # BLD: 1 /100 — HIGH (ref 0–0)
NRBC # BLD: SIGNIFICANT CHANGE UP /100 WBCS (ref 0–0)
NT-PROBNP SERPL-SCNC: 67 PG/ML — SIGNIFICANT CHANGE UP (ref 0–300)
OVALOCYTES BLD QL SMEAR: SLIGHT — SIGNIFICANT CHANGE UP
PLAT MORPH BLD: NORMAL — SIGNIFICANT CHANGE UP
PLATELET # BLD AUTO: 181 K/UL — SIGNIFICANT CHANGE UP (ref 150–400)
PLATELET # BLD AUTO: 188 K/UL — SIGNIFICANT CHANGE UP (ref 150–400)
POIKILOCYTOSIS BLD QL AUTO: SLIGHT — SIGNIFICANT CHANGE UP
POLYCHROMASIA BLD QL SMEAR: SLIGHT — SIGNIFICANT CHANGE UP
POTASSIUM SERPL-MCNC: 3.7 MMOL/L — SIGNIFICANT CHANGE UP (ref 3.5–5.3)
POTASSIUM SERPL-MCNC: 3.9 MMOL/L — SIGNIFICANT CHANGE UP (ref 3.5–5.3)
POTASSIUM SERPL-SCNC: 3.7 MMOL/L — SIGNIFICANT CHANGE UP (ref 3.5–5.3)
POTASSIUM SERPL-SCNC: 3.9 MMOL/L — SIGNIFICANT CHANGE UP (ref 3.5–5.3)
PROT SERPL-MCNC: 7.2 G/DL — SIGNIFICANT CHANGE UP (ref 6–8.3)
PROTHROM AB SERPL-ACNC: 12 SEC — SIGNIFICANT CHANGE UP (ref 10.6–13.6)
RBC # BLD: 3.6 M/UL — LOW (ref 4.2–5.8)
RBC # BLD: 3.63 M/UL — LOW (ref 4.2–5.8)
RBC # FLD: 17.6 % — HIGH (ref 10.3–14.5)
RBC # FLD: 17.7 % — HIGH (ref 10.3–14.5)
RBC BLD AUTO: SIGNIFICANT CHANGE UP
RH IG SCN BLD-IMP: POSITIVE — SIGNIFICANT CHANGE UP
RSV RNA NPH QL NAA+NON-PROBE: SIGNIFICANT CHANGE UP
SARS-COV-2 RNA SPEC QL NAA+PROBE: NEGATIVE — SIGNIFICANT CHANGE UP
SARS-COV-2 RNA SPEC QL NAA+PROBE: SIGNIFICANT CHANGE UP
SMUDGE CELLS # BLD: PRESENT — SIGNIFICANT CHANGE UP
SODIUM SERPL-SCNC: 138 MMOL/L — SIGNIFICANT CHANGE UP (ref 135–145)
SODIUM SERPL-SCNC: 139 MMOL/L — SIGNIFICANT CHANGE UP (ref 135–145)
TROPONIN T SERPL-MCNC: <0.01 NG/ML — SIGNIFICANT CHANGE UP (ref 0–0.01)
WBC # BLD: 5.6 K/UL — SIGNIFICANT CHANGE UP (ref 3.8–10.5)
WBC # BLD: 6.08 K/UL — SIGNIFICANT CHANGE UP (ref 3.8–10.5)
WBC # FLD AUTO: 5.6 K/UL — SIGNIFICANT CHANGE UP (ref 3.8–10.5)
WBC # FLD AUTO: 6.08 K/UL — SIGNIFICANT CHANGE UP (ref 3.8–10.5)

## 2022-01-18 PROCEDURE — 93458 L HRT ARTERY/VENTRICLE ANGIO: CPT | Mod: 26

## 2022-01-18 PROCEDURE — 99152 MOD SED SAME PHYS/QHP 5/>YRS: CPT

## 2022-01-18 PROCEDURE — 99222 1ST HOSP IP/OBS MODERATE 55: CPT

## 2022-01-18 RX ORDER — INFLUENZA VIRUS VACCINE 15; 15; 15; 15 UG/.5ML; UG/.5ML; UG/.5ML; UG/.5ML
0.5 SUSPENSION INTRAMUSCULAR ONCE
Refills: 0 | Status: DISCONTINUED | OUTPATIENT
Start: 2022-01-18 | End: 2022-01-19

## 2022-01-18 RX ORDER — CLOPIDOGREL BISULFATE 75 MG/1
75 TABLET, FILM COATED ORAL DAILY
Refills: 0 | Status: DISCONTINUED | OUTPATIENT
Start: 2022-01-18 | End: 2022-01-19

## 2022-01-18 RX ORDER — DEXTROSE 50 % IN WATER 50 %
25 SYRINGE (ML) INTRAVENOUS ONCE
Refills: 0 | Status: DISCONTINUED | OUTPATIENT
Start: 2022-01-18 | End: 2022-01-19

## 2022-01-18 RX ORDER — POTASSIUM CHLORIDE 20 MEQ
40 PACKET (EA) ORAL ONCE
Refills: 0 | Status: COMPLETED | OUTPATIENT
Start: 2022-01-18 | End: 2022-01-18

## 2022-01-18 RX ORDER — ACETAMINOPHEN 500 MG
650 TABLET ORAL EVERY 6 HOURS
Refills: 0 | Status: DISCONTINUED | OUTPATIENT
Start: 2022-01-18 | End: 2022-01-19

## 2022-01-18 RX ORDER — INSULIN LISPRO 100/ML
VIAL (ML) SUBCUTANEOUS AT BEDTIME
Refills: 0 | Status: DISCONTINUED | OUTPATIENT
Start: 2022-01-18 | End: 2022-01-19

## 2022-01-18 RX ORDER — INSULIN LISPRO 100/ML
VIAL (ML) SUBCUTANEOUS
Refills: 0 | Status: DISCONTINUED | OUTPATIENT
Start: 2022-01-18 | End: 2022-01-19

## 2022-01-18 RX ORDER — SENNA PLUS 8.6 MG/1
2 TABLET ORAL AT BEDTIME
Refills: 0 | Status: DISCONTINUED | OUTPATIENT
Start: 2022-01-18 | End: 2022-01-18

## 2022-01-18 RX ORDER — SODIUM CHLORIDE 9 MG/ML
1000 INJECTION INTRAMUSCULAR; INTRAVENOUS; SUBCUTANEOUS
Refills: 0 | Status: COMPLETED | OUTPATIENT
Start: 2022-01-18 | End: 2022-01-18

## 2022-01-18 RX ORDER — ASPIRIN/CALCIUM CARB/MAGNESIUM 324 MG
81 TABLET ORAL DAILY
Refills: 0 | Status: DISCONTINUED | OUTPATIENT
Start: 2022-01-18 | End: 2022-01-19

## 2022-01-18 RX ORDER — PANTOPRAZOLE SODIUM 20 MG/1
40 TABLET, DELAYED RELEASE ORAL
Refills: 0 | Status: DISCONTINUED | OUTPATIENT
Start: 2022-01-18 | End: 2022-01-19

## 2022-01-18 RX ORDER — CARVEDILOL PHOSPHATE 80 MG/1
6.25 CAPSULE, EXTENDED RELEASE ORAL EVERY 12 HOURS
Refills: 0 | Status: DISCONTINUED | OUTPATIENT
Start: 2022-01-18 | End: 2022-01-19

## 2022-01-18 RX ORDER — RANOLAZINE 500 MG/1
1 TABLET, FILM COATED, EXTENDED RELEASE ORAL
Qty: 0 | Refills: 0 | DISCHARGE

## 2022-01-18 RX ORDER — MAGNESIUM OXIDE 400 MG ORAL TABLET 241.3 MG
400 TABLET ORAL ONCE
Refills: 0 | Status: COMPLETED | OUTPATIENT
Start: 2022-01-18 | End: 2022-01-18

## 2022-01-18 RX ORDER — RANOLAZINE 500 MG/1
500 TABLET, FILM COATED, EXTENDED RELEASE ORAL
Refills: 0 | Status: DISCONTINUED | OUTPATIENT
Start: 2022-01-18 | End: 2022-01-19

## 2022-01-18 RX ORDER — SODIUM CHLORIDE 9 MG/ML
1000 INJECTION, SOLUTION INTRAVENOUS
Refills: 0 | Status: DISCONTINUED | OUTPATIENT
Start: 2022-01-18 | End: 2022-01-19

## 2022-01-18 RX ORDER — DEXTROSE 50 % IN WATER 50 %
15 SYRINGE (ML) INTRAVENOUS ONCE
Refills: 0 | Status: DISCONTINUED | OUTPATIENT
Start: 2022-01-18 | End: 2022-01-19

## 2022-01-18 RX ORDER — FERROUS SULFATE 325(65) MG
325 TABLET ORAL
Refills: 0 | Status: DISCONTINUED | OUTPATIENT
Start: 2022-01-18 | End: 2022-01-19

## 2022-01-18 RX ORDER — GLUCAGON INJECTION, SOLUTION 0.5 MG/.1ML
1 INJECTION, SOLUTION SUBCUTANEOUS ONCE
Refills: 0 | Status: DISCONTINUED | OUTPATIENT
Start: 2022-01-18 | End: 2022-01-19

## 2022-01-18 RX ORDER — DEXTROSE 50 % IN WATER 50 %
12.5 SYRINGE (ML) INTRAVENOUS ONCE
Refills: 0 | Status: DISCONTINUED | OUTPATIENT
Start: 2022-01-18 | End: 2022-01-19

## 2022-01-18 RX ORDER — SENNA PLUS 8.6 MG/1
1 TABLET ORAL DAILY
Refills: 0 | Status: DISCONTINUED | OUTPATIENT
Start: 2022-01-18 | End: 2022-01-19

## 2022-01-18 RX ADMIN — SODIUM CHLORIDE 240 MILLILITER(S): 9 INJECTION INTRAMUSCULAR; INTRAVENOUS; SUBCUTANEOUS at 20:22

## 2022-01-18 RX ADMIN — CARVEDILOL PHOSPHATE 6.25 MILLIGRAM(S): 80 CAPSULE, EXTENDED RELEASE ORAL at 05:58

## 2022-01-18 RX ADMIN — Medication 5 MILLIGRAM(S): at 05:57

## 2022-01-18 RX ADMIN — Medication 4: at 07:23

## 2022-01-18 RX ADMIN — MAGNESIUM OXIDE 400 MG ORAL TABLET 400 MILLIGRAM(S): 241.3 TABLET ORAL at 12:45

## 2022-01-18 RX ADMIN — Medication 325 MILLIGRAM(S): at 17:30

## 2022-01-18 RX ADMIN — Medication 81 MILLIGRAM(S): at 07:23

## 2022-01-18 RX ADMIN — SENNA PLUS 1 TABLET(S): 8.6 TABLET ORAL at 06:27

## 2022-01-18 RX ADMIN — RANOLAZINE 500 MILLIGRAM(S): 500 TABLET, FILM COATED, EXTENDED RELEASE ORAL at 05:57

## 2022-01-18 RX ADMIN — PANTOPRAZOLE SODIUM 40 MILLIGRAM(S): 20 TABLET, DELAYED RELEASE ORAL at 05:58

## 2022-01-18 RX ADMIN — Medication 650 MILLIGRAM(S): at 22:05

## 2022-01-18 RX ADMIN — CARVEDILOL PHOSPHATE 6.25 MILLIGRAM(S): 80 CAPSULE, EXTENDED RELEASE ORAL at 17:30

## 2022-01-18 RX ADMIN — Medication 650 MILLIGRAM(S): at 23:00

## 2022-01-18 RX ADMIN — CLOPIDOGREL BISULFATE 75 MILLIGRAM(S): 75 TABLET, FILM COATED ORAL at 17:30

## 2022-01-18 RX ADMIN — Medication 325 MILLIGRAM(S): at 05:58

## 2022-01-18 RX ADMIN — Medication 4: at 17:00

## 2022-01-18 RX ADMIN — Medication 40 MILLIEQUIVALENT(S): at 12:45
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
## 2022-01-18 NOTE — H&P ADULT - NSHPLABSRESULTS_GEN_ALL_CORE
7.2    6.08  )-----------( 188      ( 17 Jan 2022 23:56 )             24.7       01-17    138  |  103  |  11  ----------------------------<  264<H>  3.9   |  25  |  1.00    Ca    9.0      17 Jan 2022 23:56  Mg     1.7     01-17    TPro  7.2  /  Alb  4.4  /  TBili  0.5  /  DBili  x   /  AST  14  /  ALT  13  /  AlkPhos  79  01-17      PT/INR - ( 17 Jan 2022 23:56 )   PT: 12.0 sec;   INR: 1.00          PTT - ( 17 Jan 2022 23:56 )  PTT:34.0 sec    CARDIAC MARKERS ( 17 Jan 2022 23:56 )  x     / <0.01 ng/mL / x     / x     / x                EKG:

## 2022-01-18 NOTE — ED ADULT NURSE NOTE - OBJECTIVE STATEMENT
pt a&ox4 resting comfortably in stretcher, c/o cp x 1 week, worse today. pt reports trying to wait for pain to go away, but it got worse, prompting ed visit. pt with multiple cardiac stents. respirations even unlabored.

## 2022-01-18 NOTE — DISCHARGE NOTE PROVIDER - NSDCFUADDAPPT_GEN_ALL_CORE_FT
You will return on 2/1/22 for a planned procedure- planned stent to the artery to open the blockage and improve the blood flow to the heart. ****You will return on 2/1/22 for a planned procedure- planned stent to the artery to open the blockage and improve the blood flow to the heart.****    - Please call and schedule an appointment to follow up with your GI Doctor- Dr. Barajas within 1 week of discharge from the hospital

## 2022-01-18 NOTE — DISCHARGE NOTE PROVIDER - INSTRUCTIONS
1/28/2020      RE: Dimple Oviedo  [address]  [facility] [address]       This 87 year old woman is here for f/u of Graves.  She first developed symptoms of hyperthyroidism in December 2017.  She was most troubled by the tremor that occurred.  She had not noticed change in her skin or hair.  She saw Dr. Rojas in the community and he started her on methimazole.  I first met her in January 2018 during a hospitalization for her stress cardiomyopathy, that was diagnosed in December 2017.  Because she had received an iodine dye load when she had a coronary angiogram, we were unable to do a radioiodine scan and uptake to determine the precise cause of her hyperthyroidism.  She was maintained on methimazole until June 2017 when it was stopped.  A radioiodine uptake and scan was done in July, showed an uptake of ~ 70%, and confirmed she had Graves' disease.  She was restarted on methimazole in July 2018 and we have been adjusting the dose since then.  She has been taking 5 mg every AM every day.      Today she is here with her daughter in law.  She reports she feels well.  Her energy level has improved.  Her appetite is fine.  She has no problems with temperature tolerance.  She occasionally has some tremor but this is much better than it was in the past.  She said no change in her vision.  She is occasionally constipated.  She is noted no change in her hair or skin.  She feels like she is back to where she felt 3 years ago before she became ill.       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  A recent cystoscopy revealed recurrent in situ cancer. She will likely be undergoing chemo again.    Her right knee has become very painful and she is planning to have knee replacement in February.  She had a cortisone shot in the fall that helped, but the pain is backed.  She isn't moving as much as she wants to and is concerned about weight gain.    Her blood pressure  "is checked regularly at her facility and she reports is always much better controlled than today.    acetaminophen (TYLENOL) 650 MG CR tablet, Take 1 tablet (650 mg) by mouth every 8 hours as needed for pain (Patient taking differently: Take 650 mg by mouth every 8 hours as needed for pain (PT last dose 1.10.2020) )  alendronate (FOSAMAX) 70 MG tablet, TAKE 1 TABLET EVERY 7 DAYS AT LEAST 60 MIN BEFORE BREAKFAST AS DIRECTED \"SEE PACKAGE FOR ADDITIONAL INSTRUCTIONS\" (Patient taking differently: Take 70 mg by mouth every 30 days TAKE 1 TABLET EVERY 7 DAYS AT LEAST 60 MIN BEFORE BREAKFAST AS DIRECTED \"SEE PACKAGE FOR ADDITIONAL INSTRUCTIONS\")  aspirin 81 MG tablet, Take 81 mg by mouth every evening   Calcium Citrate-Vitamin D (CALCIUM + D PO), Take 1 tablet by mouth 2 times daily   cycloSPORINE (RESTASIS) 0.05 % ophthalmic emulsion, Place 1 drop into both eyes 2 times daily  ferrous sulfate 325 (65 Fe) MG TBEC EC tablet, Take 325 mg by mouth every morning   furosemide (LASIX) 20 MG tablet, Take 1 tablet (20 mg) by mouth every morning  irbesartan (AVAPRO) 300 MG tablet, TAKE 1 TABLET EVERY DAY (Patient taking differently: Take 300 mg by mouth every morning TAKE 1 TABLET EVERY DAY)  lovastatin (MEVACOR) 40 MG tablet, TAKE 1 TABLET AT BEDTIME (HYPERLIPIDEMIA LDL GOAL BELOW 130)  methimazole (TAPAZOLE) 5 MG tablet, 10 mg alternating with 15 mg every other day (Patient taking differently: Take 5 mg by mouth every morning )  nitroGLYcerin (NITROSTAT) 0.4 MG sublingual tablet, For chest pain place 1 tablet under the tongue every 5 minutes for 3 doses. If symptoms persist 5 minutes after 1st dose call 911.  Omega-3 Fatty Acids (FISH OIL) 500 MG CAPS, Take 1 capsule by mouth 2 times daily   omeprazole (PRILOSEC) 20 MG DR capsule, TAKE 1 CAPSULE EVERY DAY (Patient taking differently: Take 20 mg by mouth every morning TAKE 1 CAPSULE EVERY DAY)  Probiotic Product (PROBIOTIC ADVANCED PO), Take 1 capsule by mouth every morning " "  propranolol ER (INDERAL LA) 60 MG 24 hr capsule, TAKE 1 CAPSULE EVERY DAY (Patient taking differently: Take 60 mg by mouth every evening TAKE 1 CAPSULE EVERY DAY)  rOPINIRole (REQUIP) 0.25 MG tablet, 0.25 mg in the afternoon and 0.5 mg at night (Patient taking differently: Take by mouth 2 times daily 0.25 mg in the afternoon and 0.5 mg at night)  sertraline (ZOLOFT) 100 MG tablet, TAKE 1 TABLET (100 MG) BY MOUTH EVERY MORNING  traZODone (DESYREL) 50 MG tablet, TAKE 1/2 TABLET AT BEDTIME (Patient taking differently: Take 50 mg by mouth At Bedtime )    No current facility-administered medications on file prior to visit.       ROS: 10 point ROS neg other than the symptoms noted above in the HPI.  Vital signs:   BP (!) 156/65   Pulse 61   Ht 1.461 m (4' 9.5\")   Wt 74.3 kg (163 lb 14.4 oz)   BMI 34.85 kg/m     Estimated body mass index is 34.85 kg/m  as calculated from the following:    Height as of this encounter: 1.461 m (4' 9.5\").    Weight as of this encounter: 74.3 kg (163 lb 14.4 oz).    VSS  NAD  Eyes - no periorbital edema, conjunctival injection, scleral icterus  Neck - thyroid not palpable today.  No LN  Lungs - clear  CV - RRR..   Neuro - DTR 2/4 biceps  Skin - normal texture   Mood - not depressed    EXAMINATION: US THYROID, 11/26/2018 1:09 PM      COMPARISON: 12/18/2017     HISTORY: Nontoxic multinodular goiter.     Technique: Grayscale and color ultrasound imaging of the thyroid was  performed.     Findings:    Thyroid parenchyma: Heterogeneous with innumerable nodules, similar in  appearance to 12/18/2017. There remains coarse and dystrophic  appearing calcifications on the left. Multiple punctate echogenic foci  seen. There are also punctate echogenic foci likely representing  inspissated colloid as well as foci that could represent  microcalcifications, unchanged. Multiple nodules demonstrate  hypervascularity, stable.     The right lobe of the thyroid measures: 4.4 x 3.2 x 5.1 cm     The thyroid " isthmus measures: 1.6 cm     The left lobe of the thyroid measures: 2.7 x 3 x 5.4 cm                                                                       Impression:  Enlarged heterogeneous thyroid gland with innumerable nodules which  are solid and cystic. There are coarse calcifications as well as  echogenic punctate foci likely inspissated colloid with possible  microcalcifications. No significant interval change from prior  ultrasound 12/18/2017.     I have personally reviewed the examination and initial interpretation  and I agree with the findings.     CANDIS ESPAÑA MD    Assessment and plan:    This 87-year-old woman with a history of Graves' disease now appears to be well controlled on 5 mg of methimazole each day.  I will repeat her thyroid function test today as well as a TSI.  It was elevated last fall when it was measured.  We once again reviewed the appropriate treatment for Graves' disease.  For now we will continue the methimazole.  Given her other co morbidities, I do not want to take her off the drug in order to give her radioactive iodine and risk having hyperthyroid symptoms again.    Her thyroid ultrasound done in 2018 shows she has microcalcifications  that could be due to thyroid cancer.  Given her other health problems, we have elected not to pursue this diagnosis.  However, after her knee replacement and treatment for her bladder cancer, we may want to reconsider this in the future.  I explained this to her.  She says she understands that she may have thyroid  in her cancer but additional work-up for this right now may not be in her best interest.    Follow-up in 6 months.    Dhara Meza MD     - Have at least 2 cups of vegetables a day, at least 2 whole grains a day, 2 pieces of fruit a day, limiting red meat and processed meat to no more than twice per week, increasing fish intake to at least twice a week, having nuts and seeds on most days.

## 2022-01-18 NOTE — H&P ADULT - NSHPREVIEWOFSYSTEMS_GEN_ALL_CORE
GENERAL, CONSTITUTIONAL : denies recent weight loss, fever, chills  EYES, VISION: denies changes in vision   EARS, NOSE, THROAT: denies hearing loss  HEART, CARDIOVASCULAR: + chest pain, palpitations, SOB. Denies arrhythmia, LE edema, claudication  RESPIRATORY: +  SOB. Denies cough, wheezing, PND, orthopnea  GASTROINTESTINAL: Denies abdominal pain, heartburn, recent bloody stool, dark tarry stool  GENITOURINARY: Denies frequent urination, urgency  MUSCULOSKELETAL denies joint pain or swelling, restricted motion, musculoskeletal pain.   SKIN & INTEGUMENTARY Denies rashes, sores, blisters, blisters, growths.  NEUROLOGICAL: Denies numbness or tingling sensations, sensation loss, burning.   PSYCHIATRIC: Denies nervousness, anxiety, depression  ENDOCRINE Denies heat or cold intolerance, excessive thirst  HEMATOLOGIC/LYMPHATIC: Denies abnormal bleeding, bleeding of any kind

## 2022-01-18 NOTE — H&P ADULT - ATTENDING COMMENTS
Initial attending contact date 1/18/22     . See PA note written above for details. I reviewed the PA documentation. I have personally seen and examined this patient. I reviewed vitals, labs, medications, cardiac studies, and additional imaging. I agree with the above PA's findings and plans as written above with the following additions/statements.    50 y/o Male, former heavy smoker, former ETOH abuser with PMHx of HTN, HLD, DM-2, iron deficiency anemia (baseline Hgb 8s in 12/2021, EGD 04/2021 with antral gastritis and duodenitis), internal hemorrhoids s/p hemorrhoidectomy 12/17/21, CAD (w/ MI in 2010 and multiple PCIs, NSTEMI s/p JUAN pLAD and OM1 7/22/20, brachytherapy OM1 8/6/20) admitted with CP- r/o ACS from Dr Rivera office  -Trop negative, EKG nonischemic  -s/p CATH : ISR OM1, other stents patent  -With worsening anemia- hgb7.2. Fe studies pending. To be transfused 2 u prbc today   -Plan for dc 1/19 on current meds including ASA, plavix upon DC

## 2022-01-18 NOTE — DISCHARGE NOTE PROVIDER - PROVIDER TOKENS
PROVIDER:[TOKEN:[[id number]],FOLLOWUP:[1 week]],PROVIDER:[TOKEN:[[id number]],FOLLOWUP:[1 week]]

## 2022-01-18 NOTE — DISCHARGE NOTE PROVIDER - NSDCCPCAREPLAN_GEN_ALL_CORE_FT
PRINCIPAL DISCHARGE DIAGNOSIS  Diagnosis: CAD (coronary artery disease)  Assessment and Plan of Treatment: - You have a known history of coroanry artery disease with stents in the past to open the blockages and improve the blood flow to the heart. You came to the hospital with complaints of chest pain prior to your scehduled cardiac cath. Blood work was drawn and an EKG was performed and you DID NOT have a heart attack.   - You underwent a cardiac cath which showed that all of your stents were patent/open EXCEPT for the stent which had "re-stenosis" to the OM1 artery. You will return on 2/1/22 for a planned procedure- planned stent to the artery to open the blockage and improve the blood flow to the heart.  - Do NOT drive or operate hazardous machinery for 24 hours. Limit your physical activity for 24-48 hours. Do NOT engage in sports, heavy work or heavy lifting for 72 hours.   - You MAY shower BUT no TUB BATHS, HOT TUBS OR SWIMMING FOR 5 DAYS  - Your procedure was done through your left wrist. If you observe flank bleeding from the puncture site, it is an emergency. Please put direct pressure on the site and go directly to the ER. Bleeding under the skin may also occur and a small "black and blue" may be expected. If the area appears to be expanding or swelling around the puncture site, apply manual compression and go immediately to the nearest ER. If your arm/hand becomes cool or blue and/or you are unable to move it, this must be treated as an emergency, go directly to the nearest ER. Look for signs of infection in the wrist: fever, red streaking of the arm, obvious pus formation and pain.      SECONDARY DISCHARGE DIAGNOSES  Diagnosis: Anemia  Assessment and Plan of Treatment: - You have a known history of anemia. Anemia is a term for when a person does not have enough of something called "hemoglobin" in their blood. Hemoglobin helps red blood cells carry oxygen to all parts of the body. If your hemoglobin level is low, your body might not get all the oxygen it needs.  - While you were in the hospital you DID NOT have active bleeding and received 2 Units PRBC (2 blood transfusions). Your hemoglobin on admission was 7.2 and after the transfusions your hemoglobin is now ____   - Please call and schedule an appointment to follow up with your GI Doctor within 1 week of discharge from the hospital.     PRINCIPAL DISCHARGE DIAGNOSIS  Diagnosis: CAD (coronary artery disease)  Assessment and Plan of Treatment: - You have a known history of coroanry artery disease with stents in the past to open the blockages and improve the blood flow to the heart. You came to the hospital with complaints of chest pain prior to your scehduled cardiac cath. Blood work was drawn and an EKG was performed and you DID NOT have a heart attack.   - You underwent a cardiac cath which showed that all of your stents were patent/open EXCEPT for the stent which had "re-stenosis" to the OM1 artery. You will return on 2/1/22 for a planned procedure- planned stent to the artery to open the blockage and improve the blood flow to the heart.  - Do NOT drive or operate hazardous machinery for 24 hours. Limit your physical activity for 24-48 hours. Do NOT engage in sports, heavy work or heavy lifting for 72 hours.   - You MAY shower BUT no TUB BATHS, HOT TUBS OR SWIMMING FOR 5 DAYS  - Your procedure was done through your left wrist. If you observe flank bleeding from the puncture site, it is an emergency. Please put direct pressure on the site and go directly to the ER. Bleeding under the skin may also occur and a small "black and blue" may be expected. If the area appears to be expanding or swelling around the puncture site, apply manual compression and go immediately to the nearest ER. If your arm/hand becomes cool or blue and/or you are unable to move it, this must be treated as an emergency, go directly to the nearest ER. Look for signs of infection in the wrist: fever, red streaking of the arm, obvious pus formation and pain.      SECONDARY DISCHARGE DIAGNOSES  Diagnosis: Anemia  Assessment and Plan of Treatment: - You have a known history of anemia. Anemia is a term for when a person does not have enough of something called "hemoglobin" in their blood. Hemoglobin helps red blood cells carry oxygen to all parts of the body. If your hemoglobin level is low, your body might not get all the oxygen it needs.  - While you were in the hospital you DID NOT have active bleeding and received 2 Units PRBC (2 blood transfusions). Your hemoglobin on admission was 7.2 and after the transfusions your hemoglobin is now ____   - Please call and schedule an appointment to follow up with your GI Doctor within 1 week of discharge from the hospital.    Diagnosis: Diabetes mellitus  Assessment and Plan of Treatment: You have a known history of diabetes for which you take Metformin. As you received contrast intravenously (through your IV) it can affect your kidney function and an accumulation of metformin can happen leading to HYPOGLYCEMIA. For this reason you are to HOLD metformin for 48 hours and you may RESUME IT ON 1/21/22.        PRINCIPAL DISCHARGE DIAGNOSIS  Diagnosis: CAD (coronary artery disease)  Assessment and Plan of Treatment: - You have a known history of coroanry artery disease with stents in the past to open the blockages and improve the blood flow to the heart. You came to the hospital with complaints of chest pain prior to your scehduled cardiac cath. Blood work was drawn and an EKG was performed and you DID NOT have a heart attack.   - You underwent a cardiac cath which showed that all of your stents were patent/open EXCEPT for the stent which had "re-stenosis" or re-occlusion/blockage to the OM1 artery. You will return on 2/1/22 for a planned procedure: planned stent to the artery to open the blockage and improve the blood flow to the heart.  - Do NOT drive or operate hazardous machinery for 24 hours. Limit your physical activity for 24-48 hours. Do NOT engage in sports, heavy work or heavy lifting for 72 hours.   - You MAY shower BUT no TUB BATHS, HOT TUBS OR SWIMMING FOR 5 DAYS  - Your procedure was done through your left wrist. If you observe flank bleeding from the puncture site, it is an emergency. Please put direct pressure on the site and go directly to the ER. Bleeding under the skin may also occur and a small "black and blue" may be expected. If the area appears to be expanding or swelling around the puncture site, apply manual compression and go immediately to the nearest ER. If your arm/hand becomes cool or blue and/or you are unable to move it, this must be treated as an emergency, go directly to the nearest ER. Look for signs of infection in the wrist: fever, red streaking of the arm, obvious pus formation and pain.      SECONDARY DISCHARGE DIAGNOSES  Diagnosis: Anemia  Assessment and Plan of Treatment: - You have a known history of anemia. Anemia is a term for when a person does not have enough of something called "hemoglobin" in their blood. Hemoglobin helps red blood cells carry oxygen to all parts of the body. If your hemoglobin level is low, your body might not get all the oxygen it needs.  - While you were in the hospital you DID NOT have active bleeding and received 2 Units PRBC (2 blood transfusions). Your hemoglobin on admission was 7.2 and after the transfusions your hemoglobin is now 10.3.   - Please call and schedule an appointment to follow up with your GI Doctor- Dr. Barajas within 1 week of discharge from the hospital.    Diagnosis: Diabetes mellitus  Assessment and Plan of Treatment: You have a known history of diabetes for which you take Metformin. As you received contrast intravenously (through your IV) it can affect your kidney function and an accumulation of metformin can happen leading to HYPOGLYCEMIA. For this reason you are to HOLD metformin for 48 hours and you may RESUME IT ON 1/21/22.

## 2022-01-18 NOTE — PATIENT PROFILE ADULT - DO YOU FEEL UNSAFE AT HOME, WORK, OR SCHOOL?
EMILY RUTLEDGE  12-20    eGFR if Non African American: [id number]    Culture - Blood (collected 12-15-JACOBY  Organism: Blood Culture PCR (12-18-Dec 19 2019 12:53PM             (12-19-19 @ 12:41)      CT      CARDIOLOGY TESTING  Transthoracic Echocardiogram:    EXAM:  2-D ECHO (TTE) COMPLETE        PROCEDURE DATE:  12/17/2019      INTERPRETATION:  REPORT:    TRANSTHORACIC ECHOCARDIOGRAM REPORT         Patient Name:   EMILY RUTLEDGE Accession #: [id number]  Medical Rec #:  [id number]      [id number] CARTER SWAIN  Sonographer:         Lucia ESPOSITO  Reading Physician:   Bessie Turner Dec 17 2019 12:34PM             (12-17-19 @ 12:34)  12 Lead ECG:   Ventricular Rate 75 BPM    Atrial Rate 75 BPM    P-R Interval 170 ms    QRS Duration 86 ms    Q-T Interval 464 ms    QTC Calculation(Bezet) 518 ms    P Axis 45 degrees    R Axis -21 degrees    T Axis no

## 2022-01-18 NOTE — H&P ADULT - NSICDXPASTMEDICALHX_GEN_ALL_CORE_FT
PAST MEDICAL HISTORY:  Atherosclerosis of coronary artery     DM (diabetes mellitus), type 2     Essential hypertension     Gastritis     Gastroesophageal reflux disease     Hemorrhoid Internal    Hyperlipidemia     MI (myocardial infarction)

## 2022-01-18 NOTE — DISCHARGE NOTE PROVIDER - NSDCFUADDINST_GEN_ALL_CORE_FT
- Aspirin can put you at increased risk of bleeding; please avoid NSAIDS (such as Motrin, Advil, Ibuprofen, Naproxen, or Aleve, as these medications may further your risk of bleeding - Aspirin AND Plavix can put you at increased risk of bleeding; please avoid NSAIDS (such as Motrin, Advil, Ibuprofen, Naproxen, or Aleve, as these medications may further your risk of bleeding****

## 2022-01-18 NOTE — DISCHARGE NOTE PROVIDER - NSDCMRMEDTOKEN_GEN_ALL_CORE_FT
aspirin 81 mg oral tablet: 1 tab(s) orally once a day  atorvastatin 80 mg oral tablet: 1 tab(s) orally once a day (at bedtime)  carvedilol 6.25 mg oral tablet: 1 tab(s) orally 2 times a day  Colace 100 mg oral capsule: 1 cap(s) orally 2 times a day  enalapril 5 mg oral tablet: 1 tab(s) orally once a day  ferrous sulfate 325 mg (65 mg elemental iron) oral tablet: 1 tab(s) orally 2 times a day   ferrous sulfate 325 mg (65 mg elemental iron) oral tablet: 1 tab(s) orally 2 times a day  Januvia 100 mg oral tablet: 1 tab(s) orally once a day  metFORMIN 500 mg oral tablet: 1 tab(s) orally 2 times a day  nitroglycerin 0.4 mg sublingual tablet: 1 tab(s) orally every 5 minutes, As Needed  for chest pain, maximum 3 doses MDD:3 tablets  pantoprazole 40 mg oral delayed release tablet: 1 tab(s) orally once a day  psyllium 1.7 g oral wafer:   ranolazine 500 mg oral tablet, extended release: 1 tab(s) orally 2 times a day  Senna 8.6 mg oral tablet: 1 tab(s) orally 2 times a day  sildenafil 100 mg oral tablet: 1 tab(s) orally once a day, As Needed  -for erectil dysfunction   Adult Aspirin Regimen 81 mg oral delayed release tablet: 1 tab(s) orally once a day  atorvastatin 80 mg oral tablet: 1 tab(s) orally once a day (at bedtime)  carvedilol 6.25 mg oral tablet: 1 tab(s) orally 2 times a day  clopidogrel 75 mg oral tablet: 1 tab(s) orally once a day  Colace 100 mg oral capsule: 1 cap(s) orally 2 times a day  enalapril 5 mg oral tablet: 1 tab(s) orally once a day  ferrous sulfate 325 mg (65 mg elemental iron) oral tablet: 1 tab(s) orally 2 times a day  Januvia 100 mg oral tablet: 1 tab(s) orally once a day  metFORMIN 500 mg oral tablet: 1 tab(s) orally 2 times a day. RESUME 1/21/22  nitroglycerin 0.4 mg sublingual tablet: 1 tab(s) orally every 5 minutes, As Needed  for chest pain, maximum 3 doses MDD:3 tablets  pantoprazole 40 mg oral delayed release tablet: 1 tab(s) orally once a day  psyllium 1.7 g oral wafer:   ranolazine 500 mg oral tablet, extended release: 1 tab(s) orally 2 times a day  Senna 8.6 mg oral tablet: 1 tab(s) orally 2 times a day  sildenafil 100 mg oral tablet: 1 tab(s) orally once a day, As Needed  -for erectil dysfunction

## 2022-01-18 NOTE — PATIENT PROFILE ADULT - FALL HARM RISK - HARM RISK INTERVENTIONS

## 2022-01-18 NOTE — DISCHARGE NOTE PROVIDER - CARE PROVIDERS DIRECT ADDRESSES
,[email or web address]

## 2022-01-18 NOTE — DISCHARGE NOTE PROVIDER - CARE PROVIDER_API CALL
Mathew Rivera)  Cardiovascular Disease; Internal Medicine; Interventional Cardiology  [address], [facility] [address]  [place] [address]  Phone: [phone]  Fax: [phone]  Follow Up Time: 1 week    Pablito Barajas)  [facility]  [address] , [place] [address]  Phone: [phone]  Fax: [phone]  Follow Up Time: 1 week

## 2022-01-19 ENCOUNTER — TRANSCRIPTION ENCOUNTER (OUTPATIENT)
Age: 52
End: 2022-01-19

## 2022-01-19 VITALS — TEMPERATURE: 97 F

## 2022-01-19 LAB
A1C WITH ESTIMATED AVERAGE GLUCOSE RESULT: 7.7 % — HIGH (ref 4–5.6)
ANION GAP SERPL CALC-SCNC: 11 MMOL/L — SIGNIFICANT CHANGE UP (ref 5–17)
BUN SERPL-MCNC: 9 MG/DL — SIGNIFICANT CHANGE UP (ref 7–23)
CALCIUM SERPL-MCNC: 9.2 MG/DL — SIGNIFICANT CHANGE UP (ref 8.4–10.5)
CHLORIDE SERPL-SCNC: 103 MMOL/L — SIGNIFICANT CHANGE UP (ref 96–108)
CHOLEST SERPL-MCNC: 221 MG/DL — HIGH
CO2 SERPL-SCNC: 24 MMOL/L — SIGNIFICANT CHANGE UP (ref 22–31)
CREAT SERPL-MCNC: 0.9 MG/DL — SIGNIFICANT CHANGE UP (ref 0.5–1.3)
ESTIMATED AVERAGE GLUCOSE: 174 MG/DL — HIGH (ref 68–114)
FERRITIN SERPL-MCNC: 22 NG/ML — LOW (ref 30–400)
FOLATE SERPL-MCNC: 7 NG/ML — SIGNIFICANT CHANGE UP
GLUCOSE BLDC GLUCOMTR-MCNC: 173 MG/DL — HIGH (ref 70–99)
GLUCOSE BLDC GLUCOMTR-MCNC: 184 MG/DL — HIGH (ref 70–99)
GLUCOSE SERPL-MCNC: 171 MG/DL — HIGH (ref 70–99)
HCT VFR BLD CALC: 36.1 % — LOW (ref 39–50)
HDLC SERPL-MCNC: 38 MG/DL — LOW
HGB BLD-MCNC: 10.3 G/DL — LOW (ref 13–17)
IRON SATN MFR SERPL: 190 UG/DL — HIGH (ref 45–165)
IRON SATN MFR SERPL: 39 % — SIGNIFICANT CHANGE UP (ref 16–55)
LIPID PNL WITH DIRECT LDL SERPL: 145 MG/DL — HIGH
MAGNESIUM SERPL-MCNC: 2 MG/DL — SIGNIFICANT CHANGE UP (ref 1.6–2.6)
MCHC RBC-ENTMCNC: 20.5 PG — LOW (ref 27–34)
MCHC RBC-ENTMCNC: 28.5 GM/DL — LOW (ref 32–36)
MCV RBC AUTO: 71.9 FL — LOW (ref 80–100)
NON HDL CHOLESTEROL: 183 MG/DL — HIGH
NRBC # BLD: 0 /100 WBCS — SIGNIFICANT CHANGE UP (ref 0–0)
PLATELET # BLD AUTO: 182 K/UL — SIGNIFICANT CHANGE UP (ref 150–400)
POTASSIUM SERPL-MCNC: 3.8 MMOL/L — SIGNIFICANT CHANGE UP (ref 3.5–5.3)
POTASSIUM SERPL-SCNC: 3.8 MMOL/L — SIGNIFICANT CHANGE UP (ref 3.5–5.3)
RBC # BLD: 5.02 M/UL — SIGNIFICANT CHANGE UP (ref 4.2–5.8)
RBC # FLD: 19.6 % — HIGH (ref 10.3–14.5)
SODIUM SERPL-SCNC: 138 MMOL/L — SIGNIFICANT CHANGE UP (ref 135–145)
TIBC SERPL-MCNC: 486 UG/DL — HIGH (ref 220–430)
TRIGL SERPL-MCNC: 190 MG/DL — HIGH
UIBC SERPL-MCNC: 296 UG/DL — SIGNIFICANT CHANGE UP (ref 110–370)
VIT B12 SERPL-MCNC: 341 PG/ML — SIGNIFICANT CHANGE UP (ref 232–1245)
WBC # BLD: 7.05 K/UL — SIGNIFICANT CHANGE UP (ref 3.8–10.5)
WBC # FLD AUTO: 7.05 K/UL — SIGNIFICANT CHANGE UP (ref 3.8–10.5)

## 2022-01-19 PROCEDURE — 87635 SARS-COV-2 COVID-19 AMP PRB: CPT

## 2022-01-19 PROCEDURE — 85730 THROMBOPLASTIN TIME PARTIAL: CPT

## 2022-01-19 PROCEDURE — 86850 RBC ANTIBODY SCREEN: CPT

## 2022-01-19 PROCEDURE — 71045 X-RAY EXAM CHEST 1 VIEW: CPT

## 2022-01-19 PROCEDURE — 82607 VITAMIN B-12: CPT

## 2022-01-19 PROCEDURE — 85025 COMPLETE CBC W/AUTO DIFF WBC: CPT

## 2022-01-19 PROCEDURE — 83540 ASSAY OF IRON: CPT

## 2022-01-19 PROCEDURE — 36430 TRANSFUSION BLD/BLD COMPNT: CPT

## 2022-01-19 PROCEDURE — 86922 COMPATIBILITY TEST ANTIGLOB: CPT

## 2022-01-19 PROCEDURE — 99239 HOSP IP/OBS DSCHRG MGMT >30: CPT

## 2022-01-19 PROCEDURE — 99285 EMERGENCY DEPT VISIT HI MDM: CPT

## 2022-01-19 PROCEDURE — 83735 ASSAY OF MAGNESIUM: CPT

## 2022-01-19 PROCEDURE — 80061 LIPID PANEL: CPT

## 2022-01-19 PROCEDURE — 85027 COMPLETE CBC AUTOMATED: CPT

## 2022-01-19 PROCEDURE — 86900 BLOOD TYPING SEROLOGIC ABO: CPT

## 2022-01-19 PROCEDURE — 84484 ASSAY OF TROPONIN QUANT: CPT

## 2022-01-19 PROCEDURE — 82962 GLUCOSE BLOOD TEST: CPT

## 2022-01-19 PROCEDURE — 83036 HEMOGLOBIN GLYCOSYLATED A1C: CPT

## 2022-01-19 PROCEDURE — 82728 ASSAY OF FERRITIN: CPT

## 2022-01-19 PROCEDURE — 86901 BLOOD TYPING SEROLOGIC RH(D): CPT

## 2022-01-19 PROCEDURE — 87637 SARSCOV2&INF A&B&RSV AMP PRB: CPT

## 2022-01-19 PROCEDURE — 36415 COLL VENOUS BLD VENIPUNCTURE: CPT

## 2022-01-19 PROCEDURE — P9016: CPT

## 2022-01-19 PROCEDURE — 93005 ELECTROCARDIOGRAM TRACING: CPT

## 2022-01-19 PROCEDURE — 85610 PROTHROMBIN TIME: CPT

## 2022-01-19 PROCEDURE — 83550 IRON BINDING TEST: CPT

## 2022-01-19 PROCEDURE — 80053 COMPREHEN METABOLIC PANEL: CPT

## 2022-01-19 PROCEDURE — 82746 ASSAY OF FOLIC ACID SERUM: CPT

## 2022-01-19 PROCEDURE — 83880 ASSAY OF NATRIURETIC PEPTIDE: CPT

## 2022-01-19 PROCEDURE — C1887: CPT

## 2022-01-19 PROCEDURE — C1769: CPT

## 2022-01-19 PROCEDURE — 80048 BASIC METABOLIC PNL TOTAL CA: CPT

## 2022-01-19 PROCEDURE — C1894: CPT

## 2022-01-19 RX ORDER — POTASSIUM CHLORIDE 20 MEQ
20 PACKET (EA) ORAL ONCE
Refills: 0 | Status: COMPLETED | OUTPATIENT
Start: 2022-01-19 | End: 2022-01-19

## 2022-01-19 RX ORDER — CLOPIDOGREL BISULFATE 75 MG/1
1 TABLET, FILM COATED ORAL
Qty: 30 | Refills: 1
Start: 2022-01-19 | End: 2022-03-19

## 2022-01-19 RX ORDER — ASPIRIN/CALCIUM CARB/MAGNESIUM 324 MG
1 TABLET ORAL
Qty: 30 | Refills: 3
Start: 2022-01-19 | End: 2022-05-18

## 2022-01-19 RX ADMIN — Medication 2: at 11:49

## 2022-01-19 RX ADMIN — PANTOPRAZOLE SODIUM 40 MILLIGRAM(S): 20 TABLET, DELAYED RELEASE ORAL at 06:40

## 2022-01-19 RX ADMIN — CLOPIDOGREL BISULFATE 75 MILLIGRAM(S): 75 TABLET, FILM COATED ORAL at 11:40

## 2022-01-19 RX ADMIN — RANOLAZINE 500 MILLIGRAM(S): 500 TABLET, FILM COATED, EXTENDED RELEASE ORAL at 06:40

## 2022-01-19 RX ADMIN — SENNA PLUS 1 TABLET(S): 8.6 TABLET ORAL at 11:40

## 2022-01-19 RX ADMIN — CARVEDILOL PHOSPHATE 6.25 MILLIGRAM(S): 80 CAPSULE, EXTENDED RELEASE ORAL at 06:40

## 2022-01-19 RX ADMIN — Medication 2: at 07:58

## 2022-01-19 RX ADMIN — Medication 20 MILLIEQUIVALENT(S): at 09:42

## 2022-01-19 RX ADMIN — Medication 5 MILLIGRAM(S): at 06:41

## 2022-01-19 RX ADMIN — Medication 81 MILLIGRAM(S): at 11:40

## 2022-01-19 RX ADMIN — Medication 325 MILLIGRAM(S): at 06:41

## 2022-01-19 NOTE — DISCHARGE NOTE NURSING/CASE MANAGEMENT/SOCIAL WORK - NSDCPEFALRISK_GEN_ALL_CORE
For information on Fall & Injury Prevention, visit: [email or web address] OR  [email or web address] OR  https://www.cdc.gov/steadi/patient.html

## 2022-01-19 NOTE — DISCHARGE NOTE NURSING/CASE MANAGEMENT/SOCIAL WORK - NSDCFUADDAPPT_GEN_ALL_CORE_FT
****You will return on 2/1/22 for a planned procedure- planned stent to the artery to open the blockage and improve the blood flow to the heart.****    - Please call and schedule an appointment to follow up with your GI Doctor- Dr. Barajas within 1 week of discharge from the hospital

## 2022-01-19 NOTE — DISCHARGE NOTE NURSING/CASE MANAGEMENT/SOCIAL WORK - PATIENT PORTAL LINK FT
You can access the FollowMyHealth Patient Portal offered by [facility] by registering at the following website: [email or web address]. By joining [facility]’s FollowMyHealth portal, you will also be able to view your health information using other applications (apps) compatible with our system.

## 2022-01-25 DIAGNOSIS — F10.11 ALCOHOL ABUSE, IN REMISSION: ICD-10-CM

## 2022-01-25 DIAGNOSIS — I10 ESSENTIAL (PRIMARY) HYPERTENSION: ICD-10-CM
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
## 2022-01-25 DIAGNOSIS — Z79.82 LONG TERM (CURRENT) USE OF ASPIRIN: ICD-10-CM

## 2022-01-25 DIAGNOSIS — T82.855A STENOSIS OF CORONARY ARTERY STENT, INITIAL ENCOUNTER: ICD-10-CM

## 2022-01-25 DIAGNOSIS — I25.2 OLD MYOCARDIAL INFARCTION: ICD-10-CM

## 2022-01-25 DIAGNOSIS — E78.5 HYPERLIPIDEMIA, UNSPECIFIED: ICD-10-CM

## 2022-01-25 DIAGNOSIS — K29.80 DUODENITIS WITHOUT BLEEDING: ICD-10-CM

## 2022-01-25 DIAGNOSIS — Z87.891 PERSONAL HISTORY OF NICOTINE DEPENDENCE: ICD-10-CM

## 2022-01-25 DIAGNOSIS — D50.9 IRON DEFICIENCY ANEMIA, UNSPECIFIED: ICD-10-CM

## 2022-01-25 DIAGNOSIS — K21.9 GASTRO-ESOPHAGEAL REFLUX DISEASE WITHOUT ESOPHAGITIS: ICD-10-CM

## 2022-01-25 DIAGNOSIS — I25.10 ATHEROSCLEROTIC HEART DISEASE OF NATIVE CORONARY ARTERY WITHOUT ANGINA PECTORIS: ICD-10-CM

## 2022-01-25 DIAGNOSIS — E11.9 TYPE 2 DIABETES MELLITUS WITHOUT COMPLICATIONS: ICD-10-CM

## 2022-01-25 DIAGNOSIS — Z95.5 PRESENCE OF CORONARY ANGIOPLASTY IMPLANT AND GRAFT: ICD-10-CM

## 2022-01-25 DIAGNOSIS — K29.70 GASTRITIS, UNSPECIFIED, WITHOUT BLEEDING: ICD-10-CM

## 2022-01-25 DIAGNOSIS — Z79.84 LONG TERM (CURRENT) USE OF ORAL HYPOGLYCEMIC DRUGS: ICD-10-CM

## 2022-01-27 VITALS
OXYGEN SATURATION: 97 % | WEIGHT: 185.19 LBS | RESPIRATION RATE: 16 BRPM | SYSTOLIC BLOOD PRESSURE: 120 MMHG | DIASTOLIC BLOOD PRESSURE: 82 MMHG | HEART RATE: 84 BPM | TEMPERATURE: 98 F

## 2022-01-27 RX ORDER — CHLORHEXIDINE GLUCONATE 213 G/1000ML
1 SOLUTION TOPICAL ONCE
Refills: 0 | Status: DISCONTINUED | OUTPATIENT
Start: 2022-02-01 | End: 2022-02-01

## 2022-01-27 NOTE — H&P ADULT - BACK
Patient: Meche REEVES Achterling  Procedure(s):  HYSTEROSCOPY, DILATION AND CURETTAGE, ENDOMETRIAL ABLATION  LAPAROSCOPY, RIGHT SALPINGECTOMY (Right)  Anesthesia type: MAC    Patient location: PACU  Last vitals:   Vitals Value Taken Time   /67 05/18/21 1326   Temp 36.9  C (98.4  F) 05/18/21 1326   Pulse 50 05/18/21 1326   Resp 16 05/18/21 1326   SpO2 100 % 05/18/21 1326     Post vital signs: stable  Level of consciousness: awake and responds to simple questions  Post-anesthesia pain: pain controlled  Post-anesthesia nausea and vomiting: no  Pulmonary: unassisted  Cardiovascular: stable  Hydration: adequate  Anesthetic events: no    QCDR Measures:  ASA# 11 - Pign-op Cardiac Arrest: ASA11B - Patient did NOT experience unanticipated cardiac arrest  ASA# 12 - Ping-op Mortality Rate: ASA12B - Patient did NOT die  ASA# 13 - PACU Re-Intubation Rate: ASA13B - Patient did NOT require a new airway mgmt  ASA# 10 - Composite Anes Safety: ASA10A - No serious adverse event    Additional Notes:   not examined

## 2022-01-27 NOTE — H&P ADULT - ASSESSMENT
50yo M w/ PMHx of HTN, HLD, DM T2, EDUARDO (baseline Hgb 8s in 12/2021; EGD 4/2021 w/ antral gastritis and duodenitis), CAD (w/ MI in 2010 and multiple PCIs, NSTEMI s/p JUAN pLAD and OM1 7/22/20, brachytherapy OM1 8/6/20) who initially presented to [facility] ED on 1/18/22 w/ chest pain, and is s/p C 1/18/22 revealing OM1 dz. No intervention at the time given anemia, s/p transfusion. Pt discharged on DAPT and H/H remains stable. He now returns for planned PCI of OM    Denies bleeding, hematuria, hematochezia, melena. H/H stable while on DAPT. Reports compliance. Asa 81 and plavix 75 pre cath  Euvolemic, normal EF. NS 250cc bolus x 1 (pt going into room soon), continue with NS @ 250cc/hr following bolus if pt still in holding  ISS    Mallampati Class II  ASA Class II  Pt is a suitable candidate for moderate sedation.   Risks & benefits of procedure and alternative therapy have been explained to the patient including but not limited to: allergic reaction, bleeding w/possible need for blood transfusion, infection, renal and vascular compromise, limb damage, arrhythmia, stroke, vessel dissection/perforation, Myocardial infarction, emergent CABG. Informed consent obtained and in chart.       Pt is 52yo M w/ PMHx of HTN, HLD, DM T2, EDUARDO (baseline Hgb 8s in 12/2021; EGD 4/2021 w/ antral gastritis and duodenitis), CAD (w/ MI in 2010 and multiple PCIs, NSTEMI s/p JUAN pLAD and OM1 7/22/20, brachytherapy OM1 8/6/20) who initially presented to [facility] ED on 1/18/22 w/ chest pain. At this time pt underwent diagnostic cardiac cath w/ known OM1 disease (report below). No intervention at that time, and pt now presents for staged PCI to OM1. Since, procedure, pt still experiencing CP and MANZO w/ minimal exertion, sometimes associated w/ lightheadedness. Pt denies rest symptoms, abdominal pain, LE edema, palpitations, syncope, PND, orthopnea.     OF NOTE: on recent admission 1/18/22, pt w/ Hgb as low as 7.2 and pt received 2U PRBCs (Hgb on discharge 10.3). No concern for bleeding at that time. Pt was discharged on DAPT (ASA/Plavix), Atorvastatin 80mg PO QHS, Coreg 6.25mg PO BID, Enalapril 5mg PO QD.     Cardiac Cath (1/18/22): LM normal, patent pLAD stent, patent mLAD stent, patent dLAD stent, patent D1 stent, patent pLCx stent, patent dLCx stent, ostial OM1 40%, OM1 95% ISR (at distal stent edge), patent mRCA stent, mRCA 30-40% (at distal stent edge).     In light of patient's risk factors, CCS Class III anginal symptoms, and known OM1 lesion, patient now presents to [facility] for cardiac catheterization with plan for PCI.   Denies bleeding, hematuria, hematochezia, melena. H/H stable while on DAPT. Reports compliance. Asa 81 and plavix 75 pre cath  Euvolemic, normal EF. NS 250cc bolus x 1 (pt going into room soon), continue with NS @ 250cc/hr following bolus if pt still in holding  ISS    Mallampati Class II  ASA Class II  Pt is a suitable candidate for moderate sedation.   Risks & benefits of procedure and alternative therapy have been explained to the patient including but not limited to: allergic reaction, bleeding w/possible need for blood transfusion, infection, renal and vascular compromise, limb damage, arrhythmia, stroke, vessel dissection/perforation, Myocardial infarction, emergent CABG. Informed consent obtained and in chart.

## 2022-01-27 NOTE — H&P ADULT - ATTENDING COMMENTS
Pt is 50yo M w/ PMHx of HTN, HLD, DM T2, EDUARDO (baseline Hgb 8s in 12/2021; EGD 4/2021 w/ antral gastritis and duodenitis), CAD (w/ MI in 2010 and multiple PCIs, NSTEMI s/p JUAN pLAD and OM1 7/22/20, brachytherapy OM1 8/6/20) who initially presented to [facility] ED on 1/18/22 w/ chest pain. At this time pt underwent diagnostic cardiac cath w/ known OM1 disease (report below). No intervention at that time, and pt now presents for staged PCI to OM1. Since, procedure, pt still experiencing CP and MANZO w/ minimal exertion, sometimes associated w/ lightheadedness. Pt denies rest symptoms, abdominal pain, LE edema, palpitations, syncope, PND, orthopnea.     OF NOTE: on recent admission 1/18/22, pt w/ Hgb as low as 7.2 and pt received 2U PRBCs (Hgb on discharge 10.3). No concern for bleeding at that time. Pt was discharged on DAPT (ASA/Plavix), Atorvastatin 80mg PO QHS, Coreg 6.25mg PO BID, Enalapril 5mg PO QD.     Cardiac Cath (1/18/22): LM normal, patent pLAD stent, patent mLAD stent, patent dLAD stent, patent D1 stent, patent pLCx stent, patent dLCx stent, ostial OM1 40%, OM1 95% ISR (at distal stent edge), patent mRCA stent, mRCA 30-40% (at distal stent edge).     In light of patient's risk factors, CCS Class III anginal symptoms, and known OM1 lesion, patient now presents to [facility] for cardiac catheterization with plan for PCI.

## 2022-01-27 NOTE — H&P ADULT - HISTORY OF PRESENT ILLNESS
CARDIOLOGIST: Dr. Vasquez  COVID: ____pt given Hotline # on 1/27/22___  PHARMACY: [facility] (in [place])  ESCORT: niece    Pt is 52yo M w/ PMHx of HTN, HLD, DM T2, EDUARDO (baseline Hgb 8s in 12/2021; EGD 4/2021 w/ antral gastritis and duodenitis), CAD (w/ MI in 2010 and multiple PCIs, NSTEMI s/p JUAN pLAD and OM1 7/22/20, brachytherapy OM1 8/6/20) who initially presented to [facility] ED on 1/18/22 w/ chest pain. At this time pt underwent diagnostic cardiac cath w/ known OM1 disease (report below). No intervention at that time, and pt now presents for staged PCI to [facility]. Since, procedure, pt still experiencing CP and MANZO w/ minimal exertion, sometimes associated w/ lightheadedness. Pt denies rest symptoms, abdominal pain, LE edema, palpitations, syncope, PND, orthopnea.     OF NOTE: on recent admission 1/18/22, pt w/ Hgb as low as 7.2 and pt received 2U PRBCs (Hgb on discharge 10.3). No concern for bleeding at that time. Pt was discharged on DAPT (ASA/Plavix), Atorvastatin 80mg PO QHS, Coreg 6.25mg PO BID, Enalapril 5mg PO QD.     Cardiac Cath (1/18/22): LM normal, patent pLAD stent, patent mLAD stent, patent dLAD stent, patent D1 stent, patent pLCx stent, patent dLCx stent, ostial OM1 40%, OM1 95% ISR (at distal stent edge), patent mRCA stent, mRCA 30-40% (at distal stent edge).     In light of patient's risk factors, CCS Class III anginal symptoms, and known OM1 lesion, patient now presents to [facility] for cardiac catheterization with plan for PCI.      CARDIOLOGIST: Dr. Vasquez  COVID: PCR 1/28 neg in [facility]IE  PHARMACY: [facility] (in [place])  ESCORT: niece    Pt is 52yo M w/ PMHx of HTN, HLD, DM T2, EDUARDO (baseline Hgb 8s in 12/2021; EGD 4/2021 w/ antral gastritis and duodenitis), CAD (w/ MI in 2010 and multiple PCIs, NSTEMI s/p JUAN pLAD and OM1 7/22/20, brachytherapy OM1 8/6/20) who initially presented to [facility] ED on 1/18/22 w/ chest pain. At this time pt underwent diagnostic cardiac cath w/ known OM1 disease (report below). No intervention at that time, and pt now presents for staged PCI to [facility]. Since, procedure, pt still experiencing CP and MANZO w/ minimal exertion, sometimes associated w/ lightheadedness. Pt denies rest symptoms, abdominal pain, LE edema, palpitations, syncope, PND, orthopnea.     OF NOTE: on recent admission 1/18/22, pt w/ Hgb as low as 7.2 and pt received 2U PRBCs (Hgb on discharge 10.3). No concern for bleeding at that time. Pt was discharged on DAPT (ASA/Plavix), Atorvastatin 80mg PO QHS, Coreg 6.25mg PO BID, Enalapril 5mg PO QD.     Cardiac Cath (1/18/22): LM normal, patent pLAD stent, patent mLAD stent, patent dLAD stent, patent D1 stent, patent pLCx stent, patent dLCx stent, ostial OM1 40%, OM1 95% ISR (at distal stent edge), patent mRCA stent, mRCA 30-40% (at distal stent edge).     In light of patient's risk factors, CCS Class III anginal symptoms, and known OM1 lesion, patient now presents to [facility] for cardiac catheterization with plan for PCI.

## 2022-02-01 ENCOUNTER — TRANSCRIPTION ENCOUNTER (OUTPATIENT)
Age: 52
End: 2022-02-01

## 2022-02-01 ENCOUNTER — INPATIENT (INPATIENT)
Facility: HOSPITAL | Age: 52
LOS: 0 days | Discharge: HOME | DRG: 251 | End: 2022-02-02
Attending: INTERNAL MEDICINE | Admitting: INTERNAL MEDICINE
Payer: MEDICAID

## 2022-02-01 DIAGNOSIS — Z98.61 CORONARY ANGIOPLASTY STATUS: Chronic | ICD-10-CM

## 2022-02-01 DIAGNOSIS — Z98.890 OTHER SPECIFIED POSTPROCEDURAL STATES: Chronic | ICD-10-CM

## 2022-02-01 LAB
A1C WITH ESTIMATED AVERAGE GLUCOSE RESULT: 7.9 % — HIGH (ref 4–5.6)
ALBUMIN SERPL ELPH-MCNC: 4.7 G/DL — SIGNIFICANT CHANGE UP (ref 3.3–5)
ALP SERPL-CCNC: 88 U/L — SIGNIFICANT CHANGE UP (ref 40–120)
ALT FLD-CCNC: 12 U/L — SIGNIFICANT CHANGE UP (ref 10–45)
ANION GAP SERPL CALC-SCNC: 13 MMOL/L — SIGNIFICANT CHANGE UP (ref 5–17)
ANISOCYTOSIS BLD QL: SLIGHT — SIGNIFICANT CHANGE UP
APTT BLD: 37.7 SEC — HIGH (ref 27.5–35.5)
AST SERPL-CCNC: 11 U/L — SIGNIFICANT CHANGE UP (ref 10–40)
BASOPHILS # BLD AUTO: 0 K/UL — SIGNIFICANT CHANGE UP (ref 0–0.2)
BASOPHILS NFR BLD AUTO: 0 % — SIGNIFICANT CHANGE UP (ref 0–2)
BILIRUB SERPL-MCNC: 0.4 MG/DL — SIGNIFICANT CHANGE UP (ref 0.2–1.2)
BUN SERPL-MCNC: 12 MG/DL — SIGNIFICANT CHANGE UP (ref 7–23)
CALCIUM SERPL-MCNC: 9.6 MG/DL — SIGNIFICANT CHANGE UP (ref 8.4–10.5)
CHLORIDE SERPL-SCNC: 102 MMOL/L — SIGNIFICANT CHANGE UP (ref 96–108)
CHOLEST SERPL-MCNC: 208 MG/DL — HIGH
CK MB CFR SERPL CALC: 1 NG/ML — SIGNIFICANT CHANGE UP (ref 0–6.7)
CK SERPL-CCNC: 83 U/L — SIGNIFICANT CHANGE UP (ref 30–200)
CO2 SERPL-SCNC: 24 MMOL/L — SIGNIFICANT CHANGE UP (ref 22–31)
CREAT SERPL-MCNC: 0.87 MG/DL — SIGNIFICANT CHANGE UP (ref 0.5–1.3)
EOSINOPHIL # BLD AUTO: 1.02 K/UL — HIGH (ref 0–0.5)
EOSINOPHIL NFR BLD AUTO: 18.9 % — HIGH (ref 0–6)
ESTIMATED AVERAGE GLUCOSE: 180 MG/DL — HIGH (ref 68–114)
GIANT PLATELETS BLD QL SMEAR: PRESENT — SIGNIFICANT CHANGE UP
GLUCOSE BLDC GLUCOMTR-MCNC: 157 MG/DL — HIGH (ref 70–99)
GLUCOSE BLDC GLUCOMTR-MCNC: 180 MG/DL — HIGH (ref 70–99)
GLUCOSE BLDC GLUCOMTR-MCNC: 205 MG/DL — HIGH (ref 70–99)
GLUCOSE SERPL-MCNC: 162 MG/DL — HIGH (ref 70–99)
HCT VFR BLD CALC: 35.3 % — LOW (ref 39–50)
HDLC SERPL-MCNC: 34 MG/DL — LOW
HGB BLD-MCNC: 10.6 G/DL — LOW (ref 13–17)
HYPOCHROMIA BLD QL: SLIGHT — SIGNIFICANT CHANGE UP
INR BLD: 1.06 — SIGNIFICANT CHANGE UP (ref 0.88–1.16)
LIPID PNL WITH DIRECT LDL SERPL: 124 MG/DL — HIGH
LYMPHOCYTES # BLD AUTO: 0.97 K/UL — LOW (ref 1–3.3)
LYMPHOCYTES # BLD AUTO: 18 % — SIGNIFICANT CHANGE UP (ref 13–44)
MACROCYTES BLD QL: SLIGHT — SIGNIFICANT CHANGE UP
MANUAL SMEAR VERIFICATION: SIGNIFICANT CHANGE UP
MCHC RBC-ENTMCNC: 21.3 PG — LOW (ref 27–34)
MCHC RBC-ENTMCNC: 30 GM/DL — LOW (ref 32–36)
MCV RBC AUTO: 71 FL — LOW (ref 80–100)
MICROCYTES BLD QL: SLIGHT — SIGNIFICANT CHANGE UP
MONOCYTES # BLD AUTO: 0.19 K/UL — SIGNIFICANT CHANGE UP (ref 0–0.9)
MONOCYTES NFR BLD AUTO: 3.6 % — SIGNIFICANT CHANGE UP (ref 2–14)
NEUTROPHILS # BLD AUTO: 3.22 K/UL — SIGNIFICANT CHANGE UP (ref 1.8–7.4)
NEUTROPHILS NFR BLD AUTO: 59.5 % — SIGNIFICANT CHANGE UP (ref 43–77)
NON HDL CHOLESTEROL: 174 MG/DL — HIGH
OVALOCYTES BLD QL SMEAR: SLIGHT — SIGNIFICANT CHANGE UP
PLAT MORPH BLD: ABNORMAL
PLATELET # BLD AUTO: 253 K/UL — SIGNIFICANT CHANGE UP (ref 150–400)
POLYCHROMASIA BLD QL SMEAR: SLIGHT — SIGNIFICANT CHANGE UP
POTASSIUM SERPL-MCNC: 3.9 MMOL/L — SIGNIFICANT CHANGE UP (ref 3.5–5.3)
POTASSIUM SERPL-SCNC: 3.9 MMOL/L — SIGNIFICANT CHANGE UP (ref 3.5–5.3)
PROT SERPL-MCNC: 8.2 G/DL — SIGNIFICANT CHANGE UP (ref 6–8.3)
PROTHROM AB SERPL-ACNC: 12.7 SEC — SIGNIFICANT CHANGE UP (ref 10.6–13.6)
RBC # BLD: 4.97 M/UL — SIGNIFICANT CHANGE UP (ref 4.2–5.8)
RBC # FLD: 21.1 % — HIGH (ref 10.3–14.5)
RBC BLD AUTO: ABNORMAL
SMUDGE CELLS # BLD: PRESENT — SIGNIFICANT CHANGE UP
SODIUM SERPL-SCNC: 139 MMOL/L — SIGNIFICANT CHANGE UP (ref 135–145)
SPHEROCYTES BLD QL SMEAR: SLIGHT — SIGNIFICANT CHANGE UP
TRIGL SERPL-MCNC: 251 MG/DL — HIGH
WBC # BLD: 5.41 K/UL — SIGNIFICANT CHANGE UP (ref 3.8–10.5)
WBC # FLD AUTO: 5.41 K/UL — SIGNIFICANT CHANGE UP (ref 3.8–10.5)

## 2022-02-01 PROCEDURE — 93010 ELECTROCARDIOGRAM REPORT: CPT

## 2022-02-01 PROCEDURE — 92920 PRQ TRLUML C ANGIOP 1ART&/BR: CPT | Mod: LC

## 2022-02-01 PROCEDURE — 99152 MOD SED SAME PHYS/QHP 5/>YRS: CPT

## 2022-02-01 RX ORDER — ASPIRIN/CALCIUM CARB/MAGNESIUM 324 MG
81 TABLET ORAL ONCE
Refills: 0 | Status: COMPLETED | OUTPATIENT
Start: 2022-02-01 | End: 2022-02-01

## 2022-02-01 RX ORDER — POTASSIUM CHLORIDE 20 MEQ
20 PACKET (EA) ORAL ONCE
Refills: 0 | Status: COMPLETED | OUTPATIENT
Start: 2022-02-01 | End: 2022-02-01

## 2022-02-01 RX ORDER — CLOPIDOGREL BISULFATE 75 MG/1
75 TABLET, FILM COATED ORAL DAILY
Refills: 0 | Status: DISCONTINUED | OUTPATIENT
Start: 2022-02-02 | End: 2022-02-02

## 2022-02-01 RX ORDER — GLUCAGON INJECTION, SOLUTION 0.5 MG/.1ML
1 INJECTION, SOLUTION SUBCUTANEOUS ONCE
Refills: 0 | Status: DISCONTINUED | OUTPATIENT
Start: 2022-02-01 | End: 2022-02-01

## 2022-02-01 RX ORDER — SODIUM CHLORIDE 9 MG/ML
1000 INJECTION, SOLUTION INTRAVENOUS
Refills: 0 | Status: DISCONTINUED | OUTPATIENT
Start: 2022-02-01 | End: 2022-02-02

## 2022-02-01 RX ORDER — DEXTROSE 50 % IN WATER 50 %
25 SYRINGE (ML) INTRAVENOUS ONCE
Refills: 0 | Status: DISCONTINUED | OUTPATIENT
Start: 2022-02-01 | End: 2022-02-02

## 2022-02-01 RX ORDER — SODIUM CHLORIDE 9 MG/ML
500 INJECTION INTRAMUSCULAR; INTRAVENOUS; SUBCUTANEOUS
Refills: 0 | Status: DISCONTINUED | OUTPATIENT
Start: 2022-02-01 | End: 2022-02-01

## 2022-02-01 RX ORDER — GLUCAGON INJECTION, SOLUTION 0.5 MG/.1ML
1 INJECTION, SOLUTION SUBCUTANEOUS ONCE
Refills: 0 | Status: DISCONTINUED | OUTPATIENT
Start: 2022-02-01 | End: 2022-02-02

## 2022-02-01 RX ORDER — INSULIN LISPRO 100/ML
VIAL (ML) SUBCUTANEOUS
Refills: 0 | Status: DISCONTINUED | OUTPATIENT
Start: 2022-02-01 | End: 2022-02-01

## 2022-02-01 RX ORDER — RANOLAZINE 500 MG/1
500 TABLET, FILM COATED, EXTENDED RELEASE ORAL
Refills: 0 | Status: DISCONTINUED | OUTPATIENT
Start: 2022-02-01 | End: 2022-02-02

## 2022-02-01 RX ORDER — SODIUM CHLORIDE 9 MG/ML
1000 INJECTION, SOLUTION INTRAVENOUS
Refills: 0 | Status: DISCONTINUED | OUTPATIENT
Start: 2022-02-01 | End: 2022-02-01

## 2022-02-01 RX ORDER — SODIUM CHLORIDE 9 MG/ML
1000 INJECTION INTRAMUSCULAR; INTRAVENOUS; SUBCUTANEOUS
Refills: 0 | Status: DISCONTINUED | OUTPATIENT
Start: 2022-02-01 | End: 2022-02-02

## 2022-02-01 RX ORDER — DEXTROSE 50 % IN WATER 50 %
25 SYRINGE (ML) INTRAVENOUS ONCE
Refills: 0 | Status: DISCONTINUED | OUTPATIENT
Start: 2022-02-01 | End: 2022-02-01

## 2022-02-01 RX ORDER — PANTOPRAZOLE SODIUM 20 MG/1
40 TABLET, DELAYED RELEASE ORAL
Refills: 0 | Status: DISCONTINUED | OUTPATIENT
Start: 2022-02-01 | End: 2022-02-02

## 2022-02-01 RX ORDER — DEXTROSE 50 % IN WATER 50 %
15 SYRINGE (ML) INTRAVENOUS ONCE
Refills: 0 | Status: DISCONTINUED | OUTPATIENT
Start: 2022-02-01 | End: 2022-02-01

## 2022-02-01 RX ORDER — ATORVASTATIN CALCIUM 80 MG/1
80 TABLET, FILM COATED ORAL AT BEDTIME
Refills: 0 | Status: DISCONTINUED | OUTPATIENT
Start: 2022-02-01 | End: 2022-02-02

## 2022-02-01 RX ORDER — FERROUS SULFATE 325(65) MG
325 TABLET ORAL
Refills: 0 | Status: DISCONTINUED | OUTPATIENT
Start: 2022-02-01 | End: 2022-02-02

## 2022-02-01 RX ORDER — CLOPIDOGREL BISULFATE 75 MG/1
75 TABLET, FILM COATED ORAL ONCE
Refills: 0 | Status: COMPLETED | OUTPATIENT
Start: 2022-02-01 | End: 2022-02-01

## 2022-02-01 RX ORDER — SODIUM CHLORIDE 9 MG/ML
250 INJECTION INTRAMUSCULAR; INTRAVENOUS; SUBCUTANEOUS ONCE
Refills: 0 | Status: COMPLETED | OUTPATIENT
Start: 2022-02-01 | End: 2022-02-01

## 2022-02-01 RX ORDER — DEXTROSE 50 % IN WATER 50 %
15 SYRINGE (ML) INTRAVENOUS ONCE
Refills: 0 | Status: DISCONTINUED | OUTPATIENT
Start: 2022-02-01 | End: 2022-02-02

## 2022-02-01 RX ORDER — INFLUENZA VIRUS VACCINE 15; 15; 15; 15 UG/.5ML; UG/.5ML; UG/.5ML; UG/.5ML
0.5 SUSPENSION INTRAMUSCULAR ONCE
Refills: 0 | Status: DISCONTINUED | OUTPATIENT
Start: 2022-02-01 | End: 2022-02-02

## 2022-02-01 RX ORDER — ACETAMINOPHEN 500 MG
650 TABLET ORAL ONCE
Refills: 0 | Status: COMPLETED | OUTPATIENT
Start: 2022-02-01 | End: 2022-02-01

## 2022-02-01 RX ORDER — ASPIRIN/CALCIUM CARB/MAGNESIUM 324 MG
81 TABLET ORAL DAILY
Refills: 0 | Status: DISCONTINUED | OUTPATIENT
Start: 2022-02-02 | End: 2022-02-02

## 2022-02-01 RX ORDER — DEXTROSE 50 % IN WATER 50 %
12.5 SYRINGE (ML) INTRAVENOUS ONCE
Refills: 0 | Status: DISCONTINUED | OUTPATIENT
Start: 2022-02-01 | End: 2022-02-01

## 2022-02-01 RX ORDER — DEXTROSE 50 % IN WATER 50 %
12.5 SYRINGE (ML) INTRAVENOUS ONCE
Refills: 0 | Status: DISCONTINUED | OUTPATIENT
Start: 2022-02-01 | End: 2022-02-02

## 2022-02-01 RX ORDER — INSULIN LISPRO 100/ML
VIAL (ML) SUBCUTANEOUS
Refills: 0 | Status: DISCONTINUED | OUTPATIENT
Start: 2022-02-01 | End: 2022-02-02

## 2022-02-01 RX ORDER — CARVEDILOL PHOSPHATE 80 MG/1
6.25 CAPSULE, EXTENDED RELEASE ORAL EVERY 12 HOURS
Refills: 0 | Status: DISCONTINUED | OUTPATIENT
Start: 2022-02-01 | End: 2022-02-02

## 2022-02-01 RX ADMIN — Medication 1: at 19:12

## 2022-02-01 RX ADMIN — Medication 81 MILLIGRAM(S): at 12:54

## 2022-02-01 RX ADMIN — RANOLAZINE 500 MILLIGRAM(S): 500 TABLET, FILM COATED, EXTENDED RELEASE ORAL at 17:17

## 2022-02-01 RX ADMIN — Medication 650 MILLIGRAM(S): at 18:17

## 2022-02-01 RX ADMIN — ATORVASTATIN CALCIUM 80 MILLIGRAM(S): 80 TABLET, FILM COATED ORAL at 22:11

## 2022-02-01 RX ADMIN — Medication 650 MILLIGRAM(S): at 17:17

## 2022-02-01 RX ADMIN — SODIUM CHLORIDE 250 MILLILITER(S): 9 INJECTION INTRAMUSCULAR; INTRAVENOUS; SUBCUTANEOUS at 13:13

## 2022-02-01 RX ADMIN — Medication 2: at 22:11

## 2022-02-01 RX ADMIN — Medication 325 MILLIGRAM(S): at 18:31

## 2022-02-01 RX ADMIN — CARVEDILOL PHOSPHATE 6.25 MILLIGRAM(S): 80 CAPSULE, EXTENDED RELEASE ORAL at 17:17

## 2022-02-01 RX ADMIN — Medication 20 MILLIEQUIVALENT(S): at 13:13

## 2022-02-01 RX ADMIN — SODIUM CHLORIDE 500 MILLILITER(S): 9 INJECTION INTRAMUSCULAR; INTRAVENOUS; SUBCUTANEOUS at 12:52

## 2022-02-01 RX ADMIN — CLOPIDOGREL BISULFATE 75 MILLIGRAM(S): 75 TABLET, FILM COATED ORAL at 12:54

## 2022-02-01 NOTE — PATIENT PROFILE ADULT - FALL HARM RISK - HARM RISK INTERVENTIONS

## 2022-02-01 NOTE — DISCHARGE NOTE PROVIDER - NSDCMRMEDTOKEN_GEN_ALL_CORE_FT
Adult Aspirin Regimen 81 mg oral delayed release tablet: 1 tab(s) orally once a day  atorvastatin 80 mg oral tablet: 1 tab(s) orally once a day (at bedtime)  carvedilol 6.25 mg oral tablet: 1 tab(s) orally 2 times a day  clopidogrel 75 mg oral tablet: 1 tab(s) orally once a day  Colace 100 mg oral capsule: 1 cap(s) orally 2 times a day  enalapril 5 mg oral tablet: 1 tab(s) orally once a day  ferrous sulfate 325 mg (65 mg elemental iron) oral tablet: 1 tab(s) orally 2 times a day  Januvia 100 mg oral tablet: 1 tab(s) orally once a day  metFORMIN 500 mg oral tablet: 1 tab(s) orally 2 times a day. RESUME 1/21/22  nitroglycerin 0.4 mg sublingual tablet: 1 tab(s) orally every 5 minutes, As Needed  for chest pain, maximum 3 doses MDD:3 tablets  pantoprazole 40 mg oral delayed release tablet: 1 tab(s) orally once a day  psyllium 1.7 g oral wafer:   ranolazine 500 mg oral tablet, extended release: 1 tab(s) orally 2 times a day  Senna 8.6 mg oral tablet: 1 tab(s) orally 2 times a day  sildenafil 100 mg oral tablet: 1 tab(s) orally once a day, As Needed  -for erectil dysfunction   Adult Aspirin Regimen 81 mg oral delayed release tablet: 1 tab(s) orally once a day  atorvastatin 80 mg oral tablet: 1 tab(s) orally once a day (at bedtime)  cardiac rehab: We have provided you with a prescription for cardiac rehab which is medically supervised exercise program for your heart and has been shown to improve the quantity and quality of life of people with heart disease like yours. You should attend cardiac rehab 3 times per week for 12 weeks. We have provided you with a list of nearby facilities. Please call your insurance carrier to determine which of these facilities are covered under your plan.    Cardiologist: Dr. Rivera  carvedilol 6.25 mg oral tablet: 1 tab(s) orally 2 times a day  clopidogrel 75 mg oral tablet: 1 tab(s) orally once a day  Colace 100 mg oral capsule: 1 cap(s) orally 2 times a day  enalapril 5 mg oral tablet: 1 tab(s) orally once a day  ferrous sulfate 325 mg (65 mg elemental iron) oral tablet: 1 tab(s) orally 2 times a day  Januvia 100 mg oral tablet: 1 tab(s) orally once a day  metFORMIN 500 mg oral tablet: 1 tab(s) orally 2 times a day.   nitroglycerin 0.4 mg sublingual tablet: 1 tab(s) orally every 5 minutes, As Needed  for chest pain, maximum 3 doses MDD:3 tablets  pantoprazole 40 mg oral delayed release tablet: 1 tab(s) orally once a day  psyllium 1.7 g oral wafer:   ranolazine 500 mg oral tablet, extended release: 1 tab(s) orally 2 times a day  Senna 8.6 mg oral tablet: 1 tab(s) orally 2 times a day  sildenafil 100 mg oral tablet: 1 tab(s) orally once a day, As Needed  -for erectil dysfunction   Adult Aspirin Regimen 81 mg oral delayed release tablet: 1 tab(s) orally once a day  atorvastatin 80 mg oral tablet: 1 tab(s) orally once a day (at bedtime)  cardiac rehab: We have provided you with a prescription for cardiac rehab which is medically supervised exercise program for your heart and has been shown to improve the quantity and quality of life of people with heart disease like yours. You should attend cardiac rehab 3 times per week for 12 weeks. We have provided you with a list of nearby facilities. Please call your insurance carrier to determine which of these facilities are covered under your plan.    Cardiologist: Dr. Rivera  carvedilol 6.25 mg oral tablet: 1 tab(s) orally 2 times a day  clopidogrel 75 mg oral tablet: 1 tab(s) orally once a day  Colace 100 mg oral capsule: 1 cap(s) orally 2 times a day  enalapril 5 mg oral tablet: 1 tab(s) orally once a day  ferrous sulfate 325 mg (65 mg elemental iron) oral tablet: 1 tab(s) orally 2 times a day  Januvia 100 mg oral tablet: 1 tab(s) orally once a day  metFORMIN 500 mg oral tablet: 1 tab(s) orally 2 times a day.   nitroglycerin 0.4 mg sublingual tablet: 1 tab(s) orally every 5 minutes, As Needed  for chest pain, maximum 3 doses MDD:3 tablets  pantoprazole 40 mg oral delayed release tablet: 1 tab(s) orally once a day  psyllium 1.7 g oral wafer:   ranolazine 500 mg oral tablet, extended release: 1 tab(s) orally 2 times a day  Senna 8.6 mg oral tablet: 1 tab(s) orally 2 times a day

## 2022-02-01 NOTE — DISCHARGE NOTE PROVIDER - NSDCCPCAREPLAN_GEN_ALL_CORE_FT
PRINCIPAL DISCHARGE DIAGNOSIS  Diagnosis: CAD (coronary artery disease)  Assessment and Plan of Treatment: You underwent successful percutaneous coronary intervention with balloon angioplasty of your obtuse marginal artery in stent restenosis.  --Continue Aspirin 81mg by mouth daily and Plavix 75mg by mouth daily.  --DO NOT STOP TAKING ASPIRIN OR PLAVIX UNLESS INSTRUCTED BY YOUR CARDIOLOGIST TO PREVENT STENT CLOSURE/HEART ATTACK.   ***We have provided you with a prescription for cardiac rehab which is medically supervised exercise program for your heart and has been shown to improve the quantity and quality of life of people with heart disease like yours.   ***You should attend cardiac rehab 3 times per week for 12 weeks. We have provided you with a list of nearby facilities.   ***Please call your insurance carrier to determine which of these facilities are covered under your plan.   ***Please bring this prescription with you to your follow up appointment with your cardiologist who can then further assist you to enroll into a cardiac rehab program.      SECONDARY DISCHARGE DIAGNOSES  Diagnosis: Hypertension  Assessment and Plan of Treatment: Continue HOME MEDS: Carvedilol 6.25mg by mouth every 12 hours and Enalapril 5mg by mouth daily    Diagnosis: Hyperlipidemia  Assessment and Plan of Treatment: Continue HOME MED: Atorvastatin 80mg by mouth bedtime    Diagnosis: Diabetes  Assessment and Plan of Treatment: Continue HOME MED: Januvia 100mg by mouth daily  -- DO NOT TAKE your Metformin for two days. This medication can interact with the contrast used during your procedure therefore we want to ensure the contrast has left your body prior to you restarting your Metformin.   PLEASE HOLD AND RESTART YOUR METFORMIN ON Friday 2/4/22.       PRINCIPAL DISCHARGE DIAGNOSIS  Diagnosis: CAD (coronary artery disease)  Assessment and Plan of Treatment: You underwent successful percutaneous coronary intervention with balloon angioplasty of your obtuse marginal artery in stent restenosis.  --Continue Aspirin 81mg by mouth daily and Plavix 75mg by mouth daily.  --DO NOT STOP TAKING ASPIRIN OR PLAVIX UNLESS INSTRUCTED BY YOUR CARDIOLOGIST TO PREVENT STENT CLOSURE/HEART ATTACK.   ***We have provided you with a prescription for cardiac rehab which is medically supervised exercise program for your heart and has been shown to improve the quantity and quality of life of people with heart disease like yours.   ***You should attend cardiac rehab 3 times per week for 12 weeks. We have provided you with a list of nearby facilities.   ***Please call your insurance carrier to determine which of these facilities are covered under your plan.   ***Please bring this prescription with you to your follow up appointment with your cardiologist who can then further assist you to enroll into a cardiac rehab program.      SECONDARY DISCHARGE DIAGNOSES  Diagnosis: Hypertension  Assessment and Plan of Treatment: Hypertension, commonly called high blood pressure, is when the force of blood pumping through your arteries is too strong. Hypertension forces your heart to work harder to pump blood. Your arteries may become narrow or stiff. Having untreated or uncontrolled hypertension for a long period of time can cause heart attack, stroke, kidney disease, and other problems. If started on a medication, take exactly as prescribed by your health care professional. Maintain a healthy lifestyle and follow up with your primary care physician.  Continue HOME MEDS: Carvedilol 6.25mg by mouth every 12 hours and Enalapril 5mg by mouth daily    Diagnosis: Hyperlipidemia  Assessment and Plan of Treatment: Continue HOME MED: Atorvastatin 80mg by mouth bedtime    Diagnosis: Diabetes  Assessment and Plan of Treatment: Continue HOME MED: Januvia 100mg by mouth daily  -- DO NOT TAKE your Metformin for two days. This medication can interact with the contrast used during your procedure therefore we want to ensure the contrast has left your body prior to you restarting your Metformin.   PLEASE HOLD AND RESTART YOUR METFORMIN ON Friday 2/4/22.       PRINCIPAL DISCHARGE DIAGNOSIS  Diagnosis: CAD (coronary artery disease)  Assessment and Plan of Treatment: You underwent successful percutaneous coronary intervention with balloon angioplasty of your obtuse marginal artery in stent restenosis.  --Continue Aspirin 81mg by mouth daily and Plavix 75mg by mouth daily.  --DO NOT STOP TAKING ASPIRIN OR PLAVIX UNLESS INSTRUCTED BY YOUR CARDIOLOGIST TO PREVENT STENT CLOSURE/HEART ATTACK.   ***We have provided you with a prescription for cardiac rehab which is medically supervised exercise program for your heart and has been shown to improve the quantity and quality of life of people with heart disease like yours.   ***You should attend cardiac rehab 3 times per week for 12 weeks. We have provided you with a list of nearby facilities.   ***Please call your insurance carrier to determine which of these facilities are covered under your plan.   ***Please bring this prescription with you to your follow up appointment with your cardiologist who can then further assist you to enroll into a cardiac rehab program.      SECONDARY DISCHARGE DIAGNOSES  Diagnosis: Hypertension  Assessment and Plan of Treatment: Hypertension, commonly called high blood pressure, is when the force of blood pumping through your arteries is too strong. Hypertension forces your heart to work harder to pump blood. Your arteries may become narrow or stiff. Having untreated or uncontrolled hypertension for a long period of time can cause heart attack, stroke, kidney disease, and other problems. If started on a medication, take exactly as prescribed by your health care professional. Maintain a healthy lifestyle and follow up with your primary care physician.  Continue HOME MEDS: Carvedilol 6.25mg by mouth every 12 hours and Enalapril 5mg by mouth daily    Diagnosis: Hyperlipidemia  Assessment and Plan of Treatment: Continue HOME MED: Atorvastatin 80mg by mouth bedtime    Diagnosis: Diabetes  Assessment and Plan of Treatment: Continue HOME MED: Januvia 100mg by mouth daily  -- DO NOT TAKE your Metformin for two days. This medication can interact with the contrast used during your procedure therefore we want to ensure the contrast has left your body prior to you restarting your Metformin.   PLEASE HOLD AND RESTART YOUR METFORMIN ON Friday 2/4/22.   Your blood glucose has been elevated this admission; please follow up with your endocrinologist/primary care provider for further medication mangement.  Please continue a heart healthy diet low in sodium, cholesterol, and fat.

## 2022-02-01 NOTE — DISCHARGE NOTE PROVIDER - HOSPITAL COURSE
50yo M w/ PMHx of HTN, HLD, DM T2, EDUARDO (baseline Hgb 8s in 12/2021; EGD 4/2021 w/ antral gastritis and duodenitis), CAD (w/ MI in 2010 and multiple PCIs, NSTEMI s/p JUAN pLAD and OM1 7/22/20, brachytherapy OM1 8/6/20) who initially presented to [facility] ED on 1/18/22 w/ chest pain. At this time pt underwent diagnostic cardiac cath w/ known OM1 disease, no intervention at that time. Patient with persistent CCS Angina Class III symptoms now returned for staged intervention. Patient s/p cardiac catheterization 2/1/22 with successful PTCA OM1 ISR, also noted to have dLM 20%, LAD patent stent, D1 patent stent, pLCx 30% ISR. Patient admitted to [facility] for monitoring post procedure. Patient currently asymptomatic, VSS, left radial access site soft, NT, no hematoma, radial pulse 2+. Labs/telemetry reviewed. Patient deemed stable for discharge as per Dr. Spaulding and to follow-up with Dr. Rivera in 1-2 weeks. All needed prescriptions have been e-prescribed to patients preferred outpatient pharmacy.       Cardiac Rehab (STEMI/NSTEMI/ACS/Unstable Angina/CHF/Chronic Stable Angina/Heart Surgery (CABG, Valve)/Post PCI):            *Education on benefits of Cardiac Rehab provided to patient: Yes/No         *Referral and Prescription Given for Cardiac Rehab : Yes/No.  If No, Why Not?         *Pt given list of locations & instructed to contact their insurance company to review list of participating providers   50yo M w/ PMHx of HTN, HLD, DM T2, EDUARDO (baseline Hgb 8s in 12/2021; EGD 4/2021 w/ antral gastritis and duodenitis), CAD (w/ MI in 2010 and multiple PCIs, NSTEMI s/p JUAN pLAD and OM1 7/22/20, brachytherapy OM1 8/6/20) who initially presented to [facility] ED on 1/18/22 w/ chest pain. At this time pt underwent diagnostic cardiac cath w/ known OM1 disease, no intervention at that time. Patient with persistent CCS Angina Class III symptoms now returned for staged intervention. Patient s/p cardiac catheterization 2/1/22 with successful PTCA OM1 ISR, also noted to have dLM 20%, LAD patent stent, D1 patent stent, pLCx 30% ISR. Patient admitted to [facility] for monitoring post procedure. Patient currently asymptomatic, VSS, left radial access site soft, NT, no hematoma, radial pulse 2+. Labs/telemetry reviewed. Patient deemed stable for discharge as per Dr. Spaulding and to follow-up with Dr. Rivera in 1-2 weeks. All needed prescriptions have been e-prescribed to patients preferred outpatient pharmacy.       Cardiac Rehab (STEMI/NSTEMI/ACS/Unstable Angina/CHF/Chronic Stable Angina/Heart Surgery (CABG, Valve)/Post PCI):            *Education on benefits of Cardiac Rehab provided to patient: Yes/No         *Referral and Prescription Given for Cardiac Rehab : Yes/No.  If No, Why Not?         *Pt given list of locations & instructed to contact their insurance company to review list of participating providers: YES    50yo M w/ PMHx of HTN, HLD, DM T2, EDUARDO (baseline Hgb 8s in 12/2021; EGD 4/2021 w/ antral gastritis and duodenitis), CAD (w/ MI in 2010 and multiple PCIs, NSTEMI s/p JUAN pLAD and OM1 7/22/20, brachytherapy OM1 8/6/20) who initially presented to [facility] ED on 1/18/22 w/ chest pain. At this time pt underwent diagnostic cardiac cath w/ known OM1 disease, no intervention at that time. Patient with persistent CCS Angina Class III symptoms now returned for staged intervention. Patient s/p cardiac catheterization 2/1/22 with successful PTCA OM1 ISR, also noted to have dLM 20%, LAD patent stent, D1 patent stent, pLCx 30% ISR. Patient admitted to [facility] for monitoring post procedure. Patient currently asymptomatic, VSS, left radial access site soft, NT, no hematoma, radial pulse 2+. Labs/telemetry reviewed. Patient deemed stable for discharge as per Dr. Spaulding and to follow-up with Dr. Rivera in 1-2 weeks. All needed prescriptions have been e-prescribed to patients preferred outpatient pharmacy.       Cardiac Rehab (STEMI/NSTEMI/ACS/Unstable Angina/CHF/Chronic Stable Angina/Heart Surgery (CABG, Valve)/Post PCI):            *Education on benefits of Cardiac Rehab provided to patient: Yes/No         *Referral and Prescription Given for Cardiac Rehab : Yes/No.  If No, Why Not?         *Pt given list of locations & instructed to contact their insurance company to review list of participating providers: YES    52yo M w/ PMHx of HTN, HLD, DM T2, EDUARDO (baseline Hgb 8s in 12/2021; EGD 4/2021 w/ antral gastritis and duodenitis), CAD (w/ MI in 2010 and multiple PCIs, NSTEMI s/p JUAN pLAD and OM1 7/22/20, brachytherapy OM1 8/6/20) who initially presented to [facility] ED on 1/18/22 w/ chest pain. At this time pt underwent diagnostic cardiac cath w/ known OM1 disease, no intervention at that time. Patient with persistent CCS Angina Class III symptoms now returned for staged intervention. Patient s/p cardiac catheterization 2/1/22 with successful PTCA OM1 ISR, also noted to have dLM 20%, LAD patent stent, D1 patent stent, pLCx 30% ISR. Patient admitted to [facility] for monitoring post procedure. Patient currently asymptomatic, VSS, left radial access site soft, NT, no hematoma, radial pulse 2+. Labs/telemetry reviewed. Patient deemed stable for discharge as per Dr. Spaulding and to follow-up with Dr. Rivera in 1-2 weeks. All needed prescriptions have been e-prescribed to patients preferred outpatient pharmacy.     -300s this admission; A1c 7.9; pt. instructed to c/w home meds and f/u with his endocrinologist/PCP for further med management.       Cardiac Rehab (STEMI/NSTEMI/ACS/Unstable Angina/CHF/Chronic Stable Angina/Heart Surgery (CABG, Valve)/Post PCI):            *Education on benefits of Cardiac Rehab provided to patient: Yes/No         *Referral and Prescription Given for Cardiac Rehab : Yes/No.  If No, Why Not?         *Pt given list of locations & instructed to contact their insurance company to review list of participating providers: YES

## 2022-02-01 NOTE — DISCHARGE NOTE PROVIDER - NSDCFUADDINST_GEN_ALL_CORE_FT
***You underwent a coronary angiogram and should wait 3 days before returning to ordinary activities.   ***The catheter from your radial was removed and you should remove the dressing in 24 hours.   ***You may shower once the dressing is removed, but avoid baths, hot tubs, or swimming for 5 days to prevent infection.   ***If you notice bleeding from the site, hardening and pain at the site, drainage or redness from the site, coolness/paleness of the extremity, swelling, or fever, please call [phone].

## 2022-02-01 NOTE — DISCHARGE NOTE PROVIDER - CARE PROVIDER_API CALL
Mathew Rivera)  Cardiovascular Disease; Internal Medicine; Interventional Cardiology  [address], [facility] [address]  [place], [place] [address]  Phone: [phone]  Fax: [phone]  Follow Up Time: 2 weeks

## 2022-02-01 NOTE — DISCHARGE NOTE PROVIDER - NSDCQMSTAIRS_GEN_ALL_CORE
[place]  Department of Pulmonary, Critical Care and Sleep Medicine  [address] [facility]  Department of Internal Medicine  Daily Progress Note    Ana Maria Blevins DO, Macy Johnson MD, [facility]    Dear Aaron Sung DO    We had the pleasure of doing a phone visit on your patient, Marilou Carney, in the [address] regarding her multiple lung nodules & tobacco abuse. HISTORY OF PRESENT ILLNESS:    Marilou Carney is a 77 y.o. female with hyperlipidemia, hypertension, tobacco abuse, lung nodule, history of syncope on beta blockers, fibrocystic breast disease and chronic pain. 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 She was on Chantix before and actually did quite well and was able to reduce her tobacco use but stopped it due to one fleeting episode that occurred while driving. Her history goes back to July, when she developed muscle pain; diffusely. Initially thought it was a bladder infection and started on Ciprofloxacin. Unfortunately, Ciprofloxacin made symptoms worse. She went back to see you, at that point, you thought it was the Lipitor and medication was stopped. She was given a Medrol dose pack and some Darvocet with an improvement. Throughout all of this, she continued to smoke and for completeness, she underwent a CT scan of the chest, abdomen and pelvis of which reported a 2- 3 mm nodule in the right upper lobe, normal abdomen and pelvis and an asymmetrical 1.7 mm soft tissue density in the left breast which I think is well known. We also discussed her smoking in detail and recommended an updated TB skin test. (Because she has a family history of a brother having TB and was sent to the [facility]) which was negative. On today phone visit, She denies chest pain, shortness of breath or hemoptysis.   She has no fever, chills, chest pain, pleurisy, cough or hemoptysis. We get CT scans (yearly) showed no nodule. Her old her showed a  pulmonary nodules which are stable (2020) when compared with the previous exam.  We discussed smoking sensation. She has now developed chronic cough with some mucous consistent with her COPD. US work up and biopsy on breast was negative. She states she had a cardiac work up at [employer] and it was ok     ALLERGIES:    Allergies   Allergen Reactions    Diclofenac Sodium Other (See Comments)     Sleepiness, weakness, fatigue, loss of appetite, profuse sweating    Celebrex [Celecoxib] Hives    Lipitor Hives       PAST MEDICAL HISTORY:       Diagnosis Date    Breast cyst     Patient states that she is well aware of the area. Patient states that it is a fluid filled cyst under the skin of her left breast that occasionally needs drained.     Chronic back pain     COPD (chronic obstructive pulmonary disease) ([facility])     DLCO 64%    Fibrocystic disease of breast     Hyperlipidemia     Hypertension     Lung nodule     alpha 1 negative    Pneumonia     6 min walk test 12-17-12 85% predicted percent    Shoulder arthritis     Syncope     Tinnitus of both ears 2/1/2018    Tobacco dependence         MEDICATIONS:   Current Outpatient Medications   Medication Sig Dispense Refill    nystatin (MYCOSTATIN) 393732 UNIT/ML suspension Take 5 mLs by mouth 4 times daily 200 mL 1    cefdinir (OMNICEF) 300 MG capsule TAKE ONE CAPSULE BY MOUTH EVERY 12 HOURS FOR 7 DAYS      losartan (COZAAR) 25 MG tablet TAKE ONE TABLET BY MOUTH EVERY DAY FOR 90 DAYS      ibuprofen (ADVIL;MOTRIN) 200 MG tablet Take 800 mg by mouth 2 times daily      diclofenac (VOLTAREN) 75 MG EC tablet Take 1 tablet by mouth 2 times daily 60 tablet 3    metoprolol tartrate (LOPRESSOR) 25 MG tablet TAKE ONE TABLET BY MOUTH TWO TIMES A DAY  11    lisinopril (PRINIVIL;ZESTRIL) 20 MG tablet Take 20 mg by mouth daily      omeprazole (PRILOSEC) 40 MG capsule   Take 20 mg by mouth daily       atenolol (TENORMIN) 25 MG tablet Take 25 mg by mouth daily.  pravastatin (PRAVACHOL) 80 MG tablet Take 80 mg by mouth daily.  alprazolam (XANAX) 0.5 MG tablet   Take 0.5 mg by mouth 3 times daily       loratadine (CLARITIN) 10 MG tablet Take 10 mg by mouth daily.  fish oil-omega-3 fatty acids 1000 MG capsule Take 1 g by mouth daily.  calcium-vitamin D (OSCAL-500) 500-200 MG-UNIT per tablet Take 1 tablet by mouth 2 times daily.  vitamin E 400 UNIT capsule Take 400 Units by mouth daily.  therapeutic multivitamin-minerals (THERAGRAN-M) tablet Take 1 tablet by mouth daily.  Ascorbic Acid (VITAMIN C) 500 MG tablet Take 500 mg by mouth daily.  aspirin 81 MG EC tablet   Take 81 mg by mouth 2 times daily       diphenhydrAMINE (BENADRYL) 25 MG tablet Take 25 mg by mouth nightly as needed. No current facility-administered medications for this visit. SOCIAL AND OCCUPATIONAL HEALTH:  .   from small cell lung cancer. 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 She is a smoker for over 40 pack years. She has one dog at home. No birds. One brother who had TB as an infant. She travels frequently at least once a year; last time was a cruise (two months previously). Her hobbies include going to [place]. She does at least once a month and to [place]. No asbestos or silica dust exposure. No history of recreational or IV drug use. No hot tub exposure. She likes to [employer] Surgical and is well at it. Goes to [employer] [facility].      SOCIAL HISTORY:   Social History     Tobacco Use    Smoking status: Current Every Day Smoker     Packs/day: 1.00     Years: 50.00     Pack years: 50.00    Smokeless tobacco: Never Used    Tobacco comment: would like to quit    Substance Use Topics    Alcohol use: Yes     Comment: socially    Drug use: No       SURGICAL HISTORY:   Past Surgical History:   Procedure Laterality Date    BREAST SURGERY      mass removal, benign    HYSTERECTOMY         FAMILY HISTORY: Mother  from myocardial infarction. Father  from lung cancer. One sibling  from leukemia and bladder cancer. One who had a stroke. Two daughters alive and well. One is present in the office today. Family History   Problem Relation Age of Onset    Heart Disease Mother     High Cholesterol Mother     Cancer Father     Arthritis Sister     High Blood Pressure Sister     High Cholesterol Sister     Cancer Brother     Heart Disease Brother     High Blood Pressure Brother     High Cholesterol Brother     Tuberculosis Brother       Family Status   Relation Name Status    Mother      Father      Sister  Alive    Brother  Alive        REVIEW OF SYSTEMS: The patients health assessment form was reviewed. Constitutional: General health is good. No weight changes. No fevers. No fatigue or weakness. Head: Patient has history of syncope. Skin: No history of changes in pigmentation. No easy bruising or bleeding. EENT: Patient denies any history of color blindness, inflammation, cataracts or glaucoma. Patient denies history of deafness, tinnitus, pain, discharge or recurrent infections. Patient denies history of rhinitis or nasal polyps. Lymphatic: Patient denies history of enlargement, inflammation or pain. Cardiovascular: History of palpations and chest pain. No ascites, rheumatic fever, cold extremities or edema. Respiration: Smokes. Patient denies wheezing or dyspnea. No hemoptysis, TB or asthma. No signs or symptoms of sleep apnea. Rib fracture  Gastrointestinal: Patient denies changes in appetite. No dysphagia, odynophagia or abdominal pain. No hematochezia, melena or hemorrhoids. Colonscopy for IBS was negative . Polyp, rectum, biopsy: Fragments of hyperplastic polyp.  C.  Duodenum, biopsy: Duodenal mucosa with normal villous architecture, No significant pathologic abnormality identified. Genitourinary: Patient denies dysuria, frequency, urgency or incontinence. No history of stones. Musculoskeletal: History of arthritis or joint pains. Spinal > Lumber pain. Wrist fracture during move. Left knee OA with effusion received injections since (7/2019)  Breasts: Fibrocystic breast diease. Mamogram yearly negative. Neurological: Patient denies vertigo. Psychological: Anxiety. Endocrine: No polyuria or polyphagia. No diabetes. Vit D deficiency. Hematopoietic: No history of bleeding disorders or easy bruising. Rheumatic: OA > knees. Worsens. No polyarthritis or inflammatory joint disease. PHYSICAL EXAMINATION:   There were no vitals filed for this visit. Home Vitals given 130/85 mmHg. Pulse 83, Satuations 94%  Constitutional: This is a  77 y.o.  female  who is alert, oriented, cooperative and in no apparent distress. EENT: EOMI BARI  No conjunctival injections. External canals are patent and no discharge was appreciated. Septum was midline, mucosa was without erythema, exudates or cobblestoning. No thrush. Neck: Supple without thyromegaly. No elevated JVP. Trachea was midline. No carotid bruits. Respiratory: Symmetrical without dullness to percussion. Clear to auscultation. No wheezes, rhonchi or rales. No intercostal retraction or use of accessory muscles. No egophony noted. Cardiovascular: Regular without murmur, clicks, gallops or rubs. No heave. Pulses:  Equal bilaterally. Abdomen: Obese, rounded and soft without organomegaly. No rebound, rigidity or guarding was appreciated. Lymphatic: No lymphadenopathy. Musculoskeletal: Ambulate without assistance. Normal curvature of the spine. No gross muscle weakness. Muscle size, tone and strength are normal. No involuntary movements are noted. OA left knee, back and foot. Extremities:  Trace edema. No ulcerations, tenderness or erythema.  Deep tendon reflexes are normal. Left knee pain with edema  Skin:  Warm and dry. Neurological/Psychiatric: General behavior, level of consciousness, thought content and emotional status is normal.  Cranial nerves II-XII are intact. DATA:     Previous Spirometry demonstrates an FVC of 2.66 liters which is 96 % of predicted with an FEV1 of 2.23 liters which is 104 % of predicted. FEV1/FVC ratio is 84 %. Mid expiratory flow rates are 152 % of predicted. Maximum voluntary ventilation is 113 liters per minute or 127 % of predicted. Flow volume loop shows no signs of intrathoracic or extrathoracic process. Impression: Normal Spirometry    Static Lung Volume: reveal a slow vital capacity of 3.74 liters which is 115 % of predicted. The inspiratory capacity is 2.11 liters, 95% of predicted. The thoracic gas volume is 2.97 liters which is normal.  The total lung capacity is 5.08 liters, 98 % of predicted. The RV/TLC ratio is 64 % of predicted. The DlCO is 20.70 ml/min/mmHg which is 82% of predicted. The DlCO/VA (krogh constant) is 6.20 ml/min/mmHg, 120 % of predicted. CT SCAN CHEST 10/2021: Mediastinum: No abnormal lymphadenopathy.  The pulmonary trunk is normal in size. Lungs/pleura:   No pulmonary nodules.  No pleural effusions or pneumothorax. No definite pleural thickening. Upper Abdomen: No acute process identified. Soft Tissues/Bones: No aggressive osseous lesions.  Again demonstrated is 1.5 cm left breast soft tissue lesion (image 54 series 3), which is decreased in size when compared to January 2017. Work up done on breast (-)    CT SCAN CHEST [7/2020]:  Degenerative changes noted in the spine. Thoracic aorta is normal in caliber and mildly atherosclerotic. Stable nodules in the right upper lobe measuring up to 4 mm in size. Central airways are patent. No new nodule, pleural effusion or pneumothorax seen. Thyroid gland and esophagus are unremarkable. Heart is normal in size. No pericardial effusion.  Colonic diverticulum with no evidence of diverticulitis. Visualized upper abdominal organs are otherwise unremarkable. No axillary, mediastinal, hilar or supraclavicular lymphadenopathy seen. Impression - Stable    CT SCAN CHEST [8/2019] We reviewed the films during the inter-disciplinary rounds/conference, which showed degenerative changes noted in the spine. Thoracic aorta is normal in caliber and mildly atherosclerotic. Stable nodules in the right upper lobe measuring up to 4 mm in size. Central airways are patent. No new nodule, pleural effusion or pneumothorax seen. Thyroid gland and esophagus are unremarkable. Heart is normal in size. No pericardial effusion. Colonic diverticuli with no evidence of diverticulitis. Visualized upper abdominal organs are otherwise unremarkable. No axillary, mediastinal, hilar or supraclavicular lymphadenopathy seen. CHEST CT SCAN [8/2/2018]: Impression: Noted 2 previously documented nodules in the right upper lung are less conspicuous, representing a lung RADS category 2 (benign finding). There is a previously documented left breast mass of 2.3 cm diameter, which is not new, and may represent fibrocystic change. Mammographic  and/or ultrasound to better characterize this finding. CHEST CT SCAN [1/2018]: Pulmonary nodule (series 3, image 17), measuring approximately 0.55 cm x 0.44 cm, not seen on the previous exam. Pulmonary nodule (series 3, image 11), measuring approximately 0.3 cm, stable when compared with the previous exam. Left lung: No noncalcified pulmonary nodule or spiculated mass. No supraclavicular, axillary, or mediastinal lymphadenopathy. Hilar lymphadenopathy is difficult to ascertain given the lack of IV contrast administration. . A mass like lesion is noted projecting within the left breast soft tissues, measuring approximately 2.4 x 2.4 cm, partially excluded from the field-of-view.  Finding was present on the previous exam. Visualized portions of the thyroid, heart, esophagus, stomach, liver, gallbladder, spleen, adrenals, kidneys are unremarkable. Pancreas is unremarkable. No lytic or blastic bony lesions. CHEST CT SCAN [2017] Impression: Stable right apical pulmonary nodule as described above. Recommend follow up as per Fleischner criteria below. 2. No focal consolidation, pleural effusion or pneumothorax. 3. Cystic mass lesion in the subcutaneous soft tissues of the anterior left chest wall.  (she is following for the cyst in [facility])    CHEST CT SCAN [2016] Pulmonary nodule (series 3, image 11), measuring approximately 0.31 cm, enlarged since previous exam at which time it measured approximately 0.18 cm. 2. Pulmonary nodule (series 3, image 20), measuring approximately 0.27 cm, not seen on previous exam.  Left lun. No noncalcified pulmonary nodule or spiculated mass. No supraclavicular, mediastinal, or hilar lymphadenopathy is identified. Bilateral axillary lymphadenopathy, on the left measure approximately 1.4 x 1.7 cm and on the right measuring approximately  1.2 x 1.4 cm. CHEST CT SCAN [2015] There are nodular opacities within the left breast unchanged. The adrenal glands appear normal. There are again spinal degenerative changes. Since , there is bi apical pleural parenchymal scars. Again no discrete pulmonary nodule is seen. Scars changes at the medial aspect of the right middle lobe and into the lingula peripherally. CHEST CT SCAN [2014] There are nodular opacities within the left breast one posteriorly and centrally 8.5 mm and the other centrally and more peripherally 1.5 cm. The adrenal glands appear normal. There are again spinal degenerative changes. Since  and  again seen are bi apical pleural parenchymal scars. No discrete pulmonary nodule is seen. Again seen are scars into the medial aspect of the right middle lobe and into the lingula peripherally.  There is stable band of ground glass density into the periphery of the left upper lobe. CHEST CT SCAN [6/2013] Films were read/reviewed/discussed with radiology and shows there are increased interstitial markings on today's examination and this may represent dependent atelectasis. No other mass nodule or infiltrate is identified. The mediastinum fails to demonstrate pathologic adenopathy. There is no hilar adenopathy. There is no axillary beth disease. The adrenal glands are not enlarged. There are no bony abnormalities. Impression: stable appearing right upper lobe lung pulmonary nodule. There are no acute changes or new findings     SIX MINUTE WALK TEST: INTERPRETATION:2017  Using standard reference equation for the six minute walk test [6MWT], the patient is at 85 % of predicted. IMPRESSION:    Andrews Krueger is a 77 y.o. female with  right lung nodules 0.4 mm with mild COPD stable in a active smoker. New complaints of OA pain and red tongue. With this in mind, we would like to proceed with the following;                PLAN:    As for her pulmonary disease I asked her to abstain from smoking, will also have to be keep very close evaluation on her new symptoms of chronic mucus production likely related to the development of worsening of her COPD and may need to institute bronchodilators. We have followed Sania Gonzalez lung nodule for >10 years and continue to follow because of her family history and smoking. The annual CT screening test for her lung nodules. She is to follow up in  6 months for COPD and we will now go yearly on the CT scan since she continues to smoke. We again discussed the smoking cessation program. Health maintenance screening evaluation such as breast and colonoscopy are normal and up-to-date. [facility]. We hope this updates you on our evaluation and clinical thinking. We discussed her new  she met online [facility] site. Thank you for entrusting us to participate in Andrews Krueger [facility]. No

## 2022-02-02 ENCOUNTER — TRANSCRIPTION ENCOUNTER (OUTPATIENT)
Age: 52
End: 2022-02-02

## 2022-02-02 VITALS — TEMPERATURE: 98 F

## 2022-02-02 LAB
ANION GAP SERPL CALC-SCNC: 15 MMOL/L — SIGNIFICANT CHANGE UP (ref 5–17)
BUN SERPL-MCNC: 15 MG/DL — SIGNIFICANT CHANGE UP (ref 7–23)
CALCIUM SERPL-MCNC: 9 MG/DL — SIGNIFICANT CHANGE UP (ref 8.4–10.5)
CHLORIDE SERPL-SCNC: 104 MMOL/L — SIGNIFICANT CHANGE UP (ref 96–108)
CO2 SERPL-SCNC: 22 MMOL/L — SIGNIFICANT CHANGE UP (ref 22–31)
CREAT SERPL-MCNC: 1.01 MG/DL — SIGNIFICANT CHANGE UP (ref 0.5–1.3)
GLUCOSE BLDC GLUCOMTR-MCNC: 271 MG/DL — HIGH (ref 70–99)
GLUCOSE BLDC GLUCOMTR-MCNC: 397 MG/DL — HIGH (ref 70–99)
GLUCOSE SERPL-MCNC: 355 MG/DL — HIGH (ref 70–99)
HCT VFR BLD CALC: 35.9 % — LOW (ref 39–50)
HGB BLD-MCNC: 10.5 G/DL — LOW (ref 13–17)
MAGNESIUM SERPL-MCNC: 1.8 MG/DL — SIGNIFICANT CHANGE UP (ref 1.6–2.6)
MCHC RBC-ENTMCNC: 21.3 PG — LOW (ref 27–34)
MCHC RBC-ENTMCNC: 29.2 GM/DL — LOW (ref 32–36)
MCV RBC AUTO: 73 FL — LOW (ref 80–100)
NRBC # BLD: 0 /100 WBCS — SIGNIFICANT CHANGE UP (ref 0–0)
PLATELET # BLD AUTO: 260 K/UL — SIGNIFICANT CHANGE UP (ref 150–400)
POTASSIUM SERPL-MCNC: 4.4 MMOL/L — SIGNIFICANT CHANGE UP (ref 3.5–5.3)
POTASSIUM SERPL-SCNC: 4.4 MMOL/L — SIGNIFICANT CHANGE UP (ref 3.5–5.3)
RBC # BLD: 4.92 M/UL — SIGNIFICANT CHANGE UP (ref 4.2–5.8)
RBC # FLD: 21.3 % — HIGH (ref 10.3–14.5)
SODIUM SERPL-SCNC: 141 MMOL/L — SIGNIFICANT CHANGE UP (ref 135–145)
WBC # BLD: 6.99 K/UL — SIGNIFICANT CHANGE UP (ref 3.8–10.5)
WBC # FLD AUTO: 6.99 K/UL — SIGNIFICANT CHANGE UP (ref 3.8–10.5)

## 2022-02-02 PROCEDURE — 80053 COMPREHEN METABOLIC PANEL: CPT

## 2022-02-02 PROCEDURE — 83735 ASSAY OF MAGNESIUM: CPT

## 2022-02-02 PROCEDURE — 85730 THROMBOPLASTIN TIME PARTIAL: CPT

## 2022-02-02 PROCEDURE — 85025 COMPLETE CBC W/AUTO DIFF WBC: CPT

## 2022-02-02 PROCEDURE — C1887: CPT

## 2022-02-02 PROCEDURE — 80061 LIPID PANEL: CPT

## 2022-02-02 PROCEDURE — 82550 ASSAY OF CK (CPK): CPT

## 2022-02-02 PROCEDURE — 82962 GLUCOSE BLOOD TEST: CPT

## 2022-02-02 PROCEDURE — 85610 PROTHROMBIN TIME: CPT

## 2022-02-02 PROCEDURE — 82553 CREATINE MB FRACTION: CPT

## 2022-02-02 PROCEDURE — 80048 BASIC METABOLIC PNL TOTAL CA: CPT

## 2022-02-02 PROCEDURE — 83036 HEMOGLOBIN GLYCOSYLATED A1C: CPT

## 2022-02-02 PROCEDURE — C1769: CPT

## 2022-02-02 PROCEDURE — 99239 HOSP IP/OBS DSCHRG MGMT >30: CPT

## 2022-02-02 PROCEDURE — 85027 COMPLETE CBC AUTOMATED: CPT

## 2022-02-02 PROCEDURE — 36415 COLL VENOUS BLD VENIPUNCTURE: CPT

## 2022-02-02 PROCEDURE — 93005 ELECTROCARDIOGRAM TRACING: CPT

## 2022-02-02 RX ORDER — FERROUS SULFATE 325(65) MG
1 TABLET ORAL
Qty: 60 | Refills: 0
Start: 2022-02-02 | End: 2022-03-03

## 2022-02-02 RX ORDER — FERROUS SULFATE 325(65) MG
1 TABLET ORAL
Qty: 0 | Refills: 0 | DISCHARGE

## 2022-02-02 RX ORDER — METFORMIN HYDROCHLORIDE 850 MG/1
1 TABLET ORAL
Qty: 60 | Refills: 2
Start: 2022-02-02 | End: 2022-05-02

## 2022-02-02 RX ORDER — CLOPIDOGREL BISULFATE 75 MG/1
1 TABLET, FILM COATED ORAL
Qty: 30 | Refills: 11
Start: 2022-02-02 | End: 2023-01-27

## 2022-02-02 RX ORDER — PANTOPRAZOLE SODIUM 20 MG/1
1 TABLET, DELAYED RELEASE ORAL
Qty: 30 | Refills: 2
Start: 2022-02-02 | End: 2022-05-02

## 2022-02-02 RX ORDER — SITAGLIPTIN 50 MG/1
1 TABLET, FILM COATED ORAL
Qty: 0 | Refills: 0 | DISCHARGE

## 2022-02-02 RX ORDER — ATORVASTATIN CALCIUM 80 MG/1
1 TABLET, FILM COATED ORAL
Qty: 30 | Refills: 2
Start: 2022-02-02 | End: 2022-05-02

## 2022-02-02 RX ORDER — CARVEDILOL PHOSPHATE 80 MG/1
1 CAPSULE, EXTENDED RELEASE ORAL
Qty: 60 | Refills: 2
Start: 2022-02-02 | End: 2022-05-02

## 2022-02-02 RX ORDER — ASPIRIN/CALCIUM CARB/MAGNESIUM 324 MG
1 TABLET ORAL
Qty: 30 | Refills: 11
Start: 2022-02-02 | End: 2023-01-27

## 2022-02-02 RX ORDER — PANTOPRAZOLE SODIUM 20 MG/1
1 TABLET, DELAYED RELEASE ORAL
Qty: 0 | Refills: 0 | DISCHARGE

## 2022-02-02 RX ORDER — SITAGLIPTIN 50 MG/1
1 TABLET, FILM COATED ORAL
Qty: 30 | Refills: 2
Start: 2022-02-02 | End: 2022-05-02

## 2022-02-02 RX ORDER — RANOLAZINE 500 MG/1
1 TABLET, FILM COATED, EXTENDED RELEASE ORAL
Qty: 60 | Refills: 2
Start: 2022-02-02 | End: 2022-05-02

## 2022-02-02 RX ORDER — CARVEDILOL PHOSPHATE 80 MG/1
1 CAPSULE, EXTENDED RELEASE ORAL
Qty: 0 | Refills: 0 | DISCHARGE

## 2022-02-02 RX ORDER — MAGNESIUM OXIDE 400 MG ORAL TABLET 241.3 MG
800 TABLET ORAL ONCE
Refills: 0 | Status: COMPLETED | OUTPATIENT
Start: 2022-02-02 | End: 2022-02-02

## 2022-02-02 RX ORDER — RANOLAZINE 500 MG/1
1 TABLET, FILM COATED, EXTENDED RELEASE ORAL
Qty: 0 | Refills: 0 | DISCHARGE

## 2022-02-02 RX ADMIN — Medication 5: at 07:22

## 2022-02-02 RX ADMIN — Medication 81 MILLIGRAM(S): at 11:41

## 2022-02-02 RX ADMIN — Medication 325 MILLIGRAM(S): at 05:25

## 2022-02-02 RX ADMIN — CARVEDILOL PHOSPHATE 6.25 MILLIGRAM(S): 80 CAPSULE, EXTENDED RELEASE ORAL at 05:24

## 2022-02-02 RX ADMIN — PANTOPRAZOLE SODIUM 40 MILLIGRAM(S): 20 TABLET, DELAYED RELEASE ORAL at 06:36

## 2022-02-02 RX ADMIN — CLOPIDOGREL BISULFATE 75 MILLIGRAM(S): 75 TABLET, FILM COATED ORAL at 11:41

## 2022-02-02 RX ADMIN — Medication 3: at 11:41

## 2022-02-02 RX ADMIN — RANOLAZINE 500 MILLIGRAM(S): 500 TABLET, FILM COATED, EXTENDED RELEASE ORAL at 05:25

## 2022-02-02 RX ADMIN — MAGNESIUM OXIDE 400 MG ORAL TABLET 800 MILLIGRAM(S): 241.3 TABLET ORAL at 08:38

## 2022-02-02 NOTE — DISCHARGE NOTE NURSING/CASE MANAGEMENT/SOCIAL WORK - PATIENT PORTAL LINK FT
You can access the FollowMyHealth Patient Portal offered by [facility] by registering at the following website: [email or web address]. By joining [employer]’s FollowMyHealth portal, you will also be able to view your health information using other applications (apps) compatible with our system.

## 2022-02-02 NOTE — DISCHARGE NOTE NURSING/CASE MANAGEMENT/SOCIAL WORK - NSDCPEFALRISK_GEN_ALL_CORE
For information on Fall & Injury Prevention, visit: [email or web address] OR  [email or web address] OR  https://www.cdc.gov/steadi/patient.html

## 2022-02-09 NOTE — H&P ADULT - ATTENDING SUPERVISION STATEMENT
Repeat C diff testing now to confirm it is still present. Order placed. Once result is known, will determine next step.    ACP

## 2022-02-10 DIAGNOSIS — T82.855A STENOSIS OF CORONARY ARTERY STENT, INITIAL ENCOUNTER: ICD-10-CM

## 2022-02-10 DIAGNOSIS — K21.9 GASTRO-ESOPHAGEAL REFLUX DISEASE WITHOUT ESOPHAGITIS: ICD-10-CM

## 2022-02-10 DIAGNOSIS — I25.2 OLD MYOCARDIAL INFARCTION: ICD-10-CM

## 2022-02-10 DIAGNOSIS — E11.9 TYPE 2 DIABETES MELLITUS WITHOUT COMPLICATIONS: ICD-10-CM

## 2022-02-10 DIAGNOSIS — Z79.82 LONG TERM (CURRENT) USE OF ASPIRIN: ICD-10-CM
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
## 2022-02-10 DIAGNOSIS — D50.9 IRON DEFICIENCY ANEMIA, UNSPECIFIED: ICD-10-CM

## 2022-02-10 DIAGNOSIS — Z79.02 LONG TERM (CURRENT) USE OF ANTITHROMBOTICS/ANTIPLATELETS: ICD-10-CM

## 2022-02-10 DIAGNOSIS — Z79.84 LONG TERM (CURRENT) USE OF ORAL HYPOGLYCEMIC DRUGS: ICD-10-CM

## 2022-02-10 DIAGNOSIS — I10 ESSENTIAL (PRIMARY) HYPERTENSION: ICD-10-CM

## 2022-02-10 DIAGNOSIS — I25.118 ATHEROSCLEROTIC HEART DISEASE OF NATIVE CORONARY ARTERY WITH OTHER FORMS OF ANGINA PECTORIS: ICD-10-CM
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
## 2022-02-10 DIAGNOSIS — Z95.5 PRESENCE OF CORONARY ANGIOPLASTY IMPLANT AND GRAFT: ICD-10-CM

## 2022-02-10 DIAGNOSIS — E78.5 HYPERLIPIDEMIA, UNSPECIFIED: ICD-10-CM

## 2022-02-14 NOTE — DISCHARGE NOTE PROVIDER - NSDCQMPCI_CARD_ALL_CORE
The following labs labeled with pt sticker and tubed to lab:     [] Blue     [] Lovenia Marianne   [] on ice  [] Green/yellow  [] Green/black [] on ice  [] Yellow  [] Red  [] Pink      [x] COVID-19 swab    [x] Rapid  [] PCR  [] Flu swab  [] Peds Viral Panel     [x] Urine Sample  [] Pelvic Cultures  [] Blood Cultures            Marialuisa Gonzalez RN  02/14/22 2650 No Yes...

## 2022-04-14 NOTE — PATIENT PROFILE ADULT - CENTRAL VENOUS CATHETER/PICC LINE
Attempted to call patient in regards to scheduling appointment for injectafer.  No answer.  Left vm to return phone call.    no

## 2022-04-27 NOTE — CONSULT NOTE ADULT - CONSULT REQUESTED BY NAME
Dr. Yu

## 2022-06-12 NOTE — PATIENT PROFILE ADULT - FALL HARM RISK CONCLUSION
Fall with Harm Risk Assistance with ambulation/Assistance OOB with selected safe patient handling equipment/Communicate Risk of Fall with Harm to all staff/Discuss with provider need for PT consult/Monitor gait and stability/Reinforce activity limits and safety measures with patient and family/Tailored Fall Risk Interventions/Visual Cue: Yellow wristband and red socks/Bed in lowest position, wheels locked, appropriate side rails in place/Call bell, personal items and telephone in reach/Instruct patient to call for assistance before getting out of bed or chair/Non-slip footwear when patient is out of bed/[place] to call system/Physically safe environment - no spills, clutter or unnecessary equipment/Purposeful Proactive Rounding/Room/bathroom lighting operational, light cord in reach

## 2022-06-28 NOTE — ED ADULT TRIAGE NOTE - AS HEIGHT TYPE
Detail Level: Detailed
Quality 226: Preventive Care And Screening: Tobacco Use: Screening And Cessation Intervention: Patient screened for tobacco use and is an ex/non-smoker
Quality 130: Documentation Of Current Medications In The Medical Record: Current Medications Documented
stated

## 2022-08-18 NOTE — PATIENT PROFILE ADULT - OVER THE PAST TWO WEEKS HAVE YOU FELT DOWN, DEPRESSED OR HOPELESS?
CT abdomen and pelvis with contrast/17 May 2021.  1.  No acute abdominal pelvic abnormality.  2.  Status postcholecystectomy.  3.  Minimal colonic diverticulosis without CT evidence for diverticulitis. . no

## 2022-09-08 NOTE — ED ADULT NURSE NOTE - EXTENSIONS OF SELF_ADULT
1415-Labs , hx, and medications reviewed, patient is here for 1 unit blood, was seen by NP prior to arrival. Assessment completed. Discussed plan of care with patient. Patient in agreement. Chair reclined and warm blanket and snack offered.     
1705-Patient tolerated 1 unit of blood well. PIV flushed and wrapped, left in place for transfusion tomorrow.  Discharged without complaints or S/S of adverse event. Patient to return tomorrow, aware of MRI appointment afterwards.  
None

## 2022-11-29 ENCOUNTER — INPATIENT (INPATIENT)
Facility: HOSPITAL | Age: 52
LOS: 6 days | Discharge: HOME HEALTH | DRG: 234 | End: 2022-12-06
Attending: THORACIC SURGERY (CARDIOTHORACIC VASCULAR SURGERY) | Admitting: INTERNAL MEDICINE
Payer: MEDICAID

## 2022-11-29 VITALS
WEIGHT: 179.02 LBS | RESPIRATION RATE: 18 BRPM | DIASTOLIC BLOOD PRESSURE: 98 MMHG | TEMPERATURE: 98 F | SYSTOLIC BLOOD PRESSURE: 162 MMHG | OXYGEN SATURATION: 98 % | HEART RATE: 80 BPM

## 2022-11-29 DIAGNOSIS — T82.855A STENOSIS OF CORONARY ARTERY STENT, INITIAL ENCOUNTER: ICD-10-CM

## 2022-11-29 DIAGNOSIS — Z87.891 PERSONAL HISTORY OF NICOTINE DEPENDENCE: ICD-10-CM

## 2022-11-29 DIAGNOSIS — Z79.02 LONG TERM (CURRENT) USE OF ANTITHROMBOTICS/ANTIPLATELETS: ICD-10-CM

## 2022-11-29 DIAGNOSIS — I10 ESSENTIAL (PRIMARY) HYPERTENSION: ICD-10-CM

## 2022-11-29 DIAGNOSIS — K21.9 GASTRO-ESOPHAGEAL REFLUX DISEASE WITHOUT ESOPHAGITIS: ICD-10-CM

## 2022-11-29 DIAGNOSIS — I11.0 HYPERTENSIVE HEART DISEASE WITH HEART FAILURE: ICD-10-CM

## 2022-11-29 DIAGNOSIS — Z95.5 PRESENCE OF CORONARY ANGIOPLASTY IMPLANT AND GRAFT: ICD-10-CM

## 2022-11-29 DIAGNOSIS — E11.9 TYPE 2 DIABETES MELLITUS WITHOUT COMPLICATIONS: ICD-10-CM

## 2022-11-29 DIAGNOSIS — I25.110 ATHEROSCLEROTIC HEART DISEASE OF NATIVE CORONARY ARTERY WITH UNSTABLE ANGINA PECTORIS: ICD-10-CM

## 2022-11-29 DIAGNOSIS — I95.9 HYPOTENSION, UNSPECIFIED: ICD-10-CM

## 2022-11-29 DIAGNOSIS — Z98.890 OTHER SPECIFIED POSTPROCEDURAL STATES: Chronic | ICD-10-CM

## 2022-11-29 DIAGNOSIS — I25.2 OLD MYOCARDIAL INFARCTION: ICD-10-CM

## 2022-11-29 DIAGNOSIS — D50.9 IRON DEFICIENCY ANEMIA, UNSPECIFIED: ICD-10-CM
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
## 2022-11-29 DIAGNOSIS — I20.0 UNSTABLE ANGINA: ICD-10-CM

## 2022-11-29 DIAGNOSIS — Z79.84 LONG TERM (CURRENT) USE OF ORAL HYPOGLYCEMIC DRUGS: ICD-10-CM

## 2022-11-29 DIAGNOSIS — I50.32 CHRONIC DIASTOLIC (CONGESTIVE) HEART FAILURE: ICD-10-CM

## 2022-11-29 DIAGNOSIS — E78.5 HYPERLIPIDEMIA, UNSPECIFIED: ICD-10-CM

## 2022-11-29 DIAGNOSIS — Z79.82 LONG TERM (CURRENT) USE OF ASPIRIN: ICD-10-CM

## 2022-11-29 DIAGNOSIS — Z98.61 CORONARY ANGIOPLASTY STATUS: Chronic | ICD-10-CM

## 2022-11-29 DIAGNOSIS — K62.89 OTHER SPECIFIED DISEASES OF ANUS AND RECTUM: ICD-10-CM

## 2022-11-29 LAB
ALBUMIN SERPL ELPH-MCNC: 5 G/DL — SIGNIFICANT CHANGE UP (ref 3.3–5)
ALP SERPL-CCNC: 81 U/L — SIGNIFICANT CHANGE UP (ref 40–120)
ALT FLD-CCNC: 13 U/L — SIGNIFICANT CHANGE UP (ref 10–45)
ANION GAP SERPL CALC-SCNC: 12 MMOL/L — SIGNIFICANT CHANGE UP (ref 5–17)
APTT BLD: 36.3 SEC — HIGH (ref 27.5–35.5)
APTT BLD: 52.5 SEC — HIGH (ref 27.5–35.5)
AST SERPL-CCNC: 14 U/L — SIGNIFICANT CHANGE UP (ref 10–40)
BASOPHILS # BLD AUTO: 0.04 K/UL — SIGNIFICANT CHANGE UP (ref 0–0.2)
BASOPHILS NFR BLD AUTO: 0.6 % — SIGNIFICANT CHANGE UP (ref 0–2)
BILIRUB SERPL-MCNC: 0.6 MG/DL — SIGNIFICANT CHANGE UP (ref 0.2–1.2)
BUN SERPL-MCNC: 12 MG/DL — SIGNIFICANT CHANGE UP (ref 7–23)
CALCIUM SERPL-MCNC: 9.7 MG/DL — SIGNIFICANT CHANGE UP (ref 8.4–10.5)
CHLORIDE SERPL-SCNC: 98 MMOL/L — SIGNIFICANT CHANGE UP (ref 96–108)
CO2 SERPL-SCNC: 27 MMOL/L — SIGNIFICANT CHANGE UP (ref 22–31)
CREAT SERPL-MCNC: 0.94 MG/DL — SIGNIFICANT CHANGE UP (ref 0.5–1.3)
EGFR: 98 ML/MIN/1.73M2 — SIGNIFICANT CHANGE UP
EOSINOPHIL # BLD AUTO: 0.54 K/UL — HIGH (ref 0–0.5)
EOSINOPHIL NFR BLD AUTO: 8.4 % — HIGH (ref 0–6)
GLUCOSE BLDC GLUCOMTR-MCNC: 117 MG/DL — HIGH (ref 70–99)
GLUCOSE BLDC GLUCOMTR-MCNC: 164 MG/DL — HIGH (ref 70–99)
GLUCOSE SERPL-MCNC: 222 MG/DL — HIGH (ref 70–99)
HCT VFR BLD CALC: 39.8 % — SIGNIFICANT CHANGE UP (ref 39–50)
HGB BLD-MCNC: 13.2 G/DL — SIGNIFICANT CHANGE UP (ref 13–17)
IMM GRANULOCYTES NFR BLD AUTO: 0.2 % — SIGNIFICANT CHANGE UP (ref 0–0.9)
INR BLD: 1.07 — SIGNIFICANT CHANGE UP (ref 0.88–1.16)
LIDOCAIN IGE QN: 49 U/L — SIGNIFICANT CHANGE UP (ref 7–60)
LYMPHOCYTES # BLD AUTO: 2.05 K/UL — SIGNIFICANT CHANGE UP (ref 1–3.3)
LYMPHOCYTES # BLD AUTO: 31.8 % — SIGNIFICANT CHANGE UP (ref 13–44)
MCHC RBC-ENTMCNC: 27 PG — SIGNIFICANT CHANGE UP (ref 27–34)
MCHC RBC-ENTMCNC: 33.2 GM/DL — SIGNIFICANT CHANGE UP (ref 32–36)
MCV RBC AUTO: 81.6 FL — SIGNIFICANT CHANGE UP (ref 80–100)
MONOCYTES # BLD AUTO: 0.47 K/UL — SIGNIFICANT CHANGE UP (ref 0–0.9)
MONOCYTES NFR BLD AUTO: 7.3 % — SIGNIFICANT CHANGE UP (ref 2–14)
NEUTROPHILS # BLD AUTO: 3.34 K/UL — SIGNIFICANT CHANGE UP (ref 1.8–7.4)
NEUTROPHILS NFR BLD AUTO: 51.7 % — SIGNIFICANT CHANGE UP (ref 43–77)
NRBC # BLD: 0 /100 WBCS — SIGNIFICANT CHANGE UP (ref 0–0)
PA ADP PRP-ACNC: 217 PRU — SIGNIFICANT CHANGE UP (ref 194–418)
PLATELET # BLD AUTO: 172 K/UL — SIGNIFICANT CHANGE UP (ref 150–400)
POTASSIUM SERPL-MCNC: 3.6 MMOL/L — SIGNIFICANT CHANGE UP (ref 3.5–5.3)
POTASSIUM SERPL-SCNC: 3.6 MMOL/L — SIGNIFICANT CHANGE UP (ref 3.5–5.3)
PROT SERPL-MCNC: 8 G/DL — SIGNIFICANT CHANGE UP (ref 6–8.3)
PROTHROM AB SERPL-ACNC: 12.8 SEC — SIGNIFICANT CHANGE UP (ref 10.5–13.4)
RBC # BLD: 4.88 M/UL — SIGNIFICANT CHANGE UP (ref 4.2–5.8)
RBC # FLD: 13.3 % — SIGNIFICANT CHANGE UP (ref 10.3–14.5)
SARS-COV-2 RNA SPEC QL NAA+PROBE: NEGATIVE — SIGNIFICANT CHANGE UP
SODIUM SERPL-SCNC: 137 MMOL/L — SIGNIFICANT CHANGE UP (ref 135–145)
TROPONIN T SERPL-MCNC: 0.01 NG/ML — SIGNIFICANT CHANGE UP (ref 0–0.01)
WBC # BLD: 6.45 K/UL — SIGNIFICANT CHANGE UP (ref 3.8–10.5)
WBC # FLD AUTO: 6.45 K/UL — SIGNIFICANT CHANGE UP (ref 3.8–10.5)

## 2022-11-29 PROCEDURE — 93458 L HRT ARTERY/VENTRICLE ANGIO: CPT | Mod: 26

## 2022-11-29 PROCEDURE — 99284 EMERGENCY DEPT VISIT MOD MDM: CPT

## 2022-11-29 PROCEDURE — 71046 X-RAY EXAM CHEST 2 VIEWS: CPT | Mod: 26

## 2022-11-29 PROCEDURE — 94010 BREATHING CAPACITY TEST: CPT | Mod: 26

## 2022-11-29 PROCEDURE — 93010 ELECTROCARDIOGRAM REPORT: CPT

## 2022-11-29 PROCEDURE — 93571 IV DOP VEL&/PRESS C FLO 1ST: CPT | Mod: 26,52,LD

## 2022-11-29 PROCEDURE — 93880 EXTRACRANIAL BILAT STUDY: CPT | Mod: 26

## 2022-11-29 PROCEDURE — 99222 1ST HOSP IP/OBS MODERATE 55: CPT

## 2022-11-29 PROCEDURE — 99152 MOD SED SAME PHYS/QHP 5/>YRS: CPT

## 2022-11-29 PROCEDURE — 93306 TTE W/DOPPLER COMPLETE: CPT | Mod: 26

## 2022-11-29 RX ORDER — CARVEDILOL PHOSPHATE 80 MG/1
6.25 CAPSULE, EXTENDED RELEASE ORAL EVERY 12 HOURS
Refills: 0 | Status: DISCONTINUED | OUTPATIENT
Start: 2022-11-29 | End: 2022-12-01

## 2022-11-29 RX ORDER — SODIUM CHLORIDE 9 MG/ML
1000 INJECTION, SOLUTION INTRAVENOUS
Refills: 0 | Status: DISCONTINUED | OUTPATIENT
Start: 2022-11-29 | End: 2022-12-01

## 2022-11-29 RX ORDER — ASPIRIN/CALCIUM CARB/MAGNESIUM 324 MG
81 TABLET ORAL DAILY
Refills: 0 | Status: DISCONTINUED | OUTPATIENT
Start: 2022-11-29 | End: 2022-12-01

## 2022-11-29 RX ORDER — SODIUM CHLORIDE 9 MG/ML
250 INJECTION INTRAMUSCULAR; INTRAVENOUS; SUBCUTANEOUS ONCE
Refills: 0 | Status: COMPLETED | OUTPATIENT
Start: 2022-11-29 | End: 2022-11-29

## 2022-11-29 RX ORDER — DEXTROSE 50 % IN WATER 50 %
25 SYRINGE (ML) INTRAVENOUS ONCE
Refills: 0 | Status: DISCONTINUED | OUTPATIENT
Start: 2022-11-29 | End: 2022-12-01

## 2022-11-29 RX ORDER — SENNA PLUS 8.6 MG/1
2 TABLET ORAL AT BEDTIME
Refills: 0 | Status: DISCONTINUED | OUTPATIENT
Start: 2022-11-29 | End: 2022-12-01

## 2022-11-29 RX ORDER — HEPARIN SODIUM 5000 [USP'U]/ML
800 INJECTION INTRAVENOUS; SUBCUTANEOUS
Qty: 25000 | Refills: 0 | Status: DISCONTINUED | OUTPATIENT
Start: 2022-11-29 | End: 2022-11-30

## 2022-11-29 RX ORDER — SODIUM CHLORIDE 9 MG/ML
500 INJECTION INTRAMUSCULAR; INTRAVENOUS; SUBCUTANEOUS
Refills: 0 | Status: DISCONTINUED | OUTPATIENT
Start: 2022-11-29 | End: 2022-12-01

## 2022-11-29 RX ORDER — PSYLLIUM SEED (WITH DEXTROSE)
1 POWDER (GRAM) ORAL
Refills: 0 | Status: DISCONTINUED | OUTPATIENT
Start: 2022-11-29 | End: 2022-12-01

## 2022-11-29 RX ORDER — NITROGLYCERIN 6.5 MG
0.4 CAPSULE, EXTENDED RELEASE ORAL
Refills: 0 | Status: DISCONTINUED | OUTPATIENT
Start: 2022-11-29 | End: 2022-12-01

## 2022-11-29 RX ORDER — ATORVASTATIN CALCIUM 80 MG/1
80 TABLET, FILM COATED ORAL AT BEDTIME
Refills: 0 | Status: DISCONTINUED | OUTPATIENT
Start: 2022-11-29 | End: 2022-12-01

## 2022-11-29 RX ORDER — GLUCAGON INJECTION, SOLUTION 0.5 MG/.1ML
1 INJECTION, SOLUTION SUBCUTANEOUS ONCE
Refills: 0 | Status: DISCONTINUED | OUTPATIENT
Start: 2022-11-29 | End: 2022-12-01

## 2022-11-29 RX ORDER — RANOLAZINE 500 MG/1
500 TABLET, FILM COATED, EXTENDED RELEASE ORAL
Refills: 0 | Status: DISCONTINUED | OUTPATIENT
Start: 2022-11-29 | End: 2022-11-29

## 2022-11-29 RX ORDER — DEXTROSE 50 % IN WATER 50 %
15 SYRINGE (ML) INTRAVENOUS ONCE
Refills: 0 | Status: DISCONTINUED | OUTPATIENT
Start: 2022-11-29 | End: 2022-12-01

## 2022-11-29 RX ORDER — SODIUM CHLORIDE 9 MG/ML
500 INJECTION INTRAMUSCULAR; INTRAVENOUS; SUBCUTANEOUS
Refills: 0 | Status: DISCONTINUED | OUTPATIENT
Start: 2022-11-29 | End: 2022-11-29

## 2022-11-29 RX ORDER — POLYETHYLENE GLYCOL 3350 17 G/17G
17 POWDER, FOR SOLUTION ORAL DAILY
Refills: 0 | Status: DISCONTINUED | OUTPATIENT
Start: 2022-11-29 | End: 2022-12-01

## 2022-11-29 RX ORDER — POTASSIUM CHLORIDE 20 MEQ
40 PACKET (EA) ORAL ONCE
Refills: 0 | Status: COMPLETED | OUTPATIENT
Start: 2022-11-29 | End: 2022-11-29

## 2022-11-29 RX ORDER — PANTOPRAZOLE SODIUM 20 MG/1
40 TABLET, DELAYED RELEASE ORAL
Refills: 0 | Status: DISCONTINUED | OUTPATIENT
Start: 2022-11-29 | End: 2022-12-01

## 2022-11-29 RX ORDER — PSYLLIUM SEED (WITH DEXTROSE)
0 POWDER (GRAM) ORAL
Qty: 0 | Refills: 0 | DISCHARGE

## 2022-11-29 RX ORDER — DEXTROSE 50 % IN WATER 50 %
12.5 SYRINGE (ML) INTRAVENOUS ONCE
Refills: 0 | Status: DISCONTINUED | OUTPATIENT
Start: 2022-11-29 | End: 2022-12-01

## 2022-11-29 RX ORDER — POTASSIUM CHLORIDE 20 MEQ
40 PACKET (EA) ORAL ONCE
Refills: 0 | Status: DISCONTINUED | OUTPATIENT
Start: 2022-11-29 | End: 2022-11-29

## 2022-11-29 RX ORDER — MORPHINE SULFATE 50 MG/1
2 CAPSULE, EXTENDED RELEASE ORAL ONCE
Refills: 0 | Status: DISCONTINUED | OUTPATIENT
Start: 2022-11-29 | End: 2022-11-29

## 2022-11-29 RX ORDER — INSULIN LISPRO 100/ML
VIAL (ML) SUBCUTANEOUS
Refills: 0 | Status: DISCONTINUED | OUTPATIENT
Start: 2022-11-29 | End: 2022-12-01

## 2022-11-29 RX ORDER — ISOSORBIDE MONONITRATE 60 MG/1
30 TABLET, EXTENDED RELEASE ORAL DAILY
Refills: 0 | Status: DISCONTINUED | OUTPATIENT
Start: 2022-11-29 | End: 2022-12-01

## 2022-11-29 RX ORDER — CLOPIDOGREL BISULFATE 75 MG/1
75 TABLET, FILM COATED ORAL DAILY
Refills: 0 | Status: DISCONTINUED | OUTPATIENT
Start: 2022-11-29 | End: 2022-11-29

## 2022-11-29 RX ADMIN — CLOPIDOGREL BISULFATE 75 MILLIGRAM(S): 75 TABLET, FILM COATED ORAL at 09:38

## 2022-11-29 RX ADMIN — CARVEDILOL PHOSPHATE 6.25 MILLIGRAM(S): 80 CAPSULE, EXTENDED RELEASE ORAL at 21:17

## 2022-11-29 RX ADMIN — SODIUM CHLORIDE 240 MILLILITER(S): 9 INJECTION INTRAMUSCULAR; INTRAVENOUS; SUBCUTANEOUS at 16:09

## 2022-11-29 RX ADMIN — MORPHINE SULFATE 2 MILLIGRAM(S): 50 CAPSULE, EXTENDED RELEASE ORAL at 09:38

## 2022-11-29 RX ADMIN — Medication 40 MILLIEQUIVALENT(S): at 18:34

## 2022-11-29 RX ADMIN — SENNA PLUS 2 TABLET(S): 8.6 TABLET ORAL at 21:18

## 2022-11-29 RX ADMIN — SODIUM CHLORIDE 500 MILLILITER(S): 9 INJECTION INTRAMUSCULAR; INTRAVENOUS; SUBCUTANEOUS at 09:38

## 2022-11-29 RX ADMIN — ATORVASTATIN CALCIUM 80 MILLIGRAM(S): 80 TABLET, FILM COATED ORAL at 21:17

## 2022-11-29 RX ADMIN — HEPARIN SODIUM 8 UNIT(S)/HR: 5000 INJECTION INTRAVENOUS; SUBCUTANEOUS at 15:35

## 2022-11-29 RX ADMIN — Medication 81 MILLIGRAM(S): at 09:38

## 2022-11-29 NOTE — ED PROVIDER NOTE - OBJECTIVE STATEMENT
51 y/o male w/ hx htn, hld, t2dm, lisandra, cad with 8 stents on asa/ plavix p/w 2 weeks of intermittent midsternal chest pain that radiates towards L side neck/ L arm.  States last night woke him from sleep at 1am and has been constant since.  Took 2 SL nitro around 1am, with some improvement, but still with pain. +sob and orthopnea.  +diaphoresis preceding pain.  Pain worse with ambulation, better with rest/nitro.  Not pleuritic.  Follows with Dr. Rivera (cards), saw him yesterday, who advised pt to go to ED if he had CP again.  Pt reports recurrent stent restenosis, last admission/ stent placement in Feb 2022.    Of note, pt also mentions has had intermittent rectal pain x approx 10 mo.  Reports had surgery on rectum at [facility] approx 1 yr ago.  Denies f/c, cough, abd pain, n/v/d, black/bloody stools, leg swelling.  Denies smoking, recent travel, hx dvt/pe.

## 2022-11-29 NOTE — H&P ADULT - HISTORY OF PRESENT ILLNESS
53 yo M w/ PMHx of HTN, HLD, DM T2, EDUARDO (baseline Hgb 8s in 12/2021; EGD 4/2021 w/ antral gastritis and duodenitis), CAD (w/ MI in 2010 and multiple PCIs, NSTEMI s/p JUAN pLAD and OM1 7/22/20, brachytherapy OM1 8/6/20) who initially presented to [facility] ED on 1/18/22 w/ chest pain. At this time pt underwent diagnostic cardiac cath w/ known OM1 disease, no intervention at that time. Patient with persistent CCS Angina Class III symptoms now returned for staged intervention. Patient s/p cardiac catheterization 2/1/22 with successful PTCA OM1 ISR, also noted to have dLM 20%, LAD patent stent, D1 patent stent, pLCx 30% ISR. Patient admitted to [facility] for monitoring post procedure. Patient currently asymptomatic, VSS, left radial access site soft, NT, no hematoma, radial pulse 2+. Labs/telemetry reviewed. Patient deemed stable for discharge as per Dr. Spaulding and to follow-up with Dr. Rivera in 1-2 weeks. All needed prescriptions have been e-prescribed to patients preferred outpatient pharmacy.     On asa/ plavix p/w 2 weeks of intermittent midsternal chest pain that radiates towards L side neck/ L arm.  States last night woke him from sleep at 1am and has been constant since.  Took 2 SL nitro around 1am, with some improvement, but still with pain. +sob and orthopnea.  +diaphoresis preceding pain.  Pain worse with ambulation, better with rest/nitro.  Not pleuritic.  	Follows with Dr. Rivera (cards), saw him yesterday, who advised pt to go to ED if he had CP again.   *Meds verified w/ patient list at bedside    53 yo M, former smoker, strong FH of early CAD, w/ PMHx of HTN, HLD, DM T2, EDUARDO (baseline Hgb 8s in 12/2021; EGD 4/2021 w/ antral gastritis and duodenitis + hemorrhoids), CAD (w/ MI in 2010/2020 and multiple PCIs, and recurrent ISR OM1 requiring brachytherapy in the past) who presented to [facility] ED with complaints of an intermittent midsternal chest pain with radiation to the L arm/neck. Chest pain started around 1am last night and woke him from sleep. He took SL Nitro with some improvement however still continues to endorse chest pain. CP is a/w SOB, orthopnea, diaphoresis, and is described as similar to prior stents. Denies dizziness, palpitations, BRBPR, hematuria, melena, LE swelling, positional changes/ reproducible. Pt was seen by Dr. Rivera on 11/28/22 who advised him to go to the ER if CP reoccurred.    In the ER, /98, HR 80, RR 18, SpO2 98% RA, T 98F. Labs s/f K 3.6 s/p 40meq KCl x1. Troponin negative. ECG SR @ 79 bpm with no ST changes or TWI. Pt now admitted for unstable angina with plan for cardiac cath 11/29 w/ Dr. Rivera.

## 2022-11-29 NOTE — ED ADULT TRIAGE NOTE - DOMESTIC TRAVEL HIGH RISK QUESTION
No
Pt lives alone in a house with no steps to enter, no steps inside.   Pt has rolling walker and straight cane

## 2022-11-29 NOTE — H&P ADULT - PROBLEM SELECTOR PLAN 6
Pt w/ hx of hemorrhoid removal x 1 yr ago - continues to have rectal pain for ~10 months  -Per ED exam, no fissures, bleeding  -Afebrile, no leukocytosis  -C/w home laxatives    DVT PPX: holding 2/2 cath  Dispo: pending cath  Case d/w Dr. Spaulding

## 2022-11-29 NOTE — H&P ADULT - NSHPSOCIALHISTORY_GEN_ALL_CORE
former 2 ppd smoker x 14years, quit in 2002  former daily alcohol user - last use 2002  denies recreational drug use

## 2022-11-29 NOTE — H&P ADULT - PROBLEM SELECTOR PLAN 1
P/w intermittent midsternal chest pain w/ radiation to L arm/neck, releieved w/ SL nitro, which woke from sleep  -Similar to prior PCIs  -Troponin negative, ECG SR with no ST changes or TWI  -Cardiac catheterization 2/1/22 with successful PTCA OM1 ISR, also noted to have dLM 20%, LAD patent stent, D1 patent stent, pLCx 30% ISR  -Echocardiogram ordered, f/u results  -NPO, consented, on schedule, pre cath ivf, cath order in -- f/u results   -S/p home ASA/Plavix pre cath  -C/w home ASA 81mg QD, Plavix 75mg QD, Atorvastatin 80mg QD, Coreg 6.25mg BID, Ranexa 500mg BID      51 yo M, former smoker, strong FH of early CAD, w/ PMHx of HTN, HLD, DM T2, EDUARDO (baseline Hgb 8s in 12/2021; EGD 4/2021 w/ antral gastritis and duodenitis + hemorrhoids), CAD (w/ MI in 2010/2020 and multiple PCIs, and recurrent ISR OM1 requiring brachytherapy in the past) who presented to [facility] ED with complaints of an intermittent midsternal chest pain with radiation to the L arm/neck. Chest pain started around 1am last night and woke him from sleep. He took SL Nitro with some improvement however still continues to endorse chest pain. CP is a/w SOB, orthopnea, diaphoresis, and is described as similar to prior stents. Denies dizziness, palpitations, BRBPR, hematuria, melena, LE swelling, positional changes/ reproducible. Pt was seen by Dr. Rivera on 11/28/22 who advised him to go to the ER if CP reoccurred. P/w intermittent midsternal chest pain w/ radiation to L arm/neck, relieved w/ SL nitro, which woke from sleep  -Similar to prior PCIs  -Troponin negative, ECG SR with no ST changes or TWI  -Cardiac catheterization 2/1/22 with successful PTCA OM1 ISR, also noted to have dLM 20%, LAD patent stent, D1 patent stent, pLCx 30% ISR  -Echocardiogram ordered, f/u results  -NPO, consented, on schedule, pre cath ivf, cath order in -- f/u results   -S/p home ASA/Plavix pre cath  -C/w home ASA 81mg QD, Plavix 75mg QD, Atorvastatin 80mg QD, Coreg 6.25mg BID, Ranexa 500mg BID

## 2022-11-29 NOTE — ED ADULT TRIAGE NOTE - CHIEF COMPLAINT QUOTE
Pt co intermittent L sided CP radiating to L side of neck and L shoulder x2 weeks. Pt says pain awoke him from his sleep @2am, he then took 2 sublingual nitro with mild relief. Hx of 6 cardiac stents.

## 2022-11-29 NOTE — PROGRESS NOTE ADULT - ASSESSMENT
53 yo male, former smoker, strong FH of early CAD, w/ PMHx of HTN, HLD, DM T2, EDUARDO (baseline Hgb 8s in 12/2021; EGD 4/2021 w/ antral gastritis and duodenitis + hemorrhoids), CAD (w/ MI in 2010/2020 and multiple PCIs, and recurrent ISR OM1 requiring brachytherapy in the past) who presented to [facility] ED on 11/29 with complaints of an intermittent midsternal chest pain with radiation to the L arm/neck relieved w/ SL Nitro which woke him from sleep. He underwent a cardiac cath which revealed 3V CAD. CT surgery consulted for surgical evaluation. Pt undergoing pre op testing for CABG with Dr. Hale with a tentative OR date of 12/1    Neurovascular:   No delirium. Pain well controlled with current regimen.    Cardiovascular:   Hemodynamically stable. HR controlled  #CAD  -continue aspirin   -Holding plavix, trending a P2Y12 pts last dose of plavix 75 mg was on 11/29 prior to cath   -Started on a Heparin drip, f/u evening PTT   -Started on Imdur 30 QD  -Continue on Carvedilol 6.25 BID  -Continue on Atorvastatin 80 mg QD   -Nitro PRN for chest pain, controlled at this time   HR: 70 (70 - 80)  BP: 149/105 (131/83 - 162/98)    Respiratory:   02 Sat = 98% on RA.  RR: 20 (18 - 20)  SpO2: 98% (98% - 99%)  -Wean to RA from for O2 Sat > 93%.  -Encourage Cough, deep breathing and Use of IS 10x / hr while awake.  -Chest PT 4xdaily    GI:   -Stable  -Continue pantoprazole    Tablet 40 milliGRAM(s) before breakfast  -polyethylene glycol 3350 17 Gram(s) daily  -psyllium Powder 1 Packet(s) two times a day  -senna 2 Tablet(s) at bedtime  -PO DASH diet    Renal / :   BUN/Cr Stable 12/0.94  -Continue to monitor I/O's.    Endocrine:    #DM  -moderate insulin sliding scale started  Blood sugar stable   A1C: 7.9  TSH: pending AM     Hematologic:  #history of iron deficiency anemia  -not taking supplemental iron at home  H/H stable 13.2/39.8  -DVT prophylaxis with Heparin gtt    ID:  -Afebrile  -Continue to observe for SIRS/Sepsis Syndrome.  T(C): 37, Max: 37 (11-29-22 @ 20:44)    Disposition:  Pre op planning for OR on Thursday

## 2022-11-29 NOTE — ED ADULT TRIAGE NOTE - TEMPERATURE IN CELSIUS (DEGREES C)
August 29, 2018      The following recommendations are guidelines for physical activity in school for the above referenced patient.   May participate in the entire physical education program without restrictions including all varsity competitive sports.     If there are any questions or concerns, please do not hesitate to call at [phone].     Sincerely,     Adrien Gibson MD       Electronically signed by : ADRIEN GIBSON M.D.; Aug 29 2018  8:34AM CST     36.7

## 2022-11-29 NOTE — ED ADULT NURSE REASSESSMENT NOTE - NS ED NURSE REASSESS COMMENT FT1
Pt seen in ED for chest pain. Trops WNL. EKG done. Pt placed on cardiac monitoring. vitals stable. PT in no acute distress at this time awaiting xray. MD at bedside.

## 2022-11-29 NOTE — H&P ADULT - PROBLEM SELECTOR PLAN 2
/98 on arrival - did not take home meds  -C/w home Ranexa 500mg BID, Coreg 6.25mg BID, Enalapril 5mg QD (TIC) /98 on arrival - did not take home meds  -C/w home Ranexa 500mg BID, Coreg 6.25mg BID, Enalapril 5mg QD

## 2022-11-29 NOTE — ED PROVIDER NOTE - PHYSICAL EXAMINATION
CONSTITUTIONAL: Awake, alert.  Nontoxic, no acute distress.    HEAD: Normocephalic, atraumatic.    EYES: Conjunctivae clear without exudates or hemorrhage. Sclera is non-icteric.    ENT: Normal appearing external ears, nose, mucous membranes moist.    NECK: supple, trachea midline.    HEART:  Normal rate, regular rhythm.  Heart sounds S1, S2.  No murmurs, rubs or gallops.    LUNGS:  No acute respiratory distress.  Non-tachypneic and non-labored.  Lungs are clear bilaterally with good aeration.  No wheezing, rales, rhonchi.    ABDOMEN: Normal appearing skin without lesions, rashes.  Normal bowel sounds x 4.  Soft, non-distended, non-tender in all four quadrants. No rebound or guarding. No hernias or masses palpable.  No pulsatile abdominal mass.       RECTAL: small lesion to approx 1:00 position of rectum which appears c/w fistula, +ttp, no surrounding erythema/ fluctuance.  No fluctuance/ ttp or mass on internal exam    MUSCULOSKELETAL:  Moving all extremities without issue.   No edema    SKIN: Skin in warm, dry and intact without rashes or lesions.  Appropriate color for ethnicity.    NEUROLOGICAL:  Patient is alert, oriented x person, place and time.    PSYCH: Appropriate mood and affect. Good judgment and insight.

## 2022-11-29 NOTE — ED PROVIDER NOTE - CLINICAL SUMMARY MEDICAL DECISION MAKING FREE TEXT BOX
53 y/o male w/ hx htn, hld, t2dm, lisandra, cad with 8 stents on asa/ plavix p/w 2 weeks of intermittent midsternal chest pain that radiates towards L side neck/ L arm.  States last night woke him from sleep at 1am and has been constant since.  Took 2 SL nitro around 1am, with some improvement, but still with pain. +sob and orthopnea.  +diaphoresis preceding pain.  Pain worse with ambulation, better with rest/nitro.  Not pleuritic.  Denies f/c, cough, abd pain, n/v/d, black/bloody stools, leg swelling.  Denies smoking, recent travel, hx dvt/pe.  Follows with Dr. Rivera (cards), saw him yesterday, who advised pt to go to ED if he had CP again.  Pt reports recurrent stent restenosis, last admission/ stent placement in Feb 2022.    Of note, pt also mentions has had intermittent rectal pain x approx 10 mo, slightly worse yesterday.  Reports had surgery on rectum at [facility] approx 1 yr ago.  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  No erythema or fluctuance present at this time    Will plan for EKG, Basic labs including troponin, CXR -- pt high risk, will likely require admission for ACS/ cath  In regards to rectal pain/ fistula -- this appears to be a chronic problem for pt.  Currently afebrile, without abdominal pain/ ttp.  Has ttp on rectal exam, but no signs acute abscess/ erythema.  Will screen for leukocytosis with basic labs.  May need advanced imaging/ further w/u at later time, but feel acute issue at this time is pt's chest pain, so will defer further w/u at this time 53 y/o male w/ hx htn, hld, t2dm, lisandra, cad with 8 stents on asa/ plavix p/w 2 weeks of intermittent midsternal chest pain that radiates towards L side neck/ L arm.  States last night woke him from sleep at 1am and has been constant since.  Took 2 SL nitro around 1am, with some improvement, but still with pain. +sob and orthopnea.  +diaphoresis preceding pain.  Pain worse with ambulation, better with rest/nitro.  Not pleuritic.  Denies f/c, cough, abd pain, n/v/d, black/bloody stools, leg swelling.  Denies smoking, recent travel, hx dvt/pe.  Follows with Dr. Rivera (cards), saw him yesterday, who advised pt to go to ED if he had CP again.  Pt reports recurrent stent restenosis, last admission/ stent placement in Feb 2022.    Of note, pt also mentions has had intermittent rectal pain x approx 10 mo, slightly worse yesterday.  Reports had surgery on rectum at [facility] approx 1 yr ago.  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  No erythema or fluctuance present at this time    Will plan for EKG, Basic labs including troponin, CXR -- pt high risk, will likely require admission for ACS/ cath  In regards to rectal pain/ fistula -- this appears to be a chronic problem for pt.  Currently afebrile, without abdominal pain/ ttp.  Has ttp over fistula, but no internal rectal ttp, but no signs acute abscess/ erythema.  Will screen for leukocytosis with basic labs.  May need advanced imaging/ further w/u at later time, but feel acute issue at this time is pt's chest pain, so will defer further w/u at this time  --  Case d/w trent KIRKPATRICK, plan for cath

## 2022-11-29 NOTE — ED PROVIDER NOTE - ATTENDING APP SHARED VISIT CONTRIBUTION OF CARE
52 M CAD, numerous risk factors p/w concerning chest pain.  Currently not present.  Workup concerning for ongoing ACS.  Plan admit to cardiology for addn tx and workup.

## 2022-11-29 NOTE — H&P ADULT - ASSESSMENT
53 yo M, former smoker, strong FH of early CAD, w/ PMHx of HTN, HLD, DM T2, EDUARDO (baseline Hgb 8s in 12/2021; EGD 4/2021 w/ antral gastritis and duodenitis + hemorrhoids), CAD (w/ MI in 2010/2020 and multiple PCIs, and recurrent ISR OM1 requiring brachytherapy in the past) who presented to [facility] ED with complaints of an intermittent midsternal chest pain with radiation to the L arm/neck relieved w/ SL Nitro which woke him from sleep. Pt now admitted for unstable angina with plan for cardiac cath 11/29/22 w/ Dr. Rivera       53 yo M, former smoker, strong FH of early CAD, w/ PMHx of HTN, HLD, DM T2, EDUARDO (baseline Hgb 8s in 12/2021; EGD 4/2021 w/ antral gastritis and duodenitis + hemorrhoids), CAD (w/ MI in 2010/2020 and multiple PCIs, and recurrent ISR OM1 requiring brachytherapy in the past) who presented to [facility] ED with complaints of an intermittent midsternal chest pain with radiation to the L arm/neck relieved w/ SL Nitro which woke him from sleep. Pt now admitted for unstable angina with plan for cardiac cath 11/29/22 w/ Dr. Rivera.    Precath Checklist    Anginal Class: IV      Sedation Plan:   [  ] None   [  x] Moderate   [  ]  Deep    [  ]  General Anesthesia   Patient Is Suitable Candidate For Sedation?     [x  ] Yes   [  ] No   [  ] Not Applicable   Cath Order Entered: [  x] Yes  DAPT LOAD Ordered: [  ] Yes  [x  ] No load 2/2 on home ASA/Plavix  Pre-Cath fluids Ordered: [  x] Yes  [  ] Not indicated 2/2 _________    Risks Benefits Annotation:     CARDIAC CATH: Risks & benefits of procedure and sedation and risks and benefits for the alternative therapy have been explained to the patient and/or HCP in layman’s terms including but not limited to: allergic reaction, bleeding, infection, arrhythmia, respiratory compromise, renal and vascular compromise, limb damage, MI, CVA, emergent CABG/Vascular Surgery and death. Informed consent obtained and in chart.

## 2022-11-30 ENCOUNTER — TRANSCRIPTION ENCOUNTER (OUTPATIENT)
Age: 52
End: 2022-11-30

## 2022-11-30 LAB
A1C WITH ESTIMATED AVERAGE GLUCOSE RESULT: 7.5 % — HIGH (ref 4–5.6)
ANION GAP SERPL CALC-SCNC: 6 MMOL/L — SIGNIFICANT CHANGE UP (ref 5–17)
APTT BLD: 43.8 SEC — HIGH (ref 27.5–35.5)
APTT BLD: 58.6 SEC — HIGH (ref 27.5–35.5)
BUN SERPL-MCNC: 11 MG/DL — SIGNIFICANT CHANGE UP (ref 7–23)
CALCIUM SERPL-MCNC: 8.9 MG/DL — SIGNIFICANT CHANGE UP (ref 8.4–10.5)
CHLORIDE SERPL-SCNC: 107 MMOL/L — SIGNIFICANT CHANGE UP (ref 96–108)
CHOLEST SERPL-MCNC: 195 MG/DL — SIGNIFICANT CHANGE UP
CO2 SERPL-SCNC: 27 MMOL/L — SIGNIFICANT CHANGE UP (ref 22–31)
CREAT SERPL-MCNC: 0.78 MG/DL — SIGNIFICANT CHANGE UP (ref 0.5–1.3)
EGFR: 108 ML/MIN/1.73M2 — SIGNIFICANT CHANGE UP
ESTIMATED AVERAGE GLUCOSE: 169 MG/DL — HIGH (ref 68–114)
GLUCOSE BLDC GLUCOMTR-MCNC: 137 MG/DL — HIGH (ref 70–99)
GLUCOSE BLDC GLUCOMTR-MCNC: 177 MG/DL — HIGH (ref 70–99)
GLUCOSE BLDC GLUCOMTR-MCNC: 179 MG/DL — HIGH (ref 70–99)
GLUCOSE BLDC GLUCOMTR-MCNC: 197 MG/DL — HIGH (ref 70–99)
GLUCOSE SERPL-MCNC: 139 MG/DL — HIGH (ref 70–99)
HCT VFR BLD CALC: 39 % — SIGNIFICANT CHANGE UP (ref 39–50)
HDLC SERPL-MCNC: 30 MG/DL — LOW
HGB BLD-MCNC: 12.4 G/DL — LOW (ref 13–17)
LIPID PNL WITH DIRECT LDL SERPL: 110 MG/DL — HIGH
MAGNESIUM SERPL-MCNC: 1.9 MG/DL — SIGNIFICANT CHANGE UP (ref 1.6–2.6)
MCHC RBC-ENTMCNC: 26.5 PG — LOW (ref 27–34)
MCHC RBC-ENTMCNC: 31.8 GM/DL — LOW (ref 32–36)
MCV RBC AUTO: 83.3 FL — SIGNIFICANT CHANGE UP (ref 80–100)
NON HDL CHOLESTEROL: 165 MG/DL — HIGH
NRBC # BLD: 0 /100 WBCS — SIGNIFICANT CHANGE UP (ref 0–0)
PA ADP PRP-ACNC: 171 PRU — LOW (ref 194–418)
PLATELET # BLD AUTO: 154 K/UL — SIGNIFICANT CHANGE UP (ref 150–400)
POTASSIUM SERPL-MCNC: 4 MMOL/L — SIGNIFICANT CHANGE UP (ref 3.5–5.3)
POTASSIUM SERPL-SCNC: 4 MMOL/L — SIGNIFICANT CHANGE UP (ref 3.5–5.3)
RBC # BLD: 4.68 M/UL — SIGNIFICANT CHANGE UP (ref 4.2–5.8)
RBC # FLD: 13.2 % — SIGNIFICANT CHANGE UP (ref 10.3–14.5)
SODIUM SERPL-SCNC: 140 MMOL/L — SIGNIFICANT CHANGE UP (ref 135–145)
T4 AB SER-ACNC: 5.43 UG/DL — SIGNIFICANT CHANGE UP (ref 4.5–11.7)
TRIGL SERPL-MCNC: 276 MG/DL — HIGH
TSH SERPL-MCNC: 0.68 UIU/ML — SIGNIFICANT CHANGE UP (ref 0.27–4.2)
WBC # BLD: 5.09 K/UL — SIGNIFICANT CHANGE UP (ref 3.8–10.5)
WBC # FLD AUTO: 5.09 K/UL — SIGNIFICANT CHANGE UP (ref 3.8–10.5)

## 2022-11-30 RX ORDER — INFLUENZA VIRUS VACCINE 15; 15; 15; 15 UG/.5ML; UG/.5ML; UG/.5ML; UG/.5ML
0.5 SUSPENSION INTRAMUSCULAR ONCE
Refills: 0 | Status: DISCONTINUED | OUTPATIENT
Start: 2022-11-30 | End: 2022-12-01

## 2022-11-30 RX ORDER — CHLORHEXIDINE GLUCONATE 213 G/1000ML
1 SOLUTION TOPICAL ONCE
Refills: 0 | Status: COMPLETED | OUTPATIENT
Start: 2022-12-01 | End: 2022-12-01

## 2022-11-30 RX ORDER — CHLORHEXIDINE GLUCONATE 213 G/1000ML
1 SOLUTION TOPICAL ONCE
Refills: 0 | Status: COMPLETED | OUTPATIENT
Start: 2022-11-30 | End: 2022-11-30

## 2022-11-30 RX ORDER — HEPARIN SODIUM 5000 [USP'U]/ML
700 INJECTION INTRAVENOUS; SUBCUTANEOUS
Qty: 25000 | Refills: 0 | Status: DISCONTINUED | OUTPATIENT
Start: 2022-11-30 | End: 2022-12-01

## 2022-11-30 RX ORDER — CHLORHEXIDINE GLUCONATE 213 G/1000ML
10 SOLUTION TOPICAL ONCE
Refills: 0 | Status: COMPLETED | OUTPATIENT
Start: 2022-11-30 | End: 2022-12-01

## 2022-11-30 RX ADMIN — Medication 81 MILLIGRAM(S): at 12:29

## 2022-11-30 RX ADMIN — CHLORHEXIDINE GLUCONATE 1 APPLICATION(S): 213 SOLUTION TOPICAL at 19:07

## 2022-11-30 RX ADMIN — SENNA PLUS 2 TABLET(S): 8.6 TABLET ORAL at 20:39

## 2022-11-30 RX ADMIN — CARVEDILOL PHOSPHATE 6.25 MILLIGRAM(S): 80 CAPSULE, EXTENDED RELEASE ORAL at 06:04

## 2022-11-30 RX ADMIN — ISOSORBIDE MONONITRATE 30 MILLIGRAM(S): 60 TABLET, EXTENDED RELEASE ORAL at 12:28

## 2022-11-30 RX ADMIN — CARVEDILOL PHOSPHATE 6.25 MILLIGRAM(S): 80 CAPSULE, EXTENDED RELEASE ORAL at 17:48

## 2022-11-30 RX ADMIN — PANTOPRAZOLE SODIUM 40 MILLIGRAM(S): 20 TABLET, DELAYED RELEASE ORAL at 06:05

## 2022-11-30 RX ADMIN — HEPARIN SODIUM 7 UNIT(S)/HR: 5000 INJECTION INTRAVENOUS; SUBCUTANEOUS at 11:19

## 2022-11-30 RX ADMIN — HEPARIN SODIUM 9 UNIT(S)/HR: 5000 INJECTION INTRAVENOUS; SUBCUTANEOUS at 20:37

## 2022-11-30 RX ADMIN — Medication 2: at 17:47

## 2022-11-30 RX ADMIN — ATORVASTATIN CALCIUM 80 MILLIGRAM(S): 80 TABLET, FILM COATED ORAL at 20:39

## 2022-11-30 RX ADMIN — Medication 2: at 22:41

## 2022-11-30 RX ADMIN — Medication 1 PACKET(S): at 06:05

## 2022-11-30 RX ADMIN — CHLORHEXIDINE GLUCONATE 1 APPLICATION(S): 213 SOLUTION TOPICAL at 22:38

## 2022-11-30 NOTE — PATIENT PROFILE ADULT - NSPRESCRALCFREQ_GEN_A_NUR
HISTORY OF POST-ANESTHESIA NAUSEA AND VOMITTING.  HAS HAD HISTORY OF URINARY ISSUES POST-CATHETER.    Preoperative evaluation:    Date of surgery: 5/3/18  Location of surgery: [facility]  Surgeon: Dr. Chong  Surgery: Bilateral L3-L4 decompression, R L3 foraminotomy, posterior fusion  Anesthesia: Gen.                                                                                                     Ambulatory office visit                                                                                                          Preoperative H&P        Chief Complaint   Patient presents with   • Pre-Op Exam     lumbar surgery 5/3/18 Dr. Chong   • Imm/Inj       SUBJECTIVE:  Arlene Perales is a 66 year old female who presented requesting evaluation for preoperative H&P as stated above. The patient is going for back surgery on 5/3/18. I was asked by the surgeon to see the patient for preoperative history and physical and preoperative clearance. Overall, the patient feels well. She currently denies headache, lightheadedness, dizziness, chest pain, shortness of breath, dyspnea with exertion, palpitations, syncope, presyncope. She does complain of back pain with pain into the bilateral right greater than left buttocks and leg. She denies any nausea, vomiting, abdominal pain. The patient does have irritable bowel syndrome and she has chronic changes from that. She denies any melena or bright red blood per rectum. So has chronic issues with interstitial cystitis and has chronic urinary issues. She currently denies any dysuria, hematuria, or other acute urinary issues.    The patient does report previous history of post anesthesia and nausea and vomiting. She is also had problems with worsening urinary symptoms after urinary catheterization in the past. She denies any other specific complaints or concerns today.  Review of systems:   All other systems are reviewed and are negative except as documented in the  history of present illness.     OBJECTIVE:  PROBLEM LIST:   Patient Active Problem List   Diagnosis   • Basilar migraine   • IC (interstitial cystitis)   • Unspecified osteoporosis   • Disc herniation   • Family history of colon cancer   • Tear of medial cartilage or meniscus of knee, current   • Chondromalacia of patella   • Tear of medial cartilage or meniscus of knee, current   • Osteoarthrosis, unspecified whether generalized or localized, lower leg   • Other ankle sprain and strain   • Iliotibial band tendonitis   • Patellar tendonitis   • Patellofemoral arthritis   • Knee pain   • Osteoarthritis of both knees   • Cervicalgia   • Spinal stenosis in cervical region   • Degeneration of cervical intervertebral disc   • Chronic pelvic pain in female   • Benign essential tremor   • Anxiety and depression   • Occipital neuralgia of right side   • Cervical radiculopathy   • Postlaminectomy syndrome, lumbar region   • Spondylosis of lumbar region without myelopathy or radiculopathy   • Lumbar radiculopathy   • Abnormal mammogram       PAST HISTORIES:   I have reviewed the past medical history, family history, social history, medications and allergies listed in the medical record as obtained by my nursing staff and support staff and agree with their documentation.    PHYSICAL EXAM:   Vital Signs:    Visit Vitals  /64 (BP [place] [facility] [place] Position: Sitting, Cuff Size: Regular)   Pulse 71   Ht 5' 8\" (1.727 m)   Wt 64.1 kg   SpO2 97%   BMI 21.48 kg/m²     Pulse Ox Interpretation:  Within normal limits.  General:   Alert, cooperative, conversive in no acute distress.  Skin:  Warm and dry without rash.    Head:  Normocephalic, atraumatic.   Neck:  Trachea is midline.     Eyes:  Normal conjunctivae and sclerae.   ENT:  Mucous membranes are moist.   Cardiovascular:  Symmetrical pulses.  Regular rate and rhythm without murmur.  Respiratory:   Normal respiratory effort.  Clear to auscultation.  No wheezes, rales or  rhonchi.  Gastrointestinal:  Soft and nontender.  Normal bowel sounds.  No hepatomegaly or splenomegaly.   Musculoskeletal:  No deformity or edema.   Back:  Normal alignment.  No costovertebral angle tenderness.  Neurologic:  Oriented x4.  No focal deficits.  Psychiatric:  Cooperative.  Appropriate mood and affect.    LAB RESULTS:   All pertinent laboratory results were reviewed.    ASSESSMENT:   1. Preop examination    2. Need for vaccination        PLAN:   Orders Placed This Encounter   • XR Chest PA and Lateral   • Pneumococcal Conjugate 13 Valent Vaccine (Prevnar 13) - 30306   • CBC & Auto Differential   • Basic Metabolic Panel   • Hepatic Function Panel   • Glycohemoglobin   • Thyroid Stimulating Hormone Reflex   • Electrocardiogram 12-Lead   • Urine Culture   • chlordiazePOXIDE-clidinium (LIBRAX) 5-2.5 MG per capsule       I have reviewed the patient's chest x-ray. This is completely normal. I have reviewed the patient's EKG. She has very mild bradycardia with a heart rate of 59. It is otherwise a completely normal EKG. Reviewed the patient's lab work and everything looks stable. Patient also had a urine culture which was negative.    The patient is felt to be low risk for the proposed anesthesia and surgery. As stated above I would be aware that she has had previous postoperative related/anesthesia related nausea and vomiting. The patient is also had difficulty in the past secondary to her interstitial cystitis with post urinary catheterization bladder irritation as well as UTIs.    The patient is cleared from a medical standpoint to proceed with the proposed anesthesia and surgery.    Thank you Dr. Chong for asking me to participate in the preoperative evaluation of our mutual patient. If I can be of any further service please don't hesitate to contact me at [phone].    No Follow-up on file.    Instructions provided as documented in the after visit summary.    The patient indicated understanding of the  diagnosis and agreed with the plan of care.    Never

## 2022-11-30 NOTE — PROGRESS NOTE ADULT - ASSESSMENT
51 yo male, former smoker, strong FH of early CAD, w/ PMHx of HTN, HLD, DM T2, EDUARDO (baseline Hgb 8s in 12/2021; EGD 4/2021 w/ antral gastritis and duodenitis + hemorrhoids), CAD (w/ MI in 2010/2020 and multiple PCIs, and recurrent ISR OM1 requiring brachytherapy in the past) who presented to [facility] ED on 11/29 with complaints of an intermittent midsternal chest pain with radiation to the L arm/neck relieved w/ SL Nitro which woke him from sleep. He underwent a cardiac cath which revealed 3V CAD. CT surgery consulted for surgical evaluation. Pt undergoing pre op testing for CABG with Dr. Hale with a tentative OR date of 12/1. P2Y12 pending. Had chest pain overnight but resolved this morning.     Neuro: AOx3  - monitor for pain and changes in mentation.     CVS: Normal BiV function 3VCAD (pLAD 75%, LM 75%, D1 70%, LCx 70%, OM1 100%); BP well controlled SBP 130s.   - Continue ASA 81, atorvastatin 80mg for CAD   - Continue Carvedilol 6.25mg BID,   - BP controlled with imdur 30 Daily.   - Nitro sublingual PRN for chest pain   - Heparin gtt for CAD     Respiratory: Saturating well on RA   - Wean off O2 sat > 92%  - IS and ambulation  - Chest PT.     GI: Last BM yesterday   - GI PPX   - Bowel regimen with Senna and Miralax     : BUN/ Creatinine. 11/0.78 - Morgan; Even balance. No effusions or peripheral edema.   - monitor I/Os.     Endo: A1c 7.5; currently FS well controlled. Takes PO antihyperglycemic meds at home.   - ISS for now.   - Endocrine consult     ID: afebrile; WBC 5   - continue to monitor fever curve     Heme: PTT 58.4 this am.   - Heparin gtt decreased to 700 for CAD with PTT goal 40-50     Dispo plan: OR 12/1 ?    Doris Petersen PA-C

## 2022-11-30 NOTE — PATIENT PROFILE ADULT - FALL HARM RISK - HARM RISK INTERVENTIONS

## 2022-12-01 ENCOUNTER — TRANSCRIPTION ENCOUNTER (OUTPATIENT)
Age: 52
End: 2022-12-01

## 2022-12-01 ENCOUNTER — APPOINTMENT (OUTPATIENT)
Dept: CARDIOTHORACIC SURGERY | Facility: HOSPITAL | Age: 52
End: 2022-12-01

## 2022-12-01 LAB
ALBUMIN SERPL ELPH-MCNC: 2.6 G/DL — LOW (ref 3.3–5)
ALP SERPL-CCNC: 52 U/L — SIGNIFICANT CHANGE UP (ref 40–120)
ALT FLD-CCNC: 10 U/L — SIGNIFICANT CHANGE UP (ref 10–45)
ANION GAP SERPL CALC-SCNC: 5 MMOL/L — SIGNIFICANT CHANGE UP (ref 5–17)
APTT BLD: 31.5 SEC — SIGNIFICANT CHANGE UP (ref 27.5–35.5)
APTT BLD: 37.6 SEC — HIGH (ref 27.5–35.5)
APTT BLD: 52.5 SEC — HIGH (ref 27.5–35.5)
APTT BLD: 53.6 SEC — HIGH (ref 27.5–35.5)
AST SERPL-CCNC: 29 U/L — SIGNIFICANT CHANGE UP (ref 10–40)
BASE EXCESS BLDA CALC-SCNC: -0.3 MMOL/L — SIGNIFICANT CHANGE UP (ref -2–3)
BASE EXCESS BLDA CALC-SCNC: -1.7 MMOL/L — SIGNIFICANT CHANGE UP (ref -2–3)
BASE EXCESS BLDA CALC-SCNC: -2.9 MMOL/L — LOW (ref -2–3)
BASE EXCESS BLDA CALC-SCNC: -3.6 MMOL/L — LOW (ref -2–3)
BASE EXCESS BLDA CALC-SCNC: -3.9 MMOL/L — LOW (ref -2–3)
BASE EXCESS BLDA CALC-SCNC: 0 MMOL/L — SIGNIFICANT CHANGE UP (ref -2–3)
BASE EXCESS BLDA CALC-SCNC: 0.1 MMOL/L — SIGNIFICANT CHANGE UP (ref -2–3)
BASE EXCESS BLDA CALC-SCNC: 0.3 MMOL/L — SIGNIFICANT CHANGE UP (ref -2–3)
BASE EXCESS BLDA CALC-SCNC: 1 MMOL/L — SIGNIFICANT CHANGE UP (ref -2–3)
BASOPHILS # BLD AUTO: 0.02 K/UL — SIGNIFICANT CHANGE UP (ref 0–0.2)
BASOPHILS NFR BLD AUTO: 0.2 % — SIGNIFICANT CHANGE UP (ref 0–2)
BILIRUB SERPL-MCNC: 1 MG/DL — SIGNIFICANT CHANGE UP (ref 0.2–1.2)
BLD GP AB SCN SERPL QL: NEGATIVE — SIGNIFICANT CHANGE UP
BLD GP AB SCN SERPL QL: NEGATIVE — SIGNIFICANT CHANGE UP
BUN SERPL-MCNC: 10 MG/DL — SIGNIFICANT CHANGE UP (ref 7–23)
BUN SERPL-MCNC: 12 MG/DL — SIGNIFICANT CHANGE UP (ref 7–23)
CA-I BLDA-SCNC: 0.8 MMOL/L — LOW (ref 1.15–1.33)
CA-I BLDA-SCNC: 0.81 MMOL/L — LOW (ref 1.15–1.33)
CA-I BLDA-SCNC: 0.82 MMOL/L — LOW (ref 1.15–1.33)
CA-I BLDA-SCNC: 0.99 MMOL/L — LOW (ref 1.15–1.33)
CA-I BLDA-SCNC: 1.1 MMOL/L — LOW (ref 1.15–1.33)
CA-I BLDA-SCNC: 1.12 MMOL/L — LOW (ref 1.15–1.33)
CA-I BLDA-SCNC: 1.13 MMOL/L — LOW (ref 1.15–1.33)
CA-I BLDA-SCNC: 1.14 MMOL/L — LOW (ref 1.15–1.33)
CA-I BLDA-SCNC: 1.29 MMOL/L — SIGNIFICANT CHANGE UP (ref 1.15–1.33)
CALCIUM SERPL-MCNC: 7.6 MG/DL — LOW (ref 8.4–10.5)
CHLORIDE SERPL-SCNC: 110 MMOL/L — HIGH (ref 96–108)
CO2 BLDA-SCNC: 23 MMOL/L — SIGNIFICANT CHANGE UP (ref 19–24)
CO2 BLDA-SCNC: 23 MMOL/L — SIGNIFICANT CHANGE UP (ref 19–24)
CO2 BLDA-SCNC: 24 MMOL/L — SIGNIFICANT CHANGE UP (ref 19–24)
CO2 BLDA-SCNC: 24 MMOL/L — SIGNIFICANT CHANGE UP (ref 19–24)
CO2 BLDA-SCNC: 25 MMOL/L — HIGH (ref 19–24)
CO2 BLDA-SCNC: 26 MMOL/L — HIGH (ref 19–24)
CO2 BLDA-SCNC: 27 MMOL/L — HIGH (ref 19–24)
CO2 BLDA-SCNC: 27 MMOL/L — HIGH (ref 19–24)
CO2 BLDA-SCNC: 28 MMOL/L — HIGH (ref 19–24)
CO2 SERPL-SCNC: 25 MMOL/L — SIGNIFICANT CHANGE UP (ref 22–31)
CO2 SERPL-SCNC: 25 MMOL/L — SIGNIFICANT CHANGE UP (ref 22–31)
COHGB MFR BLDA: 0.3 % — SIGNIFICANT CHANGE UP
COHGB MFR BLDA: 0.6 % — SIGNIFICANT CHANGE UP
COHGB MFR BLDA: 0.6 % — SIGNIFICANT CHANGE UP
COHGB MFR BLDA: 0.8 % — SIGNIFICANT CHANGE UP
COHGB MFR BLDA: 0.9 % — SIGNIFICANT CHANGE UP
COHGB MFR BLDA: 1 % — SIGNIFICANT CHANGE UP
CREAT SERPL-MCNC: 0.83 MG/DL — SIGNIFICANT CHANGE UP (ref 0.5–1.3)
CREAT SERPL-MCNC: 0.93 MG/DL — SIGNIFICANT CHANGE UP (ref 0.5–1.3)
EGFR: 106 ML/MIN/1.73M2 — SIGNIFICANT CHANGE UP
EGFR: 99 ML/MIN/1.73M2 — SIGNIFICANT CHANGE UP
EOSINOPHIL # BLD AUTO: 0.15 K/UL — SIGNIFICANT CHANGE UP (ref 0–0.5)
EOSINOPHIL NFR BLD AUTO: 1.6 % — SIGNIFICANT CHANGE UP (ref 0–6)
GAS PNL BLDA: SIGNIFICANT CHANGE UP
GAS PNL BLDA: SIGNIFICANT CHANGE UP
GLUCOSE BLDA-MCNC: 122 MG/DL — HIGH (ref 70–99)
GLUCOSE BLDA-MCNC: 130 MG/DL — HIGH (ref 70–99)
GLUCOSE BLDA-MCNC: 142 MG/DL — HIGH (ref 70–99)
GLUCOSE BLDA-MCNC: 150 MG/DL — HIGH (ref 70–99)
GLUCOSE BLDA-MCNC: 150 MG/DL — HIGH (ref 70–99)
GLUCOSE BLDA-MCNC: 158 MG/DL — HIGH (ref 70–99)
GLUCOSE BLDA-MCNC: 168 MG/DL — HIGH (ref 70–99)
GLUCOSE BLDA-MCNC: 190 MG/DL — HIGH (ref 70–99)
GLUCOSE BLDA-MCNC: 217 MG/DL — HIGH (ref 70–99)
GLUCOSE BLDC GLUCOMTR-MCNC: 136 MG/DL — HIGH (ref 70–99)
GLUCOSE BLDC GLUCOMTR-MCNC: 144 MG/DL — HIGH (ref 70–99)
GLUCOSE SERPL-MCNC: 198 MG/DL — HIGH (ref 70–99)
GLUCOSE SERPL-MCNC: 203 MG/DL — HIGH (ref 70–99)
HCO3 BLDA-SCNC: 22 MMOL/L — SIGNIFICANT CHANGE UP (ref 21–28)
HCO3 BLDA-SCNC: 22 MMOL/L — SIGNIFICANT CHANGE UP (ref 21–28)
HCO3 BLDA-SCNC: 23 MMOL/L — SIGNIFICANT CHANGE UP (ref 21–28)
HCO3 BLDA-SCNC: 23 MMOL/L — SIGNIFICANT CHANGE UP (ref 21–28)
HCO3 BLDA-SCNC: 24 MMOL/L — SIGNIFICANT CHANGE UP (ref 21–28)
HCO3 BLDA-SCNC: 25 MMOL/L — SIGNIFICANT CHANGE UP (ref 21–28)
HCO3 BLDA-SCNC: 27 MMOL/L — SIGNIFICANT CHANGE UP (ref 21–28)
HCT VFR BLD CALC: 29.3 % — LOW (ref 39–50)
HCT VFR BLD CALC: 30.3 % — LOW (ref 39–50)
HGB BLD-MCNC: 9.6 G/DL — LOW (ref 13–17)
HGB BLD-MCNC: 9.8 G/DL — LOW (ref 13–17)
HGB BLDA-MCNC: 10.9 G/DL — LOW (ref 12.6–17.4)
HGB BLDA-MCNC: 12.7 G/DL — SIGNIFICANT CHANGE UP (ref 12.6–17.4)
HGB BLDA-MCNC: 12.7 G/DL — SIGNIFICANT CHANGE UP (ref 12.6–17.4)
HGB BLDA-MCNC: 8.7 G/DL — LOW (ref 12.6–17.4)
HGB BLDA-MCNC: 8.7 G/DL — LOW (ref 12.6–17.4)
HGB BLDA-MCNC: 8.9 G/DL — LOW (ref 12.6–17.4)
HGB BLDA-MCNC: 9.1 G/DL — LOW (ref 12.6–17.4)
HGB BLDA-MCNC: 9.5 G/DL — LOW (ref 12.6–17.4)
HGB BLDA-MCNC: 9.7 G/DL — LOW (ref 12.6–17.4)
HOROWITZ INDEX BLDA+IHG-RTO: 100 — SIGNIFICANT CHANGE UP
IMM GRANULOCYTES NFR BLD AUTO: 1 % — HIGH (ref 0–0.9)
INR BLD: 1.02 — SIGNIFICANT CHANGE UP (ref 0.88–1.16)
INR BLD: 1.27 — HIGH (ref 0.88–1.16)
INR BLD: 1.27 — HIGH (ref 0.88–1.16)
LACTATE SERPL-SCNC: 1.9 MMOL/L — SIGNIFICANT CHANGE UP (ref 0.5–2)
LACTATE SERPL-SCNC: 2.2 MMOL/L — HIGH (ref 0.5–2)
LYMPHOCYTES # BLD AUTO: 1.9 K/UL — SIGNIFICANT CHANGE UP (ref 1–3.3)
LYMPHOCYTES # BLD AUTO: 19.7 % — SIGNIFICANT CHANGE UP (ref 13–44)
MAGNESIUM SERPL-MCNC: 2.3 MG/DL — SIGNIFICANT CHANGE UP (ref 1.6–2.6)
MCHC RBC-ENTMCNC: 26.9 PG — LOW (ref 27–34)
MCHC RBC-ENTMCNC: 27 PG — SIGNIFICANT CHANGE UP (ref 27–34)
MCHC RBC-ENTMCNC: 32.3 GM/DL — SIGNIFICANT CHANGE UP (ref 32–36)
MCHC RBC-ENTMCNC: 32.8 GM/DL — SIGNIFICANT CHANGE UP (ref 32–36)
MCV RBC AUTO: 82.1 FL — SIGNIFICANT CHANGE UP (ref 80–100)
MCV RBC AUTO: 83.5 FL — SIGNIFICANT CHANGE UP (ref 80–100)
METHGB MFR BLDA: 0 % — SIGNIFICANT CHANGE UP
METHGB MFR BLDA: 0.2 % — SIGNIFICANT CHANGE UP
METHGB MFR BLDA: 0.9 % — SIGNIFICANT CHANGE UP
METHGB MFR BLDA: 0.9 % — SIGNIFICANT CHANGE UP
METHGB MFR BLDA: 1 % — SIGNIFICANT CHANGE UP
METHGB MFR BLDA: 1 % — SIGNIFICANT CHANGE UP
METHGB MFR BLDA: 1.2 % — SIGNIFICANT CHANGE UP
METHGB MFR BLDA: 1.3 % — SIGNIFICANT CHANGE UP
METHGB MFR BLDA: 1.3 % — SIGNIFICANT CHANGE UP
MONOCYTES # BLD AUTO: 0.78 K/UL — SIGNIFICANT CHANGE UP (ref 0–0.9)
MONOCYTES NFR BLD AUTO: 8.1 % — SIGNIFICANT CHANGE UP (ref 2–14)
NEUTROPHILS # BLD AUTO: 6.7 K/UL — SIGNIFICANT CHANGE UP (ref 1.8–7.4)
NEUTROPHILS NFR BLD AUTO: 69.4 % — SIGNIFICANT CHANGE UP (ref 43–77)
NRBC # BLD: 0 /100 WBCS — SIGNIFICANT CHANGE UP (ref 0–0)
NRBC # BLD: 0 /100 WBCS — SIGNIFICANT CHANGE UP (ref 0–0)
OXYHGB MFR BLDA: 96.8 % — HIGH (ref 90–95)
OXYHGB MFR BLDA: 96.8 % — HIGH (ref 90–95)
OXYHGB MFR BLDA: 97.2 % — HIGH (ref 90–95)
OXYHGB MFR BLDA: 97.6 % — HIGH (ref 90–95)
OXYHGB MFR BLDA: 97.7 % — HIGH (ref 90–95)
OXYHGB MFR BLDA: 97.8 % — HIGH (ref 90–95)
OXYHGB MFR BLDA: 98 % — HIGH (ref 90–95)
OXYHGB MFR BLDA: 98.5 % — HIGH (ref 90–95)
OXYHGB MFR BLDA: 98.7 % — HIGH (ref 90–95)
PCO2 BLDA: 34 MMHG — LOW (ref 35–48)
PCO2 BLDA: 38 MMHG — SIGNIFICANT CHANGE UP (ref 35–48)
PCO2 BLDA: 38 MMHG — SIGNIFICANT CHANGE UP (ref 35–48)
PCO2 BLDA: 40 MMHG — SIGNIFICANT CHANGE UP (ref 35–48)
PCO2 BLDA: 40 MMHG — SIGNIFICANT CHANGE UP (ref 35–48)
PCO2 BLDA: 42 MMHG — SIGNIFICANT CHANGE UP (ref 35–48)
PCO2 BLDA: 42 MMHG — SIGNIFICANT CHANGE UP (ref 35–48)
PCO2 BLDA: 45 MMHG — SIGNIFICANT CHANGE UP (ref 35–48)
PCO2 BLDA: 46 MMHG — SIGNIFICANT CHANGE UP (ref 35–48)
PH BLDA: 7.34 — LOW (ref 7.35–7.45)
PH BLDA: 7.34 — LOW (ref 7.35–7.45)
PH BLDA: 7.36 — SIGNIFICANT CHANGE UP (ref 7.35–7.45)
PH BLDA: 7.36 — SIGNIFICANT CHANGE UP (ref 7.35–7.45)
PH BLDA: 7.37 — SIGNIFICANT CHANGE UP (ref 7.35–7.45)
PH BLDA: 7.39 — SIGNIFICANT CHANGE UP (ref 7.35–7.45)
PH BLDA: 7.39 — SIGNIFICANT CHANGE UP (ref 7.35–7.45)
PH BLDA: 7.4 — SIGNIFICANT CHANGE UP (ref 7.35–7.45)
PH BLDA: 7.45 — SIGNIFICANT CHANGE UP (ref 7.35–7.45)
PHOSPHATE SERPL-MCNC: 3.4 MG/DL — SIGNIFICANT CHANGE UP (ref 2.5–4.5)
PLATELET # BLD AUTO: 110 K/UL — LOW (ref 150–400)
PLATELET # BLD AUTO: 136 K/UL — LOW (ref 150–400)
PO2 BLDA: 170 MMHG — HIGH (ref 83–108)
PO2 BLDA: 246 MMHG — HIGH (ref 83–108)
PO2 BLDA: 293 MMHG — HIGH (ref 83–108)
PO2 BLDA: 311 MMHG — HIGH (ref 83–108)
PO2 BLDA: 419 MMHG — HIGH (ref 83–108)
PO2 BLDA: 429 MMHG — HIGH (ref 83–108)
PO2 BLDA: 434 MMHG — HIGH (ref 83–108)
PO2 BLDA: 442 MMHG — HIGH (ref 83–108)
PO2 BLDA: 473 MMHG — HIGH (ref 83–108)
POTASSIUM BLDA-SCNC: 3.5 MMOL/L — SIGNIFICANT CHANGE UP (ref 3.5–5.1)
POTASSIUM BLDA-SCNC: 4.3 MMOL/L — SIGNIFICANT CHANGE UP (ref 3.5–5.1)
POTASSIUM BLDA-SCNC: 4.7 MMOL/L — SIGNIFICANT CHANGE UP (ref 3.5–5.1)
POTASSIUM BLDA-SCNC: 4.8 MMOL/L — SIGNIFICANT CHANGE UP (ref 3.5–5.1)
POTASSIUM BLDA-SCNC: 5 MMOL/L — SIGNIFICANT CHANGE UP (ref 3.5–5.1)
POTASSIUM BLDA-SCNC: 5.3 MMOL/L — HIGH (ref 3.5–5.1)
POTASSIUM BLDA-SCNC: 6 MMOL/L — HIGH (ref 3.5–5.1)
POTASSIUM BLDA-SCNC: 6.2 MMOL/L — CRITICAL HIGH (ref 3.5–5.1)
POTASSIUM BLDA-SCNC: 6.4 MMOL/L — CRITICAL HIGH (ref 3.5–5.1)
POTASSIUM SERPL-MCNC: 4.8 MMOL/L — SIGNIFICANT CHANGE UP (ref 3.5–5.3)
POTASSIUM SERPL-SCNC: 4.8 MMOL/L — SIGNIFICANT CHANGE UP (ref 3.5–5.3)
PROT SERPL-MCNC: 4.3 G/DL — LOW (ref 6–8.3)
PROTHROM AB SERPL-ACNC: 12.2 SEC — SIGNIFICANT CHANGE UP (ref 10.5–13.4)
PROTHROM AB SERPL-ACNC: 15.1 SEC — HIGH (ref 10.5–13.4)
PROTHROM AB SERPL-ACNC: 15.1 SEC — HIGH (ref 10.5–13.4)
RBC # BLD: 3.57 M/UL — LOW (ref 4.2–5.8)
RBC # BLD: 3.63 M/UL — LOW (ref 4.2–5.8)
RBC # FLD: 13.1 % — SIGNIFICANT CHANGE UP (ref 10.3–14.5)
RBC # FLD: 13.2 % — SIGNIFICANT CHANGE UP (ref 10.3–14.5)
RH IG SCN BLD-IMP: POSITIVE — SIGNIFICANT CHANGE UP
RH IG SCN BLD-IMP: POSITIVE — SIGNIFICANT CHANGE UP
SAO2 % BLDA: 100 % — HIGH (ref 94–98)
SAO2 % BLDA: 98.6 % — HIGH (ref 94–98)
SAO2 % BLDA: 98.8 % — HIGH (ref 94–98)
SAO2 % BLDA: 98.9 % — HIGH (ref 94–98)
SAO2 % BLDA: 99.2 % — HIGH (ref 94–98)
SAO2 % BLDA: 99.3 % — HIGH (ref 94–98)
SAO2 % BLDA: 99.6 % — HIGH (ref 94–98)
SAO2 % BLDA: 99.7 % — HIGH (ref 94–98)
SAO2 % BLDA: 99.7 % — HIGH (ref 94–98)
SODIUM BLDA-SCNC: 133 MMOL/L — LOW (ref 136–145)
SODIUM BLDA-SCNC: 135 MMOL/L — LOW (ref 136–145)
SODIUM BLDA-SCNC: 136 MMOL/L — SIGNIFICANT CHANGE UP (ref 136–145)
SODIUM BLDA-SCNC: 137 MMOL/L — SIGNIFICANT CHANGE UP (ref 136–145)
SODIUM BLDA-SCNC: 139 MMOL/L — SIGNIFICANT CHANGE UP (ref 136–145)
SODIUM BLDA-SCNC: 140 MMOL/L — SIGNIFICANT CHANGE UP (ref 136–145)
SODIUM BLDA-SCNC: 140 MMOL/L — SIGNIFICANT CHANGE UP (ref 136–145)
SODIUM BLDA-SCNC: 141 MMOL/L — SIGNIFICANT CHANGE UP (ref 136–145)
SODIUM BLDA-SCNC: 141 MMOL/L — SIGNIFICANT CHANGE UP (ref 136–145)
SODIUM SERPL-SCNC: 140 MMOL/L — SIGNIFICANT CHANGE UP (ref 135–145)
WBC # BLD: 10.19 K/UL — SIGNIFICANT CHANGE UP (ref 3.8–10.5)
WBC # BLD: 9.65 K/UL — SIGNIFICANT CHANGE UP (ref 3.8–10.5)
WBC # FLD AUTO: 10.19 K/UL — SIGNIFICANT CHANGE UP (ref 3.8–10.5)
WBC # FLD AUTO: 9.65 K/UL — SIGNIFICANT CHANGE UP (ref 3.8–10.5)

## 2022-12-01 PROCEDURE — 33518 CABG ARTERY-VEIN TWO: CPT | Mod: AS

## 2022-12-01 PROCEDURE — 71045 X-RAY EXAM CHEST 1 VIEW: CPT | Mod: 26

## 2022-12-01 PROCEDURE — 93010 ELECTROCARDIOGRAM REPORT: CPT

## 2022-12-01 PROCEDURE — 99292 CRITICAL CARE ADDL 30 MIN: CPT

## 2022-12-01 PROCEDURE — 99291 CRITICAL CARE FIRST HOUR: CPT

## 2022-12-01 PROCEDURE — 33533 CABG ARTERIAL SINGLE: CPT

## 2022-12-01 PROCEDURE — 33533 CABG ARTERIAL SINGLE: CPT | Mod: AS

## 2022-12-01 PROCEDURE — 33518 CABG ARTERY-VEIN TWO: CPT

## 2022-12-01 PROCEDURE — 33508 ENDOSCOPIC VEIN HARVEST: CPT | Mod: AS,59

## 2022-12-01 RX ORDER — ATORVASTATIN CALCIUM 80 MG/1
80 TABLET, FILM COATED ORAL AT BEDTIME
Refills: 0 | Status: DISCONTINUED | OUTPATIENT
Start: 2022-12-01 | End: 2022-12-06

## 2022-12-01 RX ORDER — CEFAZOLIN SODIUM 1 G
2000 VIAL (EA) INJECTION EVERY 8 HOURS
Refills: 0 | Status: DISCONTINUED | OUTPATIENT
Start: 2022-12-01 | End: 2022-12-01

## 2022-12-01 RX ORDER — HEPARIN SODIUM 5000 [USP'U]/ML
5000 INJECTION INTRAVENOUS; SUBCUTANEOUS EVERY 8 HOURS
Refills: 0 | Status: DISCONTINUED | OUTPATIENT
Start: 2022-12-01 | End: 2022-12-02

## 2022-12-01 RX ORDER — ALBUMIN HUMAN 25 %
250 VIAL (ML) INTRAVENOUS
Refills: 0 | Status: COMPLETED | OUTPATIENT
Start: 2022-12-01 | End: 2022-12-02

## 2022-12-01 RX ORDER — CHLORHEXIDINE GLUCONATE 213 G/1000ML
15 SOLUTION TOPICAL EVERY 12 HOURS
Refills: 0 | Status: DISCONTINUED | OUTPATIENT
Start: 2022-12-01 | End: 2022-12-02

## 2022-12-01 RX ORDER — DEXTROSE 50 % IN WATER 50 %
25 SYRINGE (ML) INTRAVENOUS
Refills: 0 | Status: DISCONTINUED | OUTPATIENT
Start: 2022-12-01 | End: 2022-12-02

## 2022-12-01 RX ORDER — ACETAMINOPHEN 500 MG
1000 TABLET ORAL ONCE
Refills: 0 | Status: COMPLETED | OUTPATIENT
Start: 2022-12-01 | End: 2022-12-01

## 2022-12-01 RX ORDER — FENTANYL CITRATE 50 UG/ML
25 INJECTION INTRAVENOUS
Refills: 0 | Status: DISCONTINUED | OUTPATIENT
Start: 2022-12-01 | End: 2022-12-02

## 2022-12-01 RX ORDER — DEXTROSE 50 % IN WATER 50 %
50 SYRINGE (ML) INTRAVENOUS
Refills: 0 | Status: DISCONTINUED | OUTPATIENT
Start: 2022-12-01 | End: 2022-12-02

## 2022-12-01 RX ORDER — SODIUM CHLORIDE 9 MG/ML
1000 INJECTION INTRAMUSCULAR; INTRAVENOUS; SUBCUTANEOUS
Refills: 0 | Status: DISCONTINUED | OUTPATIENT
Start: 2022-12-01 | End: 2022-12-02

## 2022-12-01 RX ORDER — ASPIRIN/CALCIUM CARB/MAGNESIUM 324 MG
81 TABLET ORAL DAILY
Refills: 0 | Status: DISCONTINUED | OUTPATIENT
Start: 2022-12-01 | End: 2022-12-06

## 2022-12-01 RX ORDER — PANTOPRAZOLE SODIUM 20 MG/1
40 TABLET, DELAYED RELEASE ORAL DAILY
Refills: 0 | Status: DISCONTINUED | OUTPATIENT
Start: 2022-12-01 | End: 2022-12-02

## 2022-12-01 RX ORDER — PROPOFOL 10 MG/ML
10 INJECTION, EMULSION INTRAVENOUS
Qty: 1000 | Refills: 0 | Status: DISCONTINUED | OUTPATIENT
Start: 2022-12-01 | End: 2022-12-02

## 2022-12-01 RX ORDER — CEFAZOLIN SODIUM 1 G
2000 VIAL (EA) INJECTION EVERY 8 HOURS
Refills: 0 | Status: COMPLETED | OUTPATIENT
Start: 2022-12-01 | End: 2022-12-03

## 2022-12-01 RX ORDER — DEXMEDETOMIDINE HYDROCHLORIDE IN 0.9% SODIUM CHLORIDE 4 UG/ML
0.2 INJECTION INTRAVENOUS
Qty: 400 | Refills: 0 | Status: DISCONTINUED | OUTPATIENT
Start: 2022-12-01 | End: 2022-12-02

## 2022-12-01 RX ORDER — INSULIN HUMAN 100 [IU]/ML
2 INJECTION, SOLUTION SUBCUTANEOUS
Qty: 50 | Refills: 0 | Status: DISCONTINUED | OUTPATIENT
Start: 2022-12-01 | End: 2022-12-02

## 2022-12-01 RX ORDER — EPINEPHRINE 0.3 MG/.3ML
0.03 INJECTION INTRAMUSCULAR; SUBCUTANEOUS
Qty: 4 | Refills: 0 | Status: DISCONTINUED | OUTPATIENT
Start: 2022-12-01 | End: 2022-12-02

## 2022-12-01 RX ADMIN — HEPARIN SODIUM 5000 UNIT(S): 5000 INJECTION INTRAVENOUS; SUBCUTANEOUS at 23:00

## 2022-12-01 RX ADMIN — CHLORHEXIDINE GLUCONATE 1 APPLICATION(S): 213 SOLUTION TOPICAL at 05:54

## 2022-12-01 RX ADMIN — Medication 1000 MILLIGRAM(S): at 23:00

## 2022-12-01 RX ADMIN — Medication 400 MILLIGRAM(S): at 22:30

## 2022-12-01 RX ADMIN — CARVEDILOL PHOSPHATE 6.25 MILLIGRAM(S): 80 CAPSULE, EXTENDED RELEASE ORAL at 06:02

## 2022-12-01 RX ADMIN — PANTOPRAZOLE SODIUM 40 MILLIGRAM(S): 20 TABLET, DELAYED RELEASE ORAL at 06:02

## 2022-12-01 RX ADMIN — CHLORHEXIDINE GLUCONATE 10 MILLILITER(S): 213 SOLUTION TOPICAL at 05:55

## 2022-12-01 NOTE — PRE-ANESTHESIA EVALUATION ADULT - LAST CARDIAC ANGIOGRAM/IMAGING
11/29/22 report reviewed  LM NL, LAD p75%, LAD and D1 stent patent, Cx o70-80%, OM1 100% ISR, RCA m40%

## 2022-12-01 NOTE — PRE-ANESTHESIA EVALUATION ADULT - NSANTHINPATMEDSFT_GEN_ALL_CORE
Received Carvedilol, Protonix 12/1 am Received Carvedilol, Protonix 12/1 am  Received plavix 600mg 12/29

## 2022-12-01 NOTE — PRE-OP CHECKLIST - PATIENT'S PERSONAL PROPERTY REMOVED
2 gold hoop earings taken off in pre-op area 3rd floor, and sent to [facility]/[employer]

## 2022-12-01 NOTE — DIETITIAN INITIAL EVALUATION ADULT - ADD RECOMMEND
1. Monitor PO intake/appetite, GI distress, diet tolerance, labs, weights.  2. Honor pt food preferences as able.  3. RD to remain available for additional nutrition interventions as needed.  * High Nutrition Risk.

## 2022-12-01 NOTE — DIETITIAN INITIAL EVALUATION ADULT - PERTINENT LABORATORY DATA
11-30    140  |  107  |  11  ----------------------------<  139<H>  4.0   |  27  |  0.78    Ca    8.9      30 Nov 2022 04:44  Mg     1.9     11-30    POCT Blood Glucose.: 136 mg/dL (12-01-22 @ 06:26)  A1C with Estimated Average Glucose Result: 7.5 % (11-30-22 @ 04:44)  A1C with Estimated Average Glucose Result: 7.9 % (02-01-22 @ 12:07)  A1C with Estimated Average Glucose Result: 7.7 % (01-19-22 @ 08:03)

## 2022-12-01 NOTE — DIETITIAN INITIAL EVALUATION ADULT - OTHER CALCULATIONS
5'6''  pounds +-10%  Wt 170 pounds BMI 27.4 %[id number]  IBW used to calculate energy needs due to pt's current body weight exceeding 120% of IBW   Adjust for age, Pending OR

## 2022-12-01 NOTE — DIETITIAN INITIAL EVALUATION ADULT - OTHER INFO
51 yo M, PMHx of HTN, HLD, DM T2, EDUARDO (baseline Hgb 8s in 12/2021; EGD 4/2021 antral gastritis and duodenitis + hemorrhoids), CAD (w/ MI in 2010/2020 and multiple PCIs, and recurrent ISR OM1 requiring brachytherapy in the past) who presented to [facility] ED with complaints of an intermittent midsternal chest pain with radiation to the L arm/neck. Pt now admitted for unstable angina with plan for cardiac cath 11/29; underwent cardiac cath which revealed 3V CAD. CT surgery consulted for surgical evaluation. Planned OR date 12/1-pt NPO for OR. Prior diet order for DASH TLC diet noted. RN reports pt ate well 11/30 prior to NPO without issues. RD attempted to see pt this AM however had already left for OR. RN on 5UR unsure when/if pt to return for 5UR. Left written materials from [facility] and contact info at bedside should pt return to 5UR room. Labs reviewed: , HDL 30, NON , , A1c 7.5.%. No pain. Chava 21. No edema or pressure ulcers. BM+ 11/30; Miralax senna and metamucil ordered.   Please see below for nutritions recommendations.

## 2022-12-01 NOTE — BRIEF OPERATIVE NOTE - COMMENTS
I first assisted for the entirety of the case, including but not limited to conduit harvest, cannulation, distal/proximal anastamoses, decannulation, and chest closure.

## 2022-12-01 NOTE — BRIEF OPERATIVE NOTE - OPERATION/FINDINGS
EF EF 55%  EVH harvest time 30 min, prep time 10 min   Bypass times 126 min, 39 min  Cross clamp time: 103min  CABG x 3 lima-lad, OM 1 Y graft OM 2

## 2022-12-02 LAB
ALBUMIN SERPL ELPH-MCNC: 2.9 G/DL — LOW (ref 3.3–5)
ALBUMIN SERPL ELPH-MCNC: 3.1 G/DL — LOW (ref 3.3–5)
ALP SERPL-CCNC: 46 U/L — SIGNIFICANT CHANGE UP (ref 40–120)
ALP SERPL-CCNC: 57 U/L — SIGNIFICANT CHANGE UP (ref 40–120)
ALT FLD-CCNC: 11 U/L — SIGNIFICANT CHANGE UP (ref 10–45)
ALT FLD-CCNC: 9 U/L — LOW (ref 10–45)
ANION GAP SERPL CALC-SCNC: 12 MMOL/L — SIGNIFICANT CHANGE UP (ref 5–17)
ANION GAP SERPL CALC-SCNC: 6 MMOL/L — SIGNIFICANT CHANGE UP (ref 5–17)
ANION GAP SERPL CALC-SCNC: 8 MMOL/L — SIGNIFICANT CHANGE UP (ref 5–17)
ANION GAP SERPL CALC-SCNC: 9 MMOL/L — SIGNIFICANT CHANGE UP (ref 5–17)
APTT BLD: 35.2 SEC — SIGNIFICANT CHANGE UP (ref 27.5–35.5)
APTT BLD: 82.7 SEC — HIGH (ref 27.5–35.5)
AST SERPL-CCNC: 28 U/L — SIGNIFICANT CHANGE UP (ref 10–40)
AST SERPL-CCNC: 32 U/L — SIGNIFICANT CHANGE UP (ref 10–40)
BILIRUB SERPL-MCNC: 1 MG/DL — SIGNIFICANT CHANGE UP (ref 0.2–1.2)
BILIRUB SERPL-MCNC: 1.4 MG/DL — HIGH (ref 0.2–1.2)
BUN SERPL-MCNC: 12 MG/DL — SIGNIFICANT CHANGE UP (ref 7–23)
BUN SERPL-MCNC: 16 MG/DL — SIGNIFICANT CHANGE UP (ref 7–23)
BUN SERPL-MCNC: 17 MG/DL — SIGNIFICANT CHANGE UP (ref 7–23)
CALCIUM SERPL-MCNC: 7.7 MG/DL — LOW (ref 8.4–10.5)
CALCIUM SERPL-MCNC: 7.8 MG/DL — LOW (ref 8.4–10.5)
CALCIUM SERPL-MCNC: 7.8 MG/DL — LOW (ref 8.4–10.5)
CALCIUM SERPL-MCNC: 7.9 MG/DL — LOW (ref 8.4–10.5)
CHLORIDE SERPL-SCNC: 107 MMOL/L — SIGNIFICANT CHANGE UP (ref 96–108)
CHLORIDE SERPL-SCNC: 109 MMOL/L — HIGH (ref 96–108)
CHLORIDE SERPL-SCNC: 98 MMOL/L — SIGNIFICANT CHANGE UP (ref 96–108)
CHLORIDE SERPL-SCNC: 98 MMOL/L — SIGNIFICANT CHANGE UP (ref 96–108)
CO2 SERPL-SCNC: 22 MMOL/L — SIGNIFICANT CHANGE UP (ref 22–31)
CO2 SERPL-SCNC: 24 MMOL/L — SIGNIFICANT CHANGE UP (ref 22–31)
CO2 SERPL-SCNC: 28 MMOL/L — SIGNIFICANT CHANGE UP (ref 22–31)
CREAT SERPL-MCNC: 0.92 MG/DL — SIGNIFICANT CHANGE UP (ref 0.5–1.3)
CREAT SERPL-MCNC: 0.92 MG/DL — SIGNIFICANT CHANGE UP (ref 0.5–1.3)
CREAT SERPL-MCNC: 1.12 MG/DL — SIGNIFICANT CHANGE UP (ref 0.5–1.3)
EGFR: 101 ML/MIN/1.73M2 — SIGNIFICANT CHANGE UP
EGFR: 101 ML/MIN/1.73M2 — SIGNIFICANT CHANGE UP
EGFR: 80 ML/MIN/1.73M2 — SIGNIFICANT CHANGE UP
GAS PNL BLDA: SIGNIFICANT CHANGE UP
GAS PNL BLDA: SIGNIFICANT CHANGE UP
GLUCOSE BLDC GLUCOMTR-MCNC: 172 MG/DL — HIGH (ref 70–99)
GLUCOSE BLDC GLUCOMTR-MCNC: 177 MG/DL — HIGH (ref 70–99)
GLUCOSE BLDC GLUCOMTR-MCNC: 199 MG/DL — HIGH (ref 70–99)
GLUCOSE BLDC GLUCOMTR-MCNC: 200 MG/DL — HIGH (ref 70–99)
GLUCOSE SERPL-MCNC: 187 MG/DL — HIGH (ref 70–99)
GLUCOSE SERPL-MCNC: 194 MG/DL — HIGH (ref 70–99)
GLUCOSE SERPL-MCNC: 234 MG/DL — HIGH (ref 70–99)
HCT VFR BLD CALC: 20.9 % — CRITICAL LOW (ref 39–50)
HCT VFR BLD CALC: 23.1 % — LOW (ref 39–50)
HCT VFR BLD CALC: 23.3 % — LOW (ref 39–50)
HGB BLD-MCNC: 6.5 G/DL — CRITICAL LOW (ref 13–17)
HGB BLD-MCNC: 7.4 G/DL — LOW (ref 13–17)
HGB BLD-MCNC: 7.6 G/DL — LOW (ref 13–17)
INR BLD: 1.33 — HIGH (ref 0.88–1.16)
INR BLD: 1.37 — HIGH (ref 0.88–1.16)
ISTAT ARTERIAL BE: 2 MMOL/L — SIGNIFICANT CHANGE UP (ref -2–3)
ISTAT ARTERIAL GLUCOSE: 187 MG/DL — HIGH (ref 70–99)
ISTAT ARTERIAL HCO3: 25 MMOL/L — SIGNIFICANT CHANGE UP (ref 22–26)
ISTAT ARTERIAL HEMATOCRIT: 26 % — LOW (ref 39–50)
ISTAT ARTERIAL HEMOGLOBIN: 8.8 G/DL — LOW (ref 13–17)
ISTAT ARTERIAL IONIZED CALCIUM: 1.13 MMOL/L — SIGNIFICANT CHANGE UP (ref 1.12–1.3)
ISTAT ARTERIAL PCO2: 34 MMHG — LOW (ref 35–45)
ISTAT ARTERIAL PH: 7.48 — HIGH (ref 7.35–7.45)
ISTAT ARTERIAL PO2: 168 MMHG — HIGH (ref 80–105)
ISTAT ARTERIAL POTASSIUM: 4.8 MMOL/L — SIGNIFICANT CHANGE UP (ref 3.5–5.3)
ISTAT ARTERIAL SO2: 100 % — HIGH (ref 95–98)
ISTAT ARTERIAL SODIUM: 144 MMOL/L — SIGNIFICANT CHANGE UP (ref 135–145)
ISTAT ARTERIAL TCO2: 26 MMOL/L — SIGNIFICANT CHANGE UP (ref 22–31)
LACTATE SERPL-SCNC: 1.6 MMOL/L — SIGNIFICANT CHANGE UP (ref 0.5–2)
MAGNESIUM SERPL-MCNC: 1.6 MG/DL — SIGNIFICANT CHANGE UP (ref 1.6–2.6)
MAGNESIUM SERPL-MCNC: 1.8 MG/DL — SIGNIFICANT CHANGE UP (ref 1.6–2.6)
MAGNESIUM SERPL-MCNC: 1.9 MG/DL — SIGNIFICANT CHANGE UP (ref 1.6–2.6)
MCHC RBC-ENTMCNC: 26.3 PG — LOW (ref 27–34)
MCHC RBC-ENTMCNC: 26.6 PG — LOW (ref 27–34)
MCHC RBC-ENTMCNC: 27.2 PG — SIGNIFICANT CHANGE UP (ref 27–34)
MCHC RBC-ENTMCNC: 31.1 GM/DL — LOW (ref 32–36)
MCHC RBC-ENTMCNC: 32 GM/DL — SIGNIFICANT CHANGE UP (ref 32–36)
MCHC RBC-ENTMCNC: 32.6 GM/DL — SIGNIFICANT CHANGE UP (ref 32–36)
MCV RBC AUTO: 83.1 FL — SIGNIFICANT CHANGE UP (ref 80–100)
MCV RBC AUTO: 83.5 FL — SIGNIFICANT CHANGE UP (ref 80–100)
MCV RBC AUTO: 84.6 FL — SIGNIFICANT CHANGE UP (ref 80–100)
NRBC # BLD: 0 /100 WBCS — SIGNIFICANT CHANGE UP (ref 0–0)
PHOSPHATE SERPL-MCNC: 3.1 MG/DL — SIGNIFICANT CHANGE UP (ref 2.5–4.5)
PHOSPHATE SERPL-MCNC: 4.5 MG/DL — SIGNIFICANT CHANGE UP (ref 2.5–4.5)
PLATELET # BLD AUTO: 108 K/UL — LOW (ref 150–400)
PLATELET # BLD AUTO: 123 K/UL — LOW (ref 150–400)
PLATELET # BLD AUTO: 91 K/UL — LOW (ref 150–400)
POTASSIUM SERPL-MCNC: 3.8 MMOL/L — SIGNIFICANT CHANGE UP (ref 3.5–5.3)
POTASSIUM SERPL-MCNC: 4.2 MMOL/L — SIGNIFICANT CHANGE UP (ref 3.5–5.3)
POTASSIUM SERPL-MCNC: 4.4 MMOL/L — SIGNIFICANT CHANGE UP (ref 3.5–5.3)
POTASSIUM SERPL-MCNC: 4.8 MMOL/L — SIGNIFICANT CHANGE UP (ref 3.5–5.3)
POTASSIUM SERPL-SCNC: 3.8 MMOL/L — SIGNIFICANT CHANGE UP (ref 3.5–5.3)
POTASSIUM SERPL-SCNC: 4.2 MMOL/L — SIGNIFICANT CHANGE UP (ref 3.5–5.3)
POTASSIUM SERPL-SCNC: 4.4 MMOL/L — SIGNIFICANT CHANGE UP (ref 3.5–5.3)
POTASSIUM SERPL-SCNC: 4.8 MMOL/L — SIGNIFICANT CHANGE UP (ref 3.5–5.3)
PROT SERPL-MCNC: 4.6 G/DL — LOW (ref 6–8.3)
PROT SERPL-MCNC: 4.6 G/DL — LOW (ref 6–8.3)
PROTHROM AB SERPL-ACNC: 15.9 SEC — HIGH (ref 10.5–13.4)
PROTHROM AB SERPL-ACNC: 16.4 SEC — HIGH (ref 10.5–13.4)
RBC # BLD: 2.47 M/UL — LOW (ref 4.2–5.8)
RBC # BLD: 2.78 M/UL — LOW (ref 4.2–5.8)
RBC # BLD: 2.79 M/UL — LOW (ref 4.2–5.8)
RBC # FLD: 13.2 % — SIGNIFICANT CHANGE UP (ref 10.3–14.5)
RBC # FLD: 13.6 % — SIGNIFICANT CHANGE UP (ref 10.3–14.5)
RBC # FLD: 13.7 % — SIGNIFICANT CHANGE UP (ref 10.3–14.5)
SODIUM SERPL-SCNC: 132 MMOL/L — LOW (ref 135–145)
SODIUM SERPL-SCNC: 134 MMOL/L — LOW (ref 135–145)
SODIUM SERPL-SCNC: 140 MMOL/L — SIGNIFICANT CHANGE UP (ref 135–145)
SODIUM SERPL-SCNC: 140 MMOL/L — SIGNIFICANT CHANGE UP (ref 135–145)
WBC # BLD: 5.61 K/UL — SIGNIFICANT CHANGE UP (ref 3.8–10.5)
WBC # BLD: 7.4 K/UL — SIGNIFICANT CHANGE UP (ref 3.8–10.5)
WBC # BLD: 8.51 K/UL — SIGNIFICANT CHANGE UP (ref 3.8–10.5)
WBC # FLD AUTO: 5.61 K/UL — SIGNIFICANT CHANGE UP (ref 3.8–10.5)
WBC # FLD AUTO: 7.4 K/UL — SIGNIFICANT CHANGE UP (ref 3.8–10.5)
WBC # FLD AUTO: 8.51 K/UL — SIGNIFICANT CHANGE UP (ref 3.8–10.5)

## 2022-12-02 PROCEDURE — 71045 X-RAY EXAM CHEST 1 VIEW: CPT | Mod: 26,77

## 2022-12-02 PROCEDURE — 71045 X-RAY EXAM CHEST 1 VIEW: CPT | Mod: 26

## 2022-12-02 RX ORDER — SODIUM CHLORIDE 9 MG/ML
3 INJECTION INTRAMUSCULAR; INTRAVENOUS; SUBCUTANEOUS EVERY 8 HOURS
Refills: 0 | Status: DISCONTINUED | OUTPATIENT
Start: 2022-12-02 | End: 2022-12-06

## 2022-12-02 RX ORDER — DEXTROSE 50 % IN WATER 50 %
12.5 SYRINGE (ML) INTRAVENOUS ONCE
Refills: 0 | Status: DISCONTINUED | OUTPATIENT
Start: 2022-12-02 | End: 2022-12-06

## 2022-12-02 RX ORDER — PANTOPRAZOLE SODIUM 20 MG/1
40 TABLET, DELAYED RELEASE ORAL
Refills: 0 | Status: DISCONTINUED | OUTPATIENT
Start: 2022-12-02 | End: 2022-12-06

## 2022-12-02 RX ORDER — KETOROLAC TROMETHAMINE 30 MG/ML
30 SYRINGE (ML) INJECTION EVERY 6 HOURS
Refills: 0 | Status: DISCONTINUED | OUTPATIENT
Start: 2022-12-02 | End: 2022-12-04

## 2022-12-02 RX ORDER — OXYCODONE HYDROCHLORIDE 5 MG/1
5 TABLET ORAL EVERY 4 HOURS
Refills: 0 | Status: DISCONTINUED | OUTPATIENT
Start: 2022-12-02 | End: 2022-12-06

## 2022-12-02 RX ORDER — CLOPIDOGREL BISULFATE 75 MG/1
75 TABLET, FILM COATED ORAL DAILY
Refills: 0 | Status: DISCONTINUED | OUTPATIENT
Start: 2022-12-02 | End: 2022-12-06

## 2022-12-02 RX ORDER — FUROSEMIDE 40 MG
20 TABLET ORAL ONCE
Refills: 0 | Status: COMPLETED | OUTPATIENT
Start: 2022-12-02 | End: 2022-12-02

## 2022-12-02 RX ORDER — DEXTROSE 50 % IN WATER 50 %
25 SYRINGE (ML) INTRAVENOUS ONCE
Refills: 0 | Status: DISCONTINUED | OUTPATIENT
Start: 2022-12-02 | End: 2022-12-06

## 2022-12-02 RX ORDER — KETOROLAC TROMETHAMINE 30 MG/ML
15 SYRINGE (ML) INJECTION ONCE
Refills: 0 | Status: DISCONTINUED | OUTPATIENT
Start: 2022-12-02 | End: 2022-12-02

## 2022-12-02 RX ORDER — INSULIN LISPRO 100/ML
3 VIAL (ML) SUBCUTANEOUS
Refills: 0 | Status: DISCONTINUED | OUTPATIENT
Start: 2022-12-02 | End: 2022-12-03

## 2022-12-02 RX ORDER — CALCIUM GLUCONATE 100 MG/ML
2 VIAL (ML) INTRAVENOUS ONCE
Refills: 0 | Status: COMPLETED | OUTPATIENT
Start: 2022-12-02 | End: 2022-12-02

## 2022-12-02 RX ORDER — ACETAMINOPHEN 500 MG
650 TABLET ORAL EVERY 6 HOURS
Refills: 0 | Status: DISCONTINUED | OUTPATIENT
Start: 2022-12-02 | End: 2022-12-06

## 2022-12-02 RX ORDER — POLYETHYLENE GLYCOL 3350 17 G/17G
17 POWDER, FOR SOLUTION ORAL DAILY
Refills: 0 | Status: DISCONTINUED | OUTPATIENT
Start: 2022-12-02 | End: 2022-12-06

## 2022-12-02 RX ORDER — SODIUM CHLORIDE 9 MG/ML
1000 INJECTION, SOLUTION INTRAVENOUS
Refills: 0 | Status: DISCONTINUED | OUTPATIENT
Start: 2022-12-02 | End: 2022-12-05

## 2022-12-02 RX ORDER — INSULIN LISPRO 100/ML
VIAL (ML) SUBCUTANEOUS
Refills: 0 | Status: DISCONTINUED | OUTPATIENT
Start: 2022-12-02 | End: 2022-12-04

## 2022-12-02 RX ORDER — DEXTROSE 50 % IN WATER 50 %
15 SYRINGE (ML) INTRAVENOUS ONCE
Refills: 0 | Status: DISCONTINUED | OUTPATIENT
Start: 2022-12-02 | End: 2022-12-05

## 2022-12-02 RX ORDER — ACETAMINOPHEN 500 MG
1000 TABLET ORAL ONCE
Refills: 0 | Status: COMPLETED | OUTPATIENT
Start: 2022-12-02 | End: 2022-12-02

## 2022-12-02 RX ORDER — OXYCODONE HYDROCHLORIDE 5 MG/1
10 TABLET ORAL EVERY 6 HOURS
Refills: 0 | Status: DISCONTINUED | OUTPATIENT
Start: 2022-12-02 | End: 2022-12-02

## 2022-12-02 RX ORDER — HEPARIN SODIUM 5000 [USP'U]/ML
5000 INJECTION INTRAVENOUS; SUBCUTANEOUS EVERY 12 HOURS
Refills: 0 | Status: DISCONTINUED | OUTPATIENT
Start: 2022-12-02 | End: 2022-12-06

## 2022-12-02 RX ORDER — GLUCAGON INJECTION, SOLUTION 0.5 MG/.1ML
1 INJECTION, SOLUTION SUBCUTANEOUS ONCE
Refills: 0 | Status: DISCONTINUED | OUTPATIENT
Start: 2022-12-02 | End: 2022-12-05

## 2022-12-02 RX ORDER — INSULIN GLARGINE 100 [IU]/ML
10 INJECTION, SOLUTION SUBCUTANEOUS AT BEDTIME
Refills: 0 | Status: DISCONTINUED | OUTPATIENT
Start: 2022-12-02 | End: 2022-12-06

## 2022-12-02 RX ADMIN — ATORVASTATIN CALCIUM 80 MILLIGRAM(S): 80 TABLET, FILM COATED ORAL at 01:00

## 2022-12-02 RX ADMIN — OXYCODONE HYDROCHLORIDE 5 MILLIGRAM(S): 5 TABLET ORAL at 19:04

## 2022-12-02 RX ADMIN — Medication 1000 MILLIGRAM(S): at 12:00

## 2022-12-02 RX ADMIN — INSULIN GLARGINE 10 UNIT(S): 100 INJECTION, SOLUTION SUBCUTANEOUS at 22:20

## 2022-12-02 RX ADMIN — FENTANYL CITRATE 25 MICROGRAM(S): 50 INJECTION INTRAVENOUS at 09:17

## 2022-12-02 RX ADMIN — POLYETHYLENE GLYCOL 3350 17 GRAM(S): 17 POWDER, FOR SOLUTION ORAL at 12:45

## 2022-12-02 RX ADMIN — Medication 3 UNIT(S): at 17:50

## 2022-12-02 RX ADMIN — Medication 200 GRAM(S): at 00:30

## 2022-12-02 RX ADMIN — Medication 20 MILLIGRAM(S): at 07:00

## 2022-12-02 RX ADMIN — OXYCODONE HYDROCHLORIDE 10 MILLIGRAM(S): 5 TABLET ORAL at 03:00

## 2022-12-02 RX ADMIN — SODIUM CHLORIDE 3 MILLILITER(S): 9 INJECTION INTRAMUSCULAR; INTRAVENOUS; SUBCUTANEOUS at 15:00

## 2022-12-02 RX ADMIN — Medication 250 MILLILITER(S): at 00:30

## 2022-12-02 RX ADMIN — SODIUM CHLORIDE 3 MILLILITER(S): 9 INJECTION INTRAMUSCULAR; INTRAVENOUS; SUBCUTANEOUS at 22:21

## 2022-12-02 RX ADMIN — FENTANYL CITRATE 25 MICROGRAM(S): 50 INJECTION INTRAVENOUS at 01:00

## 2022-12-02 RX ADMIN — Medication 15 MILLIGRAM(S): at 07:00

## 2022-12-02 RX ADMIN — FENTANYL CITRATE 25 MICROGRAM(S): 50 INJECTION INTRAVENOUS at 00:30

## 2022-12-02 RX ADMIN — ATORVASTATIN CALCIUM 80 MILLIGRAM(S): 80 TABLET, FILM COATED ORAL at 22:20

## 2022-12-02 RX ADMIN — Medication 2: at 07:15

## 2022-12-02 RX ADMIN — Medication 30 MILLIGRAM(S): at 17:30

## 2022-12-02 RX ADMIN — Medication 400 MILLIGRAM(S): at 11:30

## 2022-12-02 RX ADMIN — OXYCODONE HYDROCHLORIDE 5 MILLIGRAM(S): 5 TABLET ORAL at 05:07

## 2022-12-02 RX ADMIN — Medication 15 MILLIGRAM(S): at 07:30

## 2022-12-02 RX ADMIN — Medication 30 MILLIGRAM(S): at 17:51

## 2022-12-02 RX ADMIN — PANTOPRAZOLE SODIUM 40 MILLIGRAM(S): 20 TABLET, DELAYED RELEASE ORAL at 07:36

## 2022-12-02 RX ADMIN — Medication 250 MILLILITER(S): at 00:00

## 2022-12-02 RX ADMIN — FENTANYL CITRATE 25 MICROGRAM(S): 50 INJECTION INTRAVENOUS at 09:45

## 2022-12-02 RX ADMIN — Medication 2000 MILLIGRAM(S): at 11:30

## 2022-12-02 RX ADMIN — OXYCODONE HYDROCHLORIDE 5 MILLIGRAM(S): 5 TABLET ORAL at 04:07

## 2022-12-02 RX ADMIN — Medication 2: at 17:49

## 2022-12-02 RX ADMIN — Medication 81 MILLIGRAM(S): at 12:45

## 2022-12-02 RX ADMIN — HEPARIN SODIUM 5000 UNIT(S): 5000 INJECTION INTRAVENOUS; SUBCUTANEOUS at 06:00

## 2022-12-02 RX ADMIN — Medication 2000 MILLIGRAM(S): at 03:00

## 2022-12-02 RX ADMIN — Medication 2000 MILLIGRAM(S): at 18:56

## 2022-12-02 RX ADMIN — OXYCODONE HYDROCHLORIDE 10 MILLIGRAM(S): 5 TABLET ORAL at 02:00

## 2022-12-02 RX ADMIN — HEPARIN SODIUM 5000 UNIT(S): 5000 INJECTION INTRAVENOUS; SUBCUTANEOUS at 22:20

## 2022-12-02 RX ADMIN — OXYCODONE HYDROCHLORIDE 5 MILLIGRAM(S): 5 TABLET ORAL at 19:20

## 2022-12-02 RX ADMIN — CLOPIDOGREL BISULFATE 75 MILLIGRAM(S): 75 TABLET, FILM COATED ORAL at 12:45

## 2022-12-02 NOTE — PHYSICAL THERAPY INITIAL EVALUATION ADULT - PERTINENT HX OF CURRENT PROBLEM, REHAB EVAL
51 yo M presented to [facility] ED with complaints of an intermittent midsternal chest pain with radiation to the L arm/neck. Chest pain started around 1am last night and woke him from sleep. He took SL Nitro with some improvement however still continues to endorse chest pain. CP is a/w SOB, orthopnea, diaphoresis, and is described as similar to prior stents. Pt was seen by Dr. Rivera on 11/28/22 who advised him to go to the ER if CP reoccurred. 51 yo M presented to [facility] ED with complaints of an intermittent midsternal chest pain with radiation to the L arm/neck. Chest pain started around 1am and woke him from sleep. He took SL Nitro with some improvement however still continues to endorse chest pain. CP is a/w SOB, orthopnea, diaphoresis, and is described as similar to prior stents. Pt was seen by Dr. Rivera on 11/28/22 who advised him to go to the ER if CP reoccurred.

## 2022-12-02 NOTE — PHYSICAL THERAPY INITIAL EVALUATION ADULT - RANGE OF MOTION EXAMINATION, REHAB EVAL
All UE/LE ROM WNL besides b/l UE shoulder flexion restricted to 50% ROM secondary to sternal precautions

## 2022-12-02 NOTE — PHYSICAL THERAPY INITIAL EVALUATION ADULT - GENERAL OBSERVATIONS, REHAB EVAL
Pt received sitting in bedside chair c/o 10/10 sternal pain +EKG +Heplock +R SCD + LLE ace wrap +TPM +NC 2L +jovani +2 chest tubes to suction +Sternal incision dressing C/D/I +Eddie Pt received sitting in bedside chair c/o 10/10 sternal pain and dizziness +EKG +Heplock +R SCD + LLE ace wrap +TPM +NC 2L +jovani +2 chest tubes to suction +Sternal incision dressing C/D/I +Eddie Pt received sitting in bedside chair c/o 10/10 sternal pain and dizziness +EKG +Heplock +R SCD + LLE ace wrap +TPM +NC 2L +jovani +2 chest tubes to wall suction +Sternal incision dressing C/D/I +Eddie

## 2022-12-02 NOTE — PROGRESS NOTE ADULT - ASSESSMENT
53 yo male, former smoker, strong FH of early CAD, w/ PMHx of HTN, HLD, DM T2, EDUARDO (baseline Hgb 8s in 12/2021; EGD 4/2021 w/ antral gastritis and duodenitis + hemorrhoids), CAD (w/ MI in 2010/2020 and multiple PCIs, and recurrent ISR OM1 requiring brachytherapy in the past) who presented to [facility] ED on 11/29 with complaints of an intermittent midsternal chest pain with radiation to the L arm/neck relieved w/ SL Nitro which woke him from sleep. He underwent a cardiac cath which revealed 3V CAD. CT surgery consulted for surgical evaluation. Pt undergoing pre op testing. On 12/1/22 he underwent an uncomplicated CABG x3 (lima-lad, OM 1 Y graft OM 2) EF 55% with Dr. Hale. Extubated in short course. POD1 pain issues, but sleepy. Toradol started PRN. Transferred to stepdown unit. Pleural tube removed. 2x mediastinal blakes remain, likely remove in AM. Goal is negative balance.    Plan:    Neurovascular:   -Pain well controlled with current regimen.   -continue tylenol, toradol, oxy 5 for pain    Cardiovascular:   POD1 CABGx3  -continue ASA/plavix  -continue atorvastatin  -will start BB when BP allows, likely AM  -Hemodynamically stable.   -Monitor: BP, HR, tele    Respiratory:   -Oxygenating well on room air  -Encourage continued use of IS 10x/hr and frequent ambulation    GI:  -GI PPX: continue protonix  -PO Diet  -Bowel Regimen: continue miralax    Renal / :  -Continue to monitor renal function: BUN/Cr: 16/1.12  -Monitor I/O's daily     Endocrine:    hx of DM   -A1c: 7.5%  -continue MISS, lantus 10u, lispro 3u  no hx thyroid disease  -TSH:0.675    Hematologic:  -CBC: H/H- 7.4/23.1, pending repeats  -Coagulation Panel.    ID:  -Temperature: afebrile  -CBC: WBC- 8.51  -Continue to observe for SIRS/Sepsis Syndrome.    Prophylaxis:  -DVT prophylaxis with 5000 SubQ Heparin q12h (lowered 2/2 increased PTT)  -Continue with SCD's b/l while patient is at rest     Disposition:  -Discharge home once patient is medically ready

## 2022-12-02 NOTE — PHYSICAL THERAPY INITIAL EVALUATION ADULT - MANUAL MUSCLE TESTING RESULTS, REHAB EVAL
All UE/LE ROM grossly >3/5 via functional ambulation assessment besides b/l UE shoulder flexion 3-/5 secondary to sternal precautions

## 2022-12-02 NOTE — PHYSICAL THERAPY INITIAL EVALUATION ADULT - ADDITIONAL COMMENTS
Pt is a semi-poor historian secondary to being in pain. Pt reports prior to arrival using a SC for ambulation. Pt was mostly independent however sometimes required assistance with ADLs and IADLs. Pt has a shower tub with grab bars.

## 2022-12-02 NOTE — PHYSICAL THERAPY INITIAL EVALUATION ADULT - GAIT DEVIATIONS NOTED, PT EVAL
decreased steve/decreased velocity of limb motion/decreased step length/decreased weight-shifting ability

## 2022-12-02 NOTE — PHYSICAL THERAPY INITIAL EVALUATION ADULT - GAIT DISTANCE, PT EVAL
15 feet x 1 requiring seated rest after, Further distance limited by pt c/o dizziness and 10/10 sternal pain however vitals remained stable

## 2022-12-03 LAB
ALBUMIN SERPL ELPH-MCNC: 3 G/DL — LOW (ref 3.3–5)
ALP SERPL-CCNC: 44 U/L — SIGNIFICANT CHANGE UP (ref 40–120)
ALT FLD-CCNC: 8 U/L — LOW (ref 10–45)
ANION GAP SERPL CALC-SCNC: 7 MMOL/L — SIGNIFICANT CHANGE UP (ref 5–17)
APTT BLD: 37.1 SEC — HIGH (ref 27.5–35.5)
AST SERPL-CCNC: 22 U/L — SIGNIFICANT CHANGE UP (ref 10–40)
BILIRUB SERPL-MCNC: 1.1 MG/DL — SIGNIFICANT CHANGE UP (ref 0.2–1.2)
BUN SERPL-MCNC: 14 MG/DL — SIGNIFICANT CHANGE UP (ref 7–23)
CALCIUM SERPL-MCNC: 7.8 MG/DL — LOW (ref 8.4–10.5)
CHLORIDE SERPL-SCNC: 101 MMOL/L — SIGNIFICANT CHANGE UP (ref 96–108)
CO2 SERPL-SCNC: 28 MMOL/L — SIGNIFICANT CHANGE UP (ref 22–31)
CREAT SERPL-MCNC: 0.85 MG/DL — SIGNIFICANT CHANGE UP (ref 0.5–1.3)
EGFR: 105 ML/MIN/1.73M2 — SIGNIFICANT CHANGE UP
GLUCOSE BLDC GLUCOMTR-MCNC: 127 MG/DL — HIGH (ref 70–99)
GLUCOSE BLDC GLUCOMTR-MCNC: 153 MG/DL — HIGH (ref 70–99)
GLUCOSE BLDC GLUCOMTR-MCNC: 161 MG/DL — HIGH (ref 70–99)
GLUCOSE BLDC GLUCOMTR-MCNC: 186 MG/DL — HIGH (ref 70–99)
GLUCOSE BLDC GLUCOMTR-MCNC: 188 MG/DL — HIGH (ref 70–99)
GLUCOSE SERPL-MCNC: 176 MG/DL — HIGH (ref 70–99)
HCT VFR BLD CALC: 21.2 % — LOW (ref 39–50)
HCT VFR BLD CALC: 21.3 % — LOW (ref 39–50)
HCT VFR BLD CALC: 21.7 % — LOW (ref 39–50)
HCT VFR BLD CALC: 23.5 % — LOW (ref 39–50)
HGB BLD-MCNC: 6.9 G/DL — CRITICAL LOW (ref 13–17)
HGB BLD-MCNC: 7 G/DL — CRITICAL LOW (ref 13–17)
HGB BLD-MCNC: 7.1 G/DL — LOW (ref 13–17)
HGB BLD-MCNC: 7.6 G/DL — LOW (ref 13–17)
INR BLD: 1.39 — HIGH (ref 0.88–1.16)
MAGNESIUM SERPL-MCNC: 1.8 MG/DL — SIGNIFICANT CHANGE UP (ref 1.6–2.6)
MCHC RBC-ENTMCNC: 27 PG — SIGNIFICANT CHANGE UP (ref 27–34)
MCHC RBC-ENTMCNC: 27.2 PG — SIGNIFICANT CHANGE UP (ref 27–34)
MCHC RBC-ENTMCNC: 27.3 PG — SIGNIFICANT CHANGE UP (ref 27–34)
MCHC RBC-ENTMCNC: 27.8 PG — SIGNIFICANT CHANGE UP (ref 27–34)
MCHC RBC-ENTMCNC: 31.8 GM/DL — LOW (ref 32–36)
MCHC RBC-ENTMCNC: 32.3 GM/DL — SIGNIFICANT CHANGE UP (ref 32–36)
MCHC RBC-ENTMCNC: 32.9 GM/DL — SIGNIFICANT CHANGE UP (ref 32–36)
MCHC RBC-ENTMCNC: 33.5 GM/DL — SIGNIFICANT CHANGE UP (ref 32–36)
MCV RBC AUTO: 82.9 FL — SIGNIFICANT CHANGE UP (ref 80–100)
MCV RBC AUTO: 83.1 FL — SIGNIFICANT CHANGE UP (ref 80–100)
MCV RBC AUTO: 84.5 FL — SIGNIFICANT CHANGE UP (ref 80–100)
MCV RBC AUTO: 84.8 FL — SIGNIFICANT CHANGE UP (ref 80–100)
NRBC # BLD: 0 /100 WBCS — SIGNIFICANT CHANGE UP (ref 0–0)
PHOSPHATE SERPL-MCNC: 2.3 MG/DL — LOW (ref 2.5–4.5)
PLATELET # BLD AUTO: 84 K/UL — LOW (ref 150–400)
PLATELET # BLD AUTO: 92 K/UL — LOW (ref 150–400)
POTASSIUM SERPL-MCNC: 4.1 MMOL/L — SIGNIFICANT CHANGE UP (ref 3.5–5.3)
POTASSIUM SERPL-SCNC: 4.1 MMOL/L — SIGNIFICANT CHANGE UP (ref 3.5–5.3)
PROT SERPL-MCNC: 5.2 G/DL — LOW (ref 6–8.3)
PROTHROM AB SERPL-ACNC: 16.6 SEC — HIGH (ref 10.5–13.4)
RBC # BLD: 2.55 M/UL — LOW (ref 4.2–5.8)
RBC # BLD: 2.56 M/UL — LOW (ref 4.2–5.8)
RBC # BLD: 2.57 M/UL — LOW (ref 4.2–5.8)
RBC # BLD: 2.78 M/UL — LOW (ref 4.2–5.8)
RBC # FLD: 13.7 % — SIGNIFICANT CHANGE UP (ref 10.3–14.5)
RBC # FLD: 13.8 % — SIGNIFICANT CHANGE UP (ref 10.3–14.5)
RBC # FLD: 14.1 % — SIGNIFICANT CHANGE UP (ref 10.3–14.5)
RBC # FLD: 14.2 % — SIGNIFICANT CHANGE UP (ref 10.3–14.5)
SODIUM SERPL-SCNC: 136 MMOL/L — SIGNIFICANT CHANGE UP (ref 135–145)
WBC # BLD: 5.87 K/UL — SIGNIFICANT CHANGE UP (ref 3.8–10.5)
WBC # BLD: 6.1 K/UL — SIGNIFICANT CHANGE UP (ref 3.8–10.5)
WBC # BLD: 7.11 K/UL — SIGNIFICANT CHANGE UP (ref 3.8–10.5)
WBC # BLD: 7.22 K/UL — SIGNIFICANT CHANGE UP (ref 3.8–10.5)
WBC # FLD AUTO: 5.87 K/UL — SIGNIFICANT CHANGE UP (ref 3.8–10.5)
WBC # FLD AUTO: 6.1 K/UL — SIGNIFICANT CHANGE UP (ref 3.8–10.5)
WBC # FLD AUTO: 7.11 K/UL — SIGNIFICANT CHANGE UP (ref 3.8–10.5)
WBC # FLD AUTO: 7.22 K/UL — SIGNIFICANT CHANGE UP (ref 3.8–10.5)

## 2022-12-03 PROCEDURE — 93308 TTE F-UP OR LMTD: CPT | Mod: 26

## 2022-12-03 PROCEDURE — 71045 X-RAY EXAM CHEST 1 VIEW: CPT | Mod: 26

## 2022-12-03 RX ORDER — INSULIN LISPRO 100/ML
4 VIAL (ML) SUBCUTANEOUS
Refills: 0 | Status: DISCONTINUED | OUTPATIENT
Start: 2022-12-03 | End: 2022-12-06

## 2022-12-03 RX ORDER — MAGNESIUM OXIDE 400 MG ORAL TABLET 241.3 MG
400 TABLET ORAL ONCE
Refills: 0 | Status: COMPLETED | OUTPATIENT
Start: 2022-12-03 | End: 2022-12-03

## 2022-12-03 RX ORDER — FUROSEMIDE 40 MG
20 TABLET ORAL ONCE
Refills: 0 | Status: COMPLETED | OUTPATIENT
Start: 2022-12-03 | End: 2022-12-03

## 2022-12-03 RX ADMIN — POLYETHYLENE GLYCOL 3350 17 GRAM(S): 17 POWDER, FOR SOLUTION ORAL at 11:45

## 2022-12-03 RX ADMIN — PANTOPRAZOLE SODIUM 40 MILLIGRAM(S): 20 TABLET, DELAYED RELEASE ORAL at 05:26

## 2022-12-03 RX ADMIN — Medication 30 MILLIGRAM(S): at 05:10

## 2022-12-03 RX ADMIN — INSULIN GLARGINE 10 UNIT(S): 100 INJECTION, SOLUTION SUBCUTANEOUS at 21:37

## 2022-12-03 RX ADMIN — Medication 2: at 16:52

## 2022-12-03 RX ADMIN — ATORVASTATIN CALCIUM 80 MILLIGRAM(S): 80 TABLET, FILM COATED ORAL at 21:37

## 2022-12-03 RX ADMIN — Medication 2000 MILLIGRAM(S): at 11:45

## 2022-12-03 RX ADMIN — SODIUM CHLORIDE 3 MILLILITER(S): 9 INJECTION INTRAMUSCULAR; INTRAVENOUS; SUBCUTANEOUS at 05:15

## 2022-12-03 RX ADMIN — SODIUM CHLORIDE 3 MILLILITER(S): 9 INJECTION INTRAMUSCULAR; INTRAVENOUS; SUBCUTANEOUS at 13:22

## 2022-12-03 RX ADMIN — OXYCODONE HYDROCHLORIDE 5 MILLIGRAM(S): 5 TABLET ORAL at 01:00

## 2022-12-03 RX ADMIN — Medication 30 MILLIGRAM(S): at 04:49

## 2022-12-03 RX ADMIN — MAGNESIUM OXIDE 400 MG ORAL TABLET 400 MILLIGRAM(S): 241.3 TABLET ORAL at 11:44

## 2022-12-03 RX ADMIN — Medication 30 MILLIGRAM(S): at 18:19

## 2022-12-03 RX ADMIN — Medication 30 MILLIGRAM(S): at 12:00

## 2022-12-03 RX ADMIN — OXYCODONE HYDROCHLORIDE 5 MILLIGRAM(S): 5 TABLET ORAL at 13:06

## 2022-12-03 RX ADMIN — OXYCODONE HYDROCHLORIDE 5 MILLIGRAM(S): 5 TABLET ORAL at 14:00

## 2022-12-03 RX ADMIN — Medication 4 UNIT(S): at 16:11

## 2022-12-03 RX ADMIN — Medication 30 MILLIGRAM(S): at 19:25

## 2022-12-03 RX ADMIN — Medication 20 MILLIGRAM(S): at 19:40

## 2022-12-03 RX ADMIN — SODIUM CHLORIDE 3 MILLILITER(S): 9 INJECTION INTRAMUSCULAR; INTRAVENOUS; SUBCUTANEOUS at 21:40

## 2022-12-03 RX ADMIN — Medication 2: at 07:56

## 2022-12-03 RX ADMIN — OXYCODONE HYDROCHLORIDE 5 MILLIGRAM(S): 5 TABLET ORAL at 06:29

## 2022-12-03 RX ADMIN — Medication 30 MILLIGRAM(S): at 11:14

## 2022-12-03 RX ADMIN — Medication 3 UNIT(S): at 07:56

## 2022-12-03 RX ADMIN — OXYCODONE HYDROCHLORIDE 5 MILLIGRAM(S): 5 TABLET ORAL at 07:29

## 2022-12-03 RX ADMIN — OXYCODONE HYDROCHLORIDE 5 MILLIGRAM(S): 5 TABLET ORAL at 00:00

## 2022-12-03 RX ADMIN — Medication 2000 MILLIGRAM(S): at 03:03

## 2022-12-03 RX ADMIN — Medication 2: at 13:07

## 2022-12-03 RX ADMIN — Medication 3 UNIT(S): at 13:07

## 2022-12-03 NOTE — PROGRESS NOTE ADULT - ASSESSMENT
53 yo male, former smoker, strong FH of early CAD, w/ PMHx of HTN, HLD, DM T2, EDUARDO (baseline Hgb 8s in 12/2021; EGD 4/2021 w/ antral gastritis and duodenitis + hemorrhoids), CAD (w/ MI in 2010/2020 and multiple PCIs, and recurrent ISR OM1 requiring brachytherapy in the past) who presented to [facility] ED on 11/29 with complaints of an intermittent midsternal chest pain with radiation to the L arm/neck relieved w/ SL Nitro which woke him from sleep. He underwent a cardiac cath which revealed 3V CAD. CT surgery consulted for surgical evaluation. Pt undergoing pre op testing. On 12/1/22 he underwent an uncomplicated CABG x3 (lima-lad, OM 1 Y graft OM 2) EF 55% with Dr. Hale. Extubated on POD#0. POD1 pain issues, but sleepy. Toradol started PRN. Transferred to stepdown unit. Pleural tube removed. 2x mediastinal blakes remain. Over night dropped hemoglobin to 6.9. Hypontensive this AM. Received 2 units of PRBCs. Suboptimal response in repeat hemoglobin. 6.9--> 7.6 after 2 units. Monitoring left thigh as potential bleeding source.     Neuro: pain management   - Oxy + Toradol for pain     CVS: CABGx3 EF 50%. No pericardial effusion on Echo this am. Normotensive after 2 units of blood.   - Atorvastatin for CAD   - held ASA and BB in setting of hypotension and possible bleed.       Respiratory: Saturating well on 2LNC   - Wean off O2 sat > 92%  - IS and ambulation  - Chest PT.     GI: Last BM pre-op   - GI PPX   - Bowel regimen with Senna and Miralax     : BUN/ Creatinine. 14/0.85  + Morgan   - monitor I/Os.   - Lasix IV x 1 after blood     Endo: mealtime FS mildly elevated.   - increased Meal time lispro to 4units   - continue 10 units of Lantus at bedtime.   - ISS     ID: afebrile WBC 7.11  - completing ellie-operative ABX   - continue to monitor fever curve     Heme: hemoglobin 6.9 to 7.6 after 2 units PRBCs   - held ASA, Plavix and SQH today.   - recheck CBC in afternoon.     Dispo plan:     Doris Petersen PA-C

## 2022-12-04 LAB
ALBUMIN SERPL ELPH-MCNC: 3.1 G/DL — LOW (ref 3.3–5)
ALP SERPL-CCNC: 51 U/L — SIGNIFICANT CHANGE UP (ref 40–120)
ALT FLD-CCNC: 7 U/L — LOW (ref 10–45)
ANION GAP SERPL CALC-SCNC: 9 MMOL/L — SIGNIFICANT CHANGE UP (ref 5–17)
AST SERPL-CCNC: 20 U/L — SIGNIFICANT CHANGE UP (ref 10–40)
BILIRUB SERPL-MCNC: 1 MG/DL — SIGNIFICANT CHANGE UP (ref 0.2–1.2)
BUN SERPL-MCNC: 12 MG/DL — SIGNIFICANT CHANGE UP (ref 7–23)
CALCIUM SERPL-MCNC: 8.1 MG/DL — LOW (ref 8.4–10.5)
CHLORIDE SERPL-SCNC: 99 MMOL/L — SIGNIFICANT CHANGE UP (ref 96–108)
CO2 SERPL-SCNC: 29 MMOL/L — SIGNIFICANT CHANGE UP (ref 22–31)
CREAT SERPL-MCNC: 0.86 MG/DL — SIGNIFICANT CHANGE UP (ref 0.5–1.3)
EGFR: 105 ML/MIN/1.73M2 — SIGNIFICANT CHANGE UP
GLUCOSE BLDC GLUCOMTR-MCNC: 151 MG/DL — HIGH (ref 70–99)
GLUCOSE BLDC GLUCOMTR-MCNC: 161 MG/DL — HIGH (ref 70–99)
GLUCOSE BLDC GLUCOMTR-MCNC: 162 MG/DL — HIGH (ref 70–99)
GLUCOSE BLDC GLUCOMTR-MCNC: 164 MG/DL — HIGH (ref 70–99)
GLUCOSE SERPL-MCNC: 142 MG/DL — HIGH (ref 70–99)
HCT VFR BLD CALC: 22.5 % — LOW (ref 39–50)
HGB BLD-MCNC: 7.3 G/DL — LOW (ref 13–17)
MAGNESIUM SERPL-MCNC: 2 MG/DL — SIGNIFICANT CHANGE UP (ref 1.6–2.6)
MCHC RBC-ENTMCNC: 27.3 PG — SIGNIFICANT CHANGE UP (ref 27–34)
MCHC RBC-ENTMCNC: 32.4 GM/DL — SIGNIFICANT CHANGE UP (ref 32–36)
MCV RBC AUTO: 84.3 FL — SIGNIFICANT CHANGE UP (ref 80–100)
NRBC # BLD: 0 /100 WBCS — SIGNIFICANT CHANGE UP (ref 0–0)
PHOSPHATE SERPL-MCNC: 2.7 MG/DL — SIGNIFICANT CHANGE UP (ref 2.5–4.5)
PLATELET # BLD AUTO: 111 K/UL — LOW (ref 150–400)
POTASSIUM SERPL-MCNC: 3.9 MMOL/L — SIGNIFICANT CHANGE UP (ref 3.5–5.3)
POTASSIUM SERPL-SCNC: 3.9 MMOL/L — SIGNIFICANT CHANGE UP (ref 3.5–5.3)
PROT SERPL-MCNC: 6 G/DL — SIGNIFICANT CHANGE UP (ref 6–8.3)
RBC # BLD: 2.67 M/UL — LOW (ref 4.2–5.8)
RBC # FLD: 14.1 % — SIGNIFICANT CHANGE UP (ref 10.3–14.5)
SODIUM SERPL-SCNC: 137 MMOL/L — SIGNIFICANT CHANGE UP (ref 135–145)
WBC # BLD: 5.79 K/UL — SIGNIFICANT CHANGE UP (ref 3.8–10.5)
WBC # FLD AUTO: 5.79 K/UL — SIGNIFICANT CHANGE UP (ref 3.8–10.5)

## 2022-12-04 PROCEDURE — 71045 X-RAY EXAM CHEST 1 VIEW: CPT | Mod: 26

## 2022-12-04 RX ORDER — METOPROLOL TARTRATE 50 MG
25 TABLET ORAL
Refills: 0 | Status: DISCONTINUED | OUTPATIENT
Start: 2022-12-04 | End: 2022-12-06

## 2022-12-04 RX ORDER — FUROSEMIDE 40 MG
20 TABLET ORAL ONCE
Refills: 0 | Status: COMPLETED | OUTPATIENT
Start: 2022-12-04 | End: 2022-12-04

## 2022-12-04 RX ORDER — METOPROLOL TARTRATE 50 MG
2.5 TABLET ORAL ONCE
Refills: 0 | Status: DISCONTINUED | OUTPATIENT
Start: 2022-12-04 | End: 2022-12-04

## 2022-12-04 RX ORDER — INSULIN LISPRO 100/ML
VIAL (ML) SUBCUTANEOUS
Refills: 0 | Status: DISCONTINUED | OUTPATIENT
Start: 2022-12-04 | End: 2022-12-06

## 2022-12-04 RX ADMIN — Medication 2: at 12:32

## 2022-12-04 RX ADMIN — Medication 2: at 16:43

## 2022-12-04 RX ADMIN — SODIUM CHLORIDE 3 MILLILITER(S): 9 INJECTION INTRAMUSCULAR; INTRAVENOUS; SUBCUTANEOUS at 06:18

## 2022-12-04 RX ADMIN — Medication 4 UNIT(S): at 12:32

## 2022-12-04 RX ADMIN — Medication 650 MILLIGRAM(S): at 19:05

## 2022-12-04 RX ADMIN — Medication 20 MILLIGRAM(S): at 19:05

## 2022-12-04 RX ADMIN — ATORVASTATIN CALCIUM 80 MILLIGRAM(S): 80 TABLET, FILM COATED ORAL at 22:17

## 2022-12-04 RX ADMIN — OXYCODONE HYDROCHLORIDE 5 MILLIGRAM(S): 5 TABLET ORAL at 09:35

## 2022-12-04 RX ADMIN — OXYCODONE HYDROCHLORIDE 5 MILLIGRAM(S): 5 TABLET ORAL at 14:43

## 2022-12-04 RX ADMIN — Medication 4 UNIT(S): at 16:43

## 2022-12-04 RX ADMIN — PANTOPRAZOLE SODIUM 40 MILLIGRAM(S): 20 TABLET, DELAYED RELEASE ORAL at 06:18

## 2022-12-04 RX ADMIN — Medication 30 MILLIGRAM(S): at 07:40

## 2022-12-04 RX ADMIN — POLYETHYLENE GLYCOL 3350 17 GRAM(S): 17 POWDER, FOR SOLUTION ORAL at 11:59

## 2022-12-04 RX ADMIN — Medication 25 MILLIGRAM(S): at 19:57

## 2022-12-04 RX ADMIN — SODIUM CHLORIDE 3 MILLILITER(S): 9 INJECTION INTRAMUSCULAR; INTRAVENOUS; SUBCUTANEOUS at 15:13

## 2022-12-04 RX ADMIN — OXYCODONE HYDROCHLORIDE 5 MILLIGRAM(S): 5 TABLET ORAL at 15:13

## 2022-12-04 RX ADMIN — Medication 30 MILLIGRAM(S): at 01:22

## 2022-12-04 RX ADMIN — INSULIN GLARGINE 10 UNIT(S): 100 INJECTION, SOLUTION SUBCUTANEOUS at 23:07

## 2022-12-04 RX ADMIN — CLOPIDOGREL BISULFATE 75 MILLIGRAM(S): 75 TABLET, FILM COATED ORAL at 11:59

## 2022-12-04 RX ADMIN — Medication 30 MILLIGRAM(S): at 07:22

## 2022-12-04 RX ADMIN — Medication 650 MILLIGRAM(S): at 19:54

## 2022-12-04 RX ADMIN — SODIUM CHLORIDE 3 MILLILITER(S): 9 INJECTION INTRAMUSCULAR; INTRAVENOUS; SUBCUTANEOUS at 22:47

## 2022-12-04 RX ADMIN — OXYCODONE HYDROCHLORIDE 5 MILLIGRAM(S): 5 TABLET ORAL at 20:31

## 2022-12-04 RX ADMIN — Medication 2: at 07:58

## 2022-12-04 RX ADMIN — OXYCODONE HYDROCHLORIDE 5 MILLIGRAM(S): 5 TABLET ORAL at 20:51

## 2022-12-04 RX ADMIN — Medication 81 MILLIGRAM(S): at 11:59

## 2022-12-04 RX ADMIN — OXYCODONE HYDROCHLORIDE 5 MILLIGRAM(S): 5 TABLET ORAL at 11:44

## 2022-12-04 RX ADMIN — Medication 30 MILLIGRAM(S): at 01:40

## 2022-12-04 RX ADMIN — Medication 4 UNIT(S): at 07:57

## 2022-12-04 NOTE — PROGRESS NOTE ADULT - ASSESSMENT
51 yo male, former smoker, strong FH of early CAD, w/ PMHx of HTN, HLD, DM T2, EDUARDO (baseline Hgb 8s in 12/2021; EGD 4/2021 w/ antral gastritis and duodenitis + hemorrhoids), CAD (w/ MI in 2010/2020 and multiple PCIs, and recurrent ISR OM1 requiring brachytherapy in the past) who presented to [facility] ED on 11/29 with complaints of an intermittent midsternal chest pain with radiation to the L arm/neck relieved w/ SL Nitro which woke him from sleep. He underwent a cardiac cath which revealed 3V CAD. CT surgery consulted for surgical evaluation. Pt undergoing pre op testing. On 12/1/22 he underwent an uncomplicated CABG x3 (lima-lad, OM 1 Y graft OM 2) EF 55% with Dr. Hale. Extubated on POD#0. POD1 pain issues, but sleepy. Toradol started PRN. Transferred to stepdown unit. Pleural tube removed. 2x mediastinal blakes remain. Over night on  POD #2 dropped hemoglobin to 6.9. Hypontensive this AM. Received 2 units of PRBCs. Suboptimal response in repeat hemoglobin. 6.9--> 7.6 after 2 units. Monitored eft thigh as potential bleeding source. POD#3 Hemoglobin still 7.3 this AM. 1 unit of blood given followed by Lasix. . Blood pressure improved. Started on Lopressor.     Neuro: pain management   - Oxy PRN     CVS: CABGx3 EF 50%. No pericardial effusion on Echo. Normotensive this AM.   - Atorvastatin for CAD   - resumed ASA and Plavix.   - Started Lopressor 25 BID this evening.     Respiratory: Saturating well on 2LNC   - Wean off O2 sat > 92%  - IS and ambulation  - Chest PT.     GI: Last BM pre-op   - GI PPX   - Bowel regimen with Senna and Miralax     : BUN/ Creatinine. 12/0.86 + Morgan   - monitor I/Os.   - Lasix IV x 1 after blood     Endo: mealtime FS improved   - continue Meal time lispro to 4units   - continue 10 units of Lantus at bedtime.   - ISS     ID: afebrile WBC 5.79  - completing ellie-operative ABX   - continue to monitor fever curve     Heme: hemoglobin7.3 after 2 units yestrday  - okay to restart SQH   - recheck CBC in tonight after 1 units of blood.     Dispo plan:     Doris Petersen PA-C

## 2022-12-05 LAB
ALBUMIN SERPL ELPH-MCNC: 3.8 G/DL — SIGNIFICANT CHANGE UP (ref 3.3–5)
ALP SERPL-CCNC: 65 U/L — SIGNIFICANT CHANGE UP (ref 40–120)
ALT FLD-CCNC: 9 U/L — LOW (ref 10–45)
ANION GAP SERPL CALC-SCNC: 14 MMOL/L — SIGNIFICANT CHANGE UP (ref 5–17)
AST SERPL-CCNC: 20 U/L — SIGNIFICANT CHANGE UP (ref 10–40)
BILIRUB SERPL-MCNC: 1.2 MG/DL — SIGNIFICANT CHANGE UP (ref 0.2–1.2)
BLD GP AB SCN SERPL QL: NEGATIVE — SIGNIFICANT CHANGE UP
BUN SERPL-MCNC: 9 MG/DL — SIGNIFICANT CHANGE UP (ref 7–23)
CALCIUM SERPL-MCNC: 8.8 MG/DL — SIGNIFICANT CHANGE UP (ref 8.4–10.5)
CHLORIDE SERPL-SCNC: 98 MMOL/L — SIGNIFICANT CHANGE UP (ref 96–108)
CO2 SERPL-SCNC: 25 MMOL/L — SIGNIFICANT CHANGE UP (ref 22–31)
CREAT SERPL-MCNC: 0.79 MG/DL — SIGNIFICANT CHANGE UP (ref 0.5–1.3)
EGFR: 108 ML/MIN/1.73M2 — SIGNIFICANT CHANGE UP
GLUCOSE BLDC GLUCOMTR-MCNC: 123 MG/DL — HIGH (ref 70–99)
GLUCOSE BLDC GLUCOMTR-MCNC: 133 MG/DL — HIGH (ref 70–99)
GLUCOSE BLDC GLUCOMTR-MCNC: 156 MG/DL — HIGH (ref 70–99)
GLUCOSE BLDC GLUCOMTR-MCNC: 252 MG/DL — HIGH (ref 70–99)
GLUCOSE SERPL-MCNC: 154 MG/DL — HIGH (ref 70–99)
HCT VFR BLD CALC: 29.2 % — LOW (ref 39–50)
HGB BLD-MCNC: 9.5 G/DL — LOW (ref 13–17)
MAGNESIUM SERPL-MCNC: 2.1 MG/DL — SIGNIFICANT CHANGE UP (ref 1.6–2.6)
MCHC RBC-ENTMCNC: 27.9 PG — SIGNIFICANT CHANGE UP (ref 27–34)
MCHC RBC-ENTMCNC: 32.5 GM/DL — SIGNIFICANT CHANGE UP (ref 32–36)
MCV RBC AUTO: 85.9 FL — SIGNIFICANT CHANGE UP (ref 80–100)
NRBC # BLD: 0 /100 WBCS — SIGNIFICANT CHANGE UP (ref 0–0)
PHOSPHATE SERPL-MCNC: 3 MG/DL — SIGNIFICANT CHANGE UP (ref 2.5–4.5)
PLATELET # BLD AUTO: 174 K/UL — SIGNIFICANT CHANGE UP (ref 150–400)
POTASSIUM SERPL-MCNC: 4.1 MMOL/L — SIGNIFICANT CHANGE UP (ref 3.5–5.3)
POTASSIUM SERPL-SCNC: 4.1 MMOL/L — SIGNIFICANT CHANGE UP (ref 3.5–5.3)
PROT SERPL-MCNC: 6.9 G/DL — SIGNIFICANT CHANGE UP (ref 6–8.3)
RBC # BLD: 3.4 M/UL — LOW (ref 4.2–5.8)
RBC # FLD: 14.1 % — SIGNIFICANT CHANGE UP (ref 10.3–14.5)
RH IG SCN BLD-IMP: POSITIVE — SIGNIFICANT CHANGE UP
SODIUM SERPL-SCNC: 137 MMOL/L — SIGNIFICANT CHANGE UP (ref 135–145)
WBC # BLD: 7.33 K/UL — SIGNIFICANT CHANGE UP (ref 3.8–10.5)
WBC # FLD AUTO: 7.33 K/UL — SIGNIFICANT CHANGE UP (ref 3.8–10.5)

## 2022-12-05 PROCEDURE — 71045 X-RAY EXAM CHEST 1 VIEW: CPT | Mod: 26

## 2022-12-05 RX ORDER — FUROSEMIDE 40 MG
20 TABLET ORAL ONCE
Refills: 0 | Status: COMPLETED | OUTPATIENT
Start: 2022-12-05 | End: 2022-12-05

## 2022-12-05 RX ORDER — FERROUS SULFATE 325(65) MG
325 TABLET ORAL DAILY
Refills: 0 | Status: DISCONTINUED | OUTPATIENT
Start: 2022-12-05 | End: 2022-12-06

## 2022-12-05 RX ORDER — FOLIC ACID 0.8 MG
1 TABLET ORAL DAILY
Refills: 0 | Status: DISCONTINUED | OUTPATIENT
Start: 2022-12-05 | End: 2022-12-06

## 2022-12-05 RX ORDER — ASCORBIC ACID 60 MG
500 TABLET,CHEWABLE ORAL DAILY
Refills: 0 | Status: DISCONTINUED | OUTPATIENT
Start: 2022-12-05 | End: 2022-12-06

## 2022-12-05 RX ADMIN — OXYCODONE HYDROCHLORIDE 5 MILLIGRAM(S): 5 TABLET ORAL at 01:15

## 2022-12-05 RX ADMIN — OXYCODONE HYDROCHLORIDE 5 MILLIGRAM(S): 5 TABLET ORAL at 06:55

## 2022-12-05 RX ADMIN — Medication 20 MILLIGRAM(S): at 13:01

## 2022-12-05 RX ADMIN — SODIUM CHLORIDE 3 MILLILITER(S): 9 INJECTION INTRAMUSCULAR; INTRAVENOUS; SUBCUTANEOUS at 13:06

## 2022-12-05 RX ADMIN — OXYCODONE HYDROCHLORIDE 5 MILLIGRAM(S): 5 TABLET ORAL at 18:02

## 2022-12-05 RX ADMIN — Medication 650 MILLIGRAM(S): at 14:44

## 2022-12-05 RX ADMIN — Medication 500 MILLIGRAM(S): at 13:13

## 2022-12-05 RX ADMIN — Medication 650 MILLIGRAM(S): at 15:45

## 2022-12-05 RX ADMIN — INSULIN GLARGINE 10 UNIT(S): 100 INJECTION, SOLUTION SUBCUTANEOUS at 22:57

## 2022-12-05 RX ADMIN — OXYCODONE HYDROCHLORIDE 5 MILLIGRAM(S): 5 TABLET ORAL at 17:11

## 2022-12-05 RX ADMIN — OXYCODONE HYDROCHLORIDE 5 MILLIGRAM(S): 5 TABLET ORAL at 23:30

## 2022-12-05 RX ADMIN — Medication 25 MILLIGRAM(S): at 17:11

## 2022-12-05 RX ADMIN — HEPARIN SODIUM 5000 UNIT(S): 5000 INJECTION INTRAVENOUS; SUBCUTANEOUS at 22:57

## 2022-12-05 RX ADMIN — OXYCODONE HYDROCHLORIDE 5 MILLIGRAM(S): 5 TABLET ORAL at 12:44

## 2022-12-05 RX ADMIN — SODIUM CHLORIDE 3 MILLILITER(S): 9 INJECTION INTRAMUSCULAR; INTRAVENOUS; SUBCUTANEOUS at 22:43

## 2022-12-05 RX ADMIN — Medication 1 MILLIGRAM(S): at 13:01

## 2022-12-05 RX ADMIN — Medication 81 MILLIGRAM(S): at 13:01

## 2022-12-05 RX ADMIN — SODIUM CHLORIDE 3 MILLILITER(S): 9 INJECTION INTRAMUSCULAR; INTRAVENOUS; SUBCUTANEOUS at 05:48

## 2022-12-05 RX ADMIN — Medication 25 MILLIGRAM(S): at 05:47

## 2022-12-05 RX ADMIN — Medication 4 UNIT(S): at 07:33

## 2022-12-05 RX ADMIN — OXYCODONE HYDROCHLORIDE 5 MILLIGRAM(S): 5 TABLET ORAL at 10:06

## 2022-12-05 RX ADMIN — ATORVASTATIN CALCIUM 80 MILLIGRAM(S): 80 TABLET, FILM COATED ORAL at 22:57

## 2022-12-05 RX ADMIN — Medication 325 MILLIGRAM(S): at 13:01

## 2022-12-05 RX ADMIN — CLOPIDOGREL BISULFATE 75 MILLIGRAM(S): 75 TABLET, FILM COATED ORAL at 13:01

## 2022-12-05 RX ADMIN — OXYCODONE HYDROCHLORIDE 5 MILLIGRAM(S): 5 TABLET ORAL at 05:51

## 2022-12-05 RX ADMIN — Medication 6: at 23:00

## 2022-12-05 RX ADMIN — OXYCODONE HYDROCHLORIDE 5 MILLIGRAM(S): 5 TABLET ORAL at 02:15

## 2022-12-05 RX ADMIN — HEPARIN SODIUM 5000 UNIT(S): 5000 INJECTION INTRAVENOUS; SUBCUTANEOUS at 10:03

## 2022-12-05 RX ADMIN — Medication 2: at 07:32

## 2022-12-05 RX ADMIN — Medication 4 UNIT(S): at 18:10

## 2022-12-05 RX ADMIN — POLYETHYLENE GLYCOL 3350 17 GRAM(S): 17 POWDER, FOR SOLUTION ORAL at 13:00

## 2022-12-05 RX ADMIN — Medication 4 UNIT(S): at 13:28

## 2022-12-05 RX ADMIN — PANTOPRAZOLE SODIUM 40 MILLIGRAM(S): 20 TABLET, DELAYED RELEASE ORAL at 07:35

## 2022-12-05 NOTE — PROGRESS NOTE ADULT - ASSESSMENT
51 yo male, former smoker, strong FH of early CAD, w/ PMHx of HTN, HLD, DM T2, EDUARDO (baseline Hgb 8s in 12/2021; EGD 4/2021 w/ antral gastritis and duodenitis + hemorrhoids), CAD (w/ MI in 2010/2020 and multiple PCIs, and recurrent ISR OM1 requiring brachytherapy in the past) who presented to [facility] ED on 11/29 with complaints of an intermittent midsternal chest pain with radiation to the L arm/neck relieved w/ SL Nitro which woke him from sleep. He underwent a cardiac cath which revealed 3V CAD. CT surgery consulted for surgical evaluation. Pt undergoing pre op testing. On 12/1/22 he underwent an uncomplicated CABG x3 (lima-lad, OM 1 Y graft OM 2) EF 55% with Dr. Hale. Extubated on POD#0. POD1 pain issues, but sleepy. Toradol started PRN. Transferred to stepdown unit. Pleural tube removed. 2x mediastinal blakes remain. Over night on  POD #2 dropped hemoglobin to 6.9. Hypotensive this AM. Received 2 units of PRBCs. Suboptimal response in repeat hemoglobin. 6.9--> 7.6 after 2 units. Monitored eft thigh as potential bleeding source. POD#3 Hemoglobin still 7.3 this AM. 1 unit of blood given followed by Lasix.  Blood pressure improved. Started on Lopressor.     Neuro: pain management   -Tylenol and oxy prn for discomfort       CVS: CABG x 3 lima-lad, OM 1 Y graft OM 2, EF 50%. No pericardial effusion on Echo. Normotensive this AM.   - Atorvastatin for HLD  - ASA and Plavix for graft patency   - Continue lopressor 25mg BID   PMHx of previous stents 2020  HR: 81 (05 Dec 2022 13:00) (74 - 93)  BP: 122/81 (05 Dec 2022 13:00) (107/67 - 155/95)      Respiratory: Saturating well on room air  - Wean off O2 sat > 92%  - IS and ambulation  - Chest PT.     GI: Last BM pre-op   - GI PPX   - Diet: Consistent carb/ DASH  - Bowel regimen with Senna and Miralax     : BUN/ Creatinine. 9/.79  - monitor I/Os.   - Lasix IV 20mg today 12/5    Endo:  PMHx DMII on oral medications  a1C 7.5  POCT Blood Glucose last 24 hours: 133 -162  - continue Meal time lispro to 4units   - continue 10 units of Lantus at bedtime.   - ISS   Thyroid .675    ID: afebrile WBC 7.33  - post-op Abx completed  - afebrile continue to monitor fever curve       Heme:  H/H : 9.5/29.2   - PMHx: Iron deficiency Anemia, 2PRBC on 12/4/2022  - Vitamin C, Folic acid, Ferrous sulfate started   - okay to restart SQH   - recheck CBC in tonight after 1 units of blood.     Dispo plan:   Home when medically appropriate      53 yo male, former smoker, strong FH of early CAD, w/ PMHx of HTN, HLD, DM T2, EDUARDO (baseline Hgb 8s in 12/2021; EGD 4/2021 w/ antral gastritis and duodenitis + hemorrhoids), CAD (w/ MI in 2010/2020 and multiple PCIs, and recurrent ISR OM1 requiring brachytherapy in the past) who presented to [facility] ED on 11/29 with complaints of an intermittent midsternal chest pain with radiation to the L arm/neck relieved w/ SL Nitro which woke him from sleep. He underwent a cardiac cath which revealed 3V CAD. CT surgery consulted for surgical evaluation. Pt undergoing pre op testing. On 12/1/22 he underwent an uncomplicated CABG x3 (lima-lad, OM 1 Y graft OM 2) EF 55% with Dr. Hale. Extubated on POD0. POD1 pain issues, but sleepy. Toradol started PRN. POD Transferred to stepdown unit. Pleural tube removed. 2x mediastinal blakes remain. Over night on  POD 2 dropped hemoglobin to 6.9. Hypotensive this AM. Received 2 units of PRBCs. Suboptimal response in repeat hemoglobin. 6.9--> 7.6 after 2 units. Monitored eft thigh as potential bleeding source. POD3 Hemoglobin still 7.3, 1 unit of blood given followed by Lasix.  Blood pressure improved. Started on Lopressor. POD4 Mediastinal drains removed, diureses.     Neuro: pain management   -Tylenol and oxy prn for discomfort       CVS: CABG x 3 lima-lad, OM 1 Y graft OM 2, EF 50%. No pericardial effusion on Echo. Normotensive this AM.   - Atorvastatin for HLD  - ASA and Plavix for graft patency   - Continue lopressor 25mg BID   PMHx of previous stents 2020  HR: 81 (05 Dec 2022 13:00) (74 - 93)  BP: 122/81 (05 Dec 2022 13:00) (107/67 - 155/95)      Respiratory: Saturating well on room air  - Wean off O2 sat > 92%  - IS and ambulation  - Chest PT.     GI: Last BM pre-op   - GI PPX   - Diet: Consistent carb/ DASH  - Bowel regimen with Senna and Miralax     : BUN/ Creatinine. 9/.79  - monitor I/Os.   - Lasix IV 20mg today 12/5    Endo:  PMHx DMII on oral medications  a1C 7.5  POCT Blood Glucose last 24 hours: 133 -162  - continue Meal time lispro to 4units   - continue 10 units of Lantus at bedtime.   - ISS   Thyroid .675    ID: afebrile WBC 7.33  - post-op Abx completed  - afebrile continue to monitor fever curve       Heme:  H/H : 9.5/29.2   - PMHx: Iron deficiency Anemia, 2PRBC on 12/4/2022  - Vitamin C, Folic acid, Ferrous sulfate started   - [facility] for DVT prophylaxis     Dispo plan:   Home when medically appropriate

## 2022-12-05 NOTE — CHART NOTE - NSCHARTNOTEFT_GEN_A_CORE
CT Removal:    Case discussed with Dr. Hale this AM. Patient seen and examined at bedside, minimal output from CTs with no air leak appreciated.  Mediastinal CTx2 removed without incident per physician request.  Occlusive DSD placed.  Chest x-ray obtain with no obvious pneumothorax noted.  Patient tolerated procedure well.

## 2022-12-06 ENCOUNTER — TRANSCRIPTION ENCOUNTER (OUTPATIENT)
Age: 52
End: 2022-12-06

## 2022-12-06 VITALS — TEMPERATURE: 98 F

## 2022-12-06 LAB
ANION GAP SERPL CALC-SCNC: 15 MMOL/L — SIGNIFICANT CHANGE UP (ref 5–17)
BUN SERPL-MCNC: 12 MG/DL — SIGNIFICANT CHANGE UP (ref 7–23)
CALCIUM SERPL-MCNC: 9.1 MG/DL — SIGNIFICANT CHANGE UP (ref 8.4–10.5)
CHLORIDE SERPL-SCNC: 99 MMOL/L — SIGNIFICANT CHANGE UP (ref 96–108)
CO2 SERPL-SCNC: 23 MMOL/L — SIGNIFICANT CHANGE UP (ref 22–31)
CREAT SERPL-MCNC: 0.83 MG/DL — SIGNIFICANT CHANGE UP (ref 0.5–1.3)
EGFR: 106 ML/MIN/1.73M2 — SIGNIFICANT CHANGE UP
GLUCOSE BLDC GLUCOMTR-MCNC: 119 MG/DL — HIGH (ref 70–99)
GLUCOSE BLDC GLUCOMTR-MCNC: 131 MG/DL — HIGH (ref 70–99)
GLUCOSE SERPL-MCNC: 150 MG/DL — HIGH (ref 70–99)
HCT VFR BLD CALC: 28.8 % — LOW (ref 39–50)
HGB BLD-MCNC: 9.2 G/DL — LOW (ref 13–17)
MAGNESIUM SERPL-MCNC: 2.2 MG/DL — SIGNIFICANT CHANGE UP (ref 1.6–2.6)
MCHC RBC-ENTMCNC: 27.5 PG — SIGNIFICANT CHANGE UP (ref 27–34)
MCHC RBC-ENTMCNC: 31.9 GM/DL — LOW (ref 32–36)
MCV RBC AUTO: 86.2 FL — SIGNIFICANT CHANGE UP (ref 80–100)
NRBC # BLD: 0 /100 WBCS — SIGNIFICANT CHANGE UP (ref 0–0)
PLATELET # BLD AUTO: 213 K/UL — SIGNIFICANT CHANGE UP (ref 150–400)
POTASSIUM SERPL-MCNC: 4.2 MMOL/L — SIGNIFICANT CHANGE UP (ref 3.5–5.3)
POTASSIUM SERPL-SCNC: 4.2 MMOL/L — SIGNIFICANT CHANGE UP (ref 3.5–5.3)
RBC # BLD: 3.34 M/UL — LOW (ref 4.2–5.8)
RBC # FLD: 13.8 % — SIGNIFICANT CHANGE UP (ref 10.3–14.5)
SODIUM SERPL-SCNC: 137 MMOL/L — SIGNIFICANT CHANGE UP (ref 135–145)
WBC # BLD: 7.97 K/UL — SIGNIFICANT CHANGE UP (ref 3.8–10.5)
WBC # FLD AUTO: 7.97 K/UL — SIGNIFICANT CHANGE UP (ref 3.8–10.5)

## 2022-12-06 PROCEDURE — 83605 ASSAY OF LACTIC ACID: CPT

## 2022-12-06 PROCEDURE — 84132 ASSAY OF SERUM POTASSIUM: CPT

## 2022-12-06 PROCEDURE — 85025 COMPLETE CBC W/AUTO DIFF WBC: CPT

## 2022-12-06 PROCEDURE — 83690 ASSAY OF LIPASE: CPT

## 2022-12-06 PROCEDURE — 86891 AUTOLOGOUS BLOOD OP SALVAGE: CPT

## 2022-12-06 PROCEDURE — 85014 HEMATOCRIT: CPT

## 2022-12-06 PROCEDURE — 94150 VITAL CAPACITY TEST: CPT

## 2022-12-06 PROCEDURE — 82947 ASSAY GLUCOSE BLOOD QUANT: CPT

## 2022-12-06 PROCEDURE — 85610 PROTHROMBIN TIME: CPT

## 2022-12-06 PROCEDURE — 84100 ASSAY OF PHOSPHORUS: CPT

## 2022-12-06 PROCEDURE — 84295 ASSAY OF SERUM SODIUM: CPT

## 2022-12-06 PROCEDURE — 80053 COMPREHEN METABOLIC PANEL: CPT

## 2022-12-06 PROCEDURE — 85027 COMPLETE CBC AUTOMATED: CPT

## 2022-12-06 PROCEDURE — 80048 BASIC METABOLIC PNL TOTAL CA: CPT

## 2022-12-06 PROCEDURE — 36430 TRANSFUSION BLD/BLD COMPNT: CPT

## 2022-12-06 PROCEDURE — 93306 TTE W/DOPPLER COMPLETE: CPT

## 2022-12-06 PROCEDURE — 97116 GAIT TRAINING THERAPY: CPT

## 2022-12-06 PROCEDURE — C1887: CPT

## 2022-12-06 PROCEDURE — 82330 ASSAY OF CALCIUM: CPT

## 2022-12-06 PROCEDURE — 82803 BLOOD GASES ANY COMBINATION: CPT

## 2022-12-06 PROCEDURE — 99285 EMERGENCY DEPT VISIT HI MDM: CPT

## 2022-12-06 PROCEDURE — P9016: CPT

## 2022-12-06 PROCEDURE — C1889: CPT

## 2022-12-06 PROCEDURE — 83880 ASSAY OF NATRIURETIC PEPTIDE: CPT

## 2022-12-06 PROCEDURE — C1769: CPT

## 2022-12-06 PROCEDURE — 85347 COAGULATION TIME ACTIVATED: CPT

## 2022-12-06 PROCEDURE — 80061 LIPID PANEL: CPT

## 2022-12-06 PROCEDURE — 86900 BLOOD TYPING SEROLOGIC ABO: CPT

## 2022-12-06 PROCEDURE — 87635 SARS-COV-2 COVID-19 AMP PRB: CPT

## 2022-12-06 PROCEDURE — 86850 RBC ANTIBODY SCREEN: CPT

## 2022-12-06 PROCEDURE — 71045 X-RAY EXAM CHEST 1 VIEW: CPT

## 2022-12-06 PROCEDURE — P9045: CPT

## 2022-12-06 PROCEDURE — 71045 X-RAY EXAM CHEST 1 VIEW: CPT | Mod: 26

## 2022-12-06 PROCEDURE — 93005 ELECTROCARDIOGRAM TRACING: CPT

## 2022-12-06 PROCEDURE — 84443 ASSAY THYROID STIM HORMONE: CPT

## 2022-12-06 PROCEDURE — 85730 THROMBOPLASTIN TIME PARTIAL: CPT

## 2022-12-06 PROCEDURE — 71046 X-RAY EXAM CHEST 2 VIEWS: CPT

## 2022-12-06 PROCEDURE — 83036 HEMOGLOBIN GLYCOSYLATED A1C: CPT

## 2022-12-06 PROCEDURE — 84484 ASSAY OF TROPONIN QUANT: CPT

## 2022-12-06 PROCEDURE — C1894: CPT

## 2022-12-06 PROCEDURE — 86923 COMPATIBILITY TEST ELECTRIC: CPT

## 2022-12-06 PROCEDURE — 97162 PT EVAL MOD COMPLEX 30 MIN: CPT

## 2022-12-06 PROCEDURE — 85576 BLOOD PLATELET AGGREGATION: CPT

## 2022-12-06 PROCEDURE — 86901 BLOOD TYPING SEROLOGIC RH(D): CPT

## 2022-12-06 PROCEDURE — 36415 COLL VENOUS BLD VENIPUNCTURE: CPT

## 2022-12-06 PROCEDURE — 93880 EXTRACRANIAL BILAT STUDY: CPT

## 2022-12-06 PROCEDURE — 83735 ASSAY OF MAGNESIUM: CPT

## 2022-12-06 PROCEDURE — 84436 ASSAY OF TOTAL THYROXINE: CPT

## 2022-12-06 PROCEDURE — 82962 GLUCOSE BLOOD TEST: CPT

## 2022-12-06 PROCEDURE — C1751: CPT

## 2022-12-06 RX ORDER — EMPAGLIFLOZIN 10 MG/1
1 TABLET, FILM COATED ORAL
Qty: 30 | Refills: 0
Start: 2022-12-06 | End: 2023-01-04

## 2022-12-06 RX ORDER — POLYETHYLENE GLYCOL 3350 17 G/17G
17 POWDER, FOR SOLUTION ORAL
Qty: 119 | Refills: 0
Start: 2022-12-06 | End: 2022-12-12

## 2022-12-06 RX ORDER — METOPROLOL TARTRATE 50 MG
12.5 TABLET ORAL ONCE
Refills: 0 | Status: COMPLETED | OUTPATIENT
Start: 2022-12-06 | End: 2022-12-06

## 2022-12-06 RX ORDER — FUROSEMIDE 40 MG
1 TABLET ORAL
Qty: 30 | Refills: 0
Start: 2022-12-06 | End: 2023-01-04

## 2022-12-06 RX ORDER — ASPIRIN/CALCIUM CARB/MAGNESIUM 324 MG
1 TABLET ORAL
Qty: 0 | Refills: 0 | DISCHARGE

## 2022-12-06 RX ORDER — SENNA PLUS 8.6 MG/1
1 TABLET ORAL
Qty: 0 | Refills: 0 | DISCHARGE

## 2022-12-06 RX ORDER — OXYCODONE HYDROCHLORIDE 5 MG/1
1 TABLET ORAL
Qty: 16 | Refills: 0
Start: 2022-12-06 | End: 2022-12-09

## 2022-12-06 RX ORDER — METFORMIN HYDROCHLORIDE 850 MG/1
1 TABLET ORAL
Qty: 0 | Refills: 2 | DISCHARGE

## 2022-12-06 RX ORDER — ATORVASTATIN CALCIUM 80 MG/1
1 TABLET, FILM COATED ORAL
Qty: 30 | Refills: 0
Start: 2022-12-06 | End: 2023-01-04

## 2022-12-06 RX ORDER — POTASSIUM CHLORIDE 20 MEQ
1 PACKET (EA) ORAL
Qty: 7 | Refills: 0
Start: 2022-12-06 | End: 2022-12-12

## 2022-12-06 RX ORDER — ACETAMINOPHEN 500 MG
2 TABLET ORAL
Qty: 84 | Refills: 0
Start: 2022-12-06 | End: 2022-12-12

## 2022-12-06 RX ORDER — SITAGLIPTIN 50 MG/1
1 TABLET, FILM COATED ORAL
Qty: 30 | Refills: 0
Start: 2022-12-06 | End: 2023-01-04

## 2022-12-06 RX ORDER — METOPROLOL TARTRATE 50 MG
0.5 TABLET ORAL
Qty: 30 | Refills: 0
Start: 2022-12-06 | End: 2023-01-04

## 2022-12-06 RX ORDER — DOCUSATE SODIUM 100 MG
1 CAPSULE ORAL
Qty: 0 | Refills: 0 | DISCHARGE

## 2022-12-06 RX ORDER — FERROUS SULFATE 325(65) MG
1 TABLET ORAL
Qty: 30 | Refills: 0
Start: 2022-12-06 | End: 2023-01-04

## 2022-12-06 RX ORDER — EMPAGLIFLOZIN 10 MG/1
1 TABLET, FILM COATED ORAL
Qty: 0 | Refills: 0 | DISCHARGE

## 2022-12-06 RX ORDER — METOPROLOL TARTRATE 50 MG
1 TABLET ORAL
Qty: 60 | Refills: 0
Start: 2022-12-06 | End: 2023-01-04

## 2022-12-06 RX ORDER — ASPIRIN/CALCIUM CARB/MAGNESIUM 324 MG
1 TABLET ORAL
Qty: 30 | Refills: 0
Start: 2022-12-06 | End: 2023-01-04

## 2022-12-06 RX ORDER — CLOPIDOGREL BISULFATE 75 MG/1
1 TABLET, FILM COATED ORAL
Qty: 30 | Refills: 0
Start: 2022-12-06 | End: 2023-01-04

## 2022-12-06 RX ORDER — METFORMIN HYDROCHLORIDE 850 MG/1
1 TABLET ORAL
Qty: 60 | Refills: 0
Start: 2022-12-06 | End: 2023-01-04

## 2022-12-06 RX ORDER — ASCORBIC ACID 60 MG
1 TABLET,CHEWABLE ORAL
Qty: 30 | Refills: 0
Start: 2022-12-06 | End: 2023-01-04

## 2022-12-06 RX ORDER — FOLIC ACID 0.8 MG
1 TABLET ORAL
Qty: 30 | Refills: 0
Start: 2022-12-06 | End: 2023-01-04

## 2022-12-06 RX ORDER — PANTOPRAZOLE SODIUM 20 MG/1
1 TABLET, DELAYED RELEASE ORAL
Qty: 30 | Refills: 0
Start: 2022-12-06 | End: 2023-01-04

## 2022-12-06 RX ADMIN — OXYCODONE HYDROCHLORIDE 5 MILLIGRAM(S): 5 TABLET ORAL at 00:30

## 2022-12-06 RX ADMIN — Medication 1 MILLIGRAM(S): at 11:28

## 2022-12-06 RX ADMIN — Medication 12.5 MILLIGRAM(S): at 09:43

## 2022-12-06 RX ADMIN — PANTOPRAZOLE SODIUM 40 MILLIGRAM(S): 20 TABLET, DELAYED RELEASE ORAL at 06:49

## 2022-12-06 RX ADMIN — HEPARIN SODIUM 5000 UNIT(S): 5000 INJECTION INTRAVENOUS; SUBCUTANEOUS at 09:05

## 2022-12-06 RX ADMIN — SODIUM CHLORIDE 3 MILLILITER(S): 9 INJECTION INTRAMUSCULAR; INTRAVENOUS; SUBCUTANEOUS at 05:31

## 2022-12-06 RX ADMIN — Medication 25 MILLIGRAM(S): at 05:32

## 2022-12-06 RX ADMIN — Medication 81 MILLIGRAM(S): at 11:28

## 2022-12-06 RX ADMIN — POLYETHYLENE GLYCOL 3350 17 GRAM(S): 17 POWDER, FOR SOLUTION ORAL at 11:27

## 2022-12-06 RX ADMIN — Medication 500 MILLIGRAM(S): at 11:27

## 2022-12-06 RX ADMIN — OXYCODONE HYDROCHLORIDE 5 MILLIGRAM(S): 5 TABLET ORAL at 11:28

## 2022-12-06 RX ADMIN — Medication 4 UNIT(S): at 07:00

## 2022-12-06 RX ADMIN — CLOPIDOGREL BISULFATE 75 MILLIGRAM(S): 75 TABLET, FILM COATED ORAL at 11:36

## 2022-12-06 RX ADMIN — Medication 650 MILLIGRAM(S): at 09:06

## 2022-12-06 RX ADMIN — Medication 325 MILLIGRAM(S): at 11:28

## 2022-12-06 NOTE — DISCHARGE NOTE PROVIDER - CARE PROVIDER_API CALL
Brian Hale)  Cardiovascular Surgery  [address], [address], [place] [address]  Phone: [phone]  Fax: [phone]  Scheduled Appointment: 12/12/2022 12:45 PM    Mathew Rivera)  Cardiovascular Disease; Internal Medicine; Interventional Cardiology  [address], [address]  [place] [address]  Phone: [phone]  Fax: [phone]  Scheduled Appointment: 12/28/2022 09:15 AM
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
## 2022-12-06 NOTE — DISCHARGE NOTE NURSING/CASE MANAGEMENT/SOCIAL WORK - PATIENT PORTAL LINK FT
You can access the FollowMyHealth Patient Portal offered by [facility] by registering at the following website: [email or web address]. By joining [facility]’s FollowMyHealth portal, you will also be able to view your health information using other applications (apps) compatible with our system.

## 2022-12-06 NOTE — DISCHARGE NOTE PROVIDER - NSDCFUADDINST_GEN_ALL_CORE_FT
-Walk daily as tolerated and use your incentive spirometer 10 times every hour while you are awake.     -Please weigh yourself daily. If you notice over a 3 pound weight gain in 3 days, this is a sign you are likely retaining too much fluid. It is imperative you call our right away with unexplained rapid weight gain.      -Please continue to wear the compression stockings given to you in the hospital at home. This is a way to prevent fluid from building up in your legs.     -No driving or strenuous activity/exercise until cleared by your surgeon.    -Gently clean your incisions with unscented/antibacterial soap and water, pat dry.  You may leave them open to air.    -Call your doctor if you have shortness of breath, chest pain not relieved by pain medication, dizziness, fever >100.5, or increased redness or drainage from incisions.    -Take the abdominal dressing off in 2 days.

## 2022-12-06 NOTE — PROGRESS NOTE ADULT - SUBJECTIVE AND OBJECTIVE BOX
CTICU  CRITICAL  CARE  attending     Hand off received 					   Pertinent clinical, laboratory, radiographic, hemodynamic, echocardiographic, respiratory data, microbiologic data and chart were reviewed and analyzed frequently throughout the course of the day and night  Patient seen and examined with CTS/ SH attending at bedside  Pt is a 52y , Male, HEALTH ISSUES - PROBLEM Dx:  Unstable angina    HTN (hypertension)    HLD (hyperlipidemia)    GERD (gastroesophageal reflux disease)    Rectal pain    DM (diabetes mellitus)        , FAMILY HISTORY:  Family history of early CAD (Sibling)  multiple brothers MIs in 50s/60s, some passed away from this    PAST MEDICAL & SURGICAL HISTORY:  Gastroesophageal reflux disease      Atherosclerosis of coronary artery      Essential hypertension      Hyperlipidemia      Hemorrhoid  Internal      MI (myocardial infarction)      DM (diabetes mellitus), type 2      Gastritis      Coronary angioplasty status      S/P hemorrhoidectomy        Patient is a 52y old  Male who presents with a chief complaint of CHEST PAIN     (01 Dec 2022 13:12)      14 system review limited by mentation and multiorgan morbidity     Vital signs, hemodynamic and respiratory parameters were reviewed from the bedside nursing flowsheet.  ICU Vital Signs Last 24 Hrs  T(C): 36 (01 Dec 2022 09:13), Max: 36.4 (01 Dec 2022 04:38)  T(F): 96.8 (01 Dec 2022 09:13), Max: 97.5 (01 Dec 2022 04:38)  HR: 73 (01 Dec 2022 22:00) (62 - 75)  BP: 130/87 (01 Dec 2022 08:58) (130/87 - 153/96)  BP(mean): 108 (01 Dec 2022 08:58) (108 - 120)  ABP: 128/79 (01 Dec 2022 21:30) (122/75 - 128/79)  ABP(mean): 97 (01 Dec 2022 21:30) (92 - 97)  RR: 18 (01 Dec 2022 08:58) (17 - 18)  SpO2: 100% (01 Dec 2022 22:00) (95% - 100%)    O2 Parameters below as of 01 Dec 2022 08:23  Patient On (Oxygen Delivery Method): room air          Adult Advanced Hemodynamics Last 24 Hrs  CVP(mm Hg): --  CVP(cm H2O): --  CO: --  CI: --  PA: --  PA(mean): --  PCWP: --  SVR: --  SVRI: --  PVR: --  PVRI: --, ABG - ( 01 Dec 2022 21:09 )  pH, Arterial: 7.47  pH, Blood: x     /  pCO2: 32    /  pO2: 175   / HCO3: 23    / Base Excess: 0.3   /  SaO2: 99.0                Intake and output was reviewed and the fluid balance was calculated  Daily Height in cm: 167.64 (01 Dec 2022 08:58)    Daily Weight in k.3 (01 Dec 2022 06:11)  I&O's Summary    2022 07:01  -  01 Dec 2022 07:00  --------------------------------------------------------  IN: 245 mL / OUT: 2475 mL / NET: -2230 mL    01 Dec 2022 07:01  -  01 Dec 2022 22:16  --------------------------------------------------------  IN: 0 mL / OUT: 0 mL / NET: 0 mL        All lines and drain sites were assessed  [facility]      POCT Blood Glucose.: 144 mg/dL (01 Dec 2022 21:46)    No acute change in focality  Auscultation of the chest reveals equal bs  Abdomen is soft  Extremities are warm and well perfused  Wounds appear clean and unremarkable  Antibiotics are periop    labs  CBC Full  -  ( 01 Dec 2022 21:11 )  WBC Count : 9.65 K/uL  RBC Count : 3.57 M/uL  Hemoglobin : 9.6 g/dL  Hematocrit : 29.3 %  Platelet Count - Automated : 110 K/uL  Mean Cell Volume : 82.1 fl  Mean Cell Hemoglobin : 26.9 pg  Mean Cell Hemoglobin Concentration : 32.8 gm/dL  Auto Neutrophil # : 6.70 K/uL  Auto Lymphocyte # : 1.90 K/uL  Auto Monocyte # : 0.78 K/uL  Auto Eosinophil # : 0.15 K/uL  Auto Basophil # : 0.02 K/uL  Auto Neutrophil % : 69.4 %  Auto Lymphocyte % : 19.7 %  Auto Monocyte % : 8.1 %  Auto Eosinophil % : 1.6 %  Auto Basophil % : 0.2 %        140  |  110<H>  |  10  ----------------------------<  198<H>  4.8   |  25  |  0.83    Ca    7.6<L>      01 Dec 2022 21:11  Mg     1.9     11-30    TPro  4.3<L>  /  Alb  2.6<L>  /  TBili  1.0  /  DBili  x   /  AST  29  /  ALT  10  /  AlkPhos  52  12    PT/INR - ( 01 Dec 2022 21:11 )   PT: 15.1 sec;   INR: 1.27          PTT - ( 01 Dec 2022 21:11 )  PTT:31.5 sec  The current medications were reviewed   MEDICATIONS  (STANDING):  aspirin  chewable 81 milliGRAM(s) Oral daily  atorvastatin 80 milliGRAM(s) Oral at bedtime  ceFAZolin  Injectable. 2000 milliGRAM(s) IV Push every 8 hours  chlorhexidine 0.12% Liquid 15 milliLiter(s) Oral Mucosa every 12 hours  dexMEDEtomidine Infusion 0.2 MICROgram(s)/kG/Hr (3.87 mL/Hr) IV Continuous <Continuous>  dextrose 50% Injectable 50 milliLiter(s) IV Push every 15 minutes  dextrose 50% Injectable 25 milliLiter(s) IV Push every 15 minutes  heparin   Injectable 5000 Unit(s) SubCutaneous every 8 hours  insulin regular Infusion 2 Unit(s)/Hr (2 mL/Hr) IV Continuous <Continuous>  pantoprazole  Injectable 40 milliGRAM(s) IV Push daily  propofol Infusion 10 MICROgram(s)/kG/Min (4.64 mL/Hr) IV Continuous <Continuous>  sodium chloride 0.9%. 1000 milliLiter(s) (10 mL/Hr) IV Continuous <Continuous>    MEDICATIONS  (PRN):  acetaminophen   IVPB .. 1000 milliGRAM(s) IV Intermittent once PRN Mild Pain (1 - 3)  fentaNYL    Injectable 25 MICROGram(s) IV Push every 3 hours PRN Severe Pain (7 - 10)       PROBLEM LIST/ ASSESSMENT:  HEALTH ISSUES - PROBLEM Dx:  Unstable angina    HTN (hypertension)    HLD (hyperlipidemia)    GERD (gastroesophageal reflux disease)    Rectal pain    DM (diabetes mellitus)        ,   Patient is a 52y old  Male who presents with a chief complaint of CHEST PAIN     (01 Dec 2022 13:12)     s/p cardiac surgery                My plan includes :  close hemodynamic, ventilatory and drain monitoring and management per post op routine    Monitor for arrhythmias and monitor parameters for organ perfusion  beta blockade not administered due to hemodynamic instability and bradycardia  monitor neurologic status  Head of the bed should remain elevated to 45 deg .   chest PT and IS will be encouraged  monitor adequacy of oxygenation and ventilation and attempt to wean oxygen  antibiotic regimen will be tailored to the clinical, laboratory and microbiologic data  Nutritional goals will be met using po eventually , ensure adequate caloric intake and montior the same  Stress ulcer and VTE prophylaxis will be achieved    Glycemic control is satisfactory  Electrolytes have been repleted as necessary and wound care has been carried out. Pain control has been achieved.   agressive physical therapy and early mobility and ambulation goals will be met   The family was updated about the course and plan  CRITICAL CARE TIME personally provided by me  in evaluation and management, reassessments, review and interpretation of labs and x-rays, ventilator and hemodynamic management, formulating a plan and coordinating care: ___90____ MIN.  Time does not include procedural time. Time spent was non routine post-operarive caRE and included multiple and repeated evaluations at the bedside  CTICU ATTENDING     					    Favio Ledesma MD                        	
Patient discussed on morning rounds with Dr. Hale     Operation / Date: pre-op for CABG     SUBJECTIVE ASSESSMENT:  52y Male reports having 3/10 chest pain overnight but states that he has no pain this morning. Denies SOB.         Vital Signs Last 24 Hrs  T(C): 36.7 (30 Nov 2022 09:10), Max: 37 (29 Nov 2022 20:44)  T(F): 98 (30 Nov 2022 09:10), Max: 98.6 (29 Nov 2022 20:44)  HR: 54 (30 Nov 2022 08:45) (54 - 70)  BP: 122/79 (30 Nov 2022 08:45) (122/79 - 155/90)  BP(mean): 97 (30 Nov 2022 08:45) (97 - 124)  RR: 20 (30 Nov 2022 00:25) (20 - 20)  SpO2: 96% (30 Nov 2022 08:45) (96% - 98%)    Parameters below as of 30 Nov 2022 08:45  Patient On (Oxygen Delivery Method): room air      I&O's Detail    29 Nov 2022 07:01  -  30 Nov 2022 07:00  --------------------------------------------------------  IN:    Heparin: 104 mL    sodium chloride 0.9%: 240 mL  Total IN: 344 mL    OUT:    Voided (mL): 600 mL  Total OUT: 600 mL    Total NET: -256 mL      30 Nov 2022 07:01  -  30 Nov 2022 11:07  --------------------------------------------------------  IN:    Heparin: 15 mL  Total IN: 15 mL    OUT:    Voided (mL): 400 mL  Total OUT: 400 mL    Total NET: -385 mL      PHYSICAL EXAM:    GEN: NAD, looks comfortable  Psych: Mood appropriate  Neuro: A&Ox3.  No focal deficits.  Moving all extremities.   HEENT: No obvious abnormalities  CV: S1S2, regular, no murmurs appreciated.  No carotid bruits.  No JVD  Lungs: Clear B/L.  No wheezing, rales or rhonchi  ABD: Soft, non-tender, non-distended.  +Bowel sounds  EXT: Warm and well perfused.  No peripheral edema noted  Musculoskeletal: Moving all extremities with normal ROM, no joint swelling  PV: Pedal pulses palpable      LABS:                        12.4   5.09  )-----------( 154      ( 30 Nov 2022 04:44 )             39.0       COUMADIN:  No.     PT/INR - ( 29 Nov 2022 07:06 )   PT: 12.8 sec;   INR: 1.07          PTT - ( 30 Nov 2022 04:43 )  PTT:58.6 sec    11-30    140  |  107  |  11  ----------------------------<  139<H>  4.0   |  27  |  0.78    Ca    8.9      30 Nov 2022 04:44  Mg     1.9     11-30    TPro  8.0  /  Alb  5.0  /  TBili  0.6  /  DBili  x   /  AST  14  /  ALT  13  /  AlkPhos  81  11-29          MEDICATIONS  (STANDING):  aspirin enteric coated 81 milliGRAM(s) Oral daily  atorvastatin 80 milliGRAM(s) Oral at bedtime  carvedilol 6.25 milliGRAM(s) Oral every 12 hours  dextrose 5%. 1000 milliLiter(s) (50 mL/Hr) IV Continuous <Continuous>  dextrose 5%. 1000 milliLiter(s) (100 mL/Hr) IV Continuous <Continuous>  dextrose 50% Injectable 25 Gram(s) IV Push once  dextrose 50% Injectable 12.5 Gram(s) IV Push once  dextrose 50% Injectable 25 Gram(s) IV Push once  glucagon  Injectable 1 milliGRAM(s) IntraMuscular once  heparin  Infusion 700 Unit(s)/Hr (7 mL/Hr) IV Continuous <Continuous>  insulin lispro (ADMELOG) corrective regimen sliding scale   SubCutaneous three times a day before meals  isosorbide   mononitrate ER Tablet (IMDUR) 30 milliGRAM(s) Oral daily  pantoprazole    Tablet 40 milliGRAM(s) Oral before breakfast  polyethylene glycol 3350 17 Gram(s) Oral daily  psyllium Powder 1 Packet(s) Oral two times a day  senna 2 Tablet(s) Oral at bedtime  sodium chloride 0.9%. 500 milliLiter(s) (240 mL/Hr) IV Continuous <Continuous>    MEDICATIONS  (PRN):  dextrose Oral Gel 15 Gram(s) Oral once PRN Blood Glucose LESS THAN 70 milliGRAM(s)/deciliter  nitroglycerin     SubLingual 0.4 milliGRAM(s) SubLingual every 5 minutes PRN Chest Pain        RADIOLOGY & ADDITIONAL TESTS:  < from: Xray Chest 2 Views PA/Lat (11.29.22 @ 09:33) >  IMPRESSION:    Unremarkable except for minimal bilateral focal atelectasis and coronary   stents. Lungs otherwise clear. No lung consolidation pleural effusion or   pneumothorax. Normal size heart. No acute bone abnormality.    --- End of Report ---    < end of copied text >    
CTICU  CRITICAL  CARE  attending     Hand off received 					   Pertinent clinical, laboratory, radiographic, hemodynamic, echocardiographic, respiratory data, microbiologic data and chart were reviewed and analyzed frequently throughout the course of the day and night    52 years old Male with DM, HTN, HLD, CAD (w/ MI in  and multiple PCIs,  EDUARDO (baseline Hgb 8s in 2021;  Anemia is due to antral gastritis and duodenitis + hemorrhoids).  He is as former smoker with a strong FH of early CAD.  He presented to [facility] ED with complaints of an intermittent midsternal chest pain with radiation to the L arm/neck.   The chest pain started around 1am last night and woke him from sleep. He took SL Nitro with some improvement however still continues to endorse chest pain.   The Chest Pain is similar to the pain experienced before prior stents. It is associated with SOB, orthopnea, diaphoresis.  LHC: Triple vessel CAD.    S/P CABG x 3.       HEALTH ISSUES - PROBLEM Dx:  Unstable angina  HTN (hypertension)  HLD (hyperlipidemia)  GERD (gastroesophageal reflux disease)  Rectal pain  DM (diabetes mellitus)        FAMILY HISTORY:  Family history of early CAD (Sibling)  multiple brothers MIs in 50s/60s, some passed away from this    PAST MEDICAL & SURGICAL HISTORY:  Gastroesophageal reflux disease  Atherosclerosis of coronary artery  Essential hypertension  Hyperlipidemia  Internal Hemorrhoids  MI (myocardial infarction)  DM (diabetes mellitus), type 2  Gastritis  Coronary angioplasty status  S/P hemorrhoidectomy      14 system review was unremarkable    Vital signs, hemodynamic and respiratory parameters were reviewed from the bedside nursing flow sheet.  ICU Vital Signs Last 24 Hrs  T(C): 36 (01 Dec 2022 09:13), Max: 36.4 (01 Dec 2022 04:38)  T(F): 96.8 (01 Dec 2022 09:13), Max: 97.5 (01 Dec 2022 04:38)  HR: 73 (01 Dec 2022 22:00) (62 - 75)  BP: 130/87 (01 Dec 2022 08:58) (130/87 - 153/96)  BP(mean): 108 (01 Dec 2022 08:58) (108 - 120)  ABP: 128/79 (01 Dec 2022 21:30) (122/75 - 128/79)  ABP(mean): 97 (01 Dec 2022 21:30) (92 - 97)  RR: 18 (01 Dec 2022 08:58) (17 - 18)  SpO2: 100% (01 Dec 2022 22:00) (95% - 100%)    O2 Parameters below as of 01 Dec 2022 08:23  Patient On (Oxygen Delivery Method): room air      Adult Advanced Hemodynamics Last 24 Hrs  CVP(mm Hg): --  CVP(cm H2O): --  CO: --  CI: --  PA: --  PA(mean): --  PCWP: --  SVR: --  SVRI: --  PVR: --  PVRI: --, ABG - ( 01 Dec 2022 21:09 )  pH, Arterial: 7.47  pH, Blood: x     /  pCO2: 32    /  pO2: 175   / HCO3: 23    / Base Excess: 0.3   /  SaO2: 99.0          Intake and output was reviewed and the fluid balance was calculated  Daily Height in cm: 167.64 (01 Dec 2022 08:58)    Daily Weight in k.3 (01 Dec 2022 06:11)  I&O's Summary    2022 07:01  -  01 Dec 2022 07:00  --------------------------------------------------------  IN: 245 mL / OUT: 2475 mL / NET: -2230 mL    01 Dec 2022 07:01  -  01 Dec 2022 22:31  --------------------------------------------------------  IN: 0 mL / OUT: 0 mL / NET: 0 mL        All lines and drain sites were assessed    Neuro: No change in the mental status from the baseline. Follows commands. Moves all 4 extremities.  Neck: No JVD.  CVS: S1, S2, No S3.  Lungs: Good air entry bilaterally.  Abd: Soft. No tenderness. + Bowel sounds.  Vascular: + DP/PT.  Extremities: No edema.  Lymphatic: Normal.  Skin: No abnormalities.      labs  CBC Full  -  ( 01 Dec 2022 21:11 )  WBC Count : 9.65 K/uL  RBC Count : 3.57 M/uL  Hemoglobin : 9.6 g/dL  Hematocrit : 29.3 %  Platelet Count - Automated : 110 K/uL  Mean Cell Volume : 82.1 fl  Mean Cell Hemoglobin : 26.9 pg  Mean Cell Hemoglobin Concentration : 32.8 gm/dL  Auto Neutrophil # : 6.70 K/uL  Auto Lymphocyte # : 1.90 K/uL  Auto Monocyte # : 0.78 K/uL  Auto Eosinophil # : 0.15 K/uL  Auto Basophil # : 0.02 K/uL  Auto Neutrophil % : 69.4 %  Auto Lymphocyte % : 19.7 %  Auto Monocyte % : 8.1 %  Auto Eosinophil % : 1.6 %  Auto Basophil % : 0.2 %        140  |  110<H>  |  10  ----------------------------<  198<H>  4.8   |  25  |  0.83    Ca    7.6<L>      01 Dec 2022 21:11  Mg     1.9     11-30    TPro  4.3<L>  /  Alb  2.6<L>  /  TBili  1.0  /  DBili  x   /  AST  29  /  ALT  10  /  AlkPhos  52  12-    PT/INR - ( 01 Dec 2022 21:11 )   PT: 15.1 sec;   INR: 1.27          PTT - ( 01 Dec 2022 21:11 )  PTT:31.5 sec  The current medications were reviewed   MEDICATIONS  (STANDING):  aspirin  chewable 81 milliGRAM(s) Oral daily  atorvastatin 80 milliGRAM(s) Oral at bedtime  ceFAZolin  Injectable. 2000 milliGRAM(s) IV Push every 8 hours  chlorhexidine 0.12% Liquid 15 milliLiter(s) Oral Mucosa every 12 hours  dexMEDEtomidine Infusion 0.2 MICROgram(s)/kG/Hr (3.87 mL/Hr) IV Continuous <Continuous>  dextrose 50% Injectable 50 milliLiter(s) IV Push every 15 minutes  dextrose 50% Injectable 25 milliLiter(s) IV Push every 15 minutes  heparin   Injectable 5000 Unit(s) SubCutaneous every 8 hours  insulin regular Infusion 2 Unit(s)/Hr (2 mL/Hr) IV Continuous <Continuous>  pantoprazole  Injectable 40 milliGRAM(s) IV Push daily  propofol Infusion 10 MICROgram(s)/kG/Min (4.64 mL/Hr) IV Continuous <Continuous>  sodium chloride 0.9%. 1000 milliLiter(s) (10 mL/Hr) IV Continuous <Continuous>    MEDICATIONS  (PRN):  acetaminophen   IVPB .. 1000 milliGRAM(s) IV Intermittent once PRN Mild Pain (1 - 3)  fentaNYL    Injectable 25 MICROGram(s) IV Push every 3 hours PRN Severe Pain (7 - 10)        PROBLEM LIST/ ASSESSMENT:  HEALTH ISSUES - PROBLEM Dx:  Unstable angina  HTN (hypertension)  HLD (hyperlipidemia)  GERD (gastroesophageal reflux disease)  Rectal pain  DM (diabetes mellitus)        52 years old  Male with triple vessel CAD.   S/P CABG x 3.  Uncontrolled DM.  Hemodynamically stable.  Good oxygenation.  Fair urine out put.        My plan includes :  WEAN to Extubate.  OPTIMIZE Glycemic control.  Statin and Betablocker.  Close hemodynamic, ventilatory and drain monitoring and management  Monitor for arrhythmias and monitor parameters for organ perfusion  Monitor neurologic status  Monitor renal function.  Head of the bed should remain elevated to 45 deg .   Chest PT and IS will be encouraged  Monitor adequacy of oxygenation and ventilation and attempt to wean oxygen  Nutritional goals will be met using po eventually , ensure adequate caloric intake and monitor the same  Stress ulcer and VTE prophylaxis will be achieved    Electrolytes have been repleted as necessary and wound care has been carried out. Pain control has been achieved.   Aggressive physical therapy and early mobility and ambulation goals will be met   The family was updated about the course and plan  CRITICAL CARE TIME SPENT in evaluation and management, reassessments, review and interpretation of labs and x-rays, ventilator and hemodynamic management, formulating a plan and coordinating care: ___90____ MIN.  Time does not include procedural time.  CTICU ATTENDING     					    Chuck Davis MD                        	
Patient discussed on morning rounds with Dr. Hale     Operation / Date: Pre op planning for CABG on Thursday 12/1    SUBJECTIVE ASSESSMENT:  52y Male seen and examined at bedside with complaints of mild chest pain. He stated the chest pain does not radiate and is not as intense as the pain prior to his cath. He denies dizziness, vision changes, palpitations, shortness of breath, cough, n/v/d, extremity swelling, calf tenderness.     Vital Signs Last 24 Hrs  T(C): 37 (29 Nov 2022 20:44), Max: 37 (29 Nov 2022 20:44)  T(F): 98.6 (29 Nov 2022 20:44), Max: 98.6 (29 Nov 2022 20:44)  HR: 70 (29 Nov 2022 20:26) (70 - 80)  BP: 149/105 (29 Nov 2022 20:26) (131/83 - 162/98)  BP(mean): 124 (29 Nov 2022 20:26) (124 - 124)  RR: 20 (29 Nov 2022 20:26) (18 - 20)  SpO2: 98% (29 Nov 2022 20:26) (98% - 99%)    Parameters below as of 29 Nov 2022 20:26  Patient On (Oxygen Delivery Method): room air    PHYSICAL EXAM:  GEN: NAD, looks comfortable  Psych: Mood appropriate  Neuro: A&Ox3.  No focal deficits.  Moving all extremities.   HEENT: No obvious abnormalities  CV: S1S2, regular, no murmurs appreciated.  No carotid bruits.  No JVD  Lungs: Clear B/L.  No wheezing, rales or rhonchi  ABD: Soft, non-tender, non-distended.  +Bowel sounds  EXT: Warm and well perfused.  No peripheral edema noted, no venous insufficiency or PVD noted.   Musculoskeletal: Moving all extremities with normal ROM, no joint swelling  PV: Pedal pulses palpable  Incisions: no prior surgical scars noted on pts chest or back. TR band to be removed by cath lab, no bleeding or hematoma formation appreciated.     LABS:                        13.2   6.45  )-----------( 172      ( 29 Nov 2022 07:06 )             39.8       PT/INR - ( 29 Nov 2022 07:06 )   PT: 12.8 sec;   INR: 1.07          PTT - ( 29 Nov 2022 07:06 )  PTT:36.3 sec    11-29    137  |  98  |  12  ----------------------------<  222<H>  3.6   |  27  |  0.94    Ca    9.7      29 Nov 2022 07:06    TPro  8.0  /  Alb  5.0  /  TBili  0.6  /  DBili  x   /  AST  14  /  ALT  13  /  AlkPhos  81  11-29    MEDICATIONS  (STANDING):  aspirin enteric coated 81 milliGRAM(s) Oral daily  atorvastatin 80 milliGRAM(s) Oral at bedtime  carvedilol 6.25 milliGRAM(s) Oral every 12 hours  dextrose 5%. 1000 milliLiter(s) (50 mL/Hr) IV Continuous <Continuous>  dextrose 5%. 1000 milliLiter(s) (100 mL/Hr) IV Continuous <Continuous>  dextrose 50% Injectable 25 Gram(s) IV Push once  dextrose 50% Injectable 12.5 Gram(s) IV Push once  dextrose 50% Injectable 25 Gram(s) IV Push once  glucagon  Injectable 1 milliGRAM(s) IntraMuscular once  heparin  Infusion 800 Unit(s)/Hr (8 mL/Hr) IV Continuous <Continuous>  insulin lispro (ADMELOG) corrective regimen sliding scale   SubCutaneous three times a day before meals  isosorbide   mononitrate ER Tablet (IMDUR) 30 milliGRAM(s) Oral daily  pantoprazole    Tablet 40 milliGRAM(s) Oral before breakfast  polyethylene glycol 3350 17 Gram(s) Oral daily  psyllium Powder 1 Packet(s) Oral two times a day  senna 2 Tablet(s) Oral at bedtime  sodium chloride 0.9%. 500 milliLiter(s) (240 mL/Hr) IV Continuous <Continuous>    MEDICATIONS  (PRN):  dextrose Oral Gel 15 Gram(s) Oral once PRN Blood Glucose LESS THAN 70 milliGRAM(s)/deciliter  nitroglycerin     SubLingual 0.4 milliGRAM(s) SubLingual every 5 minutes PRN Chest Pain    RADIOLOGY & ADDITIONAL TESTS:  < from: Xray Chest 2 Views PA/Lat (11.29.22 @ 09:33) >  MPRESSION:    Unremarkable except for minimal bilateral focal atelectasis and coronary   stents. Lungs otherwise clear. No lung consolidation pleural effusion or   pneumothorax. Normal size heart. No acute bone abnormality.    --- End of Report ---      < end of copied text >  < from: TTE Echo Complete w/o Contrast w/ Doppler (11.29.22 @ 14:47) >  CONCLUSIONS:     1. Borderline normal left ventricular systolic function, LVEF   50%.   2. Normal left and right ventricular size and systolic function.   3. Normal right ventricular size and systolic function.   4. No significant valvular disease.   5. No evidence of pulmonary hypertension, pulmonary artery systolic   pressure is 16 mmHg.   6. No pericardial effusion.   7. Compared to the previous TTE performed on 7/23/2020, left ventricular   function appears improved.    < end of copied text >    Carotid US bilat completed, pending official read  
Patient discussed on morning rounds with Dr. Hale    Operation / Date: 12/1/22 CABG x3 (lima-lad, OM 1 Y graft OM 2) EF 55%    SUBJECTIVE ASSESSMENT:  52y Male seen and examined. Patient feels fatigued, encouraging benefits of ambulation. Moderate incision pain to middle of chest with pain with deep breaths. No other complaints. Tolerating PO diet, satting well on nasal cannula, ambulating with assistance. Denies lightheadedness, headache, abdominal pain, nausea, vomiting, fever, chills.       Vital Signs Last 24 Hrs  T(C): 36.4 (02 Dec 2022 17:44), Max: 36.9 (02 Dec 2022 08:50)  T(F): 97.5 (02 Dec 2022 17:44), Max: 98.5 (02 Dec 2022 08:50)  HR: 86 (02 Dec 2022 16:00) (66 - 92)  BP: 114/73 (02 Dec 2022 16:00) (96/62 - 114/73)  BP(mean): 87 (02 Dec 2022 16:00) (72 - 87)  RR: 22 (02 Dec 2022 16:00) (12 - 22)  SpO2: 98% (02 Dec 2022 16:00) (97% - 100%)    Parameters below as of 02 Dec 2022 16:00  Patient On (Oxygen Delivery Method): nasal cannula  O2 Flow (L/min): 2    I&O's Detail    01 Dec 2022 07:01  -  02 Dec 2022 07:00  --------------------------------------------------------  IN:    Albumin 5%  - 250 mL: 500 mL    Dexmedetomidine: 7.7 mL    EPINEPHrine: 29 mL    IV PiggyBack: 300 mL    Oral Fluid: 1075 mL    sodium chloride 0.9%: 275 mL  Total IN: 2186.7 mL    OUT:    Chest Tube (mL): 200 mL    Chest Tube (mL): 320 mL    Indwelling Catheter - Urethral (mL): 1075 mL  Total OUT: 1595 mL    Total NET: 591.7 mL      02 Dec 2022 07:01  -  02 Dec 2022 18:37  --------------------------------------------------------  IN:    IV PiggyBack: 100 mL    Oral Fluid: 240 mL    sodium chloride 0.9%: 55 mL  Total IN: 395 mL    OUT:    Chest Tube (mL): 90 mL    Chest Tube (mL): 130 mL    Indwelling Catheter - Urethral (mL): 1350 mL  Total OUT: 1570 mL    Total NET: -1175 mL      CHEST TUBE:  no  NORMA DRAIN:  yes, two mediastinal blakes to one pleurovac. No airleak  EPICARDIAL WIRES: Yes  TIE DOWNS: Yes  VELA: Yes    PHYSICAL EXAM:    General: NAD, sitting comfortably in bed, conversing appropriately  Neurological: alert and oriented, UE and LE strength equal b/l, facial symmetry present  Cardiovascular: RRR, Clear S1 and S2, no murmurs appreciated  Respiratory: chest expansion symmetrical, CTA b/l, no wheezing noted  Gastrointestinal: +BS, soft, NT, ND  Extremities: moving spontaneously, no calf tenderness or edema.  Vascular: warm, well perfused. DP/PT pulses palpable b/l.  Incisions: MSI covered with mepilex, L EVH covered with ace wrap    LABS:                        7.4    8.51  )-----------( 123      ( 02 Dec 2022 11:01 )             23.1       COUMADIN: no    PT/INR - ( 02 Dec 2022 12:17 )   PT: 15.9 sec;   INR: 1.33          PTT - ( 02 Dec 2022 12:17 )  PTT:35.2 sec    12-02    132<L>  |  98  |  16  ----------------------------<  234<H>  4.2   |  22  |  1.12    Ca    7.8<L>      02 Dec 2022 11:01  Phos  4.5     12-02  Mg     1.6     12-02    TPro  4.6<L>  /  Alb  3.1<L>  /  TBili  1.0  /  DBili  x   /  AST  28  /  ALT  9<L>  /  AlkPhos  46  12-02          MEDICATIONS  (STANDING):  aspirin  chewable 81 milliGRAM(s) Oral daily  atorvastatin 80 milliGRAM(s) Oral at bedtime  ceFAZolin  Injectable. 2000 milliGRAM(s) IV Push every 8 hours  clopidogrel Tablet 75 milliGRAM(s) Oral daily  dextrose 5%. 1000 milliLiter(s) (100 mL/Hr) IV Continuous <Continuous>  dextrose 5%. 1000 milliLiter(s) (50 mL/Hr) IV Continuous <Continuous>  dextrose 50% Injectable 25 Gram(s) IV Push once  dextrose 50% Injectable 12.5 Gram(s) IV Push once  dextrose 50% Injectable 25 Gram(s) IV Push once  glucagon  Injectable 1 milliGRAM(s) IntraMuscular once  heparin   Injectable 5000 Unit(s) SubCutaneous every 12 hours  insulin glargine Injectable (LANTUS) 10 Unit(s) SubCutaneous at bedtime  insulin lispro (ADMELOG) corrective regimen sliding scale   SubCutaneous three times a day before meals  insulin lispro Injectable (ADMELOG) 3 Unit(s) SubCutaneous three times a day before meals  pantoprazole    Tablet 40 milliGRAM(s) Oral before breakfast  polyethylene glycol 3350 17 Gram(s) Oral daily  sodium chloride 0.9% lock flush 3 milliLiter(s) IV Push every 8 hours    MEDICATIONS  (PRN):  acetaminophen     Tablet .. 650 milliGRAM(s) Oral every 6 hours PRN Mild Pain (1 - 3)  dextrose Oral Gel 15 Gram(s) Oral once PRN Blood Glucose LESS THAN 70 milliGRAM(s)/deciliter  ketorolac   Injectable 30 milliGRAM(s) IV Push every 6 hours PRN Severe Pain (7 - 10)  oxyCODONE    IR 5 milliGRAM(s) Oral every 4 hours PRN Moderate Pain (4 - 6)        RADIOLOGY & ADDITIONAL TESTS:    < from: Xray Chest 1 View- PORTABLE-Urgent (Xray Chest 1 View- PORTABLE-Urgent .) (12.02.22 @ 12:25) >  IMPRESSION:    Interval removal of left-sided chest tube. No pneumothorax.    < end of copied text >  
Patient discussed on morning rounds with Dr. Birmingham and Alexia     Operation / Date: 12/1: CABGx3 EF 50%     SUBJECTIVE ASSESSMENT:  52y Male reports some pain in the left thigh. States that he has no shortness of breath. Reports no active bleeding anywhere. No BM yet.         Vital Signs Last 24 Hrs  T(C): 36.8 (03 Dec 2022 13:26), Max: 36.9 (03 Dec 2022 09:08)  T(F): 98.3 (03 Dec 2022 13:26), Max: 98.4 (03 Dec 2022 09:08)  HR: 95 (03 Dec 2022 14:10) (85 - 96)  BP: 124/72 (03 Dec 2022 14:10) (83/56 - 125/73)  BP(mean): 91 (03 Dec 2022 14:10) (64 - 94)  RR: 20 (03 Dec 2022 14:10) (20 - 22)  SpO2: 97% (03 Dec 2022 14:10) (96% - 99%)    Parameters below as of 03 Dec 2022 14:10  Patient On (Oxygen Delivery Method): nasal cannula w/ humidification  O2 Flow (L/min): 2    I&O's Detail    02 Dec 2022 07:01  -  03 Dec 2022 07:00  --------------------------------------------------------  IN:    IV PiggyBack: 100 mL    Oral Fluid: 240 mL    PRBCs (Packed Red Blood Cells): 300 mL    sodium chloride 0.9%: 55 mL  Total IN: 695 mL    OUT:    Chest Tube (mL): 90 mL    Chest Tube (mL): 210 mL    Indwelling Catheter - Urethral (mL): 2175 mL  Total OUT: 2475 mL    Total NET: -1780 mL      03 Dec 2022 07:01  -  03 Dec 2022 16:02  --------------------------------------------------------  IN:    IV PiggyBack: 300 mL    Oral Fluid: 240 mL  Total IN: 540 mL    OUT:    Chest Tube (mL): 0 mL    Indwelling Catheter - Urethral (mL): 750 mL  Total OUT: 750 mL    Total NET: -210 mL          CHEST TUBE:  Yes/No. AIR LEAKS: Yes/No. Suction / H2O SEAL.   NOMRA DRAIN:  Yes/No.  EPICARDIAL WIRES: Yes/No.  TIE DOWNS: Yes/No.  VELA: Yes/No.    PHYSICAL EXAM:    General: NAD, sitting comfortably in bed, conversing appropriately  Neurological: alert and oriented, UE and LE strength equal b/l, facial symmetry present  Cardiovascular: RRR, Clear S1 and S2, no murmurs appreciated  Respiratory: CTA bilateral bibasilar crackles. No wheeze or rhonchi   Gastrointestinal: +BS, soft, NT, ND  Extremities: Left Groin ecchymosis but soft and no tender. Left inner thigh with extensive ecchymosis. Leg is tense but only slightly painful. 2+ DP and PT pulses on bilateral lower extremities.   Vascular: warm, well perfused. DP/PT pulses palpable b/l.  Incisions: MSI covered with mepilex,     LABS:                        7.6    7.22  )-----------( 92       ( 03 Dec 2022 12:06 )             23.5       COUMADIN:  No.    PT/INR - ( 03 Dec 2022 05:47 )   PT: 16.6 sec;   INR: 1.39          PTT - ( 03 Dec 2022 05:47 )  PTT:37.1 sec    12-03    136  |  101  |  14  ----------------------------<  176<H>  4.1   |  28  |  0.85    Ca    7.8<L>      03 Dec 2022 05:47  Phos  2.3     12-03  Mg     1.8     12-03    TPro  5.2<L>  /  Alb  3.0<L>  /  TBili  1.1  /  DBili  x   /  AST  22  /  ALT  8<L>  /  AlkPhos  44  12-03          MEDICATIONS  (STANDING):  aspirin  chewable 81 milliGRAM(s) Oral daily  atorvastatin 80 milliGRAM(s) Oral at bedtime  clopidogrel Tablet 75 milliGRAM(s) Oral daily  dextrose 5%. 1000 milliLiter(s) (100 mL/Hr) IV Continuous <Continuous>  dextrose 5%. 1000 milliLiter(s) (50 mL/Hr) IV Continuous <Continuous>  dextrose 50% Injectable 25 Gram(s) IV Push once  dextrose 50% Injectable 12.5 Gram(s) IV Push once  dextrose 50% Injectable 25 Gram(s) IV Push once  glucagon  Injectable 1 milliGRAM(s) IntraMuscular once  heparin   Injectable 5000 Unit(s) SubCutaneous every 12 hours  insulin glargine Injectable (LANTUS) 10 Unit(s) SubCutaneous at bedtime  insulin lispro (ADMELOG) corrective regimen sliding scale   SubCutaneous three times a day before meals  insulin lispro Injectable (ADMELOG) 3 Unit(s) SubCutaneous three times a day before meals  pantoprazole    Tablet 40 milliGRAM(s) Oral before breakfast  polyethylene glycol 3350 17 Gram(s) Oral daily  sodium chloride 0.9% lock flush 3 milliLiter(s) IV Push every 8 hours    MEDICATIONS  (PRN):  acetaminophen     Tablet .. 650 milliGRAM(s) Oral every 6 hours PRN Mild Pain (1 - 3)  dextrose Oral Gel 15 Gram(s) Oral once PRN Blood Glucose LESS THAN 70 milliGRAM(s)/deciliter  ketorolac   Injectable 30 milliGRAM(s) IV Push every 6 hours PRN Severe Pain (7 - 10)  oxyCODONE    IR 5 milliGRAM(s) Oral every 4 hours PRN Moderate Pain (4 - 6)        RADIOLOGY & ADDITIONAL TESTS:  < from: Xray Chest 1 View- PORTABLE-Routine (Xray Chest 1 View- PORTABLE-Routine in AM.) (12.03.22 @ 05:56) >  INTERPRETATION:  Clinical History: Postop    Frontal examination of the chest demonstrates limited inspiration. No   interval change lung pathology in comparison to prior examination of the   chest 12/2/2022. No interval change position remaining support devices.    IMPRESSION: No interval change    --- End of Report ---    < end of copied text >    
Patient discussed on morning rounds with Dr. Hale      Operation / Date: CABG x3 12/1/22    Surgeon: Dr. Hale    Referring Physician: Dr. Rivera     SUBJECTIVE ASSESSMENT:  52y Male, comfortably lying in stretcher in no acute distress, denies pain at this time.  Using IS.  Denies chest pain, nausea, vomiting.    Hospital Course:  51 yo male, former smoker, strong FH of early CAD, w/ PMHx of HTN, HLD, DM T2, EDUARDO (baseline Hgb 8s in 12/2021; EGD 4/2021 w/ antral gastritis and duodenitis + hemorrhoids), CAD (w/ MI in 2010/2020 and multiple PCIs, and recurrent ISR OM1 requiring brachytherapy in the past) who presented to [facility] ED on 11/29 with complaints of intermittent midsternal chest pain with radiation to the L arm/neck, waking him up from sleep, relieved w/ SL Nitro .He underwent a cardiac cath which revealed 3V CAD. CT surgery consulted for surgical evaluation. On 12/1/22 he underwent an uncomplicated CABG x3 (lima-lad, OM 1 Y graft OM 2) EF 55% with Dr. Hale. Extubated on POD0. POD 1 12/2/22, patients pleural chest tube removed, x2 mediastinal blakes remain. POD 2 12/3/22  Transferred to stepdown unit. Over night on POD 2 12/3/22, patients hemoglobin 6.9.  He received 2 units of PRBCs with a Suboptimal response in repeat hemoglobin. 6.9--> 7.6 after 2 units. Monitored left thigh as potential bleeding source. On POD3 12/4/22, patients hemoglobin 7.3, 1 unit of blood given followed by Lasix with improving hemodynamics. Patient started on Lopressor. On POD4 12/5/22, patients mediastinal drains removed. On POD 5 12/6/22, epicardial wires removed. Patient ambulated, is hemodynamically stable and is ready for discharge, coordinated w/ Dr. Hale.          Vital Signs Last 24 Hrs  T(C): 36.6 (06 Dec 2022 09:23), Max: 36.6 (06 Dec 2022 09:23)  T(F): 97.9 (06 Dec 2022 09:23), Max: 97.9 (06 Dec 2022 09:23)  HR: 80 (06 Dec 2022 09:10) (80 - 90)  BP: 133/75 (06 Dec 2022 09:10) (106/67 - 140/73)  BP(mean): 97 (06 Dec 2022 09:10) (79 - 106)  RR: 18 (06 Dec 2022 09:10) (16 - 20)  SpO2: 97% (06 Dec 2022 09:10) (95% - 97%)    Parameters below as of 06 Dec 2022 09:10  Patient On (Oxygen Delivery Method): room air      I&O's Detail    05 Dec 2022 07:01  -  06 Dec 2022 07:00  --------------------------------------------------------  IN:    Oral Fluid: 720 mL  Total IN: 720 mL    OUT:    Chest Tube (mL): 10 mL    Voided (mL): 2750 mL  Total OUT: 2760 mL    Total NET: -2040 mL          EPICARDIAL WIRES REMOVED: Yes  TIE DOWNS REMOVED: Yes    PHYSICAL EXAM:  General: Calm, cooperative   Neurological: AOx3. Motor skills grossly intact  Cardiovascular: Normal S1/S2. Regular rate/rhythm. No murmurs  Respiratory: Lungs CTA bilaterally. No wheezing or rales  Gastrointestinal: +BS in all 4 quadrants. Non-distended. Soft. Non-tender  Extremities: Strength 5/5 b/l upper/lower extremities. Sensation grossly intact upper/lower extremities. No edema. No calf tenderness.  Vascular: Radial 2+bilaterally, DP 2+ b/l  Incision Sites: Sternal wound c/d/i.  Mediastinal/pleural chest tube sites c/d/i.        LABS:                        9.2    7.97  )-----------( 213      ( 06 Dec 2022 05:30 )             28.8       COUMADIN:  No        12-06    137  |  99  |  12  ----------------------------<  150<H>  4.2   |  23  |  0.83    Ca    9.1      06 Dec 2022 05:30  Phos  3.0     12-05  Mg     2.2     12-06    TPro  6.9  /  Alb  3.8  /  TBili  1.2  /  DBili  x   /  AST  20  /  ALT  9<L>  /  AlkPhos  65  12-05          MEDICATIONS  (STANDING):  ascorbic acid 500 milliGRAM(s) Oral daily  aspirin  chewable 81 milliGRAM(s) Oral daily  atorvastatin 80 milliGRAM(s) Oral at bedtime  clopidogrel Tablet 75 milliGRAM(s) Oral daily  dextrose 50% Injectable 25 Gram(s) IV Push once  dextrose 50% Injectable 12.5 Gram(s) IV Push once  dextrose 50% Injectable 25 Gram(s) IV Push once  ferrous    sulfate 325 milliGRAM(s) Oral daily  folic acid 1 milliGRAM(s) Oral daily  heparin   Injectable 5000 Unit(s) SubCutaneous every 12 hours  insulin glargine Injectable (LANTUS) 10 Unit(s) SubCutaneous at bedtime  insulin lispro (ADMELOG) corrective regimen sliding scale   SubCutaneous Before meals and at bedtime  insulin lispro Injectable (ADMELOG) 4 Unit(s) SubCutaneous three times a day before meals  metoprolol tartrate 25 milliGRAM(s) Oral two times a day  pantoprazole    Tablet 40 milliGRAM(s) Oral before breakfast  polyethylene glycol 3350 17 Gram(s) Oral daily  sodium chloride 0.9% lock flush 3 milliLiter(s) IV Push every 8 hours      Discharge CXR:  < from: Xray Chest 1 View- PORTABLE-Routine (Xray Chest 1 View- PORTABLE-Routine in AM.) (12.05.22 @ 05:13) >    FINDINGS:    Single frontal view of the chest demonstrates mild bibasilar atelectasis.   Mediastinal sternotomy wires. Status post right-sided central venous   catheter removal. Mediastinal chest tube remains. The cardiomediastinal   silhouette is enlarged. No acute osseous abnormalities. Overlying EKG   leads and wires are noted    IMPRESSION: Status post right-sided central venous catheter removal.    --- End of Report ---    < end of copied text >    Awaiting official read from 12/6 CXR  -No sign of acute pnuemo/effusion       Med Reconciliation:  Medication Reconciliation Status	Admission Reconciliation is Completed  Discharge Reconciliation is Completed  Discharge Medications	acetaminophen 325 mg oral tablet: 2 tab(s) orally every 4 hours  ascorbic acid 500 mg oral tablet: 1 tab(s) orally once a day  aspirin 81 mg oral delayed release tablet: 1 tab(s) orally once a day  atorvastatin 80 mg oral tablet: 1 tab(s) orally once a day  clopidogrel 75 mg oral tablet: 1 tab(s) orally once a day  ferrous sulfate 325 mg (65 mg elemental iron) oral tablet: 1 tab(s) orally once a day  folic acid 1 mg oral tablet: 1 tab(s) orally once a day  Januvia 100 mg oral tablet: 1 tab(s) orally once a day  Jardiance 10 mg oral tablet: 1 tab(s) orally once a day (in the morning)  Lasix 20 mg oral tablet: 1 tab(s) orally once a day     Take for 7 days   metFORMIN 850 mg oral tablet: 1 tab(s) orally 2 times a day  metoprolol tartrate 100 mg oral tablet: 1 tab(s) orally every 12 hours   pantoprazole 40 mg oral delayed release tablet: 1 tab(s) orally once a day  polyethylene glycol 3350 oral powder for reconstitution: 17 gram(s) orally once a day  Potassium Chloride (Eqv-Klor-Con 10) 10 mEq oral tablet, extended release: 1 tab(s) orally once a day    Take only with Lasix    psyllium 1.7 g oral wafer: 1 dose(s) orally 2 times a day  ,  ,     Care Plan/Procedures:  Discharge Diagnoses, Assessment and Plan of Treatment	PRINCIPAL DISCHARGE DIAGNOSIS  Diagnosis: Chest pain  Assessment and Plan of Treatment:       SECONDARY DISCHARGE DIAGNOSES  Diagnosis: Hypertension  Assessment and Plan of Treatment:     Diagnosis: Hyperlipidemia  Assessment and Plan of Treatment:     Diagnosis: Type 2 diabetes mellitus  Assessment and Plan of Treatment:  Discharge Procedures, Findings and Treatment	PRINCIPAL PROCEDURE  Procedure: CABG, with VENKAT  Findings and Treatment:  Goal(s)	To get better and follow your care plan as instructed.     Follow Up:  Care Providers for Follow up (PCP/Outpatient Provider)	Brian Hale)  Cardiovascular Surgery  [address] Floor, [place] [address]  Phone: [phone]  Fax: [phone]  Scheduled Appointment: 12/12/2022 12:45 PM    Mathew Rivera)  Cardiovascular Disease; Internal Medicine; Interventional Cardiology  [address], [address]  [place] [address]  Phone: [phone]  Fax: [phone]  Scheduled Appointment: 12/28/2022 09:15 AM  Additional Scheduled Appointments	Please attend all of your follow up appointment(s)    Follow up with your endocrinologist for diabetes management  Discharge Diet	Consistent Carbohydrate Diabetic Diets  Activity	Do not drive or operate machinery, Do not make important decisions, No heavy lifting/straining, Showering allowed, Stairs allowed, Walking - Indoors allowed, Walking - Outdoors allowed  Additional Instructions	-Walk daily as tolerated and use your incentive spirometer 10 times every hour while you are awake.     -Please weigh yourself daily. If you notice over a 3 pound weight gain in 3 days, this is a sign you are likely retaining too much fluid. It is imperative you call our right away with unexplained rapid weight gain.      -Please continue to wear the compression stockings given to you in the hospital at home. This is a way to prevent fluid from building up in your legs.     -No driving or strenuous activity/exercise until cleared by your surgeon.    -Gently clean your incisions with unscented/antibacterial soap and water, pat dry.  You may leave them open to air.    -Call your doctor if you have shortness of breath, chest pain not relieved by pain medication, dizziness, fever >100.5, or increased redness or drainage from incisions.    -Take the abdominal dressing off in 2 days.      
Patient discussed on morning rounds with Dr. Birmingham     Operation / Date: 12/1: CABGx3 EF 50%     SUBJECTIVE ASSESSMENT:  52y Male reports leg feels better today. Would like to walk this afternoon.         Vital Signs Last 24 Hrs  T(C): 36.6 (04 Dec 2022 17:50), Max: 37.4 (04 Dec 2022 01:01)  T(F): 97.8 (04 Dec 2022 17:50), Max: 99.3 (04 Dec 2022 01:01)  HR: 87 (04 Dec 2022 15:36) (86 - 102)  BP: 107/67 (04 Dec 2022 15:36) (107/67 - 141/84)  BP(mean): 83 (04 Dec 2022 15:36) (78 - 106)  RR: 219 (04 Dec 2022 15:36) (18 - 219)  SpO2: 100% (04 Dec 2022 15:36) (93% - 100%)    Parameters below as of 04 Dec 2022 15:36  Patient On (Oxygen Delivery Method): nasal cannula  O2 Flow (L/min): 2    I&O's Detail    03 Dec 2022 07:01  -  04 Dec 2022 07:00  --------------------------------------------------------  IN:    IV PiggyBack: 300 mL    Oral Fluid: 840 mL  Total IN: 1140 mL    OUT:    Chest Tube (mL): 80 mL    Indwelling Catheter - Urethral (mL): 3575 mL  Total OUT: 3655 mL    Total NET: -2515 mL      04 Dec 2022 07:01  -  04 Dec 2022 18:40  --------------------------------------------------------  IN:    Oral Fluid: 300 mL  Total IN: 300 mL    OUT:    Indwelling Catheter - Urethral (mL): 1300 mL  Total OUT: 1300 mL    Total NET: -1000 mL    CHEST TUBE:  YES   NORMA DRAIN:  Yes  EPICARDIAL WIRES: Yes  TIE DOWNS: No   VELA: Yes/    PHYSICAL EXAM:    General: NAD, sitting comfortably in bed, conversing appropriately  Neurological: alert and oriented, UE and LE strength equal b/l, facial symmetry present  Cardiovascular: RRR, Clear S1 and S2, no murmurs appreciated  Respiratory: CTA bilateral bibasilar crackles. No wheeze or rhonchi   Gastrointestinal: +BS, soft, NT, ND  Extremities: Left Groin ecchymosis but soft and no tender. Left inner thigh with extensive ecchymosis. Leg is tense but only slightly painful. 2+ DP and PT pulses on bilateral lower extremities.   Vascular: warm, well perfused. DP/PT pulses palpable b/l.  Incisions: MSI covered with mepilex,       LABS:                        7.3    5.79  )-----------( 111      ( 04 Dec 2022 05:30 )             22.5       COUMADIN: No. REASON: .    PT/INR - ( 03 Dec 2022 05:47 )   PT: 16.6 sec;   INR: 1.39          PTT - ( 03 Dec 2022 05:47 )  PTT:37.1 sec    12-04    137  |  99  |  12  ----------------------------<  142<H>  3.9   |  29  |  0.86    Ca    8.1<L>      04 Dec 2022 05:30  Phos  2.7     12-04  Mg     2.0     12-04    TPro  6.0  /  Alb  3.1<L>  /  TBili  1.0  /  DBili  x   /  AST  20  /  ALT  7<L>  /  AlkPhos  51  12-04          MEDICATIONS  (STANDING):  aspirin  chewable 81 milliGRAM(s) Oral daily  atorvastatin 80 milliGRAM(s) Oral at bedtime  clopidogrel Tablet 75 milliGRAM(s) Oral daily  dextrose 5%. 1000 milliLiter(s) (100 mL/Hr) IV Continuous <Continuous>  dextrose 5%. 1000 milliLiter(s) (50 mL/Hr) IV Continuous <Continuous>  dextrose 50% Injectable 25 Gram(s) IV Push once  dextrose 50% Injectable 12.5 Gram(s) IV Push once  dextrose 50% Injectable 25 Gram(s) IV Push once  furosemide   Injectable 20 milliGRAM(s) IV Push once  glucagon  Injectable 1 milliGRAM(s) IntraMuscular once  heparin   Injectable 5000 Unit(s) SubCutaneous every 12 hours  insulin glargine Injectable (LANTUS) 10 Unit(s) SubCutaneous at bedtime  insulin lispro (ADMELOG) corrective regimen sliding scale   SubCutaneous three times a day before meals  insulin lispro Injectable (ADMELOG) 4 Unit(s) SubCutaneous three times a day before meals  metoprolol tartrate 25 milliGRAM(s) Oral two times a day  pantoprazole    Tablet 40 milliGRAM(s) Oral before breakfast  polyethylene glycol 3350 17 Gram(s) Oral daily  sodium chloride 0.9% lock flush 3 milliLiter(s) IV Push every 8 hours    MEDICATIONS  (PRN):  acetaminophen     Tablet .. 650 milliGRAM(s) Oral every 6 hours PRN Mild Pain (1 - 3)  dextrose Oral Gel 15 Gram(s) Oral once PRN Blood Glucose LESS THAN 70 milliGRAM(s)/deciliter  oxyCODONE    IR 5 milliGRAM(s) Oral every 4 hours PRN Moderate Pain (4 - 6)        RADIOLOGY & ADDITIONAL TESTS:  L< from: Xray Chest 1 View- PORTABLE-Routine (Xray Chest 1 View- PORTABLE-Routine in AM.) (12.04.22 @ 06:21) >  INTERPRETATION:  Clinical History: Abnormal chest sounds    Frontal examination of the chest demonstrates the heart to be within   normal limits in transverse diameter. No acute infiltrates. Status post   sternotomy. Right internal jugular line noted tip overlying right atrium.   Discoid change left lung base.    IMPRESSION: Limited inspiration. Discoid change left lung base    --- End of Report ---      < end of copied text >    
Patient discussed on morning rounds with Dr. Hale    Operation / Date: CABG x 3 lima-lad, OM 1 Y graft OM 2, EF 55%    SUBJECTIVE ASSESSMENT:  52y Male seen this AM, resting comfortably in the chair states he is feeling better today, encouraged cough and deep breathing. Chest tubes to be removed after firs twalk.         Vital Signs Last 24 Hrs  T(C): 36.2 (05 Dec 2022 14:14), Max: 36.8 (04 Dec 2022 15:00)  T(F): 97.2 (05 Dec 2022 14:14), Max: 98.2 (04 Dec 2022 15:00)  HR: 81 (05 Dec 2022 13:00) (74 - 93)  BP: 122/81 (05 Dec 2022 13:00) (107/67 - 155/95)  BP(mean): 98 (05 Dec 2022 13:00) (82 - 119)  RR: 16 (05 Dec 2022 13:00) (16 - 20)  SpO2: 97% (05 Dec 2022 13:00) (92% - 100%)    Parameters below as of 05 Dec 2022 13:00  Patient On (Oxygen Delivery Method): room air      I&O's Detail    04 Dec 2022 07:01  -  05 Dec 2022 07:00  --------------------------------------------------------  IN:    Oral Fluid: 400 mL  Total IN: 400 mL    OUT:    Chest Tube (mL): 30 mL    Indwelling Catheter - Urethral (mL): 3740 mL  Total OUT: 3770 mL    Total NET: -3370 mL      05 Dec 2022 07:01  -  05 Dec 2022 14:32  --------------------------------------------------------  IN:    Oral Fluid: 120 mL  Total IN: 120 mL    OUT:    Chest Tube (mL): 10 mL    Voided (mL): 1000 mL  Total OUT: 1010 mL    Total NET: -890 mL        CHEST TUBE:  No removed 12/5/2022  NORMA DRAIN:  No.  EPICARDIAL WIRES: Yes   Settings: 40bpm/10ma/.8  TIE DOWNS: Yes  VELA: No.    PHYSICAL EXAM:  Neuro: A+O x 3, non-focal, speech clear and intact  HEENT: PERRL, EOMI, oral mucosa pink and moist  Neck: supple, no JVD  CV: regular rate, regular rhythm, +S1S2, no murmurs or rub  Pulm/chest: lung sounds CTA and equal bilaterally, no accessory muscle use noted  Abd: soft, NT, ND, +BS  Ext: MORALES x 4, no C/C/E  Skin: warm, well perfused, no rashes   Incision Sites: MSI open to air, meplex removed well healing. Chest tube sites with c/d/i dressing. EVH site well healing without erythema     LABS:                        9.5    7.33  )-----------( 174      ( 05 Dec 2022 07:04 )             29.2       12-05    137  |  98  |  9   ----------------------------<  154<H>  4.1   |  25  |  0.79    Ca    8.8      05 Dec 2022 07:04  Phos  3.0     12-05  Mg     2.1     12-05    TPro  6.9  /  Alb  3.8  /  TBili  1.2  /  DBili  x   /  AST  20  /  ALT  9<L>  /  AlkPhos  65  12-05          MEDICATIONS  (STANDING):  ascorbic acid 500 milliGRAM(s) Oral daily  aspirin  chewable 81 milliGRAM(s) Oral daily  atorvastatin 80 milliGRAM(s) Oral at bedtime  clopidogrel Tablet 75 milliGRAM(s) Oral daily  dextrose 5%. 1000 milliLiter(s) (100 mL/Hr) IV Continuous <Continuous>  dextrose 5%. 1000 milliLiter(s) (50 mL/Hr) IV Continuous <Continuous>  dextrose 50% Injectable 25 Gram(s) IV Push once  dextrose 50% Injectable 12.5 Gram(s) IV Push once  dextrose 50% Injectable 25 Gram(s) IV Push once  ferrous    sulfate 325 milliGRAM(s) Oral daily  folic acid 1 milliGRAM(s) Oral daily  glucagon  Injectable 1 milliGRAM(s) IntraMuscular once  heparin   Injectable 5000 Unit(s) SubCutaneous every 12 hours  insulin glargine Injectable (LANTUS) 10 Unit(s) SubCutaneous at bedtime  insulin lispro (ADMELOG) corrective regimen sliding scale   SubCutaneous Before meals and at bedtime  insulin lispro Injectable (ADMELOG) 4 Unit(s) SubCutaneous three times a day before meals  metoprolol tartrate 25 milliGRAM(s) Oral two times a day  pantoprazole    Tablet 40 milliGRAM(s) Oral before breakfast  polyethylene glycol 3350 17 Gram(s) Oral daily  sodium chloride 0.9% lock flush 3 milliLiter(s) IV Push every 8 hours    MEDICATIONS  (PRN):  acetaminophen     Tablet .. 650 milliGRAM(s) Oral every 6 hours PRN Mild Pain (1 - 3)  dextrose Oral Gel 15 Gram(s) Oral once PRN Blood Glucose LESS THAN 70 milliGRAM(s)/deciliter  oxyCODONE    IR 5 milliGRAM(s) Oral every 4 hours PRN Moderate Pain (4 - 6)        RADIOLOGY & ADDITIONAL TESTS:

## 2022-12-06 NOTE — DISCHARGE NOTE PROVIDER - NSDCCPCAREPLAN_GEN_ALL_CORE_FT
PRINCIPAL DISCHARGE DIAGNOSIS  Diagnosis: Chest pain  Assessment and Plan of Treatment:       SECONDARY DISCHARGE DIAGNOSES  Diagnosis: Hypertension  Assessment and Plan of Treatment:     Diagnosis: Hyperlipidemia  Assessment and Plan of Treatment:     Diagnosis: Type 2 diabetes mellitus  Assessment and Plan of Treatment:

## 2022-12-06 NOTE — PROGRESS NOTE ADULT - REASON FOR ADMISSION
unstable angina

## 2022-12-06 NOTE — DISCHARGE NOTE PROVIDER - NSDCFUADDAPPT_GEN_ALL_CORE_FT
Please attend all of your follow up appointment(s)    Follow up with your endocrinologist for diabetes management

## 2022-12-06 NOTE — DISCHARGE NOTE NURSING/CASE MANAGEMENT/SOCIAL WORK - NSDCPEFALRISK_GEN_ALL_CORE
For information on Fall & Injury Prevention, visit: [email or web address] OR  [email or web address] OR  https://www.cdc.gov/steadi/patient.html

## 2022-12-06 NOTE — DISCHARGE NOTE PROVIDER - CARE PROVIDERS DIRECT ADDRESSES
[email or web address]

## 2022-12-06 NOTE — DISCHARGE NOTE PROVIDER - HOSPITAL COURSE
53 yo male, former smoker, strong FH of early CAD, w/ PMHx of HTN, HLD, DM T2, EDUARDO (baseline Hgb 8s in 12/2021; EGD 4/2021 w/ antral gastritis and duodenitis + hemorrhoids), CAD (w/ MI in 2010/2020 and multiple PCIs, and recurrent ISR OM1 requiring brachytherapy in the past) who presented to [facility] ED on 11/29 with complaints of intermittent midsternal chest pain with radiation to the L arm/neck, waking him up from sleep, relieved w/ SL Nitro .He underwent a cardiac cath which revealed 3V CAD. CT surgery consulted for surgical evaluation. On 12/1/22 he underwent an uncomplicated CABG x3 (lima-lad, OM 1 Y graft OM 2) EF 55% with Dr. Hale. Extubated on POD0. POD 1 12/2/22, patients pleural chest tube removed, x2 mediastinal blakes remain. POD 2 12/3/22  Transferred to stepdown unit. Over night on POD 2 12/3/22, patients hemoglobin 6.9.  He received 2 units of PRBCs with a Suboptimal response in repeat hemoglobin. 6.9--> 7.6 after 2 units. Monitored left thigh as potential bleeding source. On POD3 12/4/22, patients hemoglobin 7.3, 1 unit of blood given followed by Lasix with improving hemodynamics. Patient started on Lopressor. On POD4 12/5/22, patients mediastinal drains removed. On POD 5 12/6/22, epicardial wires removed. Patient ambulated, is hemodynamically stable and is ready for discharge, coordinated w/ Dr. Hale.          Over 35 minutes was spent with the patient reviewing the discharge material including medications, follow up appointments, recovery, concerning symptoms, and how to contact their health care providers if they have questions.    CARDIAC SURGERY DISCHARGE CHECKLIST:      CABG        [ X ] Aspirin, [  ] Contraindicated, Reason_______________________________        [ X ] Plavix, [  ] Contraindicated, Reason_______________________________        [ X ] Statin, [  ] Contraindicated, Reason_______________________________        [ X ] Lasix , [  ] Contraindicated, Reason_______________________________              Duration: 7 days        [ X ]  Beta-Blocker, [  ] Contraindicated, Reason_______________________________

## 2022-12-06 NOTE — PROGRESS NOTE ADULT - PROVIDER SPECIALTY LIST ADULT
Critical Care
CT Surgery
Critical Care
CT Surgery

## 2022-12-06 NOTE — DISCHARGE NOTE PROVIDER - NSDCMRMEDTOKEN_GEN_ALL_CORE_FT
acetaminophen 325 mg oral tablet: 2 tab(s) orally every 4 hours  ascorbic acid 500 mg oral tablet: 1 tab(s) orally once a day  aspirin 81 mg oral delayed release tablet: 1 tab(s) orally once a day  atorvastatin 80 mg oral tablet: 1 tab(s) orally once a day  clopidogrel 75 mg oral tablet: 1 tab(s) orally once a day  ferrous sulfate 325 mg (65 mg elemental iron) oral tablet: 1 tab(s) orally once a day  folic acid 1 mg oral tablet: 1 tab(s) orally once a day  Januvia 100 mg oral tablet: 1 tab(s) orally once a day  Jardiance 10 mg oral tablet: 1 tab(s) orally once a day (in the morning)  Lasix 20 mg oral tablet: 1 tab(s) orally once a day     Take for 7 days   metFORMIN 850 mg oral tablet: 1 tab(s) orally 2 times a day  metoprolol tartrate 100 mg oral tablet: 1 tab(s) orally every 12 hours   pantoprazole 40 mg oral delayed release tablet: 1 tab(s) orally once a day  polyethylene glycol 3350 oral powder for reconstitution: 17 gram(s) orally once a day  Potassium Chloride (Eqv-Klor-Con 10) 10 mEq oral tablet, extended release: 1 tab(s) orally once a day    Take only with Lasix    psyllium 1.7 g oral wafer: 1 dose(s) orally 2 times a day

## 2022-12-06 NOTE — CHART NOTE - NSCHARTNOTEFT_GEN_A_CORE
Epicardial wire removal:    Pt seen and examined at bedside.  Case discussed with Dr. Hale and is recommending removal of pacer wires.  Epicardial wires removed without incident.  Gauze dressing placed. Pt tolerated procedure well and remained hemodynamically stable.

## 2022-12-06 NOTE — DISCHARGE NOTE PROVIDER - PROVIDER TOKENS
PROVIDER:[TOKEN:[[id number]],SCHEDULEDAPPT:[12/12/2022],SCHEDULEDAPPTTIME:[12:45 PM]],PROVIDER:[TOKEN:[[id number]],SCHEDULEDAPPT:[12/28/2022],SCHEDULEDAPPTTIME:[09:15 AM]]

## 2022-12-07 ENCOUNTER — APPOINTMENT (OUTPATIENT)
Dept: CARE COORDINATION | Facility: HOME HEALTH | Age: 52
End: 2022-12-07

## 2022-12-07 VITALS
OXYGEN SATURATION: 98 % | HEART RATE: 96 BPM | RESPIRATION RATE: 17 BRPM | SYSTOLIC BLOOD PRESSURE: 147 MMHG | TEMPERATURE: 97.9 F | DIASTOLIC BLOOD PRESSURE: 98 MMHG

## 2022-12-07 LAB — ISTAT ACTK (ACTIVATED CLOTTING TIME KAOLIN): 359 SEC — HIGH (ref 74–137)

## 2022-12-07 PROCEDURE — 99024 POSTOP FOLLOW-UP VISIT: CPT

## 2022-12-07 RX ORDER — POTASSIUM CHLORIDE 10 MEQ
10 CAPSULE, EXTENDED RELEASE ORAL DAILY
Refills: 0 | Status: ACTIVE | COMMUNITY

## 2022-12-07 RX ORDER — METFORMIN HYDROCHLORIDE 500 MG/1
500 TABLET, FILM COATED, EXTENDED RELEASE ORAL
Refills: 0 | Status: DISCONTINUED | COMMUNITY
End: 2022-12-07

## 2022-12-07 RX ORDER — SIMETHICONE 125 MG/1
125 TABLET, CHEWABLE ORAL
Qty: 4 | Refills: 0 | Status: DISCONTINUED | COMMUNITY
Start: 2021-03-25 | End: 2022-12-07

## 2022-12-07 RX ORDER — FUROSEMIDE 20 MG/1
20 TABLET ORAL
Qty: 7 | Refills: 0 | Status: ACTIVE | COMMUNITY

## 2022-12-07 RX ORDER — RANOLAZINE 500 MG/1
500 TABLET, EXTENDED RELEASE ORAL
Refills: 0 | Status: DISCONTINUED | COMMUNITY
End: 2022-12-07

## 2022-12-07 RX ORDER — POTASSIUM CHLORIDE 750 MG/1
10 TABLET, FILM COATED, EXTENDED RELEASE ORAL
Qty: 7 | Refills: 0 | Status: DISCONTINUED | COMMUNITY
Start: 2022-12-06

## 2022-12-07 RX ORDER — SITAGLIPTIN 100 MG/1
100 TABLET, FILM COATED ORAL
Qty: 30 | Refills: 0 | Status: DISCONTINUED | COMMUNITY
Start: 2022-12-06

## 2022-12-07 RX ORDER — ENALAPRIL MALEATE 5 MG/1
TABLET ORAL
Refills: 0 | Status: DISCONTINUED | COMMUNITY
End: 2022-12-07

## 2022-12-07 RX ORDER — ASPIRIN 81 MG/1
81 TABLET, COATED ORAL
Refills: 0 | Status: DISCONTINUED | COMMUNITY
End: 2022-12-07

## 2022-12-07 RX ORDER — POLYETHYLENE GLYCOL 3350 17 G/17G
17 POWDER, FOR SOLUTION ORAL
Qty: 119 | Refills: 0 | Status: DISCONTINUED | COMMUNITY
Start: 2022-12-06

## 2022-12-07 RX ORDER — PANTOPRAZOLE 40 MG/1
40 TABLET, DELAYED RELEASE ORAL
Refills: 0 | Status: DISCONTINUED | COMMUNITY
End: 2022-12-07

## 2022-12-07 RX ORDER — CARVEDILOL 6.25 MG/1
6.25 TABLET, FILM COATED ORAL
Refills: 0 | Status: DISCONTINUED | COMMUNITY
End: 2022-12-07

## 2022-12-07 RX ORDER — FUROSEMIDE 20 MG/1
20 TABLET ORAL
Qty: 30 | Refills: 0 | Status: DISCONTINUED | COMMUNITY
Start: 2022-12-06

## 2022-12-07 RX ORDER — ATORVASTATIN CALCIUM 80 MG/1
80 TABLET, FILM COATED ORAL
Refills: 0 | Status: ACTIVE | COMMUNITY

## 2022-12-07 RX ORDER — EMPAGLIFLOZIN 10 MG/1
10 TABLET, FILM COATED ORAL DAILY
Refills: 0 | Status: ACTIVE | COMMUNITY

## 2022-12-07 RX ORDER — AMOXICILLIN 500 MG/1
CAPSULE ORAL
Refills: 0 | Status: DISCONTINUED | COMMUNITY
End: 2022-12-07

## 2022-12-07 RX ORDER — PANTOPRAZOLE SODIUM 40 MG/1
40 TABLET, DELAYED RELEASE ORAL
Qty: 60 | Refills: 0 | Status: ACTIVE | COMMUNITY

## 2022-12-07 RX ORDER — POLYETHYLENE GLYCOL-3350 AND ELECTROLYTES 236; 6.74; 5.86; 2.97; 22.74 G/274.31G; G/274.31G; G/274.31G; G/274.31G; G/274.31G
236 POWDER, FOR SOLUTION ORAL
Qty: 1 | Refills: 0 | Status: DISCONTINUED | COMMUNITY
Start: 2021-03-25 | End: 2022-12-07

## 2022-12-07 RX ORDER — ACETAMINOPHEN 325 MG/1
325 TABLET ORAL
Refills: 0 | Status: ACTIVE | COMMUNITY

## 2022-12-07 RX ORDER — MULTIVIT-MIN/FOLIC/VIT K/LYCOP 400-300MCG
500 TABLET ORAL DAILY
Refills: 0 | Status: ACTIVE | COMMUNITY

## 2022-12-07 RX ORDER — CHLORHEXIDINE GLUCONATE 4 %
325 (65 FE) LIQUID (ML) TOPICAL DAILY
Refills: 0 | Status: ACTIVE | COMMUNITY

## 2022-12-07 RX ORDER — FOLIC ACID 1 MG/1
1 TABLET ORAL
Qty: 30 | Refills: 0 | Status: DISCONTINUED | COMMUNITY
Start: 2022-12-06

## 2022-12-07 RX ORDER — ATORVASTATIN CALCIUM 80 MG/1
80 TABLET, FILM COATED ORAL
Refills: 0 | Status: DISCONTINUED | COMMUNITY
End: 2022-12-07

## 2022-12-07 RX ORDER — METOPROLOL TARTRATE 50 MG/1
50 TABLET, FILM COATED ORAL
Refills: 0 | Status: ACTIVE | COMMUNITY

## 2022-12-07 RX ORDER — FERROUS SULFATE 325(65) MG
325 TABLET ORAL
Refills: 0 | Status: DISCONTINUED | COMMUNITY
End: 2022-12-07

## 2022-12-07 RX ORDER — METFORMIN HYDROCHLORIDE 850 MG/1
850 TABLET, COATED ORAL
Qty: 60 | Refills: 0 | Status: DISCONTINUED | COMMUNITY
Start: 2022-12-06

## 2022-12-07 RX ORDER — SITAGLIPTIN 100 MG/1
100 TABLET, FILM COATED ORAL DAILY
Refills: 0 | Status: ACTIVE | COMMUNITY

## 2022-12-07 RX ORDER — METFORMIN HYDROCHLORIDE 850 MG/1
850 TABLET, COATED ORAL TWICE DAILY
Refills: 0 | Status: ACTIVE | COMMUNITY

## 2022-12-07 RX ORDER — ASPIRIN 81 MG/1
81 TABLET ORAL DAILY
Refills: 0 | Status: ACTIVE | COMMUNITY

## 2022-12-07 RX ORDER — FOLIC ACID 1 MG/1
1 TABLET ORAL DAILY
Refills: 0 | Status: ACTIVE | COMMUNITY

## 2022-12-07 RX ORDER — PSYLLIUM HUSK 0.4 G
CAPSULE ORAL TWICE DAILY
Refills: 0 | Status: ACTIVE | COMMUNITY

## 2022-12-07 RX ORDER — EMPAGLIFLOZIN 10 MG/1
10 TABLET, FILM COATED ORAL
Qty: 16 | Refills: 0 | Status: DISCONTINUED | COMMUNITY
Start: 2022-12-06

## 2022-12-07 RX ORDER — LORATADINE 10 MG
17 TABLET,DISINTEGRATING ORAL DAILY
Refills: 0 | Status: ACTIVE | COMMUNITY

## 2022-12-07 RX ORDER — OXYCODONE 5 MG/1
5 TABLET ORAL
Qty: 16 | Refills: 0 | Status: DISCONTINUED | COMMUNITY
Start: 2022-12-06

## 2022-12-07 RX ORDER — CLOPIDOGREL 75 MG/1
75 TABLET, FILM COATED ORAL DAILY
Qty: 30 | Refills: 0 | Status: ACTIVE | COMMUNITY

## 2022-12-07 RX ORDER — METOPROLOL TARTRATE 100 MG/1
100 TABLET, FILM COATED ORAL
Qty: 60 | Refills: 0 | Status: DISCONTINUED | COMMUNITY
Start: 2022-12-06

## 2022-12-07 NOTE — PHYSICAL EXAM
[Sclera] : the sclera and conjunctiva were normal [Extraocular Movements] : extraocular movements were intact [Neck Appearance] : the appearance of the neck was normal [Neck Cervical Mass (___cm)] : no neck mass was observed [Jugular Venous Distention Increased] : there was no jugular-venous distention [Thyroid Diffuse Enlargement] : the thyroid was not enlarged [Thyroid Nodule] : there were no palpable thyroid nodules [] : no respiratory distress [Respiration, Rhythm And Depth] : normal respiratory rhythm and effort [Exaggerated Use Of Accessory Muscles For Inspiration] : no accessory muscle use [Examination Of The Chest] : the chest was normal in appearance [Chest Visual Inspection Thoracic Asymmetry] : no chest asymmetry [Diminished Respiratory Excursion] : normal chest expansion [Breast Appearance] : normal in appearance [Breast Palpation Mass] : no palpable masses [Skin Color & Pigmentation] : normal skin color and pigmentation [Oriented To Time, Place, And Person] : oriented to person, place, and time [FreeTextEntry1] : MSI, CT and SVG sites c/d/i resolving ecchymosis on left knee to mid thigh at SVG; no preipheral edema noted

## 2022-12-07 NOTE — HISTORY OF PRESENT ILLNESS
[FreeTextEntry1] : [facility]: [facility] \par 24-hour discharge follow-up and assessment \par \par Orly Balbuena is a 52 year-old male, former smoker, strong FH of early CAD, w/ PMHx of HTN, HLD, DM T2, EDUARDO (baseline Hgb 8s in 12/2021; EGD 4/2021 w/ antral gastritis and duodenitis + hemorrhoids), CAD (w/ MI in 2010/2020 and multiple PCIs, and recurrent ISR OM1 requiring brachytherapy in the past) who presented to [facility] ED on 11/29 with complaints of intermittent midsternal chest pain with radiation to the L arm/neck, waking him up from sleep, relieved w/ SL Nitro. He underwent a cardiac cath which revealed 3V CAD. CT surgery consulted for surgical evaluation.  \par \par On 12/1/22 he underwent an uncomplicated CABG x3 (lima-lad, OM 1 Y graft OM 2) EF 55% with Dr. Hale. POD 2 12/3/22, patients' hemoglobin 6.9. He received 2 units of PRBCs with a Suboptimal response in repeat hemoglobin. 6.9--> 7.6 after 2 units. Monitored left thigh as potential bleeding source. On POD3 12/4/22, patients' hemoglobin 7.3, 1 unit of blood given followed by Lasix with improving hemodynamics and started on Lopressor. On 12/6/22, discharged to home with [facility] services. \par \par \par MSI, CT and left leg SVG sites no erythema, purulent exudate or edema noted, moderate ecchymosis from left knee to thigh, edges well approximated, patient reports mild pain at SVG site.\par \par

## 2022-12-07 NOTE — ASSESSMENT
[FreeTextEntry1] : ASSESSMENT \par \par Patient recovering well at home s/p 3V CABG, accompanied by spouse Delilah Vincent. Reviewed all medications and dosages with patient understanding. Patient has all medications in home and is taking as prescribed. Pain controlled with current medication regimen. No new symptoms, issues or concerns. Reports ambulating around home independently, without the use if assistive device. Denies chest pain, SOB/MANZO, nausea/vomiting, constipation/diarrhea. \par \par MSI, CT and SVG sites no erythema, purulent exudate or edema noted, edges well approximated, patient denies pain. \par \par SVG: left leg SVG harvest site moderate ecchymotic to mid thigh, denies pain or tenderness to site. \par \par Post-op appointments \par Brian Hale) Cardiovascular Surgery: Scheduled Appointment: 12/12/2022 12:45 PM \par Mathwe Rivera) cardiovascular disease; Internal Medicine; Interventional Cardiology: Scheduled Appointment: 12/28/2022 09:15 AM \par NW RN: awaiting auth from [facility]; escalated by NW Melissa Cali; patient and CTS team updated.\par \par [facility] Your [facility] team will continue to follow up with patient's status. NP/CCC roles explained with patient understanding, contact information provided. Patient agrees to call with any questions, issues or concerns. Worsening symptoms reviewed with patient with reiteration and understanding.\par \par Additional instructions \par \par  \par Please make a follow up appointment with your endocrinologist for diabetes management \par  \par \par POST OP PLAN\par \par *Adhere to ADA Consistent Carbohydrate Diabetic Diets. Monitor FBG once daily, on oral antidiabetics not required for QID FBG.\par \par *Do not drive or operate machinery, No heavy lifting/straining, Showering allowed, Stairs allowed, Walking - Indoors allowed, Walking - Outdoors allowed \par \par *Walk daily as tolerated and use your incentive spirometer 10 times every hour while you are awake. Walk around the apartment and transition slowly to stairs and outdoors as tolerated, avoid extreme temperatures when outdoors.\par  \par *Please weigh yourself daily. If you notice over a 3 pound weight gain in 3 days, this is a sign you are likely retaining too much fluid. It is imperative you call our right away with unexplained rapid weight gain. A scale was mailed to your home.\par  \par *Please continue to wear the compression stockings given to you in the hospital at home. This is a way to prevent fluid from building up in your legs. You can remove intermittently for breaks or to wash.\par  \par *No driving or strenuous activity/exercise until cleared by your surgeon. \par  \par *Gently clean your incisions with unscented/antibacterial soap and water, pat dry. You may leave them open to air. \par  \par *Call your doctor if you have shortness of breath, chest pain not relieved by pain medication, dizziness, fever >100.5, or increased redness or drainage from incisions. \par \par \par Sternal Precautions\par \par Sternal precautions are used to help protect your sternum (breastbone) after open chest surgery. Wires are placed during surgery to hold the sternum together as it heals. Sternal precautions help prevent the wires from cutting through the sternum. The precautions also help prevent the sternum from coming apart from an injury, and prevent pain and bleeding. You may need to use the precautions for up to 12 weeks after surgery.  It is important to follow the instructions carefully. An injury to the healing sternum can be life-threatening.\par \par General sternal precautions: Start slowly and do more as you get stronger. Pain medicine might make it harder for you to know when to slow down or be careful. Stop immediately if you hear a crunch or pop in your sternum.\par \par Protect your sternum. Hug a pillow to your chest or cross your arms over your chest when you laugh, sneeze, or cough.\par \par Be careful when you get into or out of a chair or bed. Hug a pillow or cross your arms when you stand or sit. Do not twist as you move. Use only your legs to sit and stand. You may need to use a raised toilet seat if you have trouble standing up without using your arms. \par \par Ask when you may take a bath or shower. You may need to use a bath chair if you have trouble getting into or out of the tub. Do not use a grab bar.\par \par Do not lift or carry anything heavier than 5 pounds. For example, a gallon of milk weighs 8 pounds.\par \par Keep your arms down as much as possible. Do not put your arms out to the side, behind you, or over your head. Do not let anyone pull your arms to help you move or dress. Do not reach for items.\par \par Do not push or pull anything. Examples include a car door or a vacuum .\par \par Do not drive while you are healing. Your surgeon will tell you when it is safe for you to start driving again.\par \par \par

## 2022-12-08 ENCOUNTER — APPOINTMENT (OUTPATIENT)
Dept: CARE COORDINATION | Facility: HOME HEALTH | Age: 52
End: 2022-12-08

## 2022-12-08 VITALS
WEIGHT: 170 LBS | SYSTOLIC BLOOD PRESSURE: 125 MMHG | DIASTOLIC BLOOD PRESSURE: 81 MMHG | TEMPERATURE: 98.1 F | RESPIRATION RATE: 17 BRPM | OXYGEN SATURATION: 98 % | BODY MASS INDEX: 28.29 KG/M2 | HEART RATE: 103 BPM

## 2022-12-08 PROBLEM — I25.10 3-VESSEL CAD: Status: ACTIVE | Noted: 2022-12-08

## 2022-12-08 PROCEDURE — 99024 POSTOP FOLLOW-UP VISIT: CPT

## 2022-12-08 NOTE — PHYSICAL EXAM
[Sclera] : the sclera and conjunctiva were normal [PERRL With Normal Accommodation] : pupils were equal in size, round, and reactive to light [Extraocular Movements] : extraocular movements were intact [Neck Appearance] : the appearance of the neck was normal [Jugular Venous Distention Increased] : there was no jugular-venous distention [Thyroid Nodule] : there were no palpable thyroid nodules [] : no respiratory distress [Respiration, Rhythm And Depth] : normal respiratory rhythm and effort [Exaggerated Use Of Accessory Muscles For Inspiration] : no accessory muscle use [Auscultation Breath Sounds / Voice Sounds] : lungs were clear to auscultation bilaterally [Heart Rate And Rhythm] : heart rate was normal and rhythm regular [Heart Sounds] : normal S1 and S2 [Heart Sounds Gallop] : no gallops [Murmurs] : no murmurs [Heart Sounds Pericardial Friction Rub] : no pericardial rub [Examination Of The Chest] : the chest was normal in appearance [Chest Visual Inspection Thoracic Asymmetry] : no chest asymmetry [Diminished Respiratory Excursion] : normal chest expansion [2+] : left 2+ [No Abnormalities] : the abdominal aorta was not enlarged and no bruit was heard [No Pulse Discrepancy] : no pulse discrepancy detected [Full Pulse] : peripheral pulses were full [Breast Appearance] : normal in appearance [Breast Palpation Mass] : no palpable masses [Bowel Sounds] : normal bowel sounds [Abdomen Soft] : soft [Abdomen Tenderness] : non-tender [Cervical Lymph Nodes Enlarged Posterior Bilaterally] : posterior cervical [Cervical Lymph Nodes Enlarged Anterior Bilaterally] : anterior cervical [No CVA Tenderness] : no ~M costovertebral angle tenderness [No Spinal Tenderness] : no spinal tenderness [Abnormal Walk] : normal gait [Nail Clubbing] : no clubbing  or cyanosis of the fingernails [Musculoskeletal - Swelling] : no joint swelling seen [Motor Tone] : muscle strength and tone were normal [Skin Color & Pigmentation] : normal skin color and pigmentation [Skin Turgor] : normal skin turgor [Deep Tendon Reflexes (DTR)] : deep tendon reflexes were 2+ and symmetric [Sensation] : the sensory exam was normal to light touch and pinprick [Motor Exam] : the motor exam was normal [No Focal Deficits] : no focal deficits [Oriented To Time, Place, And Person] : oriented to person, place, and time [Impaired Insight] : insight and judgment were intact [Affect] : the affect was normal [Right Carotid Bruit] : no bruit heard over the right carotid [Left Carotid Bruit] : no bruit heard over the left carotid [Right Femoral Bruit] : no bruit heard over the right femoral artery [Left Femoral Bruit] : no bruit heard over the left femoral artery [Fingers] :  capillary refill of the fingers was normal [Left Fingers] :  capillary refill of the left fingers was normal [Right Fingers] :  capillary refill of the right fingers was normal [Toes] :  capillary refill of the toes was normal [Left Toes] :  capillary refill of the left toes was normal [Right Toes] :  capillary refill of the right toes was normal [FreeTextEntry1] : MSI c/d/i; margin well approximated sternum stable; leg leg SVG site moderate ecchymosis 10cm hardened area at groin

## 2022-12-08 NOTE — COUNSELING
[Hygeine (Including Daily Shower)] : hygeine (including daily shower) [Importance of Regular Medical Follow-Up] : the importance of regular medical follow-up [No Heavy Lifting] : no heavy lifting (>15-20 lb. for 1 month or 25 lb. for 3 months from date of surgery) [Blood Pressure Control] : blood pressure control [S/S of infection] : signs and symptoms of infection (and to whom it should be reported) [Progressive Ambulation/Activity] : progressive ambulation/activity [Low Fat/Low Cholesterol Diet] : low fat/low cholesterol diet [Stress Management] : stress management

## 2022-12-08 NOTE — HISTORY OF PRESENT ILLNESS
[FreeTextEntry1] : [facility]: [place] \par Post-op in home assessment \par \par Orly Balbuean is a 52 year-old male, former smoker, strong FH of early CAD, w/ PMHx of HTN, HLD, DM T2, EDUARDO (baseline Hgb 8s in 12/2021; EGD 4/2021 w/ antral gastritis and duodenitis + hemorrhoids), CAD (w/ MI in 2010/2020 and multiple PCIs, and recurrent ISR OM1 requiring brachytherapy in the past) who presented to [facility] ED on 11/29 with complaints of intermittent midsternal chest pain with radiation to the L arm/neck, waking him up from sleep, relieved w/ SL Nitro. He underwent a cardiac cath which revealed 3V CAD. CT surgery consulted for surgical evaluation. \par \par On 12/1/22 he underwent an uncomplicated CABG x3 (lima-lad, OM 1 Y graft OM 2) EF 55% with Dr. Hale. POD 2 12/3/22, patients' hemoglobin 6.9. He received 2 units of PRBCs with a Suboptimal response in repeat hemoglobin. 6.9--> 7.6 after 2 units. Monitored left thigh as potential bleeding source. On POD3 12/4/22, patients' hemoglobin 7.3, 1 unit of blood given followed by Lasix with improving hemodynamics and started on Lopressor. On 12/6/22, discharged to home with [facility] services. \par \par Post-op incision assessment\par \par MSI and CT sites purulent exudate or edema noted, margins well approximated, CT site dressing removed no drainage noted left open to air, sternum stable along incision site.\par \par Left leg SVG site moderate ecchymosis extending to groin, 10cm area hard surrounding area soft, patient states area less tender and swollen since discharge, line of demarcation to monitor margins. Denies pain on ambulation; no pain with flexion/extension or adduction/abduction of affected leg; all peripheral pulses present, extremities warm.\par \par \par

## 2022-12-08 NOTE — ASSESSMENT
[FreeTextEntry1] : ASSESSMENT \par \par Patient recovering well at home s/p 3V CABG, accompanied by spouse Delilah Vincent. Reviewed all medications and dosages with patient understanding. Patient has all medications in home and is taking as prescribed. Pain controlled with current medication regimen. States tenderness at left mid thigh at SVG site, improved swelling and pain since discharge, denies pain in calf/BLE, or when ambulating, no other symptoms, issues or concerns. Reports ambulating around home independently, with use of straight cane, no gait abnormalities. Denies chest pain, SOB/MANZO, nausea/vomiting, constipation/diarrhea. Found to be ST at 103 did not take AM dose of BB, no SOB/MANZO, dose taken during assessment with repeat HR trending down at 94 bpm.\par \par Post-op incision assessment\par \par MSI and CT sites purulent exudate or edema noted, margins well approximated, CT site dressing removed no drainage noted left open to air, sternum stable along incision site.\par \par Left leg SVG site moderate ecchymosis extending to groin, 10cm area hard surrounding area soft, patient states area less tender and swollen since discharge, line of demarcation to monitor margins. Denies pain on ambulation; no pain with flexion/extension or adduction/abduction of affected leg; all peripheral pulses present, extremities warm.\par \par \par Post-op appointments \par \par Brian Hale) Cardiovascular Surgery: Scheduled Appointment: 12/12/2022 12:45 PM \par Mathew Rivera) cardiovascular disease; Internal Medicine; Interventional Cardiology: Scheduled Appointment: 12/28/2022 09:15 AM \par [facility] RN: auth approved start of care 12/9/22.\par \par [facility] team will continue to follow up with patient's status. NP/CCC roles explained with patient understanding, contact information provided. Patient agrees to call with any questions, issues or concerns. Worsening symptoms reviewed with patient with reiteration and understanding.\par \par Additional instructions \par SVG site marked for continued management; CTS team aware. Discussed immediate notification for worsening pain/swelling or ecchymosis. Call for pain with walking/passive movement of affected extremity.\par  \par Please make a follow up appointment with your endocrinologist for diabetes management \par  \par \par POST OP PLAN\par \par *Adhere to ADA Consistent Carbohydrate Diabetic Diets. Monitor FBG once daily, on oral antidiabetics not required for QID FBG.\par \par *Do not drive or operate machinery, No heavy lifting/straining, Showering allowed, Stairs allowed, Walking - Indoors allowed, Walking - Outdoors allowed \par \par *Walk daily as tolerated and use your incentive spirometer 10 times every hour while you are awake. Walk around the apartment and transition slowly to stairs and outdoors as tolerated, avoid extreme temperatures when outdoors.\par  \par *Please weigh yourself daily. If you notice over a 3 pound weight gain in 3 days, this is a sign you are likely retaining too much fluid. It is imperative you call our right away with unexplained rapid weight gain. A scale was mailed to your home.\par  \par *Please continue to wear the compression stockings given to you in the hospital at home. This is a way to prevent fluid from building up in your legs. You can remove intermittently for breaks or to wash.\par  \par *No driving or strenuous activity/exercise until cleared by your surgeon. \par  \par *Gently clean your incisions with unscented/antibacterial soap and water, pat dry. You may leave them open to air. \par  \par *Call your doctor if you have shortness of breath, chest pain not relieved by pain medication, dizziness, fever >100.5, or increased redness or drainage from incisions. \par \par \par Sternal Precautions\par \par Sternal precautions are used to help protect your sternum (breastbone) after open chest surgery. Wires are placed during surgery to hold the sternum together as it heals. Sternal precautions help prevent the wires from cutting through the sternum. The precautions also help prevent the sternum from coming apart from an injury, and prevent pain and bleeding. You may need to use the precautions for up to 12 weeks after surgery. It is important to follow the instructions carefully. An injury to the healing sternum can be life-threatening.\par \par General sternal precautions: Start slowly and do more as you get stronger. Pain medicine might make it harder for you to know when to slow down or be careful. Stop immediately if you hear a crunch or pop in your sternum.\par \par Protect your sternum. Hug a pillow to your chest or cross your arms over your chest when you laugh, sneeze, or cough.\par \par Be careful when you get into or out of a chair or bed. Hug a pillow or cross your arms when you stand or sit. Do not twist as you move. Use only your legs to sit and stand. You may need to use a raised toilet seat if you have trouble standing up without using your arms. \par \par Ask when you may take a bath or shower. You may need to use a bath chair if you have trouble getting into or out of the tub. Do not use a grab bar.\par \par Do not lift or carry anything heavier than 5 pounds. For example, a gallon of milk weighs 8 pounds.\par \par Keep your arms down as much as possible. Do not put your arms out to the side, behind you, or over your head. Do not let anyone pull your arms to help you move or dress. Do not reach for items.\par \par Do not push or pull anything. Examples include a car door or a vacuum .\par \par Do not drive while you are healing. Your surgeon will tell you when it is safe for you to start driving again.\par \par

## 2022-12-12 ENCOUNTER — APPOINTMENT (OUTPATIENT)
Dept: CARDIOTHORACIC SURGERY | Facility: CLINIC | Age: 52
End: 2022-12-12

## 2022-12-12 ENCOUNTER — OUTPATIENT (OUTPATIENT)
Dept: OUTPATIENT SERVICES | Facility: HOSPITAL | Age: 52
LOS: 1 days | End: 2022-12-12
Payer: MEDICAID

## 2022-12-12 ENCOUNTER — NON-APPOINTMENT (OUTPATIENT)
Age: 52
End: 2022-12-12

## 2022-12-12 VITALS
BODY MASS INDEX: 23.82 KG/M2 | SYSTOLIC BLOOD PRESSURE: 129 MMHG | DIASTOLIC BLOOD PRESSURE: 84 MMHG | HEART RATE: 79 BPM | RESPIRATION RATE: 17 BRPM | HEIGHT: 65 IN | TEMPERATURE: 98 F | OXYGEN SATURATION: 98 % | WEIGHT: 143 LBS

## 2022-12-12 DIAGNOSIS — I25.10 ATHEROSCLEROTIC HEART DISEASE OF NATIVE CORONARY ARTERY W/OUT ANGINA PECTORIS: ICD-10-CM

## 2022-12-12 DIAGNOSIS — Z98.890 OTHER SPECIFIED POSTPROCEDURAL STATES: Chronic | ICD-10-CM

## 2022-12-12 DIAGNOSIS — Z98.890 OTHER SPECIFIED POSTPROCEDURAL STATES: ICD-10-CM

## 2022-12-12 DIAGNOSIS — Z95.1 PRESENCE OF AORTOCORONARY BYPASS GRAFT: ICD-10-CM

## 2022-12-12 DIAGNOSIS — Z98.61 CORONARY ANGIOPLASTY STATUS: Chronic | ICD-10-CM

## 2022-12-12 PROCEDURE — 99024 POSTOP FOLLOW-UP VISIT: CPT

## 2022-12-12 PROCEDURE — 71046 X-RAY EXAM CHEST 2 VIEWS: CPT

## 2022-12-12 PROCEDURE — 71046 X-RAY EXAM CHEST 2 VIEWS: CPT | Mod: 26

## 2022-12-12 RX ORDER — IBUPROFEN 400 MG/1
400 TABLET, FILM COATED ORAL 3 TIMES DAILY
Qty: 30 | Refills: 0 | Status: ACTIVE | COMMUNITY
Start: 2022-12-12 | End: 1900-01-01

## 2022-12-14 PROBLEM — Z98.890 POST-OPERATIVE STATE: Status: ACTIVE | Noted: 2022-12-07

## 2022-12-14 PROBLEM — Z95.1 S/P CABG X 3: Status: ACTIVE | Noted: 2022-12-07

## 2022-12-14 NOTE — REASON FOR VISIT
[Spouse] : spouse [de-identified] :  CABG x3 (lima-lad, OM 1 Y graft OM 2) EF 55%  [de-identified] : 12/1/22 [de-identified] : Extubated on POD0. POD 1 12/2/22, patients pleural chest tube removed, x2 mediastinal blakes remain. POD 2 12/3/22  Transferred to stepdown unit. Over night on POD 2 12/3/22, patients hemoglobin 6.9.  He received 2 units of PRBCs with a Suboptimal response in repeat hemoglobin. 6.9--> 7.6 after 2 units. Monitored left thigh as potential bleeding source. On POD3 12/4/22, patients hemoglobin 7.3, 1 unit of blood given followed by Lasix with improving hemodynamics. Patient started on Lopressor. On POD4 12/5/22, patients mediastinal drains removed. On POD 5 12/6/22, epicardial wires removed. He was discharged home on 12/5/22.\par \par He presents today for a postop visit. Patient presents for post op visit. He appears generally well, denies c/o , sob/camacho, fever, lower extremity edema or palpitations. He reports sternal tenderness and soreness of his arms and shoulders. Left thigh hematoma has decreased in size and patient denies pain at this time. \par

## 2022-12-14 NOTE — END OF VISIT
[FreeTextEntry3] : I, ZAK RHOADES , am scribing for and in the presence of MONTSERRAT WILLARD the following sections: History of present illness, past Medical/family/surgical/family/social history, review of systems, vital signs, physical exam and disposition.\par

## 2022-12-14 NOTE — ASSESSMENT
[FreeTextEntry1] : 52 year old male s/p CABG x3 on 12/1/22 presents for a postop visit \par \par iburprofen as needed with food\par Continue to increase activity and walk daily as tolerated. Continue to use incentive spirometer. \par No driving or strenuous activity for 4 weeks after surgery. Avoid lifting >10 to 15 lbs.\par Call MD if you experience fever, fatigue, dizziness, confusion, syncope, shortness of breath, chest pain not relieved with analgesics, increased redness/drainage from incision.\par Continue to use compression stockings. Keep legs elevated above heart when resting/sitting/sleeping\par Follow up with Dr. Rivera \par D/C from CTS \par

## 2022-12-14 NOTE — PHYSICAL EXAM
[] : no respiratory distress [Respiration, Rhythm And Depth] : normal respiratory rhythm and effort [Exaggerated Use Of Accessory Muscles For Inspiration] : no accessory muscle use [Auscultation Breath Sounds / Voice Sounds] : lungs were clear to auscultation bilaterally [Apical Impulse] : the apical impulse was normal [Heart Rate And Rhythm] : heart rate was normal and rhythm regular [Heart Sounds] : normal S1 and S2 [Clean] : clean [Dry] : dry [Healing Well] : healing well [No Edema] : no edema [FreeTextEntry1] : sternum stable  [Erythema] : not erythematous [Warm] : not warm [Tender] : not tender [FreeTextEntry5] : left leg

## 2022-12-14 NOTE — CONSULT LETTER
[Dear  ___] : Dear  [unfilled], [Please see my note below.] : Please see my note below. [Sincerely,] : Sincerely, [FreeTextEntry2] : Dr. Mathew Rivera  [FreeTextEntry1] : YO GARZON  was seen today for a post operative visit. He is doing well status post[default value] on [default value].  We have asked that the patient followup in your office. We have instructed the patient to return to see us in [default value]. Enclosed is a copy of the operative report. Please contact our office for any questions or concerns at [phone].\par \par Thank you for allowing us to participate in this patients care.\par \par   [FreeTextEntry3] : SAUL Rodrigues MD FACS\par ,Cardiovascular & Thoracic Surgery\par [facility] \par \par Cardiovascular & Thoracic Surgery\par [facility]\par Professor of Cardiovascular & Thoracic Surgery\par [employer] \par \par \par \par

## 2022-12-28 ENCOUNTER — APPOINTMENT (OUTPATIENT)
Dept: HEART AND VASCULAR | Facility: CLINIC | Age: 52
End: 2022-12-28

## 2023-01-05 ENCOUNTER — TRANSCRIPTION ENCOUNTER (OUTPATIENT)
Age: 53
End: 2023-01-05

## 2023-01-12 NOTE — PATIENT PROFILE ADULT - DO YOU FEEL THREATENED BY OTHERS?
Statement Selected [FreeTextEntry1] : Patient is a 73 year old female with history of DM, HTN, now with CKD; referred to me by Dr Booth and Dr Wild. No complaints; otherwise feeling well. Recently taken off diovan by cardiologist Dr Bella at [facility] due to rise in Cr; now on amlodipine 5mg daily with high BP. Was hospitalized/admitted to [place] overnight; just discharged this morning; followed up within 2 hours of discharge; here for follow up; was admitted for headache. starting on nortriptyline.\par Pt seen/examined; last Cr 1.5; off diovan again; on amlodipine 7.5mg\par Pt seen/examined; complaining of foot swelling with 10mg amlodipine; improved when changed to 7.5 however still present;\par Pt seen/examined; no complaints; off prednisone now; off insulin;\par \par Current: Pt seen/examined; tested positive flu last week was at [place];  no

## 2023-01-25 NOTE — ED PROVIDER NOTE - NS ED MD DISPO DIVISION
Hemoglobin A1c is improved from 9.5-8.2. Vitamin D and PSA levels are normal.  Cholesterol is improved.   Kidney liver function and blood count are normal [facility]

## 2023-08-30 NOTE — PATIENT PROFILE ADULT - NSASFALLATTEMPTOOB_GEN_A_NUR
unable to leave ----- Message from Yuliya Leyva sent at 8/30/2023  7:39 AM CDT -----  Contact: Pt  Type:  Sooner Apoointment Request    Caller is requesting a sooner appointment.  Caller declined first available appointment listed below.  Caller will not accept being placed on the waitlist and is requesting a message be sent to doctor.  Name of Caller: pt   When is the first available appointment?   Symptoms: ER follow up / [facility]   Would the patient rather a call back or a response via [facility]? phone  Best Call Back Number: [phone]  Additional Information: would like to come in on Tuesday 09/05        
no

## 2023-10-09 NOTE — PATIENT PROFILE ADULT - FUNCTIONAL ASSESSMENT - DAILY ACTIVITY 3.
4 = No assist / stand by assistance Area H Indication Text: Tumors in this location are included in Area H (eyelids, eyebrows, nose, lips, chin, ear, pre-auricular, post-auricular, temple, genitalia, hands, feet, ankles and areola).  Tissue conservation is critical in these anatomic locations.

## 2023-10-26 NOTE — PATIENT PROFILE ADULT. - FUNCTIONAL SCREEN CURRENT LEVEL: TRANSFERRING, MLM
Problem: Skin Injury Risk Increased  Goal: Skin Health and Integrity  Outcome: Ongoing, Progressing  Intervention: Optimize Skin Protection  Description: Perform a full pressure injury risk assessment, as indicated by screening, upon admission to care unit.  Reassess skin (injury risk, full inspection) frequently (e.g., scheduled interval, with change in condition) to provide optimal early detection and prevention.  Maintain adequate tissue perfusion (e.g., encourage fluid balance; avoid crossing legs, constrictive clothing or devices) to promote tissue oxygenation.  Maintain head of bed at lowest degree of elevation tolerated, considering medical condition and other restrictions.  Avoid positioning onto an area that remains reddened.  Minimize incontinence and moisture (e.g., toileting schedule; moisture-wicking pad, diaper or incontinence collection device; skin moisture barrier).  Cleanse skin promptly and gently when soiled utilizing a pH-balanced cleanser.  Relieve and redistribute pressure (e.g., scheduled position changes, weight shifts, use of support surface, medical device repositioning, protective dressing application, use of positioning device, microclimate control, use of pressure-injury-monitor  Encourage increased activity, such as sitting in a chair at the bedside or early mobilization, when able to tolerate.  Recent Flowsheet Documentation  Taken 10/26/2023 1643 by Yesenia Altamirano, RN  Head of Bed (HOB) Positioning: HOB flat  Taken 10/26/2023 1305 by Yesenia Altamirano RN  Head of Bed (HOB) Positioning: HOB flat  Taken 10/26/2023 0840 by Yesenia Altamirano, RN  Pressure Reduction Techniques:   weight shift assistance provided   positioned off wounds   heels elevated off bed  Pressure Reduction Devices: specialty bed utilized  Skin Protection: adhesive use limited     Problem: Fall Injury Risk  Goal: Absence of Fall and Fall-Related Injury  Outcome: Ongoing, Progressing  Intervention: Identify and  Manage Contributors  Description: Develop a fall prevention plan with the patient and caregiver/family.  Provide reorientation, appropriate sensory stimulation and routines with changes in mental status to decrease risk of fall.  Promote use of personal vision and auditory aids.  Assess assistance level required for safe and effective self-care; provide support as needed, such as toileting, mobilization. For age 65 and older, implement timed toileting with assistance.  Encourage physical activity, such as performance of mobility and self-care at highest level of patient ability, multicomponent exercise program and provision of appropriate assistive devices.  If fall occurs, assess the severity of injury; implement fall injury protocol. Determine the cause and revise fall injury prevention plan.  Regularly review medication contribution to fall risk; adjust medication administration times to minimize risk of falling.  Consider risk related to polypharmacy and age.  Balance adequate pain management with potential for oversedation.  Recent Flowsheet Documentation  Taken 10/26/2023 0840 by Yesenia Altamirano RN  Medication Review/Management: medications reviewed  Intervention: Promote Injury-Free Environment  Description: Provide a safe, barrier-free environment that encourages independent activity.  Keep care area uncluttered and well-lighted.  Determine need for increased observation or monitoring.  Avoid use of devices that minimize mobility, such as restraints or indwelling urinary catheter.  Recent Flowsheet Documentation  Taken 10/26/2023 1850 by Yesenia Altamirano, RN  Safety Promotion/Fall Prevention:   safety round/check completed   room organization consistent  Taken 10/26/2023 1643 by Yesenia Altamirano, RN  Safety Promotion/Fall Prevention:   safety round/check completed   room organization consistent  Taken 10/26/2023 1409 by Yesenia Altamirano, RN  Safety Promotion/Fall Prevention:   safety round/check  completed   room organization consistent  Taken 10/26/2023 1222 by Yesenia Altamirano RN  Safety Promotion/Fall Prevention:   safety round/check completed   room organization consistent  Taken 10/26/2023 1004 by Yesenia Altamirano RN  Safety Promotion/Fall Prevention:   safety round/check completed   room organization consistent  Taken 10/26/2023 0840 by Yesenia Altamirano RN  Safety Promotion/Fall Prevention:   safety round/check completed   room organization consistent   clutter free environment maintained   assistive device/personal items within reach   activity supervised     Problem: Adult Inpatient Plan of Care  Goal: Plan of Care Review  Outcome: Ongoing, Progressing  Flowsheets (Taken 10/26/2023 1854)  Progress: declining  Plan of Care Reviewed With: patient  Outcome Evaluation: Premed with Dilaudid 1mg, Ativan 1mg, Robinul 0.4mg. Mottled and cool. F/C. PPS 10%. No visitors at bedside.  Goal: Patient-Specific Goal (Individualized)  Outcome: Ongoing, Progressing  Goal: Absence of Hospital-Acquired Illness or Injury  Outcome: Ongoing, Progressing  Intervention: Identify and Manage Fall Risk  Description: Perform standard risk assessment on admission using a validated tool or comprehensive approach appropriate to the patient; reassess fall risk frequently, with change in status or transfer to another level of care.  Communicate fall injury risk to interprofessional healthcare team.  Determine need for increased observation, equipment and environmental modification, such as low bed, signage and supportive, nonskid footwear.  Adjust safety measures to individual developmental age, stage and identified risk factors.  Reinforce the importance of safety and physical activity with patient and family.  Perform regular intentional rounding to assess need for position change, pain assessment and personal needs, including assistance with toileting.  Recent Flowsheet Documentation  Taken 10/26/2023 1850 by Yesenia Altamirano  MÓNICA ESCALONA  Safety Promotion/Fall Prevention:   safety round/check completed   room organization consistent  Taken 10/26/2023 1643 by Yesenia Altamirano RN  Safety Promotion/Fall Prevention:   safety round/check completed   room organization consistent  Taken 10/26/2023 1409 by Yesenia Altamirano RN  Safety Promotion/Fall Prevention:   safety round/check completed   room organization consistent  Taken 10/26/2023 1222 by Yesenia Altamirano RN  Safety Promotion/Fall Prevention:   safety round/check completed   room organization consistent  Taken 10/26/2023 1004 by Yesenia Altamirano RN  Safety Promotion/Fall Prevention:   safety round/check completed   room organization consistent  Taken 10/26/2023 0840 by Yesenia Altamirano RN  Safety Promotion/Fall Prevention:   safety round/check completed   room organization consistent   clutter free environment maintained   assistive device/personal items within reach   activity supervised  Intervention: Prevent Skin Injury  Description: Perform a screening for skin injury risk, such as pressure or moisture associated skin damage on admission and at regular intervals throughout hospital stay.  Keep all areas of skin (especially folds) clean and dry.  Maintain adequate skin hydration.  Relieve and redistribute pressure and protect bony prominences; implement measures based on patient-specific risk factors.  Match turning and repositioning schedule to clinical condition.  Encourage weight shift frequently; assist with reposition if unable to complete independently.  Float heels off bed; avoid pressure on the Achilles tendon.  Keep skin free from extended contact with medical devices.  Encourage functional activity and mobility, as early as tolerated.  Use aids (e.g., slide boards, mechanical lift) during transfer.  Recent Flowsheet Documentation  Taken 10/26/2023 1643 by Yesenia Altamirano RN  Body Position:   turned   right   side-lying  Taken 10/26/2023 1305 by Yesenia Altamirano,  RN  Body Position:   turned   left   side-lying  Taken 10/26/2023 0840 by Yesenia Altamirano RN  Skin Protection: adhesive use limited  Intervention: Prevent and Manage VTE (Venous Thromboembolism) Risk  Description: Assess for VTE (venous thromboembolism) risk.  Encourage and assist with early ambulation.  Initiate and maintain compression or other therapy, as indicated, based on identified risk in accordance with organizational protocol and provider order.  Encourage both active and passive leg exercises while in bed, if unable to ambulate.  Recent Flowsheet Documentation  Taken 10/26/2023 1850 by Yesenia Altamirano RN  Activity Management: bedrest  Taken 10/26/2023 1643 by Yesenia Altamirano RN  Activity Management: bedrest  Taken 10/26/2023 1409 by Yesenia Altamirano RN  Activity Management: bedrest  Taken 10/26/2023 1222 by Yesenia Altamirano RN  Activity Management: bedrest  Taken 10/26/2023 1004 by Yesenia Altamirano RN  Activity Management: bedrest  Taken 10/26/2023 0840 by Yesenia Altamirano RN  Activity Management: bedrest  Goal: Optimal Comfort and Wellbeing  Outcome: Ongoing, Progressing  Intervention: Monitor Pain and Promote Comfort  Description: Assess pain level, treatment efficacy and patient response at regular intervals using a consistent pain scale.  Consider the presence and impact of preexisting chronic pain.  Encourage patient and caregiver involvement in pain assessment, interventions and safety measures.  Recent Flowsheet Documentation  Taken 10/26/2023 1643 by Yesenia Altamirano RN  Pain Management Interventions:   premedicated for activity   see MAR  Taken 10/26/2023 1222 by Yesenia Altamirano RN  Pain Management Interventions:   premedicated for activity   see MAR  Taken 10/26/2023 0840 by Yesenia Altamirano RN  Pain Management Interventions:   premedicated for activity   see MAR  Intervention: Provide Person-Centered Care  Description: Use a family-focused approach to care.  Develop  trust and rapport by proactively providing information, encouraging questions, addressing concerns and offering reassurance.  Acknowledge emotional response to hospitalization.  Recognize and utilize personal coping strategies.  Honor spiritual and cultural preferences.  Recent Flowsheet Documentation  Taken 10/26/2023 0840 by Yesenia Altamirano RN  Trust Relationship/Rapport: care explained  Goal: Readiness for Transition of Care  Outcome: Ongoing, Progressing     Problem: Palliative Care  Goal: Enhanced Quality of Life  Outcome: Ongoing, Progressing  Intervention: Maximize Comfort  Description: Assess pain and acceptable level of comfort to individualize pain management plan.  Offer preferred nonpharmacologic and pharmacologic techniques to maximize comfort and pain relief.  Prevent or manage oral and gastrointestinal symptoms.  Offer food and fluids based on patient’s preference and tolerance.  Promote complementary therapy, such as music, pet therapy, massage, meditation or aromatherapy.  Evaluate for breathlessness; provide supportive relief (e.g., positioning, breathing exercises, secretion clearance, medication).  Promote strategies for sleep and rest.  Recent Flowsheet Documentation  Taken 10/26/2023 1643 by Yesenia Altamirano RN  Pain Management Interventions:   premedicated for activity   see MAR  Taken 10/26/2023 1222 by Yesenia Altamirano RN  Pain Management Interventions:   premedicated for activity   see MAR  Taken 10/26/2023 0840 by Yesenia Altamirano RN  Pain Management Interventions:   premedicated for activity   see MAR   Goal Outcome Evaluation:  Plan of Care Reviewed With: patient        Progress: declining  Outcome Evaluation: Premed with Dilaudid 1mg, Ativan 1mg, Robinul 0.4mg. Mottled and cool. F/C. PPS 10%. No visitors at bedside.          (0) independent

## 2023-12-05 ENCOUNTER — INPATIENT (INPATIENT)
Facility: HOSPITAL | Age: 53
LOS: 0 days | Discharge: HOME | DRG: 313 | End: 2023-12-06
Attending: INTERNAL MEDICINE | Admitting: INTERNAL MEDICINE
Payer: MEDICAID

## 2023-12-05 VITALS
HEART RATE: 90 BPM | SYSTOLIC BLOOD PRESSURE: 133 MMHG | TEMPERATURE: 98 F | RESPIRATION RATE: 18 BRPM | OXYGEN SATURATION: 99 % | WEIGHT: 179.9 LBS | DIASTOLIC BLOOD PRESSURE: 85 MMHG | HEIGHT: 65 IN

## 2023-12-05 DIAGNOSIS — E11.9 TYPE 2 DIABETES MELLITUS WITHOUT COMPLICATIONS: ICD-10-CM

## 2023-12-05 DIAGNOSIS — E78.5 HYPERLIPIDEMIA, UNSPECIFIED: ICD-10-CM

## 2023-12-05 DIAGNOSIS — I25.10 ATHEROSCLEROTIC HEART DISEASE OF NATIVE CORONARY ARTERY WITHOUT ANGINA PECTORIS: ICD-10-CM

## 2023-12-05 DIAGNOSIS — Z98.890 OTHER SPECIFIED POSTPROCEDURAL STATES: Chronic | ICD-10-CM

## 2023-12-05 DIAGNOSIS — I10 ESSENTIAL (PRIMARY) HYPERTENSION: ICD-10-CM

## 2023-12-05 DIAGNOSIS — Z98.61 CORONARY ANGIOPLASTY STATUS: Chronic | ICD-10-CM

## 2023-12-05 DIAGNOSIS — R07.9 CHEST PAIN, UNSPECIFIED: ICD-10-CM

## 2023-12-05 LAB
GLUCOSE BLDC GLUCOMTR-MCNC: 155 MG/DL — HIGH (ref 70–99)
GLUCOSE BLDC GLUCOMTR-MCNC: 155 MG/DL — HIGH (ref 70–99)
GLUCOSE BLDC GLUCOMTR-MCNC: 193 MG/DL — HIGH (ref 70–99)
GLUCOSE BLDC GLUCOMTR-MCNC: 193 MG/DL — HIGH (ref 70–99)
GLUCOSE BLDC GLUCOMTR-MCNC: 216 MG/DL — HIGH (ref 70–99)
GLUCOSE BLDC GLUCOMTR-MCNC: 216 MG/DL — HIGH (ref 70–99)
GLUCOSE BLDC GLUCOMTR-MCNC: 228 MG/DL — HIGH (ref 70–99)
GLUCOSE BLDC GLUCOMTR-MCNC: 228 MG/DL — HIGH (ref 70–99)
PA ADP PRP-ACNC: 111 PRU — LOW (ref 194–418)
PA ADP PRP-ACNC: 111 PRU — LOW (ref 194–418)
TROPONIN T, HIGH SENSITIVITY RESULT: 8 NG/L — SIGNIFICANT CHANGE UP (ref 0–51)

## 2023-12-05 PROCEDURE — 93306 TTE W/DOPPLER COMPLETE: CPT | Mod: 26

## 2023-12-05 PROCEDURE — 99222 1ST HOSP IP/OBS MODERATE 55: CPT

## 2023-12-05 PROCEDURE — 93010 ELECTROCARDIOGRAM REPORT: CPT

## 2023-12-05 PROCEDURE — 71045 X-RAY EXAM CHEST 1 VIEW: CPT | Mod: 26

## 2023-12-05 PROCEDURE — 99285 EMERGENCY DEPT VISIT HI MDM: CPT

## 2023-12-05 RX ORDER — LOSARTAN POTASSIUM 100 MG/1
50 TABLET, FILM COATED ORAL DAILY
Refills: 0 | Status: DISCONTINUED | OUTPATIENT
Start: 2023-12-05 | End: 2023-12-06

## 2023-12-05 RX ORDER — ATORVASTATIN CALCIUM 80 MG/1
80 TABLET, FILM COATED ORAL AT BEDTIME
Refills: 0 | Status: DISCONTINUED | OUTPATIENT
Start: 2023-12-05 | End: 2023-12-06

## 2023-12-05 RX ORDER — SODIUM CHLORIDE 9 MG/ML
1000 INJECTION, SOLUTION INTRAVENOUS
Refills: 0 | Status: DISCONTINUED | OUTPATIENT
Start: 2023-12-05 | End: 2023-12-06

## 2023-12-05 RX ORDER — DEXTROSE 50 % IN WATER 50 %
15 SYRINGE (ML) INTRAVENOUS ONCE
Refills: 0 | Status: DISCONTINUED | OUTPATIENT
Start: 2023-12-05 | End: 2023-12-06

## 2023-12-05 RX ORDER — DEXTROSE 50 % IN WATER 50 %
25 SYRINGE (ML) INTRAVENOUS ONCE
Refills: 0 | Status: DISCONTINUED | OUTPATIENT
Start: 2023-12-05 | End: 2023-12-06

## 2023-12-05 RX ORDER — NITROGLYCERIN 6.5 MG
1 CAPSULE, EXTENDED RELEASE ORAL
Refills: 0 | DISCHARGE

## 2023-12-05 RX ORDER — CLOPIDOGREL BISULFATE 75 MG/1
75 TABLET, FILM COATED ORAL DAILY
Refills: 0 | Status: DISCONTINUED | OUTPATIENT
Start: 2023-12-05 | End: 2023-12-06

## 2023-12-05 RX ORDER — RANOLAZINE 500 MG/1
1 TABLET, FILM COATED, EXTENDED RELEASE ORAL
Refills: 0 | DISCHARGE

## 2023-12-05 RX ORDER — FERROUS SULFATE 325(65) MG
325 TABLET ORAL DAILY
Refills: 0 | Status: DISCONTINUED | OUTPATIENT
Start: 2023-12-05 | End: 2023-12-06

## 2023-12-05 RX ORDER — ROSUVASTATIN CALCIUM 5 MG/1
1 TABLET ORAL
Refills: 0 | DISCHARGE

## 2023-12-05 RX ORDER — PANTOPRAZOLE SODIUM 20 MG/1
40 TABLET, DELAYED RELEASE ORAL
Refills: 0 | Status: DISCONTINUED | OUTPATIENT
Start: 2023-12-05 | End: 2023-12-06

## 2023-12-05 RX ORDER — LOSARTAN POTASSIUM 100 MG/1
1 TABLET, FILM COATED ORAL
Refills: 0 | DISCHARGE

## 2023-12-05 RX ORDER — HYDROCORTISONE 1 %
0 OINTMENT (GRAM) TOPICAL
Refills: 0 | DISCHARGE

## 2023-12-05 RX ORDER — DEXTROSE 50 % IN WATER 50 %
12.5 SYRINGE (ML) INTRAVENOUS ONCE
Refills: 0 | Status: DISCONTINUED | OUTPATIENT
Start: 2023-12-05 | End: 2023-12-06

## 2023-12-05 RX ORDER — RANOLAZINE 500 MG/1
500 TABLET, FILM COATED, EXTENDED RELEASE ORAL
Refills: 0 | Status: DISCONTINUED | OUTPATIENT
Start: 2023-12-05 | End: 2023-12-06

## 2023-12-05 RX ORDER — POLYETHYLENE GLYCOL 3350 17 G/17G
17 POWDER, FOR SOLUTION ORAL DAILY
Refills: 0 | Status: DISCONTINUED | OUTPATIENT
Start: 2023-12-05 | End: 2023-12-06

## 2023-12-05 RX ORDER — ATORVASTATIN CALCIUM 80 MG/1
80 TABLET, FILM COATED ORAL AT BEDTIME
Refills: 0 | Status: DISCONTINUED | OUTPATIENT
Start: 2023-12-05 | End: 2023-12-05

## 2023-12-05 RX ORDER — ASPIRIN/CALCIUM CARB/MAGNESIUM 324 MG
81 TABLET ORAL DAILY
Refills: 0 | Status: DISCONTINUED | OUTPATIENT
Start: 2023-12-06 | End: 2023-12-06

## 2023-12-05 RX ORDER — PSYLLIUM SEED (WITH DEXTROSE)
1 POWDER (GRAM) ORAL
Qty: 0 | Refills: 0 | DISCHARGE

## 2023-12-05 RX ORDER — ACETAMINOPHEN 500 MG
650 TABLET ORAL ONCE
Refills: 0 | Status: COMPLETED | OUTPATIENT
Start: 2023-12-05 | End: 2023-12-05

## 2023-12-05 RX ORDER — TRAZODONE HCL 50 MG
50 TABLET ORAL AT BEDTIME
Refills: 0 | Status: DISCONTINUED | OUTPATIENT
Start: 2023-12-05 | End: 2023-12-06

## 2023-12-05 RX ORDER — INSULIN LISPRO 100/ML
VIAL (ML) SUBCUTANEOUS
Refills: 0 | Status: DISCONTINUED | OUTPATIENT
Start: 2023-12-05 | End: 2023-12-06

## 2023-12-05 RX ORDER — GLUCAGON INJECTION, SOLUTION 0.5 MG/.1ML
1 INJECTION, SOLUTION SUBCUTANEOUS ONCE
Refills: 0 | Status: DISCONTINUED | OUTPATIENT
Start: 2023-12-05 | End: 2023-12-06

## 2023-12-05 RX ORDER — INFLUENZA VIRUS VACCINE 15; 15; 15; 15 UG/.5ML; UG/.5ML; UG/.5ML; UG/.5ML
0.5 SUSPENSION INTRAMUSCULAR ONCE
Refills: 0 | Status: DISCONTINUED | OUTPATIENT
Start: 2023-12-05 | End: 2023-12-06

## 2023-12-05 RX ORDER — CARVEDILOL PHOSPHATE 80 MG/1
6.25 CAPSULE, EXTENDED RELEASE ORAL ONCE
Refills: 0 | Status: COMPLETED | OUTPATIENT
Start: 2023-12-05 | End: 2023-12-05

## 2023-12-05 RX ORDER — EZETIMIBE 10 MG/1
1 TABLET ORAL
Refills: 0 | DISCHARGE

## 2023-12-05 RX ORDER — FOLIC ACID 0.8 MG
1 TABLET ORAL DAILY
Refills: 0 | Status: DISCONTINUED | OUTPATIENT
Start: 2023-12-05 | End: 2023-12-06

## 2023-12-05 RX ORDER — CLOPIDOGREL BISULFATE 75 MG/1
75 TABLET, FILM COATED ORAL ONCE
Refills: 0 | Status: COMPLETED | OUTPATIENT
Start: 2023-12-05 | End: 2023-12-05

## 2023-12-05 RX ORDER — METOPROLOL TARTRATE 50 MG
100 TABLET ORAL DAILY
Refills: 0 | Status: DISCONTINUED | OUTPATIENT
Start: 2023-12-05 | End: 2023-12-06

## 2023-12-05 RX ORDER — HYDROCORTISONE 1 %
1 OINTMENT (GRAM) TOPICAL DAILY
Refills: 0 | Status: DISCONTINUED | OUTPATIENT
Start: 2023-12-05 | End: 2023-12-06

## 2023-12-05 RX ORDER — METOPROLOL TARTRATE 50 MG
1 TABLET ORAL
Refills: 0 | DISCHARGE

## 2023-12-05 RX ORDER — TRAZODONE HCL 50 MG
0 TABLET ORAL
Refills: 0 | DISCHARGE

## 2023-12-05 RX ORDER — ASPIRIN/CALCIUM CARB/MAGNESIUM 324 MG
81 TABLET ORAL DAILY
Refills: 0 | Status: DISCONTINUED | OUTPATIENT
Start: 2023-12-05 | End: 2023-12-05

## 2023-12-05 RX ADMIN — Medication 1 APPLICATION(S): at 17:39

## 2023-12-05 RX ADMIN — CLOPIDOGREL BISULFATE 75 MILLIGRAM(S): 75 TABLET, FILM COATED ORAL at 12:49

## 2023-12-05 RX ADMIN — Medication 325 MILLIGRAM(S): at 12:51

## 2023-12-05 RX ADMIN — Medication 2: at 12:47

## 2023-12-05 RX ADMIN — Medication 81 MILLIGRAM(S): at 06:23

## 2023-12-05 RX ADMIN — Medication 4: at 21:35

## 2023-12-05 RX ADMIN — LOSARTAN POTASSIUM 50 MILLIGRAM(S): 100 TABLET, FILM COATED ORAL at 12:49

## 2023-12-05 RX ADMIN — ATORVASTATIN CALCIUM 80 MILLIGRAM(S): 80 TABLET, FILM COATED ORAL at 21:35

## 2023-12-05 RX ADMIN — CLOPIDOGREL BISULFATE 75 MILLIGRAM(S): 75 TABLET, FILM COATED ORAL at 06:22

## 2023-12-05 RX ADMIN — RANOLAZINE 500 MILLIGRAM(S): 500 TABLET, FILM COATED, EXTENDED RELEASE ORAL at 17:38

## 2023-12-05 RX ADMIN — Medication 4: at 17:38

## 2023-12-05 RX ADMIN — Medication 1 MILLIGRAM(S): at 12:49

## 2023-12-05 RX ADMIN — PANTOPRAZOLE SODIUM 40 MILLIGRAM(S): 20 TABLET, DELAYED RELEASE ORAL at 21:38

## 2023-12-05 RX ADMIN — CARVEDILOL PHOSPHATE 6.25 MILLIGRAM(S): 80 CAPSULE, EXTENDED RELEASE ORAL at 06:23

## 2023-12-05 RX ADMIN — Medication 100 MILLIGRAM(S): at 17:37

## 2023-12-05 RX ADMIN — Medication 50 MILLIGRAM(S): at 21:35

## 2023-12-05 RX ADMIN — Medication 650 MILLIGRAM(S): at 13:30

## 2023-12-05 RX ADMIN — Medication 650 MILLIGRAM(S): at 12:48

## 2023-12-05 NOTE — ED ADULT NURSE NOTE - CHIEF COMPLAINT
Detail Level: Zone Text: The above diagnosis and findings were noted on the exam but not discussed at this time with the patient. The patient is a 53y Male complaining of chest pain.

## 2023-12-05 NOTE — H&P ADULT - PROBLEM SELECTOR PLAN 2
CAD (w/ MI in 2010/2020 and multiple PCIs, and recurrent ISR OM1 requiring brachytherapy in the past)  CABG x3 (lima-lad, OM 1 Y graft OM 2) on 12/2022  C/w ASA/Plavix, atorvastatin 80 mg

## 2023-12-05 NOTE — H&P ADULT - NSHPPHYSICALEXAM_GEN_ALL_CORE
Vitals:  T(F): 97.7, Max: 97.7 (12-05 @ 07:20)  HR: 66 (66 - 90)  BP: 114/59 (114/59 - 133/85)  RR: 16 (16 - 20)  SpO2: 98% (97% - 100%)    Ins & outs:     Weight trend:  Weight (kg): 78.4 (12-05)    Physical exam:  General: No apparent distress  HEENT: Anicteric sclera. Moist mucous membranes. JVD+   Cardiac: Regular rate and rhythm. No murmurs, rubs, or gallops.   Vascular: Symmetric radial pulses. Dorsalis pedis pulses palpable.   Respiratory: Normal effort. Clear to ascultation.   Abdomen: Soft, nontender. Audible bowel sounds.   Extremities: Warm with +1 non pitting edema. No cyanosis or clubbing.   Skin: Warm and dry. No rash.   Neurologic: Grossly normal motor function.   Psychiatric: Oriented to person, place, and time. Vitals:  T(F): 97.7, Max: 97.7 (12-05 @ 07:20)  HR: 66 (66 - 90)  BP: 114/59 (114/59 - 133/85)  RR: 16 (16 - 20)  SpO2: 98% (97% - 100%)    Ins & outs:     Weight trend:  Weight (kg): 78.4 (12-05)    Physical exam:  General: No apparent distress  HEENT: Anicteric sclera. Moist mucous membranes. JVD-  Cardiac: Regular rate and rhythm. No murmurs, rubs, or gallops.   Vascular: Symmetric radial pulses. Dorsalis pedis pulses palpable.   Respiratory: Normal effort. Clear to ascultation.   Abdomen: Soft, nontender. Audible bowel sounds.   Extremities: Warm with +1 non pitting edema. No cyanosis or clubbing.   Skin: Warm and dry. No rash.   Neurologic: Grossly normal motor function.   Psychiatric: Oriented to person, place, and time.

## 2023-12-05 NOTE — H&P ADULT - PROBLEM SELECTOR PLAN 4
takes home Enalipril, losartan, toprol and coreg   Needs adequate medication reconciliation upon discharge   c/w Toprol, losartan while inpatient

## 2023-12-05 NOTE — ED ADULT NURSE NOTE - OBJECTIVE STATEMENT
Pt is a 52 yo M with pmhx CAD s/p multiple stent placements and cabg Dec 2022 here for bilateral chest pain x 4 days. States pain is sharp and in both sides of chest. Some sob at rest and with exertion, orthopnea, palpitations and nausea. Pain similar to previous episodes prior to pci. Took NTGx1 at 11am yesterday morning with initial improvement. Denies vomiting, ha, radiation of pain to arms/neck/jaw/abd. On dual antiplatelet therapy with plavix/asa.  Pt ambulatory with even and steady gait. PIV placed, ECG completed, labs sent, placed on continuous cardiac/O2 monitoring. Pt is a 54 yo M with pmhx CAD s/p multiple stent placements and cabg Dec 2022 here for bilateral chest pain x 4 days. States pain is sharp and in both sides of chest. Some sob at rest and with exertion, orthopnea, palpitations and nausea. Pain similar to previous episodes prior to pci. Took NTGx1 at 11am yesterday morning with initial improvement. Denies vomiting, ha, radiation of pain to arms/neck/jaw/abd. On dual antiplatelet therapy with plavix/asa.  Pt ambulatory with even and steady gait. PIV placed, ECG completed, labs sent, placed on continuous cardiac/O2 monitoring.

## 2023-12-05 NOTE — PATIENT PROFILE ADULT - FALL HARM RISK - HARM RISK INTERVENTIONS
Communicate Risk of Fall with Harm to all staff/Reinforce activity limits and safety measures with patient and family/Tailored Fall Risk Interventions/Visual Cue: Yellow wristband and red socks/Bed in lowest position, wheels locked, appropriate side rails in place/Call bell, personal items and telephone in reach/Instruct patient to call for assistance before getting out of bed or chair/Non-slip footwear when patient is out of bed/[place] to call system/Physically safe environment - no spills, clutter or unnecessary equipment/Purposeful Proactive Rounding/Room/bathroom lighting operational, light cord in reach Communicate Risk of Fall with Harm to all staff/Reinforce activity limits and safety measures with patient and family/Tailored Fall Risk Interventions/Visual Cue: Yellow wristband and red socks/Bed in lowest position, wheels locked, appropriate side rails in place/Call bell, personal items and telephone in reach/Instruct patient to call for assistance before getting out of bed or chair/Non-slip footwear when patient is out of bed/[place] to call system/Physically safe environment - no spills, clutter or unnecessary equipment/Purposeful Proactive Rounding/Room/bathroom lighting operational, light cord in reach

## 2023-12-05 NOTE — PATIENT PROFILE ADULT - DO YOU FEEL UNSAFE AT HOME, WORK, OR SCHOOL?
no Anticipated Plan (Based On Presumed Biopsy Results): Peripheral globules. Brown and pink. Stands out from others. Good symmetry though

## 2023-12-05 NOTE — H&P ADULT - PROBLEM SELECTOR PLAN 1
1 week of chest that has worsened over the past 3 days accompanied with orthopnea, diaphoresis and shortness of breath.    Troponin negative, BNP 66   EKG without ischemic changes; telemetry NSR 60-88   c/w Ranexa, Toprol    D/w Dr. Rivera   Obtain TTE

## 2023-12-05 NOTE — H&P ADULT - HISTORY OF PRESENT ILLNESS
Patient is a 53y old  Male former smoker, strong FH of early CAD, w/ PMHx of HTN, HLD, DM T2, EDUARDO (baseline Hgb 8s in 12/2021; EGD 4/2021 w/ antral gastritis and duodenitis + hemorrhoids), CAD (w/ MI in 2010/2020 and multiple PCIs, and recurrent ISR OM1 requiring brachytherapy in the past), CABG x3 (lima-lad, OM 1 Y graft OM 2) on 12/2022,      In the ED. Vitals: T(F): 97.7, Max: 97.7, HR: 66 (66 - 90), BP: 114/59 (114/59 - 133/85), RR: 16 (16 - 20), SpO2: 98% (97% - 100%)  Labs:   EKG:   Imaging:   Interventions:    Patient is a 53y old  Male former smoker, strong FH of early CAD, w/ PMHx of HTN, HLD, DM T2, EDUARDO (baseline Hgb 8s in 12/2021; EGD 4/2021 w/ antral gastritis and duodenitis + hemorrhoids), CAD (w/ MI in 2010/2020 and multiple PCIs, and recurrent ISR OM1 requiring brachytherapy in the past), CABG x3 (lima-lad, OM 1 Y graft OM 2) on 12/2022, presenting with 3 days of right sided chest pain. States that the pain feels like it is a stabbing sensation. is exacerbated by movement and walking up stairs and relieved with nitroglycerin. States orthopnea and shortness of breath has been accompanied with these symptoms and he is unable to tolerate 10 steps without needing to stop and catch his breath. Endorses sweating at rest, dizziness and feeling "unwell" since these symptoms began.  He states that he has one stent that closes up every 6 months that usually needs to be reopened that is accompanied with these same symptoms. Denies changes in bowel movements, recent URI or abdominal pain. Additionally, patient has an appointment with Dr. Rivera on 12/20 but was unable to wait until then since the symptoms have been getting a lot worse over the past week.      In the ED. Vitals: T(F): 97.7, Max: 97.7, HR: 66 (66 - 90), BP: 114/59 (114/59 - 133/85), RR: 16 (16 - 20), SpO2: 98% (97% - 100%)  Labs: Troponin negative, BNP 66   EKG: NSR  Imaging: CWR without congestion   Interventions: given home Coreg for BP control, home dose of ASA/Plavix

## 2023-12-05 NOTE — ED ADULT NURSE REASSESSMENT NOTE - NS ED NURSE REASSESS COMMENT FT1
Pt asleep in stretcher with even and unlabored respirations. Remains on continuous cardiac monitoring. Arousable to obtain vital signs, WNL.

## 2023-12-05 NOTE — H&P ADULT - PROBLEM SELECTOR PLAN 5
Home: Rosuvastatin 40 mg   C/w atorvastatin 80 mg QD   F/u AM lipid panel       Diet: Dash/TLC CC   GI ppx; PPI  DVT ppx: Heparin subq   Full code

## 2023-12-05 NOTE — ED PROVIDER NOTE - PHYSICAL EXAMINATION
VITAL SIGNS: I have reviewed nursing notes and confirm.  CONSTITUTIONAL: Well appearing, in no acute distress.   SKIN:  warm and dry, no acute rash.   HEAD:  normocephalic, atraumatic.  EYES: EOM intact; conjunctiva and sclera clear.  ENT: No nasal discharge; airway clear.   NECK: Supple.  CARD: S1, S2, Regular rate and rhythm. Median sternotomy scar well-healed  RESP:   bilateral wheezes intermittent , speaking in full sentences not hypoxic normal work of breathing  ABD: Normal bowel sounds; soft; non-distended; non-tender; no guarding/ rebound.  EXT: Normal ROM.  bilateral 2+ pitting edema to lower extremities. Pulses intact and equal b/l.  NEURO: Alert, oriented, grossly unremarkable  PSYCH: Cooperative, mood and affect appropriate.

## 2023-12-05 NOTE — ED ADULT NURSE NOTE - NSFALLHARMRISKINTERV_ED_ALL_ED
Communicate risk of Fall with Harm to all staff, patient, and family/Provide visual cue: red socks, yellow wristband, yellow gown, etc/Reinforce activity limits and safety measures with patient and family/Bed in lowest position, wheels locked, appropriate side rails in place/Call bell, personal items and telephone in reach/Instruct patient to call for assistance before getting out of bed/chair/stretcher/Non-slip footwear applied when patient is off stretcher/[place] to call system/Physically safe environment - no spills, clutter or unnecessary equipment/Purposeful Proactive Rounding/Room/bathroom lighting operational, light cord in reach Communicate risk of Fall with Harm to all staff, patient, and family/Provide visual cue: red socks, yellow wristband, yellow gown, etc/Reinforce activity limits and safety measures with patient and family/Bed in lowest position, wheels locked, appropriate side rails in place/Call bell, personal items and telephone in reach/Instruct patient to call for assistance before getting out of bed/chair/stretcher/Non-slip footwear applied when patient is off stretcher/[place] to call system/Physically safe environment - no spills, clutter or unnecessary equipment/Purposeful Proactive Rounding/Room/bathroom lighting operational, light cord in reach

## 2023-12-05 NOTE — ED PROVIDER NOTE - OBJECTIVE STATEMENT
53-year-old male with history of CHF hypertension CABG, CAD status post PCI x 10 with residual stent failure as per patient on Plavix aspirin Coreg hypertension metoprolol losartan enalapril  diabetes mellitus Januvia nitroglycerin as needed Jardiance trazodone  here today with 3 days of worsening dyspnea on exertion as well as chest pain on  exertion resolved with rest worsening orthopnea and leg swelling.  Denies fever chills cough abdominal pain.  States that the pain on his chest is a pressure-like sensation which radiates from the left side of the chest to the right side of his chest and is exacerbated with walking and climbing stairs.

## 2023-12-05 NOTE — H&P ADULT - NSHPLABSRESULTS_GEN_ALL_CORE
- Labs:                        14.3   5.44  )-----------( 134      ( 05 Dec 2023 02:32 )             43.0     12-05    138  |  103  |  12  ----------------------------<  242<H>  4.1   |  23  |  0.80    Ca    9.7      05 Dec 2023 02:32    TPro  7.9  /  Alb  4.7  /  TBili  1.1  /  DBili  x   /  AST  22  /  ALT  22  /  AlkPhos  92  12-05      Urinalysis Basic - ( 05 Dec 2023 02:32 )    Color: x / Appearance: x / SG: x / pH: x  Gluc: 242 mg/dL / Ketone: x  / Bili: x / Urobili: x   Blood: x / Protein: x / Nitrite: x   Leuk Esterase: x / RBC: x / WBC x   Sq Epi: x / Non Sq Epi: x / Bacteria: x

## 2023-12-05 NOTE — ED PROVIDER NOTE - PROGRESS NOTE DETAILS
proBNP negative troponin negative Case discussed with cardiology PA as well as fellow will admit for  chest pain on exertion and angina

## 2023-12-05 NOTE — H&P ADULT - ASSESSMENT
Patient is a 53y old  Male former smoker, strong FH of early CAD, w/ PMHx of HTN, HLD, DM T2, EDUARDO (baseline Hgb 8s in 12/2021; EGD 4/2021 w/ antral gastritis and duodenitis + hemorrhoids), CAD (w/ MI in 2010/2020 and multiple PCIs, and recurrent ISR OM1 requiring brachytherapy in the past), CABG x3 (lima-lad, OM 1 Y graft OM 2) on 12/2022, presenting with 3 days of worsening, right sided stabbing chest pain accompanied with orthopnea, diaphoresis and shortness of breath. Admitted to cardiac telemetry for further management.

## 2023-12-05 NOTE — ED PROVIDER NOTE - CLINICAL SUMMARY MEDICAL DECISION MAKING FREE TEXT BOX
53-year-old male with high risk chest pain here today with dyspnea on exertion orthopnea and chest pressure on exertion will evaluate for ACS   EKG   plan for CBC CMP EKG cardiac enzymes proBNP chest x-ray will give gentle Lasix     plan for likely admission 53-year-old male with high risk chest pain here today with dyspnea on exertion orthopnea and chest pressure on exertion will evaluate for ACS   EKGNormal sinus rhythm 62 nonspecific T wave inversion isolated less than half millimeter ST depression lead II no STEMI   plan for CBC CMP EKG cardiac enzymes proBNP chest x-ray will give gentle Lasix   chest x-ray grossly negative for pneumothorax trachea midline   plan for likely admission 53-year-old male with high risk chest pain here today with dyspnea on exertion orthopnea and chest pressure on exertion will evaluate for ACS  EKG: Normal sinus rhythm 62 nonspecific T wave inversion isolated less than half millimeter ST depression lead II no STEMI  Plan for   CBC CMP EKG cardiac enzymes proBNP chest x-ray will give gentle Lasix  chest x-ray grossly negative for pneumothorax trachea midline  plan for likely admission

## 2023-12-06 ENCOUNTER — TRANSCRIPTION ENCOUNTER (OUTPATIENT)
Age: 53
End: 2023-12-06

## 2023-12-06 VITALS
RESPIRATION RATE: 16 BRPM | SYSTOLIC BLOOD PRESSURE: 132 MMHG | HEART RATE: 65 BPM | TEMPERATURE: 98 F | DIASTOLIC BLOOD PRESSURE: 71 MMHG | OXYGEN SATURATION: 98 %

## 2023-12-06 LAB
A1C WITH ESTIMATED AVERAGE GLUCOSE RESULT: 8.6 % — HIGH (ref 4–5.6)
A1C WITH ESTIMATED AVERAGE GLUCOSE RESULT: 8.6 % — HIGH (ref 4–5.6)
ANION GAP SERPL CALC-SCNC: 10 MMOL/L — SIGNIFICANT CHANGE UP (ref 5–17)
ANION GAP SERPL CALC-SCNC: 10 MMOL/L — SIGNIFICANT CHANGE UP (ref 5–17)
BUN SERPL-MCNC: 16 MG/DL — SIGNIFICANT CHANGE UP (ref 7–23)
BUN SERPL-MCNC: 16 MG/DL — SIGNIFICANT CHANGE UP (ref 7–23)
CALCIUM SERPL-MCNC: 9.1 MG/DL — SIGNIFICANT CHANGE UP (ref 8.4–10.5)
CALCIUM SERPL-MCNC: 9.1 MG/DL — SIGNIFICANT CHANGE UP (ref 8.4–10.5)
CHLORIDE SERPL-SCNC: 105 MMOL/L — SIGNIFICANT CHANGE UP (ref 96–108)
CHLORIDE SERPL-SCNC: 105 MMOL/L — SIGNIFICANT CHANGE UP (ref 96–108)
CHOLEST SERPL-MCNC: 99 MG/DL — SIGNIFICANT CHANGE UP
CHOLEST SERPL-MCNC: 99 MG/DL — SIGNIFICANT CHANGE UP
CO2 SERPL-SCNC: 25 MMOL/L — SIGNIFICANT CHANGE UP (ref 22–31)
CO2 SERPL-SCNC: 25 MMOL/L — SIGNIFICANT CHANGE UP (ref 22–31)
CREAT SERPL-MCNC: 0.94 MG/DL — SIGNIFICANT CHANGE UP (ref 0.5–1.3)
CREAT SERPL-MCNC: 0.94 MG/DL — SIGNIFICANT CHANGE UP (ref 0.5–1.3)
EGFR: 98 ML/MIN/1.73M2 — SIGNIFICANT CHANGE UP
EGFR: 98 ML/MIN/1.73M2 — SIGNIFICANT CHANGE UP
ESTIMATED AVERAGE GLUCOSE: 200 MG/DL — HIGH (ref 68–114)
ESTIMATED AVERAGE GLUCOSE: 200 MG/DL — HIGH (ref 68–114)
FERRITIN SERPL-MCNC: 42 NG/ML — SIGNIFICANT CHANGE UP (ref 30–400)
FERRITIN SERPL-MCNC: 42 NG/ML — SIGNIFICANT CHANGE UP (ref 30–400)
GLUCOSE BLDC GLUCOMTR-MCNC: 192 MG/DL — HIGH (ref 70–99)
GLUCOSE BLDC GLUCOMTR-MCNC: 192 MG/DL — HIGH (ref 70–99)
GLUCOSE SERPL-MCNC: 231 MG/DL — HIGH (ref 70–99)
GLUCOSE SERPL-MCNC: 231 MG/DL — HIGH (ref 70–99)
HCT VFR BLD CALC: 41.6 % — SIGNIFICANT CHANGE UP (ref 39–50)
HCT VFR BLD CALC: 41.6 % — SIGNIFICANT CHANGE UP (ref 39–50)
HDLC SERPL-MCNC: 33 MG/DL — LOW
HDLC SERPL-MCNC: 33 MG/DL — LOW
HGB BLD-MCNC: 13.8 G/DL — SIGNIFICANT CHANGE UP (ref 13–17)
HGB BLD-MCNC: 13.8 G/DL — SIGNIFICANT CHANGE UP (ref 13–17)
IRON SATN MFR SERPL: 17 % — SIGNIFICANT CHANGE UP (ref 16–55)
IRON SATN MFR SERPL: 17 % — SIGNIFICANT CHANGE UP (ref 16–55)
IRON SATN MFR SERPL: 63 UG/DL — SIGNIFICANT CHANGE UP (ref 45–165)
IRON SATN MFR SERPL: 63 UG/DL — SIGNIFICANT CHANGE UP (ref 45–165)
LIPID PNL WITH DIRECT LDL SERPL: 26 MG/DL — SIGNIFICANT CHANGE UP
LIPID PNL WITH DIRECT LDL SERPL: 26 MG/DL — SIGNIFICANT CHANGE UP
MAGNESIUM SERPL-MCNC: 1.9 MG/DL — SIGNIFICANT CHANGE UP (ref 1.6–2.6)
MAGNESIUM SERPL-MCNC: 1.9 MG/DL — SIGNIFICANT CHANGE UP (ref 1.6–2.6)
MCHC RBC-ENTMCNC: 27.2 PG — SIGNIFICANT CHANGE UP (ref 27–34)
MCHC RBC-ENTMCNC: 27.2 PG — SIGNIFICANT CHANGE UP (ref 27–34)
MCHC RBC-ENTMCNC: 33.2 GM/DL — SIGNIFICANT CHANGE UP (ref 32–36)
MCHC RBC-ENTMCNC: 33.2 GM/DL — SIGNIFICANT CHANGE UP (ref 32–36)
MCV RBC AUTO: 82.1 FL — SIGNIFICANT CHANGE UP (ref 80–100)
MCV RBC AUTO: 82.1 FL — SIGNIFICANT CHANGE UP (ref 80–100)
NON HDL CHOLESTEROL: 66 MG/DL — SIGNIFICANT CHANGE UP
NON HDL CHOLESTEROL: 66 MG/DL — SIGNIFICANT CHANGE UP
NRBC # BLD: 0 /100 WBCS — SIGNIFICANT CHANGE UP (ref 0–0)
NRBC # BLD: 0 /100 WBCS — SIGNIFICANT CHANGE UP (ref 0–0)
PLATELET # BLD AUTO: 135 K/UL — LOW (ref 150–400)
PLATELET # BLD AUTO: 135 K/UL — LOW (ref 150–400)
POTASSIUM SERPL-MCNC: 4.2 MMOL/L — SIGNIFICANT CHANGE UP (ref 3.5–5.3)
POTASSIUM SERPL-MCNC: 4.2 MMOL/L — SIGNIFICANT CHANGE UP (ref 3.5–5.3)
POTASSIUM SERPL-SCNC: 4.2 MMOL/L — SIGNIFICANT CHANGE UP (ref 3.5–5.3)
POTASSIUM SERPL-SCNC: 4.2 MMOL/L — SIGNIFICANT CHANGE UP (ref 3.5–5.3)
RBC # BLD: 5.07 M/UL — SIGNIFICANT CHANGE UP (ref 4.2–5.8)
RBC # BLD: 5.07 M/UL — SIGNIFICANT CHANGE UP (ref 4.2–5.8)
RBC # FLD: 13.7 % — SIGNIFICANT CHANGE UP (ref 10.3–14.5)
RBC # FLD: 13.7 % — SIGNIFICANT CHANGE UP (ref 10.3–14.5)
SODIUM SERPL-SCNC: 140 MMOL/L — SIGNIFICANT CHANGE UP (ref 135–145)
SODIUM SERPL-SCNC: 140 MMOL/L — SIGNIFICANT CHANGE UP (ref 135–145)
T4 FREE SERPL-MCNC: 0.96 NG/DL — SIGNIFICANT CHANGE UP (ref 0.93–1.7)
T4 FREE SERPL-MCNC: 0.96 NG/DL — SIGNIFICANT CHANGE UP (ref 0.93–1.7)
TIBC SERPL-MCNC: 363 UG/DL — SIGNIFICANT CHANGE UP (ref 220–430)
TIBC SERPL-MCNC: 363 UG/DL — SIGNIFICANT CHANGE UP (ref 220–430)
TRIGL SERPL-MCNC: 198 MG/DL — HIGH
TRIGL SERPL-MCNC: 198 MG/DL — HIGH
TSH SERPL-MCNC: 0.34 UIU/ML — SIGNIFICANT CHANGE UP (ref 0.27–4.2)
TSH SERPL-MCNC: 0.34 UIU/ML — SIGNIFICANT CHANGE UP (ref 0.27–4.2)
UIBC SERPL-MCNC: 300 UG/DL — SIGNIFICANT CHANGE UP (ref 110–370)
UIBC SERPL-MCNC: 300 UG/DL — SIGNIFICANT CHANGE UP (ref 110–370)
WBC # BLD: 6.33 K/UL — SIGNIFICANT CHANGE UP (ref 3.8–10.5)
WBC # BLD: 6.33 K/UL — SIGNIFICANT CHANGE UP (ref 3.8–10.5)
WBC # FLD AUTO: 6.33 K/UL — SIGNIFICANT CHANGE UP (ref 3.8–10.5)
WBC # FLD AUTO: 6.33 K/UL — SIGNIFICANT CHANGE UP (ref 3.8–10.5)

## 2023-12-06 PROCEDURE — 80048 BASIC METABOLIC PNL TOTAL CA: CPT

## 2023-12-06 PROCEDURE — 93017 CV STRESS TEST TRACING ONLY: CPT

## 2023-12-06 PROCEDURE — 99239 HOSP IP/OBS DSCHRG MGMT >30: CPT

## 2023-12-06 PROCEDURE — 83540 ASSAY OF IRON: CPT

## 2023-12-06 PROCEDURE — 85025 COMPLETE CBC W/AUTO DIFF WBC: CPT

## 2023-12-06 PROCEDURE — 83036 HEMOGLOBIN GLYCOSYLATED A1C: CPT

## 2023-12-06 PROCEDURE — 71045 X-RAY EXAM CHEST 1 VIEW: CPT

## 2023-12-06 PROCEDURE — 80061 LIPID PANEL: CPT

## 2023-12-06 PROCEDURE — 83880 ASSAY OF NATRIURETIC PEPTIDE: CPT

## 2023-12-06 PROCEDURE — 84439 ASSAY OF FREE THYROXINE: CPT

## 2023-12-06 PROCEDURE — 78452 HT MUSCLE IMAGE SPECT MULT: CPT | Mod: 26

## 2023-12-06 PROCEDURE — 93016 CV STRESS TEST SUPVJ ONLY: CPT

## 2023-12-06 PROCEDURE — C8929: CPT

## 2023-12-06 PROCEDURE — 83550 IRON BINDING TEST: CPT

## 2023-12-06 PROCEDURE — A9500: CPT

## 2023-12-06 PROCEDURE — 82962 GLUCOSE BLOOD TEST: CPT

## 2023-12-06 PROCEDURE — 93018 CV STRESS TEST I&R ONLY: CPT

## 2023-12-06 PROCEDURE — 78452 HT MUSCLE IMAGE SPECT MULT: CPT

## 2023-12-06 PROCEDURE — 80053 COMPREHEN METABOLIC PANEL: CPT

## 2023-12-06 PROCEDURE — 83735 ASSAY OF MAGNESIUM: CPT

## 2023-12-06 PROCEDURE — 84443 ASSAY THYROID STIM HORMONE: CPT

## 2023-12-06 PROCEDURE — 85576 BLOOD PLATELET AGGREGATION: CPT

## 2023-12-06 PROCEDURE — 99285 EMERGENCY DEPT VISIT HI MDM: CPT

## 2023-12-06 PROCEDURE — 93005 ELECTROCARDIOGRAM TRACING: CPT

## 2023-12-06 PROCEDURE — 36415 COLL VENOUS BLD VENIPUNCTURE: CPT

## 2023-12-06 PROCEDURE — 85027 COMPLETE CBC AUTOMATED: CPT

## 2023-12-06 PROCEDURE — 84484 ASSAY OF TROPONIN QUANT: CPT

## 2023-12-06 PROCEDURE — 82728 ASSAY OF FERRITIN: CPT

## 2023-12-06 RX ORDER — CARVEDILOL PHOSPHATE 80 MG/1
1 CAPSULE, EXTENDED RELEASE ORAL
Refills: 0 | DISCHARGE

## 2023-12-06 RX ORDER — MAGNESIUM OXIDE 400 MG ORAL TABLET 241.3 MG
400 TABLET ORAL ONCE
Refills: 0 | Status: COMPLETED | OUTPATIENT
Start: 2023-12-06 | End: 2023-12-06

## 2023-12-06 RX ADMIN — Medication 1 MILLIGRAM(S): at 15:23

## 2023-12-06 RX ADMIN — PANTOPRAZOLE SODIUM 40 MILLIGRAM(S): 20 TABLET, DELAYED RELEASE ORAL at 06:21

## 2023-12-06 RX ADMIN — Medication 81 MILLIGRAM(S): at 15:23

## 2023-12-06 RX ADMIN — MAGNESIUM OXIDE 400 MG ORAL TABLET 400 MILLIGRAM(S): 241.3 TABLET ORAL at 16:34

## 2023-12-06 RX ADMIN — Medication 2: at 07:55

## 2023-12-06 RX ADMIN — CLOPIDOGREL BISULFATE 75 MILLIGRAM(S): 75 TABLET, FILM COATED ORAL at 15:23

## 2023-12-06 RX ADMIN — Medication 325 MILLIGRAM(S): at 15:23

## 2023-12-06 RX ADMIN — RANOLAZINE 500 MILLIGRAM(S): 500 TABLET, FILM COATED, EXTENDED RELEASE ORAL at 05:34

## 2023-12-06 RX ADMIN — Medication 100 MILLIGRAM(S): at 05:34

## 2023-12-06 RX ADMIN — LOSARTAN POTASSIUM 50 MILLIGRAM(S): 100 TABLET, FILM COATED ORAL at 05:34

## 2023-12-06 NOTE — DISCHARGE NOTE PROVIDER - HOSPITAL COURSE
53y old  Male former smoker, strong FH of early CAD, w/ PMHx of HTN, HLD, DM T2, EDUARDO (baseline Hgb 8s in 12/2021; EGD 4/2021 w/ antral gastritis and duodenitis + hemorrhoids), CAD (w/ MI in 2010/2020 and multiple PCIs, and recurrent ISR OM1 requiring brachytherapy in the past), CABG x3 (lima-lad, OM 1 Y graft OM 2) on 12/2022, presenting with 3 days of right sided chest pain. States that the pain feels like it is a stabbing sensation. is exacerbated by movement and walking up stairs and relieved with nitroglycerin. States orthopnea and shortness of breath has been accompanied with these symptoms and he is unable to tolerate 10 steps without needing to stop and catch his breath. Endorses sweating at rest, dizziness and feeling "unwell" since these symptoms began.  He states that he has one stent that closes up every 6 months that usually needs to be reopened that is accompanied with these same symptoms. Denies changes in bowel movements, recent URI or abdominal pain. Additionally, patient has an appointment with Dr. Rivera on 12/20 but was unable to wait until then since the symptoms have been getting a lot worse over the past week.      In the ED. Vitals: T(F): 97.7, Max: 97.7, HR: 66 (66 - 90), BP: 114/59 (114/59 - 133/85), RR: 16 (16 - 20), SpO2: 98% (97% - 100%)  Labs: Troponin negative, BNP 66   EKG: NSR  Imaging: CWR without congestion   Interventions: given home Coreg for BP control, home dose of ASA/Plavix 54 y/o Male former smoker, strong FH of early CAD, w/ PMHx of HTN, HLD, DM T2, EDUARDO (baseline Hgb 8s in 12/2021; EGD 4/2021 w/ antral gastritis and duodenitis + hemorrhoids), CAD (w/ MI in 2010/2020 and multiple PCIs, and recurrent ISR OM1 requiring brachytherapy in the past), CABG x3 (lima-lad, OM 1 Y graft OM 2) on 12/2022, presenting with 3 days of right sided chest pain. States that the pain feels like it is a stabbing sensation  exacerbated by movement and walking up stairs and relieved with nitroglycerin. States orthopnea and shortness of breath has been accompanied with these symptoms and he is unable to tolerate going up 10 steps without needing to stop and catch his breath. Endorses sweating at rest, dizziness and feeling "unwell" since these symptoms began.  He states that he has one stent that closes up every 6 months that usually needs to be reopened that is accompanied with these same symptoms. Denies changes in bowel movements, recent URI or abdominal pain.     Admitted to cardiac tele and ruled out for ACS. Troponin negative x3, BNP 66 wnl. EKG: NSR 60s, TWi I and aVL. CXR without pulm congestion. Echo unremarkable w/ EF 55-60%, no significant valvular disease. Pharmacologic NST w/ mild ischemia to true apex. Upon med rec, noted pt on Enalapril, Losartan; Metoprolol and Carvedilol. Educated pt and discussed proper med rec upon discharge. Pt will f/u cardiologist Dr Rivera tomorrow in clinic.    On the day of discharge, the patient was seen and examined. Symptoms improved. Vital signs are stable. Labs and imaging reviewed. Patient is medically optimized and hemodynamically stable. Return precautions discussed, medication teach back done, and importance of physician followup emphasized. The patient verbalized understanding. 52 y/o Male former smoker, strong FH of early CAD, w/ PMHx of HTN, HLD, DM T2, EDUARDO (baseline Hgb 8s in 12/2021; EGD 4/2021 w/ antral gastritis and duodenitis + hemorrhoids), CAD (w/ MI in 2010/2020 and multiple PCIs, and recurrent ISR OM1 requiring brachytherapy in the past), CABG x3 (lima-lad, OM 1 Y graft OM 2) on 12/2022, presenting with 3 days of right sided chest pain. States that the pain feels like it is a stabbing sensation  exacerbated by movement and walking up stairs and relieved with nitroglycerin. States orthopnea and shortness of breath has been accompanied with these symptoms and he is unable to tolerate going up 10 steps without needing to stop and catch his breath. Endorses sweating at rest, dizziness and feeling "unwell" since these symptoms began.  He states that he has one stent that closes up every 6 months that usually needs to be reopened that is accompanied with these same symptoms. Denies changes in bowel movements, recent URI or abdominal pain.     Admitted to cardiac tele and ruled out for ACS. Troponin negative x3, BNP 66 wnl. EKG: NSR 60s, TWi I and aVL. CXR without pulm congestion. Echo unremarkable w/ EF 55-60%, no significant valvular disease. Pharmacologic NST w/ mild ischemia to true apex. Upon med rec, noted pt on Enalapril, Losartan; Metoprolol and Carvedilol. Educated pt and discussed proper med rec upon discharge. Pt will f/u cardiologist Dr Rivera tomorrow in clinic.    On the day of discharge, the patient was seen and examined. Symptoms improved. Vital signs are stable. Labs and imaging reviewed. Patient is medically optimized and hemodynamically stable. Return precautions discussed, medication teach back done, and importance of physician followup emphasized. The patient verbalized understanding. 54 y/o Male former smoker, strong FH of early CAD, w/ PMHx of HTN, HLD, DM T2, EDUARDO (baseline Hgb 8s in 12/2021; EGD 4/2021 w/ antral gastritis and duodenitis + hemorrhoids), CAD (w/ MI in 2010/2020 and multiple PCIs, and recurrent ISR OM1 requiring brachytherapy in the past), CABG x3 (lima-lad, OM 1 Y graft OM 2) on 12/2022, presenting with 3 days of right sided chest pain. States that the pain feels like it is a stabbing sensation  exacerbated by movement and walking up stairs and relieved with nitroglycerin. States orthopnea and shortness of breath has been accompanied with these symptoms and he is unable to tolerate going up 10 steps without needing to stop and catch his breath. Endorses sweating at rest, dizziness and feeling "unwell" since these symptoms began.  He states that he has one stent that closes up every 6 months that usually needs to be reopened that is accompanied with these same symptoms. Denies changes in bowel movements, recent URI or abdominal pain.     Admitted to cardiac tele and ruled out for ACS. Troponin negative x3, BNP 66 wnl. EKG: NSR 60s, TWi I and aVL. CXR without pulm congestion. Echo unremarkable w/ EF 55-60%, no significant valvular disease. Pharmacologic NST w/ mild ischemia to true apex: Myocardial perfusion imaging is abnormal. There is a small area of mild ischemia in the true apex. Overall left ventricular systolic function is normal. There is abnormal septal wall motion consistent with prior CABG. The EKG stress test is normal.     Of note upon med rec, noted pt on Enalapril, Losartan; Metoprolol and Carvedilol. Educated pt and discussed proper med rec upon discharge. Pt will f/u cardiologist Dr Rivera tomorrow in clinic.    On the day of discharge, the patient was seen and examined. Symptoms improved. Vital signs are stable. Labs and imaging reviewed. Patient is medically optimized and hemodynamically stable. Return precautions discussed, medication teach back done, and importance of physician followup emphasized. The patient verbalized understanding. 52 y/o Male former smoker, strong FH of early CAD, w/ PMHx of HTN, HLD, DM T2, EDUARDO (baseline Hgb 8s in 12/2021; EGD 4/2021 w/ antral gastritis and duodenitis + hemorrhoids), CAD (w/ MI in 2010/2020 and multiple PCIs, and recurrent ISR OM1 requiring brachytherapy in the past), CABG x3 (lima-lad, OM 1 Y graft OM 2) on 12/2022, presenting with 3 days of right sided chest pain. States that the pain feels like it is a stabbing sensation  exacerbated by movement and walking up stairs and relieved with nitroglycerin. States orthopnea and shortness of breath has been accompanied with these symptoms and he is unable to tolerate going up 10 steps without needing to stop and catch his breath. Endorses sweating at rest, dizziness and feeling "unwell" since these symptoms began.  He states that he has one stent that closes up every 6 months that usually needs to be reopened that is accompanied with these same symptoms. Denies changes in bowel movements, recent URI or abdominal pain.     Admitted to cardiac tele and ruled out for ACS. Troponin negative x3, BNP 66 wnl. EKG: NSR 60s, TWi I and aVL. CXR without pulm congestion. Echo unremarkable w/ EF 55-60%, no significant valvular disease. Pharmacologic NST w/ mild ischemia to true apex: Myocardial perfusion imaging is abnormal. There is a small area of mild ischemia in the true apex. Overall left ventricular systolic function is normal. There is abnormal septal wall motion consistent with prior CABG. The EKG stress test is normal.     Of note upon med rec, noted pt on Enalapril, Losartan; Metoprolol and Carvedilol. Educated pt and discussed proper med rec upon discharge. Pt will f/u cardiologist Dr Rivera tomorrow in clinic.    On the day of discharge, the patient was seen and examined. Symptoms improved. Vital signs are stable. Labs and imaging reviewed. Patient is medically optimized and hemodynamically stable. Return precautions discussed, medication teach back done, and importance of physician followup emphasized. The patient verbalized understanding.

## 2023-12-06 NOTE — DISCHARGE NOTE PROVIDER - NSDCMRMEDTOKEN_GEN_ALL_CORE_FT
ascorbic acid 500 mg oral tablet: 1 tab(s) orally once a day  aspirin 81 mg oral delayed release tablet: 1 tab(s) orally once a day  clopidogrel 75 mg oral tablet: 1 tab(s) orally once a day  ezetimibe 10 mg oral tablet: 1 tab(s) orally once a day  ferrous sulfate 325 mg (65 mg elemental iron) oral tablet: 1 tab(s) orally once a day  folic acid 1 mg oral tablet: 1 tab(s) orally once a day  hydrocortisone 2.5% rectal cream with applicator: rectal once a day  Januvia 100 mg oral tablet: 1 tab(s) orally once a day  Jardiance 10 mg oral tablet: 1 tab(s) orally once a day (in the morning)  losartan 50 mg oral tablet: 1 tab(s) orally once a day  metFORMIN 850 mg oral tablet: 1 tab(s) orally 2 times a day  nitroglycerin 0.4 mg sublingual tablet: 1 tab(s) sublingually every 4 hours as needed for  chest pain  pantoprazole 40 mg oral delayed release tablet: 1 tab(s) orally once a day  polyethylene glycol 3350 oral powder for reconstitution: 17 gram(s) orally once a day  psyllium 1.7 g oral wafer: 1 dose(s) orally 2 times a day  Ranexa 500 mg oral tablet, extended release: 1 tab(s) orally 2 times a day  rosuvastatin 40 mg oral capsule: 1 cap(s) orally once a day  Toprol- mg oral tablet, extended release: 1 tab(s) orally once a day  traZODone 50 mg oral tablet: orally once a day (at bedtime)

## 2023-12-06 NOTE — DISCHARGE NOTE PROVIDER - NSDCCPCAREPLAN_GEN_ALL_CORE_FT
PRINCIPAL DISCHARGE DIAGNOSIS  Diagnosis: CAD (coronary artery disease)  Assessment and Plan of Treatment:      PRINCIPAL DISCHARGE DIAGNOSIS  Diagnosis: Chest pain  Assessment and Plan of Treatment: You came into the hospital with chest pain and underwent cardiac evaluation. Your bloodwork was unremarkable. You had an echocardiogram that was normal. The nuclear stress test showed only mild ischemia of the heart and should be treated with medications only. Continue taking Aspirin, Plavix (clopidigrel), and cholesterol medication. Follow up with your cardiologist Dr Rivera in [place] cardiology office tomorrow.      SECONDARY DISCHARGE DIAGNOSES  Diagnosis: HTN (hypertension)  Assessment and Plan of Treatment: You were taking blood presure medications that seem to be similar in class. STOP taking Enalapril and Carvedilol (Coreg). CONTINUE taking Losartan and Metoprolol Succinate (Toprol).     PRINCIPAL DISCHARGE DIAGNOSIS  Diagnosis: Chest pain  Assessment and Plan of Treatment: You came into the hospital with chest pain and underwent cardiac evaluation. Your bloodwork was unremarkable. You had an echocardiogram that was normal. The nuclear stress test showed only mild ischemia of the heart and should be treated with medications only. Continue taking Aspirin, Plavix (clopidigrel), and cholesterol medication. Follow up with your cardiologist Dr Rivera in [place] cardiology office tomorrow.      SECONDARY DISCHARGE DIAGNOSES  Diagnosis: HTN (hypertension)  Assessment and Plan of Treatment: You were taking blood presure medications that seem to be similar in class. STOP taking Enalapril and Carvedilol (Coreg). CONTINUE taking Losartan and Metoprolol Succinate (Toprol).

## 2023-12-06 NOTE — DISCHARGE NOTE NURSING/CASE MANAGEMENT/SOCIAL WORK - PATIENT PORTAL LINK FT
You can access the FollowMyHealth Patient Portal offered by [facility] by registering at the following website: [email or web address]. By joining [employer]’s FollowMyHealth portal, you will also be able to view your health information using other applications (apps) compatible with our system. You can access the FollowMyHealth Patient Portal offered by [facility] by registering at the following website: [email or web address]. By joining [employer]’s FollowMyHealth portal, you will also be able to view your health information using other applications (apps) compatible with our system.

## 2023-12-06 NOTE — DISCHARGE NOTE NURSING/CASE MANAGEMENT/SOCIAL WORK - NSDCPEFALRISK_GEN_ALL_CORE
For information on Fall & Injury Prevention, visit: [email or web address]-[email or web address]-mobility OR  [email or web address]-injury OR  https://www.cdc.gov/steadi/patient.html For information on Fall & Injury Prevention, visit: [email or web address]://www.[facility]/news/fall-prevention-protects-[email or web address]-maintains-[email or web address]-mobility OR  https://www.[facility]/news/fall-prevention-tips-to-avoid-injury OR  https://www.cdc.gov/steadi/patient.html

## 2023-12-06 NOTE — DISCHARGE NOTE PROVIDER - CARE PROVIDER_API CALL
Mathew Rivera  Interventional Cardiology  [address], [address]  [place] [address]  Phone: [phone]  Fax: [phone]  Established Patient  Follow Up Time: 1-3 days   Mathew Rivera  Interventional Cardiology  [address], [address]  [place] [address]  Phone: [phone]  Fax: [phone]  Established Patient  Follow Up Time: 1-3 days

## 2023-12-06 NOTE — DISCHARGE NOTE PROVIDER - CARE PROVIDERS DIRECT ADDRESSES
,[email or web address]

## 2023-12-11 DIAGNOSIS — I25.2 OLD MYOCARDIAL INFARCTION: ICD-10-CM

## 2023-12-11 DIAGNOSIS — I10 ESSENTIAL (PRIMARY) HYPERTENSION: ICD-10-CM

## 2023-12-11 DIAGNOSIS — I25.10 ATHEROSCLEROTIC HEART DISEASE OF NATIVE CORONARY ARTERY WITHOUT ANGINA PECTORIS: ICD-10-CM

## 2023-12-11 DIAGNOSIS — D50.9 IRON DEFICIENCY ANEMIA, UNSPECIFIED: ICD-10-CM

## 2023-12-11 DIAGNOSIS — Z87.891 PERSONAL HISTORY OF NICOTINE DEPENDENCE: ICD-10-CM

## 2023-12-11 DIAGNOSIS — Z95.5 PRESENCE OF CORONARY ANGIOPLASTY IMPLANT AND GRAFT: ICD-10-CM

## 2023-12-11 DIAGNOSIS — R07.9 CHEST PAIN, UNSPECIFIED: ICD-10-CM

## 2023-12-11 DIAGNOSIS — Z79.82 LONG TERM (CURRENT) USE OF ASPIRIN: ICD-10-CM

## 2023-12-11 DIAGNOSIS — E11.9 TYPE 2 DIABETES MELLITUS WITHOUT COMPLICATIONS: ICD-10-CM

## 2023-12-11 DIAGNOSIS — Z79.02 LONG TERM (CURRENT) USE OF ANTITHROMBOTICS/ANTIPLATELETS: ICD-10-CM

## 2023-12-11 DIAGNOSIS — K21.9 GASTRO-ESOPHAGEAL REFLUX DISEASE WITHOUT ESOPHAGITIS: ICD-10-CM

## 2023-12-11 DIAGNOSIS — Z79.84 LONG TERM (CURRENT) USE OF ORAL HYPOGLYCEMIC DRUGS: ICD-10-CM

## 2023-12-11 DIAGNOSIS — E78.5 HYPERLIPIDEMIA, UNSPECIFIED: ICD-10-CM

## 2023-12-11 DIAGNOSIS — Z95.1 PRESENCE OF AORTOCORONARY BYPASS GRAFT: ICD-10-CM

## 2024-03-05 NOTE — PATIENT PROFILE ADULT - BRADEN ACTIVITY
Dr Mckeon made aware of this- he said to not reschedule to have this done at this time.    ----- Message from Eboni Schumacher sent at 3/5/2024 12:30 PM EST -----  Regarding: Lab Specimen Problem  Hello,    Please see information below for details regarding a problem with samples received at [facility]:    Patient Name: Grisel Palacio  Patient : 1947  Test(s) affected: Magnesium, RBC  Description of Problem: Laboratory error; specimen unsuitable for analysis.    Please place a new order and Client Services will contact the patient for recollection.     If you have any questions, please call [phone] and a representative will assist you.    Thank you,    Sixto Veras [facility] Client Services    
(4) walks frequently

## 2024-04-04 NOTE — PATIENT PROFILE ADULT. - NS TRANSFER DISPOSITION PATIENT BELONGINGS
Per Emily BARRETO PAC:  Please schedule EGD and colonoscopy with Dr. Carrera for epigastric abdominal pain, GERD, history of colon polyps.  
Please call pt to schedule EGD and colonoscopy with Dr. Carrera.      Schedule Procedure:   Please Schedule Routine (next available or patient preference)  Procedure: Colonoscopy (44130) with SuPrep and EGD (98710)  Diagnosis: Abdominal Pain R10.9, Epigastric Pain R10.13, Gastroesophageal Reflux Disease without Esophagitis K21.9, and History of Colon Polyps Z86.010  Is patient:    Diabetic? No  On Non-Insulin Drug? No  ANTIPLATELET / ANTICOAGULATION: MEDICATION:  None  Latex allergy: No  Sleep apnea: Yes  Location: [facility]  Special Instructions:   MAC Anesthesia  For MAC/GA patients if applicable, please verify to hold medications prior to procedure: Selegiline patches x 10 days  Sildenafil, Verdenafil, Tadalafil x 48 hours, and Monoamine oxidase inhibitor (MAOIs), angiotensin receptor blockers, renin inhibitors, ACE inhibitors, diuretics (unless in combination with beta blocker) day of procedure.    
with patient

## 2024-10-04 NOTE — H&P ADULT - NSHPROSALLOTHERNEGRD_GEN_ALL_CORE
Med/Onc referral received for basal sonu skin cancer. Patient accepted 1st available with Dr. Loera. Reviewed appt info and he verbalized understanding    Oncology Navigation   Intake  Date of Diagnosis: 09/20/24  Cancer Type: Skin Cancer Non-Melanoma  Type of Referral: Internal  Date of Referral: 10/03/24  Initial Nurse Navigator Contact: 10/04/24  Referral to Initial Contact Timeline (days): 1  First Appointment Available: 10/14/24  Appointment Date: 10/14/24  First Available Date vs. Scheduled Date (days): 0     Treatment  Current Status: Staging work-up  Date Presented to Tumor Board: 10/03/24    Surgical Oncologist: Dr. Zayra Ely (H&N surg onc)    Medical Oncologist: Dr. Blanca Loera  Consult Date: 10/14/24       Procedures: Biopsy  Biopsy Schedule Date: 09/20/24                 Acuity      Follow Up  No follow-ups on file.       
All other review of systems negative, except as noted in HPI

## 2024-10-18 NOTE — H&P ADULT - NSHPRISKHIVSCREEN_GEN_ALL_CORE
Spoke with Patient . Scheduled for CRT-D procedure on 12/18/2024 with Dr Galan. Procedure details reviewed and advised that pre procedure patient instructions will be sent via portal/mail as requested. Advised to call the office for any questions or concerns prior to scheduled procedure. Understanding verbalized.   
Offered and patient declined

## 2025-01-22 ENCOUNTER — EMERGENCY (EMERGENCY)
Facility: HOSPITAL | Age: 55
LOS: 1 days | Discharge: HOME | End: 2025-01-22
Attending: STUDENT IN AN ORGANIZED HEALTH CARE EDUCATION/TRAINING PROGRAM | Admitting: EMERGENCY MEDICINE
Payer: MEDICAID

## 2025-01-22 VITALS
RESPIRATION RATE: 20 BRPM | TEMPERATURE: 98 F | DIASTOLIC BLOOD PRESSURE: 90 MMHG | HEIGHT: 65 IN | HEART RATE: 86 BPM | OXYGEN SATURATION: 97 % | WEIGHT: 179.9 LBS | SYSTOLIC BLOOD PRESSURE: 143 MMHG

## 2025-01-22 DIAGNOSIS — Z79.02 LONG TERM (CURRENT) USE OF ANTITHROMBOTICS/ANTIPLATELETS: ICD-10-CM

## 2025-01-22 DIAGNOSIS — I25.10 ATHEROSCLEROTIC HEART DISEASE OF NATIVE CORONARY ARTERY WITHOUT ANGINA PECTORIS: ICD-10-CM

## 2025-01-22 DIAGNOSIS — M79.604 PAIN IN RIGHT LEG: ICD-10-CM

## 2025-01-22 DIAGNOSIS — E11.9 TYPE 2 DIABETES MELLITUS WITHOUT COMPLICATIONS: ICD-10-CM

## 2025-01-22 DIAGNOSIS — S30.0XXA CONTUSION OF LOWER BACK AND PELVIS, INITIAL ENCOUNTER: ICD-10-CM

## 2025-01-22 DIAGNOSIS — Z79.84 LONG TERM (CURRENT) USE OF ORAL HYPOGLYCEMIC DRUGS: ICD-10-CM

## 2025-01-22 DIAGNOSIS — Z95.1 PRESENCE OF AORTOCORONARY BYPASS GRAFT: ICD-10-CM

## 2025-01-22 DIAGNOSIS — Z95.5 PRESENCE OF CORONARY ANGIOPLASTY IMPLANT AND GRAFT: ICD-10-CM

## 2025-01-22 DIAGNOSIS — Z98.890 OTHER SPECIFIED POSTPROCEDURAL STATES: Chronic | ICD-10-CM
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
## 2025-01-22 DIAGNOSIS — M54.50 LOW BACK PAIN, UNSPECIFIED: ICD-10-CM
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
## 2025-01-22 DIAGNOSIS — M25.551 PAIN IN RIGHT HIP: ICD-10-CM

## 2025-01-22 DIAGNOSIS — Z98.61 CORONARY ANGIOPLASTY STATUS: Chronic | ICD-10-CM

## 2025-01-22 DIAGNOSIS — Z79.82 LONG TERM (CURRENT) USE OF ASPIRIN: ICD-10-CM

## 2025-01-22 PROCEDURE — 99284 EMERGENCY DEPT VISIT MOD MDM: CPT

## 2025-01-22 PROCEDURE — 72131 CT LUMBAR SPINE W/O DYE: CPT | Mod: 26

## 2025-01-22 PROCEDURE — 73502 X-RAY EXAM HIP UNI 2-3 VIEWS: CPT | Mod: 26,RT

## 2025-01-22 RX ORDER — LIDOCAINE HYDROCHLORIDE 20 MG/ML
1 JELLY TOPICAL ONCE
Refills: 0 | Status: COMPLETED | OUTPATIENT
Start: 2025-01-22 | End: 2025-01-22

## 2025-01-22 RX ORDER — METHOCARBAMOL 500 MG/1
750 TABLET, FILM COATED ORAL ONCE
Refills: 0 | Status: COMPLETED | OUTPATIENT
Start: 2025-01-22 | End: 2025-01-22

## 2025-01-22 RX ORDER — TRAMADOL HYDROCHLORIDE 50 MG/1
50 TABLET, FILM COATED ORAL ONCE
Refills: 0 | Status: DISCONTINUED | OUTPATIENT
Start: 2025-01-22 | End: 2025-01-22

## 2025-01-22 RX ADMIN — METHOCARBAMOL 750 MILLIGRAM(S): 500 TABLET, FILM COATED ORAL at 23:32

## 2025-01-22 RX ADMIN — TRAMADOL HYDROCHLORIDE 50 MILLIGRAM(S): 50 TABLET, FILM COATED ORAL at 23:32

## 2025-01-22 RX ADMIN — LIDOCAINE HYDROCHLORIDE 1 PATCH: 20 JELLY TOPICAL at 23:31

## 2025-01-23 VITALS
RESPIRATION RATE: 19 BRPM | OXYGEN SATURATION: 96 % | HEART RATE: 87 BPM | DIASTOLIC BLOOD PRESSURE: 84 MMHG | SYSTOLIC BLOOD PRESSURE: 134 MMHG | TEMPERATURE: 98 F

## 2025-01-23 PROCEDURE — 72131 CT LUMBAR SPINE W/O DYE: CPT | Mod: MC

## 2025-01-23 PROCEDURE — 73502 X-RAY EXAM HIP UNI 2-3 VIEWS: CPT

## 2025-01-23 PROCEDURE — 99284 EMERGENCY DEPT VISIT MOD MDM: CPT | Mod: 25

## 2025-01-23 RX ORDER — DOCUSATE SODIUM 100 MG
2 CAPSULE ORAL
Qty: 30 | Refills: 0
Start: 2025-01-23

## 2025-01-23 RX ORDER — OXYCODONE HYDROCHLORIDE AND ACETAMINOPHEN 10; 325 MG/1; MG/1
1 TABLET ORAL
Qty: 10 | Refills: 0
Start: 2025-01-23 | End: 2025-01-27

## 2025-01-31 ENCOUNTER — APPOINTMENT (OUTPATIENT)
Dept: COLORECTAL SURGERY | Facility: CLINIC | Age: 55
End: 2025-01-31

## 2025-01-31 ENCOUNTER — NON-APPOINTMENT (OUTPATIENT)
Age: 55
End: 2025-01-31

## 2025-01-31 VITALS
SYSTOLIC BLOOD PRESSURE: 137 MMHG | WEIGHT: 180 LBS | DIASTOLIC BLOOD PRESSURE: 94 MMHG | HEIGHT: 65 IN | HEART RATE: 82 BPM | BODY MASS INDEX: 29.99 KG/M2 | TEMPERATURE: 97.6 F

## 2025-01-31 DIAGNOSIS — K62.89 OTHER SPECIFIED DISEASES OF ANUS AND RECTUM: ICD-10-CM

## 2025-01-31 PROCEDURE — 45330 DIAGNOSTIC SIGMOIDOSCOPY: CPT

## 2025-01-31 PROCEDURE — 99204 OFFICE O/P NEW MOD 45 MIN: CPT | Mod: 25

## 2025-03-13 ENCOUNTER — APPOINTMENT (OUTPATIENT)
Dept: MRI IMAGING | Facility: HOSPITAL | Age: 55
End: 2025-03-13

## 2025-03-13 ENCOUNTER — OUTPATIENT (OUTPATIENT)
Dept: OUTPATIENT SERVICES | Facility: HOSPITAL | Age: 55
LOS: 1 days | End: 2025-03-13
Payer: MEDICAID

## 2025-03-13 DIAGNOSIS — Z98.890 OTHER SPECIFIED POSTPROCEDURAL STATES: Chronic | ICD-10-CM

## 2025-03-13 DIAGNOSIS — Z98.61 CORONARY ANGIOPLASTY STATUS: Chronic | ICD-10-CM

## 2025-03-13 PROCEDURE — 72197 MRI PELVIS W/O & W/DYE: CPT

## 2025-03-13 PROCEDURE — 72197 MRI PELVIS W/O & W/DYE: CPT | Mod: 26

## 2025-03-13 PROCEDURE — A9585: CPT

## 2025-03-24 ENCOUNTER — NON-APPOINTMENT (OUTPATIENT)
Age: 55
End: 2025-03-24

## 2025-03-24 ENCOUNTER — APPOINTMENT (OUTPATIENT)
Dept: GASTROENTEROLOGY | Facility: CLINIC | Age: 55
End: 2025-03-24
Payer: MEDICAID

## 2025-03-24 PROCEDURE — 99205 OFFICE O/P NEW HI 60 MIN: CPT | Mod: 93

## 2025-03-31 NOTE — ED ADULT TRIAGE NOTE - LOCATION:
Left arm; FAMILY HISTORY:  Father  Still living? No  FH: emphysema, Age at diagnosis: Age Unknown  FH: heart attack, Age at diagnosis: Age Unknown